# Patient Record
Sex: MALE | Race: BLACK OR AFRICAN AMERICAN | Employment: OTHER | URBAN - METROPOLITAN AREA
[De-identification: names, ages, dates, MRNs, and addresses within clinical notes are randomized per-mention and may not be internally consistent; named-entity substitution may affect disease eponyms.]

---

## 2017-06-02 ENCOUNTER — HOSPITAL ENCOUNTER (OUTPATIENT)
Dept: ULTRASOUND IMAGING | Age: 67
Discharge: HOME | End: 2017-06-02
Attending: UROLOGY | Admitting: UROLOGY

## 2017-06-02 DIAGNOSIS — C61 MALIGNANT NEOPLASM OF PROSTATE (HCC): ICD-10-CM

## 2018-03-31 ENCOUNTER — HOSPITAL ENCOUNTER (OUTPATIENT)
Dept: OTHER | Age: 68
Discharge: HOME | End: 2018-03-31
Attending: INTERNAL MEDICINE | Admitting: INTERNAL MEDICINE

## 2018-03-31 LAB
ANION GAP SERPL CALCULATED.3IONS-SCNC: 18 MMOL/L (ref 3–16)
BASOPHILS ABSOLUTE: 0.1 K/UL (ref 0–0.2)
BASOPHILS RELATIVE PERCENT: 1.2 %
BUN BLDV-MCNC: 16 MG/DL (ref 7–20)
CALCIUM SERPL-MCNC: 10 MG/DL (ref 8.3–10.6)
CHLORIDE BLD-SCNC: 103 MMOL/L (ref 99–110)
CO2: 23 MMOL/L (ref 21–32)
CREAT SERPL-MCNC: 1.9 MG/DL (ref 0.8–1.3)
CREATININE URINE: 164.5 MG/DL (ref 39–259)
EOSINOPHILS ABSOLUTE: 0.3 K/UL (ref 0–0.6)
EOSINOPHILS RELATIVE PERCENT: 4.8 %
FERRITIN: 176.6 NG/ML (ref 30–400)
GFR AFRICAN AMERICAN: 43
GFR NON-AFRICAN AMERICAN: 36
GLUCOSE BLD-MCNC: 186 MG/DL (ref 70–99)
HCT VFR BLD CALC: 42.2 % (ref 40.5–52.5)
HEMOGLOBIN: 13.9 G/DL (ref 13.5–17.5)
IRON SATURATION: 24 % (ref 20–50)
IRON: 67 UG/DL (ref 59–158)
LYMPHOCYTES ABSOLUTE: 2.7 K/UL (ref 1–5.1)
LYMPHOCYTES RELATIVE PERCENT: 42.7 %
MCH RBC QN AUTO: 32.1 PG (ref 26–34)
MCHC RBC AUTO-ENTMCNC: 32.9 G/DL (ref 31–36)
MCV RBC AUTO: 97.7 FL (ref 80–100)
MICROALBUMIN UR-MCNC: 305.1 MG/DL
MICROALBUMIN/CREAT UR-RTO: 1854.7 MG/G (ref 0–30)
MONOCYTES ABSOLUTE: 0.7 K/UL (ref 0–1.3)
MONOCYTES RELATIVE PERCENT: 10.8 %
NEUTROPHILS ABSOLUTE: 2.6 K/UL (ref 1.7–7.7)
NEUTROPHILS RELATIVE PERCENT: 40.5 %
PARATHYROID HORMONE INTACT: 27.2 PG/ML (ref 14–72)
PDW BLD-RTO: 14.3 % (ref 12.4–15.4)
PHOSPHORUS: 3.6 MG/DL (ref 2.5–4.9)
PLATELET # BLD: 356 K/UL (ref 135–450)
PMV BLD AUTO: 7.6 FL (ref 5–10.5)
POTASSIUM SERPL-SCNC: 4.5 MMOL/L (ref 3.5–5.1)
RBC # BLD: 4.32 M/UL (ref 4.2–5.9)
SODIUM BLD-SCNC: 144 MMOL/L (ref 136–145)
TOTAL IRON BINDING CAPACITY: 277 UG/DL (ref 260–445)
VITAMIN D 25-HYDROXY: 29 NG/ML
WBC # BLD: 6.4 K/UL (ref 4–11)

## 2018-10-10 ENCOUNTER — HOSPITAL ENCOUNTER (OUTPATIENT)
Age: 68
Discharge: HOME | End: 2018-10-10
Payer: MEDICARE

## 2018-10-10 LAB
ANION GAP SERPL CALCULATED.3IONS-SCNC: 15 MMOL/L (ref 3–16)
BUN BLDV-MCNC: 22 MG/DL (ref 7–20)
CALCIUM SERPL-MCNC: 10.2 MG/DL (ref 8.3–10.6)
CHLORIDE BLD-SCNC: 99 MMOL/L (ref 99–110)
CO2: 23 MMOL/L (ref 21–32)
CREAT SERPL-MCNC: 1.8 MG/DL (ref 0.8–1.3)
CREATININE URINE: 178.9 MG/DL (ref 39–259)
FERRITIN: 188.6 NG/ML (ref 30–400)
GFR AFRICAN AMERICAN: 46
GFR NON-AFRICAN AMERICAN: 38
GLUCOSE BLD-MCNC: 279 MG/DL (ref 70–99)
HCT VFR BLD CALC: 42.1 % (ref 40.5–52.5)
HEMOGLOBIN: 14 G/DL (ref 13.5–17.5)
IRON SATURATION: 25 % (ref 20–50)
IRON: 68 UG/DL (ref 59–158)
MCH RBC QN AUTO: 31.5 PG (ref 26–34)
MCHC RBC AUTO-ENTMCNC: 33.2 G/DL (ref 31–36)
MCV RBC AUTO: 95 FL (ref 80–100)
MICROALBUMIN UR-MCNC: 501.8 MG/DL
MICROALBUMIN/CREAT UR-RTO: 2804.9 MG/G (ref 0–30)
PARATHYROID HORMONE INTACT: 27.6 PG/ML (ref 14–72)
PDW BLD-RTO: 13.7 % (ref 12.4–15.4)
PHOSPHORUS: 2.8 MG/DL (ref 2.5–4.9)
PLATELET # BLD: 284 K/UL (ref 135–450)
PMV BLD AUTO: 8.1 FL (ref 5–10.5)
POTASSIUM SERPL-SCNC: 4.4 MMOL/L (ref 3.5–5.1)
RBC # BLD: 4.43 M/UL (ref 4.2–5.9)
SODIUM BLD-SCNC: 137 MMOL/L (ref 136–145)
TOTAL IRON BINDING CAPACITY: 271 UG/DL (ref 260–445)
VITAMIN D 25-HYDROXY: 27 NG/ML
WBC # BLD: 7.3 K/UL (ref 4–11)

## 2018-10-10 PROCEDURE — 82043 UR ALBUMIN QUANTITATIVE: CPT

## 2018-10-10 PROCEDURE — 83540 ASSAY OF IRON: CPT

## 2018-10-10 PROCEDURE — 83970 ASSAY OF PARATHORMONE: CPT

## 2018-10-10 PROCEDURE — 85027 COMPLETE CBC AUTOMATED: CPT

## 2018-10-10 PROCEDURE — 84100 ASSAY OF PHOSPHORUS: CPT

## 2018-10-10 PROCEDURE — 36415 COLL VENOUS BLD VENIPUNCTURE: CPT

## 2018-10-10 PROCEDURE — 82570 ASSAY OF URINE CREATININE: CPT

## 2018-10-10 PROCEDURE — 82306 VITAMIN D 25 HYDROXY: CPT

## 2018-10-10 PROCEDURE — 82728 ASSAY OF FERRITIN: CPT

## 2018-10-10 PROCEDURE — 80048 BASIC METABOLIC PNL TOTAL CA: CPT

## 2018-10-10 PROCEDURE — 83550 IRON BINDING TEST: CPT

## 2019-05-17 ENCOUNTER — HOSPITAL ENCOUNTER (OUTPATIENT)
Age: 69
Discharge: HOME | End: 2019-05-17
Payer: MEDICARE

## 2019-05-17 LAB
ALBUMIN SERPL-MCNC: 3.6 G/DL (ref 3.4–5)
ANION GAP SERPL CALCULATED.3IONS-SCNC: 13 MMOL/L (ref 3–16)
BUN BLDV-MCNC: 16 MG/DL (ref 7–20)
CALCIUM SERPL-MCNC: 9.5 MG/DL (ref 8.3–10.6)
CHLORIDE BLD-SCNC: 108 MMOL/L (ref 99–110)
CO2: 24 MMOL/L (ref 21–32)
CREAT SERPL-MCNC: 2.2 MG/DL (ref 0.8–1.3)
CREATININE URINE: 119.5 MG/DL (ref 39–259)
FERRITIN: 88 NG/ML (ref 30–400)
GFR AFRICAN AMERICAN: 36
GFR NON-AFRICAN AMERICAN: 30
GLUCOSE BLD-MCNC: 56 MG/DL (ref 70–99)
HCT VFR BLD CALC: 41.8 % (ref 40.5–52.5)
HEMOGLOBIN: 14.2 G/DL (ref 13.5–17.5)
IRON SATURATION: 19 % (ref 20–50)
IRON: 53 UG/DL (ref 59–158)
MCH RBC QN AUTO: 33.5 PG (ref 26–34)
MCHC RBC AUTO-ENTMCNC: 34 G/DL (ref 31–36)
MCV RBC AUTO: 98.6 FL (ref 80–100)
MICROALBUMIN UR-MCNC: 278 MG/DL
MICROALBUMIN/CREAT UR-RTO: 2326.4 MG/G (ref 0–30)
PARATHYROID HORMONE INTACT: 68.1 PG/ML (ref 14–72)
PDW BLD-RTO: 14.2 % (ref 12.4–15.4)
PHOSPHORUS: 2.2 MG/DL (ref 2.5–4.9)
PLATELET # BLD: 317 K/UL (ref 135–450)
PMV BLD AUTO: 7.5 FL (ref 5–10.5)
POTASSIUM SERPL-SCNC: 4.4 MMOL/L (ref 3.5–5.1)
RBC # BLD: 4.24 M/UL (ref 4.2–5.9)
SODIUM BLD-SCNC: 145 MMOL/L (ref 136–145)
TOTAL IRON BINDING CAPACITY: 274 UG/DL (ref 260–445)
VITAMIN D 25-HYDROXY: 24.4 NG/ML
WBC # BLD: 7.3 K/UL (ref 4–11)

## 2019-05-17 PROCEDURE — 80069 RENAL FUNCTION PANEL: CPT

## 2019-05-17 PROCEDURE — 85027 COMPLETE CBC AUTOMATED: CPT

## 2019-05-17 PROCEDURE — 83550 IRON BINDING TEST: CPT

## 2019-05-17 PROCEDURE — 83540 ASSAY OF IRON: CPT

## 2019-05-17 PROCEDURE — 83970 ASSAY OF PARATHORMONE: CPT

## 2019-05-17 PROCEDURE — 82728 ASSAY OF FERRITIN: CPT

## 2019-05-17 PROCEDURE — 82043 UR ALBUMIN QUANTITATIVE: CPT

## 2019-05-17 PROCEDURE — 82570 ASSAY OF URINE CREATININE: CPT

## 2019-05-17 PROCEDURE — 82306 VITAMIN D 25 HYDROXY: CPT

## 2019-05-17 PROCEDURE — 36415 COLL VENOUS BLD VENIPUNCTURE: CPT

## 2019-09-06 ENCOUNTER — HOSPITAL ENCOUNTER (OUTPATIENT)
Age: 69
Discharge: HOME | End: 2019-09-06
Payer: MEDICARE

## 2019-09-06 LAB
ALBUMIN SERPL-MCNC: 4.3 G/DL (ref 3.4–5)
ANION GAP SERPL CALCULATED.3IONS-SCNC: 19 MMOL/L (ref 3–16)
BUN BLDV-MCNC: 18 MG/DL (ref 7–20)
CALCIUM SERPL-MCNC: 10.4 MG/DL (ref 8.3–10.6)
CHLORIDE BLD-SCNC: 104 MMOL/L (ref 99–110)
CO2: 22 MMOL/L (ref 21–32)
CREAT SERPL-MCNC: 2.2 MG/DL (ref 0.8–1.3)
CREATININE URINE: 213.7 MG/DL (ref 39–259)
FERRITIN: 117.8 NG/ML (ref 30–400)
GFR AFRICAN AMERICAN: 36
GFR NON-AFRICAN AMERICAN: 30
GLUCOSE BLD-MCNC: 55 MG/DL (ref 70–99)
HCT VFR BLD CALC: 45.6 % (ref 40.5–52.5)
HEMOGLOBIN: 14.9 G/DL (ref 13.5–17.5)
IRON SATURATION: 21 % (ref 20–50)
IRON: 65 UG/DL (ref 59–158)
MCH RBC QN AUTO: 32.4 PG (ref 26–34)
MCHC RBC AUTO-ENTMCNC: 32.7 G/DL (ref 31–36)
MCV RBC AUTO: 99.1 FL (ref 80–100)
MICROALBUMIN UR-MCNC: 661.1 MG/DL
MICROALBUMIN/CREAT UR-RTO: 3093.6 MG/G (ref 0–30)
PARATHYROID HORMONE INTACT: 41.4 PG/ML (ref 14–72)
PDW BLD-RTO: 15 % (ref 12.4–15.4)
PHOSPHORUS: 3.3 MG/DL (ref 2.5–4.9)
PLATELET # BLD: 354 K/UL (ref 135–450)
PMV BLD AUTO: 7.7 FL (ref 5–10.5)
POTASSIUM SERPL-SCNC: 4.2 MMOL/L (ref 3.5–5.1)
RBC # BLD: 4.6 M/UL (ref 4.2–5.9)
SODIUM BLD-SCNC: 145 MMOL/L (ref 136–145)
TOTAL IRON BINDING CAPACITY: 312 UG/DL (ref 260–445)
VITAMIN D 25-HYDROXY: 30.2 NG/ML
WBC # BLD: 10.2 K/UL (ref 4–11)

## 2019-09-06 PROCEDURE — 83550 IRON BINDING TEST: CPT

## 2019-09-06 PROCEDURE — 80069 RENAL FUNCTION PANEL: CPT

## 2019-09-06 PROCEDURE — 85027 COMPLETE CBC AUTOMATED: CPT

## 2019-09-06 PROCEDURE — 82306 VITAMIN D 25 HYDROXY: CPT

## 2019-09-06 PROCEDURE — 82570 ASSAY OF URINE CREATININE: CPT

## 2019-09-06 PROCEDURE — 82043 UR ALBUMIN QUANTITATIVE: CPT

## 2019-09-06 PROCEDURE — 36415 COLL VENOUS BLD VENIPUNCTURE: CPT

## 2019-09-06 PROCEDURE — 83540 ASSAY OF IRON: CPT

## 2019-09-06 PROCEDURE — 83970 ASSAY OF PARATHORMONE: CPT

## 2019-09-06 PROCEDURE — 82728 ASSAY OF FERRITIN: CPT

## 2020-02-12 ENCOUNTER — HOSPITAL ENCOUNTER (OUTPATIENT)
Age: 70
Discharge: HOME | End: 2020-02-12
Payer: COMMERCIAL

## 2020-02-12 LAB
ALBUMIN SERPL-MCNC: 4.1 G/DL (ref 3.4–5)
ANION GAP SERPL CALCULATED.3IONS-SCNC: 19 MMOL/L (ref 3–16)
BUN BLDV-MCNC: 31 MG/DL (ref 7–20)
CALCIUM SERPL-MCNC: 9.9 MG/DL (ref 8.3–10.6)
CHLORIDE BLD-SCNC: 105 MMOL/L (ref 99–110)
CO2: 21 MMOL/L (ref 21–32)
CREAT SERPL-MCNC: 2.1 MG/DL (ref 0.8–1.3)
CREATININE URINE: 133.6 MG/DL (ref 39–259)
FERRITIN: 73.3 NG/ML (ref 30–400)
GFR AFRICAN AMERICAN: 38
GFR NON-AFRICAN AMERICAN: 32
GLUCOSE BLD-MCNC: 98 MG/DL (ref 70–99)
HCT VFR BLD CALC: 43.8 % (ref 40.5–52.5)
HEMOGLOBIN: 14.5 G/DL (ref 13.5–17.5)
IRON SATURATION: 25 % (ref 20–50)
IRON: 78 UG/DL (ref 59–158)
MCH RBC QN AUTO: 32.2 PG (ref 26–34)
MCHC RBC AUTO-ENTMCNC: 33 G/DL (ref 31–36)
MCV RBC AUTO: 97.7 FL (ref 80–100)
MICROALBUMIN UR-MCNC: 424.7 MG/DL
MICROALBUMIN/CREAT UR-RTO: 3178.9 MG/G (ref 0–30)
PARATHYROID HORMONE INTACT: 36.8 PG/ML (ref 14–72)
PDW BLD-RTO: 14.3 % (ref 12.4–15.4)
PHOSPHORUS: 3.8 MG/DL (ref 2.5–4.9)
PLATELET # BLD: 307 K/UL (ref 135–450)
PMV BLD AUTO: 7.6 FL (ref 5–10.5)
POTASSIUM SERPL-SCNC: 4.3 MMOL/L (ref 3.5–5.1)
RBC # BLD: 4.49 M/UL (ref 4.2–5.9)
SODIUM BLD-SCNC: 145 MMOL/L (ref 136–145)
TOTAL IRON BINDING CAPACITY: 314 UG/DL (ref 260–445)
VITAMIN D 25-HYDROXY: 27.2 NG/ML
WBC # BLD: 6.7 K/UL (ref 4–11)

## 2020-02-12 PROCEDURE — 85027 COMPLETE CBC AUTOMATED: CPT

## 2020-02-12 PROCEDURE — 83540 ASSAY OF IRON: CPT

## 2020-02-12 PROCEDURE — 83970 ASSAY OF PARATHORMONE: CPT

## 2020-02-12 PROCEDURE — 82043 UR ALBUMIN QUANTITATIVE: CPT

## 2020-02-12 PROCEDURE — 83550 IRON BINDING TEST: CPT

## 2020-02-12 PROCEDURE — 82570 ASSAY OF URINE CREATININE: CPT

## 2020-02-12 PROCEDURE — 36415 COLL VENOUS BLD VENIPUNCTURE: CPT

## 2020-02-12 PROCEDURE — 82306 VITAMIN D 25 HYDROXY: CPT

## 2020-02-12 PROCEDURE — 80069 RENAL FUNCTION PANEL: CPT

## 2020-02-12 PROCEDURE — 82728 ASSAY OF FERRITIN: CPT

## 2020-09-09 ENCOUNTER — OFFICE VISIT (OUTPATIENT)
Dept: INTERNAL MEDICINE CLINIC | Age: 70
End: 2020-09-09
Payer: COMMERCIAL

## 2020-09-09 VITALS
BODY MASS INDEX: 51.38 KG/M2 | OXYGEN SATURATION: 93 % | HEIGHT: 63 IN | DIASTOLIC BLOOD PRESSURE: 78 MMHG | HEART RATE: 84 BPM | WEIGHT: 290 LBS | TEMPERATURE: 96.8 F | SYSTOLIC BLOOD PRESSURE: 140 MMHG

## 2020-09-09 PROBLEM — G47.33 OSA (OBSTRUCTIVE SLEEP APNEA): Status: ACTIVE | Noted: 2020-01-28

## 2020-09-09 PROBLEM — Z86.010 HISTORY OF COLONIC POLYPS: Status: ACTIVE | Noted: 2017-07-21

## 2020-09-09 PROBLEM — E11.9 TYPE 2 DIABETES MELLITUS WITHOUT COMPLICATION, WITHOUT LONG-TERM CURRENT USE OF INSULIN (HCC): Status: ACTIVE | Noted: 2018-02-07

## 2020-09-09 PROBLEM — M47.816 SPONDYLOSIS OF LUMBAR REGION WITHOUT MYELOPATHY OR RADICULOPATHY: Status: ACTIVE | Noted: 2018-02-16

## 2020-09-09 PROBLEM — N18.9 ANEMIA IN CHRONIC KIDNEY DISEASE: Status: ACTIVE | Noted: 2020-09-09

## 2020-09-09 PROBLEM — Z80.0 FAMILY HISTORY OF MALIGNANT NEOPLASM OF GASTROINTESTINAL TRACT: Status: ACTIVE | Noted: 2017-05-12

## 2020-09-09 PROBLEM — N25.0 ROD (RENAL OSTEODYSTROPHY): Status: ACTIVE | Noted: 2020-09-09

## 2020-09-09 PROBLEM — Z86.0100 HISTORY OF COLONIC POLYPS: Status: ACTIVE | Noted: 2017-07-21

## 2020-09-09 PROBLEM — D63.1 ANEMIA IN CHRONIC KIDNEY DISEASE: Status: ACTIVE | Noted: 2020-09-09

## 2020-09-09 PROBLEM — G47.10 HYPERSOMNOLENCE: Status: ACTIVE | Noted: 2020-01-28

## 2020-09-09 PROCEDURE — 1036F TOBACCO NON-USER: CPT | Performed by: INTERNAL MEDICINE

## 2020-09-09 PROCEDURE — 3017F COLORECTAL CA SCREEN DOC REV: CPT | Performed by: INTERNAL MEDICINE

## 2020-09-09 PROCEDURE — 3046F HEMOGLOBIN A1C LEVEL >9.0%: CPT | Performed by: INTERNAL MEDICINE

## 2020-09-09 PROCEDURE — G8417 CALC BMI ABV UP PARAM F/U: HCPCS | Performed by: INTERNAL MEDICINE

## 2020-09-09 PROCEDURE — 1123F ACP DISCUSS/DSCN MKR DOCD: CPT | Performed by: INTERNAL MEDICINE

## 2020-09-09 PROCEDURE — 2022F DILAT RTA XM EVC RTNOPTHY: CPT | Performed by: INTERNAL MEDICINE

## 2020-09-09 PROCEDURE — 99203 OFFICE O/P NEW LOW 30 MIN: CPT | Performed by: INTERNAL MEDICINE

## 2020-09-09 PROCEDURE — 4040F PNEUMOC VAC/ADMIN/RCVD: CPT | Performed by: INTERNAL MEDICINE

## 2020-09-09 PROCEDURE — G8427 DOCREV CUR MEDS BY ELIG CLIN: HCPCS | Performed by: INTERNAL MEDICINE

## 2020-09-09 RX ORDER — AMMONIUM LACTATE 12 G/100G
CREAM TOPICAL PRN
COMMUNITY

## 2020-09-09 RX ORDER — HYDROCODONE BITARTRATE AND ACETAMINOPHEN 10; 325 MG/1; MG/1
1 TABLET ORAL EVERY 8 HOURS PRN
COMMUNITY
Start: 2020-08-24 | End: 2020-10-23

## 2020-09-09 RX ORDER — GLIMEPIRIDE 4 MG/1
4 TABLET ORAL
COMMUNITY
Start: 2020-07-01 | End: 2021-11-04 | Stop reason: SDUPTHER

## 2020-09-09 RX ORDER — PIOGLITAZONEHYDROCHLORIDE 30 MG/1
30 TABLET ORAL DAILY
COMMUNITY
Start: 2020-06-29 | End: 2021-03-17 | Stop reason: SDUPTHER

## 2020-09-09 RX ORDER — ALENDRONATE SODIUM 70 MG/1
70 TABLET ORAL
COMMUNITY
Start: 2020-07-01 | End: 2021-04-24

## 2020-09-09 RX ORDER — METOPROLOL SUCCINATE 100 MG/1
100 TABLET, EXTENDED RELEASE ORAL DAILY
COMMUNITY
Start: 2019-10-29 | End: 2021-03-18 | Stop reason: SDUPTHER

## 2020-09-09 RX ORDER — AMLODIPINE BESYLATE 10 MG/1
10 TABLET ORAL DAILY
COMMUNITY
Start: 2019-10-29 | End: 2021-03-18 | Stop reason: SDUPTHER

## 2020-09-09 RX ORDER — SALSALATE 750 MG
750 TABLET ORAL 2 TIMES DAILY PRN
COMMUNITY
Start: 2020-07-01

## 2020-09-09 RX ORDER — ATORVASTATIN CALCIUM 40 MG/1
40 TABLET, FILM COATED ORAL NIGHTLY
COMMUNITY
Start: 2020-07-01 | End: 2021-03-18 | Stop reason: SDUPTHER

## 2020-09-09 RX ORDER — LANOLIN ALCOHOL/MO/W.PET/CERES
50 CREAM (GRAM) TOPICAL DAILY
COMMUNITY

## 2020-09-09 SDOH — SOCIAL STABILITY: SOCIAL INSECURITY: WITHIN THE LAST YEAR, HAVE YOU BEEN AFRAID OF YOUR PARTNER OR EX-PARTNER?: NO

## 2020-09-09 SDOH — ECONOMIC STABILITY: TRANSPORTATION INSECURITY
IN THE PAST 12 MONTHS, HAS THE LACK OF TRANSPORTATION KEPT YOU FROM MEDICAL APPOINTMENTS OR FROM GETTING MEDICATIONS?: NO

## 2020-09-09 SDOH — SOCIAL STABILITY: SOCIAL NETWORK: HOW OFTEN DO YOU GET TOGETHER WITH FRIENDS OR RELATIVES?: MORE THAN THREE TIMES A WEEK

## 2020-09-09 SDOH — HEALTH STABILITY: MENTAL HEALTH
STRESS IS WHEN SOMEONE FEELS TENSE, NERVOUS, ANXIOUS, OR CAN'T SLEEP AT NIGHT BECAUSE THEIR MIND IS TROUBLED. HOW STRESSED ARE YOU?: NOT AT ALL

## 2020-09-09 SDOH — SOCIAL STABILITY: SOCIAL NETWORK: HOW OFTEN DO YOU ATTENT MEETINGS OF THE CLUB OR ORGANIZATION YOU BELONG TO?: MORE THAN 4 TIMES PER YEAR

## 2020-09-09 SDOH — HEALTH STABILITY: MENTAL HEALTH: HOW OFTEN DO YOU HAVE A DRINK CONTAINING ALCOHOL?: MONTHLY OR LESS

## 2020-09-09 SDOH — SOCIAL STABILITY: SOCIAL NETWORK: HOW OFTEN DO YOU ATTEND CHURCH OR RELIGIOUS SERVICES?: 1 TO 4 TIMES PER YEAR

## 2020-09-09 SDOH — SOCIAL STABILITY: SOCIAL INSECURITY
WITHIN THE LAST YEAR, HAVE YOU BEEN KICKED, HIT, SLAPPED, OR OTHERWISE PHYSICALLY HURT BY YOUR PARTNER OR EX-PARTNER?: NO

## 2020-09-09 SDOH — ECONOMIC STABILITY: INCOME INSECURITY: HOW HARD IS IT FOR YOU TO PAY FOR THE VERY BASICS LIKE FOOD, HOUSING, MEDICAL CARE, AND HEATING?: NOT VERY HARD

## 2020-09-09 SDOH — SOCIAL STABILITY: SOCIAL INSECURITY: WITHIN THE LAST YEAR, HAVE YOU BEEN HUMILIATED OR EMOTIONALLY ABUSED IN OTHER WAYS BY YOUR PARTNER OR EX-PARTNER?: NO

## 2020-09-09 SDOH — HEALTH STABILITY: PHYSICAL HEALTH: ON AVERAGE, HOW MANY DAYS PER WEEK DO YOU ENGAGE IN MODERATE TO STRENUOUS EXERCISE (LIKE A BRISK WALK)?: NOT ASKED

## 2020-09-09 SDOH — ECONOMIC STABILITY: TRANSPORTATION INSECURITY
IN THE PAST 12 MONTHS, HAS LACK OF TRANSPORTATION KEPT YOU FROM MEETINGS, WORK, OR FROM GETTING THINGS NEEDED FOR DAILY LIVING?: NO

## 2020-09-09 SDOH — HEALTH STABILITY: MENTAL HEALTH: HOW MANY STANDARD DRINKS CONTAINING ALCOHOL DO YOU HAVE ON A TYPICAL DAY?: 1 OR 2

## 2020-09-09 SDOH — ECONOMIC STABILITY: FOOD INSECURITY: WITHIN THE PAST 12 MONTHS, THE FOOD YOU BOUGHT JUST DIDN'T LAST AND YOU DIDN'T HAVE MONEY TO GET MORE.: NEVER TRUE

## 2020-09-09 SDOH — ECONOMIC STABILITY: FOOD INSECURITY: WITHIN THE PAST 12 MONTHS, YOU WORRIED THAT YOUR FOOD WOULD RUN OUT BEFORE YOU GOT MONEY TO BUY MORE.: NEVER TRUE

## 2020-09-09 SDOH — SOCIAL STABILITY: SOCIAL NETWORK
IN A TYPICAL WEEK, HOW MANY TIMES DO YOU TALK ON THE PHONE WITH FAMILY, FRIENDS, OR NEIGHBORS?: MORE THAN THREE TIMES A WEEK

## 2020-09-09 SDOH — SOCIAL STABILITY: SOCIAL NETWORK: ARE YOU MARRIED, WIDOWED, DIVORCED, SEPARATED, NEVER MARRIED, OR LIVING WITH A PARTNER?: DIVORCED

## 2020-09-09 SDOH — HEALTH STABILITY: PHYSICAL HEALTH: ON AVERAGE, HOW MANY MINUTES DO YOU ENGAGE IN EXERCISE AT THIS LEVEL?: NOT ASKED

## 2020-09-09 SDOH — SOCIAL STABILITY: SOCIAL INSECURITY
WITHIN THE LAST YEAR, HAVE TO BEEN RAPED OR FORCED TO HAVE ANY KIND OF SEXUAL ACTIVITY BY YOUR PARTNER OR EX-PARTNER?: NO

## 2020-09-09 ASSESSMENT — ENCOUNTER SYMPTOMS
RESPIRATORY NEGATIVE: 1
ALLERGIC/IMMUNOLOGIC NEGATIVE: 1
GASTROINTESTINAL NEGATIVE: 1
EYES NEGATIVE: 1

## 2020-09-09 ASSESSMENT — PATIENT HEALTH QUESTIONNAIRE - PHQ9
SUM OF ALL RESPONSES TO PHQ9 QUESTIONS 1 & 2: 0
SUM OF ALL RESPONSES TO PHQ QUESTIONS 1-9: 0
2. FEELING DOWN, DEPRESSED OR HOPELESS: 0
SUM OF ALL RESPONSES TO PHQ QUESTIONS 1-9: 0
1. LITTLE INTEREST OR PLEASURE IN DOING THINGS: 0

## 2020-09-09 NOTE — PROGRESS NOTES
Subjective:      Patient ID: Dipak Steve is a 72 y.o. male. HPI       Diabetes  Treatment Adherence:   Medication compliance:  noncompliant: with meals  Diet compliance:  compliant most of the time  Weight trend: stable  Current exercise: no regular exercise  Barriers: lack of motivation    Diabetes Mellitus Type 2: Current symptoms/problems include none. Home blood sugar records: patient does not test  Any episodes of hypoglycemia? no  Eye exam current (within one year): no  Tobacco history: He  reports that he quit smoking about 19 years ago. He started smoking about 40 years ago. He has quit using smokeless tobacco.   Daily Aspirin? Yes    Hypertension:  Home blood pressure monitoring: No.  He is not adherent to a low sodium diet. Patient denies chest pain, shortness of breath, headache, lightheadedness, blurred vision and dry cough. Antihypertensive medication side effects: no medication side effects noted. Use of agents associated with hypertension: none. Hyperlipidemia:  No new myalgias or GI upset on atorvastatin (Lipitor). No results found for: LABA1C  Lab Results   Component Value Date    LABMICR 424.70 (H) 02/12/2020    CREATININE 2.1 (H) 02/12/2020     Lab Results   Component Value Date    ALT 28 02/19/2015    AST 29 02/19/2015     Lab Results   Component Value Date    CHOL 201 (H) 11/14/2011    TRIG 123 11/14/2011    HDL 55 11/14/2011    LDLCALC 122 (H) 11/14/2011        CKD:    Dipak Steve is a 72 y.o. male who presents for follow-up of chronic renal disease caused by hypertension and diabetes.  Treatments have been prescribed for slowing the progression of CRF include cardiovascular risk factor reduction including advanced age (older than 54 for men, 72 for women), diabetes mellitus, dyslipidemia, hypertension, obesity (BMI >= 30 kg/m2) and sedentary lifestyle, glycemic control, ACE inhibitor therapy, blood pressure control and recognition and treatment of hypoglycemia. Patient has significant obesity and reports that he really does want to lose weight. He states he eats quite a bit.   He has had success with weight loss in the past.    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    Past Medical History:   Diagnosis Date    Cancer [facility])     prostate    Hyperlipidemia     Hypertension     Obesity     Osteoarthritis     Type 2 diabetes mellitus without complication ([facility] [address]     Type 2 diabetes mellitus without complication, without long-term current use of insulin ([facility] 2018     Past Surgical History:   Procedure Laterality Date    JOINT REPLACEMENT      TOTAL HIP ARTHROPLASTY       Family History   Problem Relation Age of Onset    Hypertension Mother     Diabetes Mother     Hypertension Father      Social History     Socioeconomic History    Marital status: Single     Spouse name: Not on file    Number of children: 2    Years of education: Not on file    Highest education level: High school graduate   Occupational History    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   Social Needs    Financial resource strain: Not very hard    Food insecurity     Worry: Never true     Inability: Never true    Transportation needs     Medical: No     Non-medical: No   Tobacco Use    Smoking status: Former Smoker     Start date:      Last attempt to quit: 2001     Years since quittin.7    Smokeless tobacco: Former User   Substance and Sexual Activity    Alcohol use: Yes     Frequency: Monthly or less     Drinks per session: 1 or 2     Binge frequency: Less than monthly     Comment: occ    Drug use: Never    Sexual activity: Yes     Partners: Female   Lifestyle    Physical activity     Days per week: Not on file     Minutes per session: Not on file    Stress: Not at all   Relationships    Social connections     Talks on phone: More than three times a week     Gets together: More than three times a week     Attends Church service: 1 to 4 times per year     Active member of club or organization: Not on file     Attends meetings of clubs or organizations: More than 4 times per year     Relationship status:     Intimate partner violence     Fear of current or ex partner: No     Emotionally abused: No     Physically abused: No     Forced sexual activity: No   Other Topics Concern    Not on file   Social History Narrative    Lives at home alone. He has 2 adult children. \  Review of Systems   Constitutional: Negative. HENT: Negative. Eyes: Negative. Respiratory: Negative. Cardiovascular: Negative. Gastrointestinal: Negative. Endocrine: Negative. Musculoskeletal: Negative. Skin: Negative. Allergic/Immunologic: Negative. Neurological: Negative. Hematological: Negative. Psychiatric/Behavioral: Negative. All other systems reviewed and are negative. .    Allergies   Allergen Reactions    Unable To Assess       Anesthesia that begins with S- stops my breathing       Current Outpatient Medications   Medication Sig Dispense Refill    tamsulosin (FLOMAX) 0.4 MG capsule Take 1 capsule by mouth daily for 10 days. 10 capsule 0    metoprolol (LOPRESSOR) 50 MG tablet Take 75 mg by mouth daily.  ergocalciferol (ERGOCALCIFEROL) 18373 UNITS capsule Take 50,000 Units by mouth once a week.  zolpidem (AMBIEN) 5 MG tablet Take 5 mg by mouth nightly as needed for Sleep.  bicalutamide (CASODEX) 50 MG chemo tablet Take 50 mg by mouth daily.  Naproxen-Esomeprazole (VIMOVO) 500-20 MG TBEC Take  by mouth daily.  simvastatin (ZOCOR) 10 MG tablet Take 10 mg by mouth nightly.  Cyanocobalamin (VITAMIN B 12 PO) Take  by mouth daily.  cyclobenzaprine (FLEXERIL) 10 MG tablet Take 1 tablet by mouth 3 times daily as needed for Muscle spasms. 21 tablet 0     No current facility-administered medications for this visit. Vitals:    09/09/20 1409 09/09/20 1417   BP: (!) 142/82 (!) 140/78   Site: Right Lower Arm Right Lower Arm   Position: Sitting Sitting   Cuff Size: Large Adult Large Adult   Pulse: 84    Temp: 96.8 °F (36 °C)    TempSrc: Infrared    SpO2: 93%    Weight: 290 lb (131.5 kg)    Height: 5' 3\" (1.6 m)      Body mass index is 51.37 kg/m². Wt Readings from Last 3 Encounters:   09/09/20 290 lb (131.5 kg)     BP Readings from Last 3 Encounters:   09/09/20 (!) 140/78         Objective:   Physical Exam  Vitals signs and nursing note reviewed. Constitutional:       General: He is not in acute distress. Appearance: He is well-developed. He is obese. He is not ill-appearing or diaphoretic. HENT:      Head: Normocephalic and atraumatic. Right Ear: Tympanic membrane, ear canal and external ear normal.      Left Ear: Tympanic membrane, ear canal and external ear normal.      Nose: Nose normal.      Mouth/Throat:      Mouth: Mucous membranes are moist.      Pharynx: Oropharynx is clear. No oropharyngeal exudate. Eyes:      General: No scleral icterus. Right eye: No discharge. Left eye: No discharge. Extraocular Movements: Extraocular movements intact. Conjunctiva/sclera: Conjunctivae normal.      Pupils: Pupils are equal, round, and reactive to light. Neck:      Musculoskeletal: Normal range of motion and neck supple. Thyroid: No thyromegaly. Vascular: No JVD. Trachea: No tracheal deviation. Cardiovascular:      Rate and Rhythm: Normal rate and regular rhythm. Pulses: Normal pulses. Heart sounds: Normal heart sounds. No murmur. Pulmonary:      Effort: Pulmonary effort is normal. No respiratory distress. Breath sounds: Normal breath sounds. No stridor. No wheezing or rales. Abdominal:      General: Bowel sounds are normal. There is no distension. Palpations: Abdomen is soft. There is no mass. Tenderness: There is no abdominal tenderness. There is no guarding or rebound. Musculoskeletal: Normal range of motion. General: No tenderness. Lymphadenopathy:      Cervical: No cervical adenopathy. Skin:     General: Skin is warm and dry. Capillary Refill: Capillary refill takes less than 2 seconds. Findings: No erythema or rash. Neurological:      General: No focal deficit present. Mental Status: He is alert and oriented to person, place, and time. Cranial Nerves: No cranial nerve deficit. Sensory: No sensory deficit. Motor: No abnormal muscle tone. Coordination: Coordination normal.      Deep Tendon Reflexes: Reflexes are normal and symmetric. Reflexes normal.   Psychiatric:         Mood and Affect: Mood normal.         Behavior: Behavior normal.         Thought Content: Thought content normal.         Judgment: Judgment normal.         Lab Review   No visits with results within 6 Month(s) from this visit.    Latest known visit with results is:   Hospital Outpatient Visit on 02/12/2020   Component Date Value    Ferritin 02/12/2020 73.3     Vit D, 25-Hydroxy 02/12/2020 27.2*    Microalbumin, Random Uri* 02/12/2020 424.70*    Creatinine, Ur 02/12/2020 133.6     Microalbumin Creatinine * 02/12/2020 3178.9*    Iron 02/12/2020 78     TIBC 02/12/2020 314     Iron Saturation 02/12/2020 25     PTH 02/12/2020 36.8     Sodium 02/12/2020 145     Potassium 02/12/2020 4.3     Chloride 02/12/2020 105     CO2 02/12/2020 21     Anion Gap 02/12/2020 19*    Glucose 02/12/2020 98     BUN 02/12/2020 31*    CREATININE 02/12/2020 2.1*    GFR Non- 02/12/2020 32*    GFR  02/12/2020 38*    Calcium 02/12/2020 9.9     Phosphorus 02/12/2020 3.8     Alb 02/12/2020 4.1     WBC 02/12/2020 6.7     RBC 02/12/2020 4.49     Hemoglobin 02/12/2020 14.5     Hematocrit 02/12/2020 43.8     MCV 02/12/2020 97.7     MCH 02/12/2020 32.2     MCHC 02/12/2020 33.0     RDW 02/12/2020 14.3     Platelets 85/28/3065 307     MPV 02/12/2020 7.6      I have reviewed the patient's medical history in detail and updated the computerized patient record. Assessment/Plan:  Yina Patient was seen today for new patient. Diagnoses and all orders for this visit:    Acute idiopathic gout of right foot  -Stable continue medication  Degeneration of lumbar or lumbosacral intervertebral disc  -Followed by pain management  Chronic renal insufficiency, stage III (moderate) (HCC)  -Has follow-up with nephrology  -Is on the appropriate blood pressure medications  Anemia in chronic kidney disease, unspecified CKD stage  -     [facility]    KAMRAN (obstructive sleep apnea)  -     [facility]    Type 2 diabetes mellitus without complication, without long-term current use of insulin [facility])  -     [facility]    Adult BMI 45.0-49.9 kg/sq m [facility])  -     Sam Angeles [facility]    A total of 35 minutes spent with the patient today greater than 50% in counseling regarding these issues. A great deal of time was spent discussing patient's chronic medical problems and the risk of morbidity associated with the problems.   Patient agreed to track his food and follow-up in 3 weeks with a food journal.       Deb Bone MD

## 2020-09-29 ENCOUNTER — OFFICE VISIT (OUTPATIENT)
Dept: INTERNAL MEDICINE CLINIC | Age: 70
End: 2020-09-29
Payer: COMMERCIAL

## 2020-09-29 VITALS
BODY MASS INDEX: 48.76 KG/M2 | WEIGHT: 293 LBS | TEMPERATURE: 97.8 F | DIASTOLIC BLOOD PRESSURE: 82 MMHG | HEART RATE: 69 BPM | OXYGEN SATURATION: 99 % | SYSTOLIC BLOOD PRESSURE: 138 MMHG

## 2020-09-29 LAB — HBA1C MFR BLD: 6.6 %

## 2020-09-29 PROCEDURE — 1036F TOBACCO NON-USER: CPT | Performed by: INTERNAL MEDICINE

## 2020-09-29 PROCEDURE — 90471 IMMUNIZATION ADMIN: CPT | Performed by: INTERNAL MEDICINE

## 2020-09-29 PROCEDURE — 4040F PNEUMOC VAC/ADMIN/RCVD: CPT | Performed by: INTERNAL MEDICINE

## 2020-09-29 PROCEDURE — 90653 IIV ADJUVANT VACCINE IM: CPT | Performed by: INTERNAL MEDICINE

## 2020-09-29 PROCEDURE — 2022F DILAT RTA XM EVC RTNOPTHY: CPT | Performed by: INTERNAL MEDICINE

## 2020-09-29 PROCEDURE — G8417 CALC BMI ABV UP PARAM F/U: HCPCS | Performed by: INTERNAL MEDICINE

## 2020-09-29 PROCEDURE — 3017F COLORECTAL CA SCREEN DOC REV: CPT | Performed by: INTERNAL MEDICINE

## 2020-09-29 PROCEDURE — 3044F HG A1C LEVEL LT 7.0%: CPT | Performed by: INTERNAL MEDICINE

## 2020-09-29 PROCEDURE — G8427 DOCREV CUR MEDS BY ELIG CLIN: HCPCS | Performed by: INTERNAL MEDICINE

## 2020-09-29 PROCEDURE — 1123F ACP DISCUSS/DSCN MKR DOCD: CPT | Performed by: INTERNAL MEDICINE

## 2020-09-29 PROCEDURE — 99213 OFFICE O/P EST LOW 20 MIN: CPT | Performed by: INTERNAL MEDICINE

## 2020-09-29 PROCEDURE — 83036 HEMOGLOBIN GLYCOSYLATED A1C: CPT | Performed by: INTERNAL MEDICINE

## 2020-09-29 RX ORDER — PEN NEEDLE, DIABETIC 31 G X1/4"
1 NEEDLE, DISPOSABLE MISCELLANEOUS DAILY
Qty: 100 EACH | Refills: 3 | Status: SHIPPED | OUTPATIENT
Start: 2020-09-29

## 2020-09-29 ASSESSMENT — ENCOUNTER SYMPTOMS
RESPIRATORY NEGATIVE: 1
ALLERGIC/IMMUNOLOGIC NEGATIVE: 1
EYES NEGATIVE: 1
GASTROINTESTINAL NEGATIVE: 1

## 2020-09-29 NOTE — PROGRESS NOTES
Subjective:      Patient ID: Lianne Acosta is a 72 y.o. male. HPI       Diabetes  Treatment Adherence:   Medication compliance:  noncompliant: with meals  Diet compliance:  compliant most of the time  Weight trend: stable  Current exercise: no regular exercise  Barriers: lack of motivation    Diabetes Mellitus Type 2: Current symptoms/problems include none. Home blood sugar records: patient does not test  Any episodes of hypoglycemia? no  Eye exam current (within one year): no  Tobacco history: He  reports that he quit smoking about 19 years ago. He started smoking about 40 years ago. He has quit using smokeless tobacco.   Daily Aspirin? Yes    Hypertension:  Home blood pressure monitoring: No.  He is not adherent to a low sodium diet. Patient denies chest pain, shortness of breath, headache, lightheadedness, blurred vision and dry cough. Antihypertensive medication side effects: no medication side effects noted. Use of agents associated with hypertension: none. Hyperlipidemia:  No new myalgias or GI upset on atorvastatin (Lipitor). Lab Results   Component Value Date    LABA1C 6.6 09/29/2020     Lab Results   Component Value Date    LABMICR 424.70 (H) 02/12/2020    CREATININE 2.1 (H) 02/12/2020     Lab Results   Component Value Date    ALT 28 02/19/2015    AST 29 02/19/2015     Lab Results   Component Value Date    CHOL 201 (H) 11/14/2011    TRIG 123 11/14/2011    HDL 55 11/14/2011    LDLCALC 122 (H) 11/14/2011        CKD:    Lianne Acosta is a 72 y.o. male who presents for follow-up of chronic renal disease caused by hypertension and diabetes.  Treatments have been prescribed for slowing the progression of CRF include cardiovascular risk factor reduction including advanced age (older than 54 for men, 72 for women), diabetes mellitus, dyslipidemia, hypertension, obesity (BMI >= 30 kg/m2) and sedentary lifestyle, glycemic control, ACE inhibitor therapy, blood pressure control and recognition and treatment of hypoglycemia. Patient has significant obesity and reports that he really does want to lose weight. He states he eats quite a bit.   He has had success with weight loss in the past.    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    Past Medical History:   Diagnosis Date    Cancer [facility])     prostate    Hyperlipidemia     Hypertension     Obesity     Osteoarthritis     Type 2 diabetes mellitus without complication ([facility] [address]     Type 2 diabetes mellitus without complication, without long-term current use of insulin ([facility] 2018     Past Surgical History:   Procedure Laterality Date    JOINT REPLACEMENT      TOTAL HIP ARTHROPLASTY       Family History   Problem Relation Age of Onset    Hypertension Mother     Diabetes Mother     Hypertension Father      Social History     Socioeconomic History    Marital status: Single     Spouse name: Not on file    Number of children: 2    Years of education: Not on file    Highest education level: High school graduate   Occupational History    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   Social Needs    Financial resource strain: Not very hard    Food insecurity     Worry: Never true     Inability: Never true    Transportation needs     Medical: No     Non-medical: No   Tobacco Use    Smoking status: Former Smoker     Start date:      Last attempt to quit: 2001     Years since quittin.7    Smokeless tobacco: Former User   Substance and Sexual Activity    Alcohol use: Yes     Frequency: Monthly or less     Drinks per session: 1 or 2     Binge frequency: Less than monthly     Comment: occ    Drug use: Never    Sexual activity: Yes     Partners: Female   Lifestyle    Physical activity     Days per week: Not on file     Minutes per session: Not on file    Stress: Not at all   Relationships    Social connections     Talks on phone: More than three times a week     Gets together: More than three times a week     Attends [employer] service: 1 to 4 times per year     Active member of club or organization: Not on file     Attends meetings of clubs or organizations: More than 4 times per year     Relationship status:     Intimate partner violence     Fear of current or ex partner: No     Emotionally abused: No     Physically abused: No     Forced sexual activity: No   Other Topics Concern    Not on file   Social History Narrative    Lives at home alone. He has 2 adult children. \  Review of Systems   Constitutional: Negative. HENT: Negative. Eyes: Negative. Respiratory: Negative. Cardiovascular: Negative. Gastrointestinal: Negative. Endocrine: Negative. Musculoskeletal: Negative. Skin: Negative. Allergic/Immunologic: Negative. Neurological: Negative. Hematological: Negative. Psychiatric/Behavioral: Negative. All other systems reviewed and are negative. .    Allergies   Allergen Reactions    Indomethacin Other (See Comments)     Dr. Geovanni Mars told him not to take because affects the bladder.  Statins Other (See Comments)    Unable To Assess       Anesthesia that begins with S- stops my breathing       Current Outpatient Medications   Medication Sig Dispense Refill    alendronate (FOSAMAX) 70 MG tablet Take 70 mg by mouth every 7 days      amLODIPine (NORVASC) 10 MG tablet Take 10 mg by mouth daily      ammonium lactate (AMLACTIN) 12 % cream Apply topically as needed      atorvastatin (LIPITOR) 40 MG tablet Take 40 mg by mouth nightly      glimepiride (AMARYL) 4 MG tablet Take 4 mg by mouth every morning (before breakfast)      HYDROcodone-acetaminophen (NORCO)  MG per tablet Take 1 tablet by mouth every 8 hours as needed.       metoprolol succinate (TOPROL XL) 100 MG extended release tablet Take 100 mg by mouth daily      pioglitazone (ACTOS) 30 MG tablet Take 30 mg by mouth daily      salsalate (DISALCID) 750 MG tablet Take 750 mg by mouth 2 times daily as needed      vitamin B-6 (PYRIDOXINE) 50 MG tablet Take 50 mg by mouth daily      tamsulosin (FLOMAX) 0.4 MG capsule Take 1 capsule by mouth daily for 10 days. (Patient not taking: Reported on 9/9/2020) 10 capsule 0    ergocalciferol (ERGOCALCIFEROL) 78509 UNITS capsule Take 50,000 Units by mouth once a week.  zolpidem (AMBIEN) 5 MG tablet Take 5 mg by mouth nightly as needed for Sleep.  bicalutamide (CASODEX) 50 MG chemo tablet Take 50 mg by mouth daily.  Naproxen-Esomeprazole (VIMOVO) 500-20 MG TBEC Take  by mouth daily.  Cyanocobalamin (VITAMIN B 12 PO) Take  by mouth daily.  cyclobenzaprine (FLEXERIL) 10 MG tablet Take 1 tablet by mouth 3 times daily as needed for Muscle spasms. 21 tablet 0     No current facility-administered medications for this visit. Vitals:    09/29/20 1455   BP: 138/82   Pulse: 69   Temp: 97.8 °F (36.6 °C)   TempSrc: Temporal   SpO2: 99%   Weight: 293 lb (132.9 kg)     Body mass index is 48.76 kg/m². Wt Readings from Last 3 Encounters:   09/29/20 293 lb (132.9 kg)   09/21/20 296 lb 9.6 oz (134.5 kg)   09/09/20 290 lb (131.5 kg)     BP Readings from Last 3 Encounters:   09/29/20 138/82   09/21/20 (!) 199/90   09/09/20 (!) 140/78         Objective:   Physical Exam  Vitals signs and nursing note reviewed. Constitutional:       General: He is not in acute distress. Appearance: He is well-developed. He is obese. He is not ill-appearing or diaphoretic. HENT:      Head: Normocephalic and atraumatic. Right Ear: Tympanic membrane, ear canal and external ear normal.      Left Ear: Tympanic membrane, ear canal and external ear normal.      Nose: Nose normal.      Mouth/Throat:      Mouth: Mucous membranes are moist.      Pharynx: Oropharynx is clear. No oropharyngeal exudate. Eyes:      General: No scleral icterus. Right eye: No discharge. Left eye: No discharge. Extraocular Movements: Extraocular movements intact. Conjunctiva/sclera: Conjunctivae normal.      Pupils: Pupils are equal, round, and reactive to light. Neck:      Musculoskeletal: Normal range of motion and neck supple. Thyroid: No thyromegaly. Vascular: No JVD. Trachea: No tracheal deviation. Cardiovascular:      Rate and Rhythm: Normal rate and regular rhythm. Pulses: Normal pulses. Heart sounds: Normal heart sounds. No murmur. Pulmonary:      Effort: Pulmonary effort is normal. No respiratory distress. Breath sounds: Normal breath sounds. No stridor. No wheezing or rales. Abdominal:      General: Bowel sounds are normal. There is no distension. Palpations: Abdomen is soft. There is no mass. Tenderness: There is no abdominal tenderness. There is no guarding or rebound. Musculoskeletal: Normal range of motion. General: No tenderness. Lymphadenopathy:      Cervical: No cervical adenopathy. Skin:     General: Skin is warm and dry. Capillary Refill: Capillary refill takes less than 2 seconds. Findings: No erythema or rash. Neurological:      General: No focal deficit present. Mental Status: He is alert and oriented to person, place, and time. Cranial Nerves: No cranial nerve deficit. Sensory: No sensory deficit. Motor: No abnormal muscle tone. Coordination: Coordination normal.      Deep Tendon Reflexes: Reflexes are normal and symmetric. Reflexes normal.   Psychiatric:         Mood and Affect: Mood normal.         Behavior: Behavior normal.         Thought Content: Thought content normal.         Judgment: Judgment normal.         Lab Review   No visits with results within 6 Month(s) from this visit.    Latest known visit with results is:   Hospital Outpatient Visit on 02/12/2020   Component Date Value    Ferritin 02/12/2020 73.3     Vit D, 25-Hydroxy 02/12/2020 27.2*    Microalbumin, Random Uri* 02/12/2020 424.70*    Creatinine, Ur 02/12/2020 133.6     Microalbumin Creatinine * 02/12/2020 3178.9*    Iron 02/12/2020 78     TIBC 02/12/2020 314     Iron Saturation 02/12/2020 25     PTH 02/12/2020 36.8     Sodium 02/12/2020 145     Potassium 02/12/2020 4.3     Chloride 02/12/2020 105     CO2 02/12/2020 21     Anion Gap 02/12/2020 19*    Glucose 02/12/2020 98     BUN 02/12/2020 31*    CREATININE 02/12/2020 2.1*    GFR Non- 02/12/2020 32*    GFR  02/12/2020 38*    Calcium 02/12/2020 9.9     Phosphorus 02/12/2020 3.8     Alb 02/12/2020 4.1     WBC 02/12/2020 6.7     RBC 02/12/2020 4.49     Hemoglobin 02/12/2020 14.5     Hematocrit 02/12/2020 43.8     MCV 02/12/2020 97.7     MCH 02/12/2020 32.2     MCHC 02/12/2020 33.0     RDW 02/12/2020 14.3     Platelets 70/32/0600 307     MPV 02/12/2020 7.6      I have reviewed the patient's medical history in detail and updated the computerized patient record. Assessment/Plan:  [facility] was seen today for diabetes and hypertension. Diagnoses and all orders for this visit:    Controlled type 2 diabetes mellitus with other specified complication, without long-term current use of insulin (HCC)  -     POCT glycosylated hemoglobin (Hb A1C)  -     Semaglutide,0.25 or 0.5MG/DOS, 2 MG/1.5ML SOPN; Inject 0.25 mg into the skin every 7 days for 14 days, THEN 0.5 mg every 7 days for 28 days.  -     Semaglutide,0.25 or 0.5MG/DOS, 2 MG/1.5ML SOPN; Inject 0.5 mg into the skin every 7 days  -     Insulin Pen Needle ([employer] PEN NEEDLES) 31G X 6 MM MISC; 1 each by Does not apply route daily    Needs flu shot  -     INFLUENZA, TRIV, INACTIVATED, SUBUNIT, ADJUVANTED, 65 YRS AND OLDER, IM, PREFILL SYR, 0.5ML (FLUAD TRIV)      Return in about 4 months (around 1/29/2021) for weight check/chronic problems BALWINDER Cantrell MD

## 2020-09-29 NOTE — PROGRESS NOTES
Vaccine Information Sheet, \"Influenza - Inactivated\"  given to Tariq Mariee, or parent/legal guardian of  Tariq Mariee and verbalized understanding. Patient responses:    Have you ever had a reaction to a flu vaccine? No  Do you have any current illness? No  Have you ever had Guillian Groveton Syndrome? No  Do you have a serious allergy to any of the follow: Neomycin, Polymyxin, Thimerosal, eggs or egg products? No    Flu vaccine given per order. Please see immunization tab. Risks and benefits explained. Current VIS given.       Immunizations Administered     Name Date Dose Route    Influenza, Triv, inactivated, subunit, adjuvanted, IM (Fluad 65 yrs and older) 9/29/2020 0.5 mL Intramuscular    Site: Deltoid- Left    Lot: [id number]    [address]: 07704-401-67

## 2020-10-06 NOTE — TELEPHONE ENCOUNTER
----- Message from Agustín Fan sent at 10/6/2020 12:52 PM EDT -----  Subject: Message to Provider    QUESTIONS  Information for Provider? Pt is wanting to know if he can up the dose on   Semaglutide  0.25 or 0.5MG/DOS   2 MG/1.5ML SOPN starting Friday because it is not helping him.  ---------------------------------------------------------------------------  --------------  CALL BACK INFO  What is the best way for the office to contact you? OK to leave message on   voicemail  Preferred Call Back Phone Number? [phone]  ---------------------------------------------------------------------------  --------------  SCRIPT ANSWERS  Relationship to Patient?  Self

## 2020-10-07 NOTE — TELEPHONE ENCOUNTER
Spoke with pt and informed him that per Dr. Mojgan Beverly he can increase his Semaglutide from to 0.5 mg weekly for 2 weeks and then increase again to 1mg weekly. Pt voiced understanding.

## 2020-10-30 ENCOUNTER — TELEPHONE (OUTPATIENT)
Dept: INTERNAL MEDICINE CLINIC | Age: 70
End: 2020-10-30

## 2020-10-30 NOTE — TELEPHONE ENCOUNTER
Pt received samples of Ozempic. When he went to use the injection for today he mistakenly forgot to take the second cap off and ended up wasting the medication. He is requesting a sample for him to use today.

## 2020-11-03 ENCOUNTER — TELEPHONE (OUTPATIENT)
Dept: INTERNAL MEDICINE CLINIC | Age: 70
End: 2020-11-03

## 2020-11-03 RX ORDER — DULAGLUTIDE 0.75 MG/.5ML
0.75 INJECTION, SOLUTION SUBCUTANEOUS WEEKLY
Qty: 4 PEN | Refills: 3 | Status: SHIPPED | OUTPATIENT
Start: 2020-11-03 | End: 2020-11-30 | Stop reason: SDUPTHER

## 2020-11-03 NOTE — TELEPHONE ENCOUNTER
----- Message from Carmencita Delaney sent at 10/31/2020 10:31 AM EDT -----  Subject: Message to Provider    QUESTIONS  Information for Provider? Patient states he ran out of medicine on   10/30/20 he is requesting a refill of Ozempic .05 mg injection taken once   per week  ---------------------------------------------------------------------------  --------------  CALL BACK INFO  What is the best way for the office to contact you? OK to leave message on   voicemail  Preferred Call Back Phone Number? [phone]  ---------------------------------------------------------------------------  --------------  SCRIPT ANSWERS  Relationship to Patient?  Self

## 2020-11-03 NOTE — TELEPHONE ENCOUNTER
Spoke with pt      No samples available    rx sent to pharmacy.    States he is doing well with injections

## 2020-11-30 ENCOUNTER — OFFICE VISIT (OUTPATIENT)
Dept: INTERNAL MEDICINE CLINIC | Age: 70
End: 2020-11-30
Payer: COMMERCIAL

## 2020-11-30 VITALS
SYSTOLIC BLOOD PRESSURE: 136 MMHG | BODY MASS INDEX: 48.85 KG/M2 | WEIGHT: 293.2 LBS | DIASTOLIC BLOOD PRESSURE: 78 MMHG | HEART RATE: 85 BPM | OXYGEN SATURATION: 98 % | RESPIRATION RATE: 18 BRPM | TEMPERATURE: 97 F | HEIGHT: 65 IN

## 2020-11-30 PROCEDURE — 99212 OFFICE O/P EST SF 10 MIN: CPT | Performed by: INTERNAL MEDICINE

## 2020-11-30 PROCEDURE — G8427 DOCREV CUR MEDS BY ELIG CLIN: HCPCS | Performed by: INTERNAL MEDICINE

## 2020-11-30 PROCEDURE — 2022F DILAT RTA XM EVC RTNOPTHY: CPT | Performed by: INTERNAL MEDICINE

## 2020-11-30 PROCEDURE — G8417 CALC BMI ABV UP PARAM F/U: HCPCS | Performed by: INTERNAL MEDICINE

## 2020-11-30 PROCEDURE — G8482 FLU IMMUNIZE ORDER/ADMIN: HCPCS | Performed by: INTERNAL MEDICINE

## 2020-11-30 PROCEDURE — 93000 ELECTROCARDIOGRAM COMPLETE: CPT | Performed by: INTERNAL MEDICINE

## 2020-11-30 RX ORDER — DULAGLUTIDE 1.5 MG/.5ML
1.5 INJECTION, SOLUTION SUBCUTANEOUS
Qty: 12 PEN | Refills: 0 | Status: SHIPPED | OUTPATIENT
Start: 2020-11-30 | End: 2021-03-05 | Stop reason: SDUPTHER

## 2020-11-30 RX ORDER — DULAGLUTIDE 0.75 MG/.5ML
0.75 INJECTION, SOLUTION SUBCUTANEOUS WEEKLY
Qty: 4 PEN | Refills: 3 | Status: SHIPPED | OUTPATIENT
Start: 2020-11-30 | End: 2020-11-30

## 2020-11-30 NOTE — PROGRESS NOTES
Preoperative Consultation      Cara De Jesus  YOB: 1950    Date of Service:  11/30/2020    Vitals:    11/30/20 1642 11/30/20 1647   BP: (!) 144/80 136/78   Pulse: 85    Resp: 18    Temp: 97 °F (36.1 °C)    TempSrc: Temporal    SpO2: 98%    Weight: 293 lb 3.2 oz (133 kg)    Height: 5' 5\" (1.651 m)       Wt Readings from Last 2 Encounters:   11/30/20 293 lb 3.2 oz (133 kg)   09/29/20 293 lb (132.9 kg)     BP Readings from Last 3 Encounters:   11/30/20 136/78   09/29/20 138/82   09/21/20 (!) 199/90        Chief Complaint   Patient presents with   [facility] Pre-op Exam     pre-op   Dr Alley Rico   Urology [facility]el[facility]    Pt unsure procedure    (Cecilia)     Allergies   Allergen Reactions    Indomethacin Other (See Comments)     Dr. Thomas Greene told him not to take because affects the bladder.  Statins Other (See Comments)    Unable To Assess       Anesthesia that begins with S- stops my breathing     Outpatient Medications Marked as Taking for the 11/30/20 encounter (Office Visit) with Kem Dennis MD   Medication Sig Dispense Refill    Dulaglutide (TRULICITY) 2.46 YE/9.6BB SOPN Inject 0.75 mg into the skin once a week 4 pen 3    alendronate (FOSAMAX) 70 MG tablet Take 70 mg by mouth every 7 days      amLODIPine (NORVASC) 10 MG tablet Take 10 mg by mouth daily      atorvastatin (LIPITOR) 40 MG tablet Take 40 mg by mouth nightly      metoprolol succinate (TOPROL XL) 100 MG extended release tablet Take 100 mg by mouth daily      pioglitazone (ACTOS) 30 MG tablet Take 30 mg by mouth daily      vitamin B-6 (PYRIDOXINE) 50 MG tablet Take 50 mg by mouth daily      zolpidem (AMBIEN) 5 MG tablet Take 5 mg by mouth nightly as needed for Sleep.  bicalutamide (CASODEX) 50 MG chemo tablet Take 50 mg by mouth daily.  Naproxen-Esomeprazole (VIMOVO) 500-20 MG TBEC Take  by mouth daily.          This patient presents to the office today for a preoperative consultation at the request of surgeon,  Brad Veliz,  Conservative therapy: yes .     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    Chronic renal insufficiency, stage III (moderate)    Degeneration of lumbar or lumbosacral intervertebral disc    Esophageal reflux    Depression    Hypersomnolence    Hyperlipidemia    History of colonic polyps    Family history of malignant neoplasm of gastrointestinal tract    Malignant neoplasm of prostate ([address]    Impotence of organic origin    Morbid obesity with BMI of 40.0-44.9, adult ([address]    Opioid type dependence, continuous ([address]    KAMRAN (obstructive sleep apnea)    Osteoarthritis of right knee    Osteopenia    ROB (renal osteodystrophy)    Spondylosis of lumbar region without myelopathy or radiculopathy    Type 2 diabetes mellitus without complication, without long-term current use of insulin ([facility])       Past Medical History:   Diagnosis Date    Cancer ([address]     prostate    Hyperlipidemia     Hypertension     Obesity     Osteoarthritis     Type 2 diabetes mellitus without complication ([address]     Type 2 diabetes mellitus without complication, without long-term current use of insulin ([facility] 2/7/2018     Past Surgical History:   Procedure Laterality Date    JOINT REPLACEMENT      TOTAL HIP ARTHROPLASTY       Family History   Problem Relation Age of Onset    Hypertension Mother     Diabetes Mother     Hypertension Father      Social History Socioeconomic History    Marital status: Single     Spouse name: Not on file    Number of children: 2    Years of education: Not on file    Highest education level: High school graduate   Occupational History    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   Social Needs    Financial resource strain: Not very hard    Food insecurity     Worry: Never true     Inability: Never true    Transportation needs     Medical: No     Non-medical: No   Tobacco Use    Smoking status: Former Smoker     Years: 21.00     Start date:      Last attempt to quit: 2001     Years since quittin.9    Smokeless tobacco: Former User   Substance and Sexual Activity    Alcohol use: Yes     Frequency: Monthly or less     Drinks per session: 1 or 2     Binge frequency: Less than monthly     Comment: occ    Drug use: Never    Sexual activity: Yes     Partners: Female   Lifestyle    Physical activity     Days per week: Not on file     Minutes per session: Not on file    Stress: Not at all   Relationships    Social connections     Talks on phone: More than three times a week     Gets together: More than three times a week     Attends Yazdanism service: 1 to 4 times per year     Active member of club or organization: Not on file     Attends meetings of clubs or organizations: More than 4 times per year     Relationship status:     Intimate partner violence     Fear of current or ex partner: No     Emotionally abused: No     Physically abused: No     Forced sexual activity: No   Other Topics Concern    Not on file   Social History Narrative    Lives at home alone. He has 2 adult children. Review of Systems  A comprehensive review of systems was negative except for what was noted in the HPI. Physical Exam   Constitutional: He is oriented to person, place, and time. He appears well-developed and well-nourished. No distress. HENT:   Head: Normocephalic and atraumatic. Mouth/Throat: Uvula is midline, oropharynx is clear and moist and mucous membranes are normal.   Eyes: Conjunctivae and EOM are normal. Pupils are equal, round, and reactive to light. Neck: Trachea normal and normal range of motion. Neck supple. No JVD present. Carotid bruit is not present. No mass and no thyromegaly present. Cardiovascular: Normal rate, regular rhythm, normal heart sounds and intact distal pulses. Exam reveals no gallop and no friction rub. No murmur heard. Pulmonary/Chest: Effort normal and breath sounds normal. No respiratory distress. He has no wheezes. He has no rales. Abdominal: Obese, Soft. Normal aorta and bowel sounds are normal. He exhibits no distension and no mass. There is no hepatosplenomegaly. No tenderness. Musculoskeletal: He exhibits no edema and no tenderness. Neurological: He is alert and oriented to person, place, and time. He has normal strength. No cranial nerve deficit or sensory deficit. Coordination and gait normal.   Skin: Skin is warm and dry. No rash noted. No erythema. Psychiatric: He has a normal mood and affect. His behavior is normal.     EKG Interpretation:  normal EKG, normal sinus rhythm, normal sinus rhythm, nonspecific ST and T waves changes. Lab Review   No visits with results within 2 Month(s) from this visit. Latest known visit with results is:   Office Visit on 09/29/2020   Component Date Value    Hemoglobin A1C 09/29/2020 6.6            Assessment:       72 y.o. patient with planned surgery as above. Known risk factors for perioperative complications: Diabetes mellitus, Hypertension, Obstructive sleep apnea, BMI 49.79  Current medications which may produce withdrawal symptoms if withheld perioperatively: none      Plan:     1. Preoperative workup as follows: ECG  2. Change in medication regimen before surgery: Discontinue NSAID and supplements as per list  3.  Prophylaxis for cardiac events with perioperative beta-blockers: Not

## 2021-01-12 ENCOUNTER — OFFICE VISIT (OUTPATIENT)
Dept: PRIMARY CARE CLINIC | Age: 71
End: 2021-01-12
Payer: COMMERCIAL

## 2021-01-12 DIAGNOSIS — Z20.828 EXPOSURE TO SARS-ASSOCIATED CORONAVIRUS: Primary | ICD-10-CM

## 2021-01-12 PROCEDURE — 99211 OFF/OP EST MAY X REQ PHY/QHP: CPT | Performed by: NURSE PRACTITIONER

## 2021-01-12 PROCEDURE — G8417 CALC BMI ABV UP PARAM F/U: HCPCS | Performed by: NURSE PRACTITIONER

## 2021-01-12 PROCEDURE — G8428 CUR MEDS NOT DOCUMENT: HCPCS | Performed by: NURSE PRACTITIONER

## 2021-01-12 NOTE — PATIENT INSTRUCTIONS
You have received a viral test for COVID-19. Below is education on quarantine per the CDC guidelines. For any symptoms, seek care from your PCP, call [phone] to establish care with a doctor, or go directly to an urgent care or the emergency room. Test results will take 2-7 days and will be sent to you in your [employer] account. If you test positive, you will be contacted via phone. If you test negative, the ONLY communication will be through [address] GO TO [facility] AND SIGN UP FOR [employer]  (LOWER LEFT OF THE HOME PAGE)  No test is 100%. If you have symptoms, you should follow the guidance of quarantine as previously stated. You can still be contagious if you have symptoms. Your [facility] will reach out to you if you have a positive result. They will provide you with a return to work date and note. If you were tested for a pre-op, then you should remain in quarantine until your procedure. How do I know if I need to be in quarantine? If you live in a community where COVID-19 is or might be spreading (currently, that is virtually everywhere in the [place])  Be alert for symptoms. Watch for fever, cough, shortness of breath, or other symptoms of COVID-19.  ? Take your temperature if symptoms develop. ? Practice social distancing. Maintain 6 feet of distance from others and stay out of crowded places. ? Follow CDC guidance if symptoms develop. If you feel healthy but:  ? Recently had close contact with a person with COVID-19 you need to Quarantine:  ? Stay home until 14 days after your last exposure. ? Check your temperature twice a day and watch for symptoms of COVID-19.  ? If possible, stay away from people who are at higher-risk for getting very sick from COVID-19. Stay Home and Monitor Your Health if you:  ? Have been diagnosed with COVID-19, or  ? Are waiting for test results, or  ?  Have cough, fever, or shortness of breath, or symptoms of COVID-19 When You Can be Around Others After You Had or Likely Had COVID-19     If you have or think you might have COVID-19, it is important to stay home and away from other people. Staying away from others helps stop the spread of COVID-19. If you have an emergency warning sign (including trouble breathing), get emergency medical care immediately. When you can be around others (end home isolation) depends on different factors for different situations. Find CDC's recommendations for your situation below. I think or know I had COVID-19, and I had symptoms  You can be with others after  ? 3 days with no fever and  ? Respiratory symptoms have improved (e.g. cough, shortness of breath) and  ? 10 days since symptoms first appeared  Depending on your healthcare provider's advice and availability of testing, you might get tested to see if you still have COVID-19. If you will be tested, you can be around others when you have no fever, respiratory symptoms have improved, and you receive two negative test results in a row, at least 24 hours apart. I tested positive for COVID-19 but had no symptoms  If you continue to have no symptoms, you can be with others after:  ? 10 days have passed since test or 14 days since your exposure test   Depending on your healthcare provider's advice and availability of testing, you might get tested to see if you still have COVID-19. If you will be tested, you can be around others after you receive two negative test results in a row, at least 24 hours apart. If you develop symptoms after testing positive, follow the guidance above for I think or know I had COVID, and I had symptoms.   For Anyone Who Has Been Around a Person with COVID-19  It is important to remember that anyone who has close contact with someone with COVID-19 should stay home for 14 days after exposure based on the time it takes to develop illness. Testing is not necessary.     www.cdc.gov/coronavirus/2019-ncov/index.html

## 2021-01-12 NOTE — PROGRESS NOTES
Nova Lee received a viral test for COVID-19. They were educated on isolation and quarantine as appropriate. For any symptoms, they were directed to seek care from their PCP, given contact information to establish with a doctor, directed to an urgent care or the emergency room.

## 2021-01-13 LAB — SARS-COV-2, NAA: NOT DETECTED

## 2021-02-10 ENCOUNTER — HOSPITAL ENCOUNTER (OUTPATIENT)
Age: 71
Discharge: HOME | End: 2021-02-10
Payer: COMMERCIAL

## 2021-02-10 PROCEDURE — 83036 HEMOGLOBIN GLYCOSYLATED A1C: CPT

## 2021-02-10 PROCEDURE — 36415 COLL VENOUS BLD VENIPUNCTURE: CPT

## 2021-02-11 LAB
ESTIMATED AVERAGE GLUCOSE: 116.9 MG/DL
HBA1C MFR BLD: 5.7 %

## 2021-03-05 RX ORDER — DULAGLUTIDE 1.5 MG/.5ML
1.5 INJECTION, SOLUTION SUBCUTANEOUS
Qty: 12 PEN | Refills: 0 | Status: ON HOLD | OUTPATIENT
Start: 2021-03-05 | End: 2021-04-14 | Stop reason: DRUGHIGH

## 2021-03-05 NOTE — TELEPHONE ENCOUNTER
Recent Visits  Date Type Provider Dept   01/12/21 Office Visit OSMAN Jasso [facility]   11/30/20 Office Visit Domo Bernal MD [facility]   09/29/20 Office Visit Domo Bernal MD [facility]   09/09/20 Office Visit Domo Bernal MD [facility]   Showing recent visits within past 540 days with a meds authorizing provider and meeting all other requirements     Future Appointments  No visits were found meeting these conditions.    Showing future appointments within next 150 days with a meds authorizing provider and meeting all other requirements      11/30/2020

## 2021-03-12 ENCOUNTER — TELEPHONE (OUTPATIENT)
Dept: INTERNAL MEDICINE CLINIC | Age: 71
End: 2021-03-12

## 2021-03-16 ENCOUNTER — TELEPHONE (OUTPATIENT)
Dept: INTERNAL MEDICINE CLINIC | Age: 71
End: 2021-03-16

## 2021-03-16 NOTE — TELEPHONE ENCOUNTER
----- Message from Amaya Haywood sent at 3/12/2021  9:54 AM EST -----  Subject: Message to Provider    QUESTIONS  Information for Provider? Pt needs his medication list updated with the   medications he was prescribed by Dr Arcelia Zamarripa. Pt asked to also call   daughter with info Daughters number - [phone]   ---------------------------------------------------------------------------  --------------  Macie OROZCO  What is the best way for the office to contact you? OK to leave message on   voicemail  Preferred Call Back Phone Number? [phone]  ---------------------------------------------------------------------------  --------------  SCRIPT ANSWERS  Relationship to Patient?  Self

## 2021-03-17 RX ORDER — ASPIRIN 81 MG/1
81 TABLET ORAL DAILY
COMMUNITY

## 2021-03-17 RX ORDER — HYDROCODONE BITARTRATE AND ACETAMINOPHEN 10; 325 MG/1; MG/1
1 TABLET ORAL 3 TIMES DAILY PRN
COMMUNITY

## 2021-03-17 RX ORDER — PIOGLITAZONEHYDROCHLORIDE 30 MG/1
30 TABLET ORAL DAILY
Qty: 90 TABLET | Refills: 0 | Status: SHIPPED | OUTPATIENT
Start: 2021-03-17 | End: 2021-03-18 | Stop reason: SDUPTHER

## 2021-03-18 RX ORDER — ATORVASTATIN CALCIUM 40 MG/1
40 TABLET, FILM COATED ORAL NIGHTLY
Qty: 90 TABLET | Refills: 0 | Status: SHIPPED | OUTPATIENT
Start: 2021-03-18 | End: 2021-06-30

## 2021-03-18 RX ORDER — METOPROLOL SUCCINATE 100 MG/1
100 TABLET, EXTENDED RELEASE ORAL DAILY
Qty: 90 TABLET | Refills: 1 | Status: SHIPPED | OUTPATIENT
Start: 2021-03-18 | End: 2021-07-14 | Stop reason: SDUPTHER

## 2021-03-18 RX ORDER — AMLODIPINE BESYLATE 10 MG/1
10 TABLET ORAL DAILY
Qty: 90 TABLET | Refills: 0 | Status: SHIPPED | OUTPATIENT
Start: 2021-03-18 | End: 2021-06-30

## 2021-03-18 RX ORDER — PIOGLITAZONEHYDROCHLORIDE 30 MG/1
30 TABLET ORAL DAILY
Qty: 90 TABLET | Refills: 0 | Status: ON HOLD | OUTPATIENT
Start: 2021-03-18 | End: 2021-04-14 | Stop reason: HOSPADM

## 2021-03-18 NOTE — TELEPHONE ENCOUNTER
Recent Visits  Date Type Provider Dept   01/12/21 Office Visit OSMAN Arevalo [facility]   11/30/20 Office Visit Layne Ashraf MD [facility]   09/29/20 Office Visit Layne Ashraf MD [facility]   09/09/20 Office Visit Layne Ashraf MD [facility]   Showing recent visits within past 540 days with a meds authorizing provider and meeting all other requirements     Future Appointments  No visits were found meeting these conditions.    Showing future appointments within next 150 days with a meds authorizing provider and meeting all other requirements      11/30/2020 last VV

## 2021-04-05 ENCOUNTER — APPOINTMENT (OUTPATIENT)
Dept: GENERAL RADIOLOGY | Age: 71
DRG: 291 | End: 2021-04-05
Payer: MEDICARE

## 2021-04-05 ENCOUNTER — HOSPITAL ENCOUNTER (INPATIENT)
Age: 71
LOS: 9 days | Discharge: HOME | DRG: 291 | End: 2021-04-14
Attending: INTERNAL MEDICINE | Admitting: INTERNAL MEDICINE
Payer: MEDICARE

## 2021-04-05 DIAGNOSIS — M47.816 SPONDYLOSIS OF LUMBAR REGION WITHOUT MYELOPATHY OR RADICULOPATHY: ICD-10-CM

## 2021-04-05 DIAGNOSIS — I50.33 ACUTE ON CHRONIC DIASTOLIC HEART FAILURE (HCC): ICD-10-CM

## 2021-04-05 DIAGNOSIS — J81.0 ACUTE PULMONARY EDEMA (HCC): Primary | ICD-10-CM

## 2021-04-05 PROBLEM — J96.01 ACUTE RESPIRATORY FAILURE WITH HYPOXIA (HCC): Status: ACTIVE | Noted: 2021-04-05

## 2021-04-05 LAB
A/G RATIO: 0.8 (ref 1.1–2.2)
ALBUMIN SERPL-MCNC: 3.3 G/DL (ref 3.4–5)
ALP BLD-CCNC: 86 U/L (ref 40–129)
ALT SERPL-CCNC: 17 U/L (ref 10–40)
ANION GAP SERPL CALCULATED.3IONS-SCNC: 11 MMOL/L (ref 3–16)
AST SERPL-CCNC: 23 U/L (ref 15–37)
BASOPHILS ABSOLUTE: 0 K/UL (ref 0–0.2)
BASOPHILS RELATIVE PERCENT: 0.6 %
BILIRUB SERPL-MCNC: 0.4 MG/DL (ref 0–1)
BUN BLDV-MCNC: 18 MG/DL (ref 7–20)
CALCIUM SERPL-MCNC: 9.4 MG/DL (ref 8.3–10.6)
CHLORIDE BLD-SCNC: 103 MMOL/L (ref 99–110)
CO2: 24 MMOL/L (ref 21–32)
CREAT SERPL-MCNC: 2 MG/DL (ref 0.8–1.3)
EOSINOPHILS ABSOLUTE: 0.1 K/UL (ref 0–0.6)
EOSINOPHILS RELATIVE PERCENT: 2 %
GFR AFRICAN AMERICAN: 40
GFR NON-AFRICAN AMERICAN: 33
GLOBULIN: 4.2 G/DL
GLUCOSE BLD-MCNC: 89 MG/DL (ref 70–99)
HCT VFR BLD CALC: 40.1 % (ref 40.5–52.5)
HEMOGLOBIN: 13.8 G/DL (ref 13.5–17.5)
LYMPHOCYTES ABSOLUTE: 1.3 K/UL (ref 1–5.1)
LYMPHOCYTES RELATIVE PERCENT: 22.9 %
MCH RBC QN AUTO: 34.1 PG (ref 26–34)
MCHC RBC AUTO-ENTMCNC: 34.5 G/DL (ref 31–36)
MCV RBC AUTO: 98.9 FL (ref 80–100)
MONOCYTES ABSOLUTE: 0.7 K/UL (ref 0–1.3)
MONOCYTES RELATIVE PERCENT: 11.8 %
NEUTROPHILS ABSOLUTE: 3.5 K/UL (ref 1.7–7.7)
NEUTROPHILS RELATIVE PERCENT: 62.7 %
PDW BLD-RTO: 15.2 % (ref 12.4–15.4)
PLATELET # BLD: 302 K/UL (ref 135–450)
PMV BLD AUTO: 7.2 FL (ref 5–10.5)
POTASSIUM SERPL-SCNC: 4.8 MMOL/L (ref 3.5–5.1)
PRO-BNP: 2510 PG/ML (ref 0–124)
RBC # BLD: 4.05 M/UL (ref 4.2–5.9)
SODIUM BLD-SCNC: 138 MMOL/L (ref 136–145)
TOTAL PROTEIN: 7.5 G/DL (ref 6.4–8.2)
TROPONIN: 0.03 NG/ML
WBC # BLD: 5.6 K/UL (ref 4–11)

## 2021-04-05 PROCEDURE — 85025 COMPLETE CBC W/AUTO DIFF WBC: CPT

## 2021-04-05 PROCEDURE — 1200000000 HC SEMI PRIVATE

## 2021-04-05 PROCEDURE — 80053 COMPREHEN METABOLIC PANEL: CPT

## 2021-04-05 PROCEDURE — 84484 ASSAY OF TROPONIN QUANT: CPT

## 2021-04-05 PROCEDURE — 71046 X-RAY EXAM CHEST 2 VIEWS: CPT

## 2021-04-05 PROCEDURE — 93005 ELECTROCARDIOGRAM TRACING: CPT | Performed by: PHYSICIAN ASSISTANT

## 2021-04-05 PROCEDURE — 83880 ASSAY OF NATRIURETIC PEPTIDE: CPT

## 2021-04-05 PROCEDURE — 99283 EMERGENCY DEPT VISIT LOW MDM: CPT

## 2021-04-05 PROCEDURE — 36415 COLL VENOUS BLD VENIPUNCTURE: CPT

## 2021-04-05 RX ORDER — FUROSEMIDE 10 MG/ML
40 INJECTION INTRAMUSCULAR; INTRAVENOUS ONCE
Status: COMPLETED | OUTPATIENT
Start: 2021-04-05 | End: 2021-04-06

## 2021-04-05 ASSESSMENT — ENCOUNTER SYMPTOMS
SHORTNESS OF BREATH: 1
WHEEZING: 0
COUGH: 0
ABDOMINAL PAIN: 0
RHINORRHEA: 0
DIARRHEA: 0
NAUSEA: 0
VOMITING: 0

## 2021-04-05 NOTE — ED PROVIDER NOTES
[address] [facility]        Pt Name: Philippe Bhatt  MRN: [id number]  [place] 1950  Date of evaluation: 4/5/2021  Provider: Chiara Garrison PA-C  PCP: Julito Trevino MD    ELIAS. I have evaluated this patient. My supervising physician was available for consultation. CHIEF COMPLAINT       Chief Complaint   Patient presents with    Shortness of Breath     pt shortness of breath worse with exertion, pt states also with dry eye, denies CP, been going on 3-4 months       HISTORY OF PRESENT ILLNESS   (Location, Timing/Onset, Context/Setting, Quality, Duration, Modifying Factors, Severity, Associated Signs and Symptoms)  Note limiting factors. Philippe Bhatt is a 72 y.o. male with history of prostate cancer hypertension hyperlipidemia diabetes and obesity presents for evaluation of increasing shortness of breath with it worse with exertion over the past 3 to 4 months. Patient states that has been getting worse. He is also reporting eye drainage. States that he has been taking Zyrtec for this with some improvement. He denies any history of CHF. Denies any significant weight gain or leg swelling from baseline. No significant cough congestion runny nose. No fevers or chills. He has no other complaints or concerns at this time. Nursing Notes were all reviewed and agreed with or any disagreements were addressed in the HPI. REVIEW OF SYSTEMS    (2-9 systems for level 4, 10 or more for level 5)     Review of Systems   Constitutional: Negative for appetite change, chills and fever. HENT: Negative for congestion and rhinorrhea. Respiratory: Positive for shortness of breath. Negative for cough and wheezing. Cardiovascular: Negative for chest pain. Gastrointestinal: Negative for abdominal pain, diarrhea, nausea and vomiting. Genitourinary: Negative for difficulty urinating, dysuria and hematuria.    Musculoskeletal: Negative for neck pain and neck stiffness. Skin: Negative for rash. Neurological: Negative for headaches. Positives and Pertinent negatives as per HPI. Except as noted above in the ROS, all other systems were reviewed and negative. PAST MEDICAL HISTORY     Past Medical History:   Diagnosis Date    Cancer [facility])     prostate    Hyperlipidemia     Hypertension     Obesity     Osteoarthritis     Type 2 diabetes mellitus without complication (HCC)     Type 2 diabetes mellitus without complication, without long-term current use of insulin ([facility] [address] 2/7/2018         SURGICAL HISTORY     Past Surgical History:   Procedure Laterality Date    JOINT REPLACEMENT      TOTAL HIP ARTHROPLASTY           CURRENTMEDICATIONS       Previous Medications    ACETAMINOPHEN (TYLENOL ARTHRITIS PAIN PO)    Take by mouth OTC    ALENDRONATE (FOSAMAX) 70 MG TABLET    Take 70 mg by mouth every 7 days    AMLODIPINE (NORVASC) 10 MG TABLET    Take 1 tablet by mouth daily    AMMONIUM LACTATE (AMLACTIN) 12 % CREAM    Apply topically as needed    ASPIRIN 81 MG EC TABLET    Take 81 mg by mouth daily    ATORVASTATIN (LIPITOR) 40 MG TABLET    Take 1 tablet by mouth nightly    CYANOCOBALAMIN (VITAMIN B 12 PO)    Take  by mouth daily. DULAGLUTIDE (TRULICITY) 1.5 WJ/7.9CA SOPN    Inject 1.5 mg into the skin every 7 days    ERGOCALCIFEROL (ERGOCALCIFEROL) 31893 UNITS CAPSULE    Take 50,000 Units by mouth once a week.     GLIMEPIRIDE (AMARYL) 4 MG TABLET    Take 4 mg by mouth every morning (before breakfast)    HYDROCODONE-ACETAMINOPHEN (NORCO PO)    Take by mouth Pain specialist    INSULIN PEN NEEDLE ([employer] PEN NEEDLES) 31G X 6 MM MISC    1 each by Does not apply route daily    METOPROLOL SUCCINATE (TOPROL XL) 100 MG EXTENDED RELEASE TABLET    Take 1 tablet by mouth daily    PIOGLITAZONE (ACTOS) 30 MG TABLET    Take 1 tablet by mouth daily    SALSALATE (DISALCID) 750 MG TABLET    Take 750 mg by mouth 2 times daily as needed SEMAGLUTIDE,0.25 OR 0.5MG/DOS, 2 MG/1.5ML SOPN    Inject 0.5 mg into the skin every 7 days    SEMAGLUTIDE,0.25 OR 0.5MG/DOS, 2 MG/1.5ML SOPN    Inject 0.25 mg into the skin every 7 days for 14 days, THEN 0.5 mg every 7 days for 28 days. VITAMIN B-6 (PYRIDOXINE) 50 MG TABLET    Take 50 mg by mouth daily         ALLERGIES     Indomethacin, Statins, and Unable to assess    FAMILYHISTORY       Family History   Problem Relation Age of Onset    Hypertension Mother     Diabetes Mother     Hypertension Father           SOCIAL HISTORY       Social History     Tobacco Use    Smoking status: Former Smoker     Years: 21.00     Start date: 37     Quit date: 2001     Years since quittin.2    Smokeless tobacco: Former User   Substance Use Topics    Alcohol use: Yes     Frequency: Monthly or less     Drinks per session: 1 or 2     Binge frequency: Less than monthly     Comment: occ    Drug use: Never       SCREENINGS             PHYSICAL EXAM    (up to 7 for level 4, 8 or more for level 5)     ED Triage Vitals [21 1609]   BP Temp Temp Source Pulse Resp SpO2 Height Weight   (!) 143/94 97.8 °F (36.6 °C) Oral 87 18 92 % 5' 5\" (1.651 m) 293 lb (132.9 kg)       Physical Exam  Vitals signs and nursing note reviewed. Constitutional:       General: He is not in acute distress. Appearance: He is well-developed. He is not ill-appearing, toxic-appearing or diaphoretic. HENT:      Head: Normocephalic and atraumatic. Right Ear: External ear normal.      Left Ear: External ear normal.      Nose: Nose normal.   Eyes:      General:         Right eye: No discharge. Left eye: No discharge. Neck:      Musculoskeletal: Normal range of motion and neck supple. Cardiovascular:      Rate and Rhythm: Normal rate and regular rhythm. Heart sounds: Normal heart sounds. Pulmonary:      Effort: Pulmonary effort is normal. No respiratory distress. Breath sounds: Rales present.    Abdominal: General: There is no distension. Palpations: Abdomen is soft. Tenderness: There is no abdominal tenderness. Musculoskeletal: Normal range of motion. Skin:     General: Skin is warm and dry. Neurological:      Mental Status: He is alert and oriented to person, place, and time. Psychiatric:         Behavior: Behavior normal.         DIAGNOSTIC RESULTS   LABS:    Labs Reviewed   CBC WITH AUTO DIFFERENTIAL - Abnormal; Notable for the following components:       Result Value    RBC 4.05 (*)     Hematocrit 40.1 (*)     MCH 34.1 (*)     All other components within normal limits    Narrative:     Performed at:  [facility] [address]   Phone [phone]   COMPREHENSIVE METABOLIC PANEL - Abnormal; Notable for the following components:    CREATININE 2.0 (*)     GFR Non- 33 (*)     GFR  40 (*)     Albumin 3.3 (*)     Albumin/Globulin Ratio 0.8 (*)     All other components within normal limits    Narrative:     Performed at:  [facility] [address]   Phone [phone]   TROPONIN - Abnormal; Notable for the following components:    Troponin 0.03 (*)     All other components within normal limits    Narrative:     Performed at:  [facility]  [employer]   Phone [phone]  - Abnormal; Notable for the following components:    Pro-BNP 2,510 (*)     All other components within normal limits    Narrative:     Performed at:  [facility] [address]   Phone [phone]       All other labs were within normal range or not returned as of this dictation. EKG:  All EKG's are interpreted by the Emergency Department Physician in the absence of a cardiologist.  Please see their note for interpretation of EKG.      RADIOLOGY:   Non-plain film images such as CT, Ultrasound and MRI are read by the radiologist. Plain radiographic images are visualized and preliminarily interpreted by the ED Provider with the below findings:        Interpretation per the Radiologist below, if available at the time of this note:    XR CHEST (2 VW)   Final Result   The above findings are most consistent moderate CHF. Pneumonia is not   excluded           Xr Chest (2 Vw)    Result Date: 4/5/2021  EXAMINATION: TWO XRAY VIEWS OF THE CHEST 4/5/2021 8:30 pm COMPARISON: 02/12/2016 HISTORY: ORDERING SYSTEM PROVIDED HISTORY: sob TECHNOLOGIST PROVIDED HISTORY: Reason for exam:->sob Reason for Exam: pt shortness of breath worse with exertion, pt states also with dry eye, denies CP, been going on 3-4 months Acuity: Unknown Type of Exam: Unknown FINDINGS: The cardiopericardial silhouette is moderately enlarged. There is ill definition of the pulmonary vascularity with patchy perihilar and bibasilar infiltrates. There is evidence of small pleural effusions. The above findings are most consistent moderate CHF. Pneumonia is not excluded           PROCEDURES   Unless otherwise noted below, none     Procedures    CRITICAL CARE TIME   N/A    CONSULTS:  None      EMERGENCY DEPARTMENT COURSE and DIFFERENTIAL DIAGNOSIS/MDM:   Vitals:    Vitals:    04/05/21 1609 04/05/21 2125 04/05/21 2130   BP: (!) 143/94     Pulse: 87 68 76   Resp: 18 16 19   Temp: 97.8 °F (36.6 °C)     TempSrc: Oral     SpO2: 92% (!) 89% 96%   Weight: 293 lb (132.9 kg)     Height: 5' 5\" (1.651 m)         Patient was given the following medications:  Medications   furosemide (LASIX) injection 40 mg (has no administration in time range)           Patient presents for evaluation of increasing exertional dyspnea. On exam, he is morbidly obese resting comfortably in bed no acute distress nontoxic. He is satting 89% on room air, placed on 2 L nasal cannula oxygen.   He has coarse breath sounds bilateral bases. Good aeration. No wheezing. Chest is nontender and abdomen is benign. Please see attending note for EKG interpretation. CBC and CMP are unremarkable aside from chronic kidney disease with a creatinine of 2.0. Troponin 0.03 with a BNP of 2510. Chest x-ray shows findings concerning for moderate CHF. Pneumonia is not excluded, however, clinically I have low suspicion for this. He was given dose of IV Lasix and will be admitted for further evaluation management of suspected new onset CHF with acute exacerbation. Hospitalist will resume care the patient at this time. Patient was informed and agreeable. He is stable for admission. Critical Care  There was a high probability of life-threatening clinical deterioration in the patient's condition requiring my urgent intervention. Total critical care time with the patient was 42 minutes excluding separately reportable procedures. Critical care required due to patients presentation, comorbidities and concern for acute life-threatening disease process, respiratory failure with hypoxia, CHF and decompensation. FINAL IMPRESSION      1. Acute pulmonary edema [facility])          DISPOSITION/PLAN   DISPOSITION Decision To Admit 04/05/2021 09:28:56 PM      PATIENT REFERREDTO:  No follow-up provider specified.     DISCHARGE MEDICATIONS:  New Prescriptions    No medications on file       DISCONTINUED MEDICATIONS:  Discontinued Medications    No medications on file              (Please note that portions of this note were completed with a voice recognition program.  Efforts were made to edit the dictations but occasionally words are mis-transcribed.)    Hilary Oconnor PA-C (electronically signed)       Heron Wynn PA-C  04/05/21 5604

## 2021-04-06 LAB
ANION GAP SERPL CALCULATED.3IONS-SCNC: 10 MMOL/L (ref 3–16)
BILIRUBIN URINE: NEGATIVE
BLOOD, URINE: ABNORMAL
BUN BLDV-MCNC: 20 MG/DL (ref 7–20)
CALCIUM SERPL-MCNC: 9 MG/DL (ref 8.3–10.6)
CHLORIDE BLD-SCNC: 106 MMOL/L (ref 99–110)
CLARITY: ABNORMAL
CO2: 24 MMOL/L (ref 21–32)
COLOR: ABNORMAL
COMMENT UA: ABNORMAL
CREAT SERPL-MCNC: 2 MG/DL (ref 0.8–1.3)
CREATININE URINE: 75.7 MG/DL (ref 39–259)
EKG ATRIAL RATE: 82 BPM
EKG DIAGNOSIS: NORMAL
EKG P AXIS: 63 DEGREES
EKG P-R INTERVAL: 192 MS
EKG Q-T INTERVAL: 388 MS
EKG QRS DURATION: 96 MS
EKG QTC CALCULATION (BAZETT): 453 MS
EKG R AXIS: -32 DEGREES
EKG T AXIS: 97 DEGREES
EKG VENTRICULAR RATE: 82 BPM
GFR AFRICAN AMERICAN: 40
GFR NON-AFRICAN AMERICAN: 33
GLUCOSE BLD-MCNC: 100 MG/DL (ref 70–99)
GLUCOSE BLD-MCNC: 107 MG/DL (ref 70–99)
GLUCOSE BLD-MCNC: 109 MG/DL (ref 70–99)
GLUCOSE BLD-MCNC: 92 MG/DL (ref 70–99)
GLUCOSE BLD-MCNC: 94 MG/DL (ref 70–99)
GLUCOSE BLD-MCNC: 94 MG/DL (ref 70–99)
GLUCOSE URINE: NEGATIVE MG/DL
HCT VFR BLD CALC: 39.6 % (ref 40.5–52.5)
HEMOGLOBIN: 12.8 G/DL (ref 13.5–17.5)
KETONES, URINE: NEGATIVE MG/DL
LEUKOCYTE ESTERASE, URINE: ABNORMAL
MAGNESIUM: 1.9 MG/DL (ref 1.8–2.4)
MCH RBC QN AUTO: 31.9 PG (ref 26–34)
MCHC RBC AUTO-ENTMCNC: 32.2 G/DL (ref 31–36)
MCV RBC AUTO: 99.2 FL (ref 80–100)
MICROSCOPIC EXAMINATION: YES
NITRITE, URINE: NEGATIVE
PDW BLD-RTO: 15.2 % (ref 12.4–15.4)
PERFORMED ON: ABNORMAL
PERFORMED ON: ABNORMAL
PERFORMED ON: NORMAL
PH UA: 5 (ref 5–8)
PLATELET # BLD: 291 K/UL (ref 135–450)
PMV BLD AUTO: 7.6 FL (ref 5–10.5)
POTASSIUM SERPL-SCNC: 4.5 MMOL/L (ref 3.5–5.1)
PROTEIN PROTEIN: 0.2 G/DL
PROTEIN PROTEIN: 200 MG/DL
PROTEIN UA: 100 MG/DL
PROTEIN/CREAT RATIO: 2.6 MG/DL
RBC # BLD: 3.99 M/UL (ref 4.2–5.9)
SODIUM BLD-SCNC: 140 MMOL/L (ref 136–145)
SODIUM URINE: 88 MMOL/L
SPECIFIC GRAVITY UA: 1.02 (ref 1–1.03)
TROPONIN: 0.03 NG/ML
TROPONIN: 0.04 NG/ML
URINE TYPE: ABNORMAL
UROBILINOGEN, URINE: 0.2 E.U./DL
WBC # BLD: 5.2 K/UL (ref 4–11)
WBC UA: >100 /HPF (ref 0–5)

## 2021-04-06 PROCEDURE — 6360000002 HC RX W HCPCS: Performed by: PHYSICIAN ASSISTANT

## 2021-04-06 PROCEDURE — 84484 ASSAY OF TROPONIN QUANT: CPT

## 2021-04-06 PROCEDURE — 6370000000 HC RX 637 (ALT 250 FOR IP): Performed by: PHYSICIAN ASSISTANT

## 2021-04-06 PROCEDURE — 80048 BASIC METABOLIC PNL TOTAL CA: CPT

## 2021-04-06 PROCEDURE — 99223 1ST HOSP IP/OBS HIGH 75: CPT | Performed by: INTERNAL MEDICINE

## 2021-04-06 PROCEDURE — 84166 PROTEIN E-PHORESIS/URINE/CSF: CPT

## 2021-04-06 PROCEDURE — 81001 URINALYSIS AUTO W/SCOPE: CPT

## 2021-04-06 PROCEDURE — 51798 US URINE CAPACITY MEASURE: CPT

## 2021-04-06 PROCEDURE — 85027 COMPLETE CBC AUTOMATED: CPT

## 2021-04-06 PROCEDURE — 87086 URINE CULTURE/COLONY COUNT: CPT

## 2021-04-06 PROCEDURE — 87077 CULTURE AEROBIC IDENTIFY: CPT

## 2021-04-06 PROCEDURE — 1200000000 HC SEMI PRIVATE

## 2021-04-06 PROCEDURE — 6370000000 HC RX 637 (ALT 250 FOR IP): Performed by: STUDENT IN AN ORGANIZED HEALTH CARE EDUCATION/TRAINING PROGRAM

## 2021-04-06 PROCEDURE — 87186 SC STD MICRODIL/AGAR DIL: CPT

## 2021-04-06 PROCEDURE — 82570 ASSAY OF URINE CREATININE: CPT

## 2021-04-06 PROCEDURE — 84156 ASSAY OF PROTEIN URINE: CPT

## 2021-04-06 PROCEDURE — 36415 COLL VENOUS BLD VENIPUNCTURE: CPT

## 2021-04-06 PROCEDURE — 84300 ASSAY OF URINE SODIUM: CPT

## 2021-04-06 PROCEDURE — 93010 ELECTROCARDIOGRAM REPORT: CPT | Performed by: INTERNAL MEDICINE

## 2021-04-06 PROCEDURE — 2580000003 HC RX 258: Performed by: PHYSICIAN ASSISTANT

## 2021-04-06 PROCEDURE — 2500000003 HC RX 250 WO HCPCS: Performed by: PHYSICIAN ASSISTANT

## 2021-04-06 PROCEDURE — 83735 ASSAY OF MAGNESIUM: CPT

## 2021-04-06 PROCEDURE — 84443 ASSAY THYROID STIM HORMONE: CPT

## 2021-04-06 RX ORDER — AMLODIPINE BESYLATE 5 MG/1
10 TABLET ORAL DAILY
Status: DISCONTINUED | OUTPATIENT
Start: 2021-04-06 | End: 2021-04-14 | Stop reason: HOSPADM

## 2021-04-06 RX ORDER — KETOTIFEN FUMARATE 0.35 MG/ML
1 SOLUTION/ DROPS OPHTHALMIC 2 TIMES DAILY
Status: DISCONTINUED | OUTPATIENT
Start: 2021-04-06 | End: 2021-04-14 | Stop reason: HOSPADM

## 2021-04-06 RX ORDER — CETIRIZINE HYDROCHLORIDE 10 MG/1
10 TABLET ORAL DAILY
Status: DISCONTINUED | OUTPATIENT
Start: 2021-04-06 | End: 2021-04-13

## 2021-04-06 RX ORDER — FUROSEMIDE 10 MG/ML
40 INJECTION INTRAMUSCULAR; INTRAVENOUS 2 TIMES DAILY
Status: COMPLETED | OUTPATIENT
Start: 2021-04-06 | End: 2021-04-10

## 2021-04-06 RX ORDER — NICOTINE POLACRILEX 4 MG
15 LOZENGE BUCCAL PRN
Status: DISCONTINUED | OUTPATIENT
Start: 2021-04-06 | End: 2021-04-14 | Stop reason: HOSPADM

## 2021-04-06 RX ORDER — CETIRIZINE HYDROCHLORIDE 10 MG/1
10 TABLET ORAL DAILY
Status: ON HOLD | COMMUNITY
End: 2021-04-14 | Stop reason: HOSPADM

## 2021-04-06 RX ORDER — INSULIN LISPRO 100 [IU]/ML
0-3 INJECTION, SOLUTION INTRAVENOUS; SUBCUTANEOUS NIGHTLY
Status: DISCONTINUED | OUTPATIENT
Start: 2021-04-06 | End: 2021-04-14 | Stop reason: HOSPADM

## 2021-04-06 RX ORDER — ASPIRIN 81 MG/1
81 TABLET ORAL DAILY
Status: DISCONTINUED | OUTPATIENT
Start: 2021-04-06 | End: 2021-04-14 | Stop reason: HOSPADM

## 2021-04-06 RX ORDER — ACETAMINOPHEN 325 MG/1
650 TABLET ORAL EVERY 6 HOURS PRN
Status: DISCONTINUED | OUTPATIENT
Start: 2021-04-06 | End: 2021-04-14 | Stop reason: HOSPADM

## 2021-04-06 RX ORDER — INSULIN LISPRO 100 [IU]/ML
0-6 INJECTION, SOLUTION INTRAVENOUS; SUBCUTANEOUS
Status: DISCONTINUED | OUTPATIENT
Start: 2021-04-06 | End: 2021-04-14 | Stop reason: HOSPADM

## 2021-04-06 RX ORDER — HYDROCODONE BITARTRATE AND ACETAMINOPHEN 10; 325 MG/1; MG/1
1 TABLET ORAL 3 TIMES DAILY PRN
Status: DISCONTINUED | OUTPATIENT
Start: 2021-04-06 | End: 2021-04-14 | Stop reason: HOSPADM

## 2021-04-06 RX ORDER — FAMOTIDINE 20 MG/1
20 TABLET, FILM COATED ORAL DAILY
Status: DISCONTINUED | OUTPATIENT
Start: 2021-04-06 | End: 2021-04-14 | Stop reason: HOSPADM

## 2021-04-06 RX ORDER — SODIUM CHLORIDE 0.9 % (FLUSH) 0.9 %
10 SYRINGE (ML) INJECTION EVERY 12 HOURS SCHEDULED
Status: DISCONTINUED | OUTPATIENT
Start: 2021-04-06 | End: 2021-04-14 | Stop reason: HOSPADM

## 2021-04-06 RX ORDER — SODIUM CHLORIDE 9 MG/ML
25 INJECTION, SOLUTION INTRAVENOUS PRN
Status: DISCONTINUED | OUTPATIENT
Start: 2021-04-06 | End: 2021-04-14 | Stop reason: HOSPADM

## 2021-04-06 RX ORDER — DEXTROSE MONOHYDRATE 25 G/50ML
12.5 INJECTION, SOLUTION INTRAVENOUS PRN
Status: DISCONTINUED | OUTPATIENT
Start: 2021-04-06 | End: 2021-04-14 | Stop reason: HOSPADM

## 2021-04-06 RX ORDER — SODIUM CHLORIDE 0.9 % (FLUSH) 0.9 %
10 SYRINGE (ML) INJECTION PRN
Status: DISCONTINUED | OUTPATIENT
Start: 2021-04-06 | End: 2021-04-14 | Stop reason: HOSPADM

## 2021-04-06 RX ORDER — METOPROLOL SUCCINATE 50 MG/1
100 TABLET, EXTENDED RELEASE ORAL DAILY
Status: DISCONTINUED | OUTPATIENT
Start: 2021-04-06 | End: 2021-04-14 | Stop reason: HOSPADM

## 2021-04-06 RX ORDER — ATORVASTATIN CALCIUM 40 MG/1
40 TABLET, FILM COATED ORAL NIGHTLY
Status: DISCONTINUED | OUTPATIENT
Start: 2021-04-06 | End: 2021-04-14 | Stop reason: HOSPADM

## 2021-04-06 RX ORDER — GUAIFENESIN 600 MG/1
600 TABLET, EXTENDED RELEASE ORAL 2 TIMES DAILY
Status: DISCONTINUED | OUTPATIENT
Start: 2021-04-06 | End: 2021-04-14 | Stop reason: HOSPADM

## 2021-04-06 RX ORDER — DEXTROSE MONOHYDRATE 50 MG/ML
100 INJECTION, SOLUTION INTRAVENOUS PRN
Status: DISCONTINUED | OUTPATIENT
Start: 2021-04-06 | End: 2021-04-14 | Stop reason: HOSPADM

## 2021-04-06 RX ORDER — ONDANSETRON 2 MG/ML
4 INJECTION INTRAMUSCULAR; INTRAVENOUS EVERY 6 HOURS PRN
Status: DISCONTINUED | OUTPATIENT
Start: 2021-04-06 | End: 2021-04-14 | Stop reason: HOSPADM

## 2021-04-06 RX ADMIN — FAMOTIDINE 20 MG: 20 TABLET ORAL at 08:49

## 2021-04-06 RX ADMIN — KETOTIFEN FUMARATE 1 DROP: 0.35 SOLUTION/ DROPS OPHTHALMIC at 04:02

## 2021-04-06 RX ADMIN — GUAIFENESIN 600 MG: 600 TABLET, EXTENDED RELEASE ORAL at 22:19

## 2021-04-06 RX ADMIN — ASPIRIN 81 MG: 81 TABLET, COATED ORAL at 08:49

## 2021-04-06 RX ADMIN — Medication 10 ML: at 17:47

## 2021-04-06 RX ADMIN — FUROSEMIDE 40 MG: 10 INJECTION, SOLUTION INTRAMUSCULAR; INTRAVENOUS at 08:49

## 2021-04-06 RX ADMIN — KETOTIFEN FUMARATE 1 DROP: 0.35 SOLUTION/ DROPS OPHTHALMIC at 20:42

## 2021-04-06 RX ADMIN — CETIRIZINE HYDROCHLORIDE 10 MG: 10 TABLET, FILM COATED ORAL at 12:38

## 2021-04-06 RX ADMIN — HYDROCODONE BITARTRATE AND ACETAMINOPHEN 1 TABLET: 10; 325 TABLET ORAL at 12:43

## 2021-04-06 RX ADMIN — FUROSEMIDE 40 MG: 10 INJECTION, SOLUTION INTRAMUSCULAR; INTRAVENOUS at 01:01

## 2021-04-06 RX ADMIN — MICONAZOLE NITRATE: 20 POWDER TOPICAL at 20:42

## 2021-04-06 RX ADMIN — Medication 10 ML: at 08:49

## 2021-04-06 RX ADMIN — ATORVASTATIN CALCIUM 40 MG: 40 TABLET, FILM COATED ORAL at 20:42

## 2021-04-06 RX ADMIN — METOPROLOL SUCCINATE 100 MG: 50 TABLET, EXTENDED RELEASE ORAL at 08:49

## 2021-04-06 RX ADMIN — FUROSEMIDE 40 MG: 10 INJECTION, SOLUTION INTRAMUSCULAR; INTRAVENOUS at 17:47

## 2021-04-06 RX ADMIN — KETOTIFEN FUMARATE 1 DROP: 0.35 SOLUTION/ DROPS OPHTHALMIC at 08:49

## 2021-04-06 RX ADMIN — Medication 10 ML: at 20:42

## 2021-04-06 RX ADMIN — ENOXAPARIN SODIUM 40 MG: 40 INJECTION SUBCUTANEOUS at 08:48

## 2021-04-06 RX ADMIN — AMLODIPINE BESYLATE 10 MG: 5 TABLET ORAL at 08:49

## 2021-04-06 ASSESSMENT — PAIN SCALES - GENERAL
PAINLEVEL_OUTOF10: 8
PAINLEVEL_OUTOF10: 0

## 2021-04-06 ASSESSMENT — PAIN DESCRIPTION - LOCATION
LOCATION: ARM
LOCATION: ARM

## 2021-04-06 ASSESSMENT — PAIN DESCRIPTION - PAIN TYPE
TYPE: ACUTE PAIN
TYPE: ACUTE PAIN

## 2021-04-06 ASSESSMENT — PAIN DESCRIPTION - ORIENTATION: ORIENTATION: RIGHT

## 2021-04-06 NOTE — PROGRESS NOTES
4 Eyes Skin Assessment     NAME:  Rocio Banks  YOB: 1950  MEDICAL RECORD NUMBER:  [id number]    The patient is being assess for  Admission    I agree that 2 RN's have performed a thorough Head to Toe Skin Assessment on the patient. ALL assessment sites listed below have been assessed. Areas assessed by both nurses:    Head, Face, Ears, Shoulders, Back, Chest, Arms, Elbows, Hands, Sacrum. Buttock, Coccyx, Ischium and Legs. Feet and Heels        Does the Patient have a Wound?  No noted wound(s)       Bill Prevention initiated:  Yes   Wound Care Orders initiated:  No    Pressure Injury (Stage 3,4, Unstageable, DTI, NWPT, and Complex wounds) if present place consult order under [de-identified] No    New and Established Ostomies if present place consult order under : No      Nurse 1 eSignature: Electronically signed by Jes Worrell RN on 4/6/21 at 5:10 AM EDT    **SHARE this note so that the co-signing nurse is able to place an eSignature**    Nurse 2 eSignature: Electronically signed by Jayro Perkins RN on 4/6/21 at 6:53 AM EDT no

## 2021-04-06 NOTE — ED NOTES
Pt is alert and oriented no s/s of distress. Pt respirations easy, even, and unlabored.       Nickolas Lilly RN  04/05/21 3148

## 2021-04-06 NOTE — H&P
[facility] Medicine History & Physical      Patient Name: Krystle Kaba    : 1950    PCP: Valery Webb MD    Date of Service:  Patient seen and examined on 2021     Chief Complaint:  Shortness of breath    History Of Present Illness:    Krystle Kaba is a 72 y.o. male who presented to ED with complaint of progressively worsening shortness of breath which is worse with exertion for the last 3-4 months. He reports associated non-productive cough. He denies fever, dizziness, chest pain, edema, orthopnea, weight gain, abdominal pain, N/V/D/C, urinary symptoms. Patient has a history of KAMRAN and has not been using his CPAP    Past Medical History:    Patient has a past medical history of Cancer ([facility] [address] Hyperlipidemia, Hypertension, Obesity, Osteoarthritis, Type 2 diabetes mellitus without complication ([facility] [address] and Type 2 diabetes mellitus without complication, without long-term current use of insulin ([facility] [address] Past Surgical History:    Patient has a past surgical history that includes Total hip arthroplasty and joint replacement. Medications Prior to Admission:      Prior to Admission medications    Medication Sig Start Date End Date Taking?  Authorizing Provider   pioglitazone (ACTOS) 30 MG tablet Take 1 tablet by mouth daily 3/18/21   Gunner Mckeon MD   metoprolol succinate (TOPROL XL) 100 MG extended release tablet Take 1 tablet by mouth daily 3/18/21   Gunner Mckeon MD   atorvastatin (LIPITOR) 40 MG tablet Take 1 tablet by mouth nightly 3/18/21   Gunner Mckeon MD   amLODIPine (NORVASC) 10 MG tablet Take 1 tablet by mouth daily 3/18/21   Gunner Mckeon MD   aspirin 81 MG EC tablet Take 81 mg by mouth daily    Historical Provider, MD   Acetaminophen (TYLENOL ARTHRITIS PAIN PO) Take by mouth OTC    Historical Provider, MD   HYDROcodone-Acetaminophen (NORCO PO) Take by mouth Pain specialist    Historical Provider, MD   Dulaglutide (Priti Smiles) 1.5 MG/0.5ML SOPN Inject 1.5 mg into the skin every 7 days 3/5/21   Martín Nguyen MD   Semaglutide,0.25 or 0.5MG/DOS, 2 MG/1.5ML SOPN Inject 0.25 mg into the skin every 7 days for 14 days, THEN 0.5 mg every 7 days for 28 days. 11/2/20 12/14/20  Martín Nguyen MD   Semaglutide,0.25 or 0.5MG/DOS, 2 MG/1.5ML SOPN Inject 0.5 mg into the skin every 7 days 9/29/20   Martín Nguyen MD   Insulin Pen Needle ([employer] PEN NEEDLES) 31G X 6 MM MISC 1 each by Does not apply route daily 9/29/20   Martín Nguyen MD   alendronate (FOSAMAX) 70 MG tablet Take 70 mg by mouth every 7 days 7/1/20   Historical Provider, MD   ammonium lactate (AMLACTIN) 12 % cream Apply topically as needed    Historical Provider, MD   glimepiride (AMARYL) 4 MG tablet Take 4 mg by mouth every morning (before breakfast) 7/1/20   Historical Provider, MD   salsalate (DISALCID) 750 MG tablet Take 750 mg by mouth 2 times daily as needed 7/1/20   Historical Provider, MD   vitamin B-6 (PYRIDOXINE) 50 MG tablet Take 50 mg by mouth daily    Historical Provider, MD   ergocalciferol (ERGOCALCIFEROL) 45460 UNITS capsule Take 50,000 Units by mouth once a week. Historical Provider, MD   Cyanocobalamin (VITAMIN B 12 PO) Take  by mouth daily. Historical Provider, MD       Allergies: Indomethacin, Statins, and Unable to assess    Social History:      TOBACCO:   reports that he quit smoking about 20 years ago. He started smoking about 41 years ago. He quit after 21.00 years of use. He has quit using smokeless tobacco.  ETOH:   reports current alcohol use. DRUGS:  reports no history of drug use. Family History:      Reviewed in detail positive as follows:        Problem Relation Age of Onset    Hypertension Mother     Diabetes Mother     Hypertension Father        REVIEW OF SYSTEMS:   Pertinent positives as noted in the HPI. All other systems reviewed and negative.     PHYSICAL EXAM PERFORMED:    BP (!) 143/94   Pulse 68 Temp 97.8 °F (36.6 °C) (Oral)   Resp 16   Ht 5' 5\" (1.651 m)   Wt 293 lb (132.9 kg)   SpO2 (!) 89%   BMI 48.76 kg/m²     General appearance:  Awake, alert, no apparent distress  HEENT:  Normocephalic, atraumatic without obvious deformity. PERRL. EOM intact. Conjunctivae/corneas clear. Neck: Supple, with full range of motion. No JVD. Trachea midline. Respiratory:  +Rales. No wheezes, or rhonchi. Normal respiratory effort. Cardiovascular:  Regular rate and rhythm without murmurs, rubs or gallops. Abdomen: Soft, NT, ND, without rebound or guarding. Normal bowel sounds. Extremities:  +BLE edema. Full range of motion without deformity. +2 palpable pulses, equal bilaterally. Capillary refill brisk,< 3 seconds   Skin: No rashes or lesions. Warm/dry. Neurologic:  Neurovascularly intact without any focal sensory/motor deficits. Cranial nerves: II-XII intact, grossly non-focal. Alert and oriented x 3. Normal speech. Psychiatric:  Thought content appropriate, normal insight. Labs:   CBC   Recent Labs     04/05/21  1626   WBC 5.6   HGB 13.8   HCT 40.1*           RENAL  Recent Labs     04/05/21  1626      K 4.8      CO2 24   BUN 18   CREATININE 2.0*       LFTS  Recent Labs     04/05/21  1626   AST 23   ALT 17   BILITOT 0.4   ALKPHOS 86       COAG  No results for input(s): INR in the last 72 hours. CARDIAC ENZYMES  Recent Labs     04/05/21  1626   TROPONINI 0.03*       LIPIDS  Cholesterol, Total   Date/Time Value Ref Range Status   11/14/2011 08:42  (H) <200 mg/dl Final     Triglycerides   Date/Time Value Ref Range Status   11/14/2011 08:42  <150 mg/dl Final     HDL   Date/Time Value Ref Range Status   11/14/2011 08:42 AM 55 40 - 60 mg/dl Final     LDL Calculated   Date/Time Value Ref Range Status   11/14/2011 08:42  (H) <100 mg/dl Final         Radiology:     XR CHEST (2 VW)   Final Result   The above findings are most consistent moderate CHF.   Pneumonia is not   excluded ASSESSMENT/PLAN:     Acute pulmonary edema  Suspect new onset CHF  Last ECHO on 12/12/19 noted EF of 60-65%  Trend troponin  ProBNP 2510  Lasix 40 mg IV BID  Continue BB  Daily weights, Strict I/Os  Cardiology consulted    Acute hypoxic respiratory failure, POA  With criteria: < 92% on RA (89%), RR> 18, concern for new onset CHF  Supplemental O2 PRN. Not on O2 at home. Currently stable on 3L O2     KAMRAN  Has not been using CPAP at home. Explained the importance of CPAP compliance and patient would like to have it ordered here    Morbid obesity due to excess calories (Body mass index is 48.01 kg/m².)   Complicating assessment and treatment. Obesity places the patient at risk for multiple co-morbidities as well as early death and may be contributing to the patient's presentation. Supportive care. Encourage therapeutic lifestyle changes. DM2  Last HbA1c was 6.9  Hold home PO meds   Accuchecks, SSI  Hypoglycemia protocol      HTN  Continue home norvasc and metoprolol    HLD  Continue home statin      DVT prophylaxis: Lovenox   Probiotic if on abx: N/A    Diet: DIET LOW SODIUM 2 GM;  Code Status: Full Code    Consults:  IP CONSULT TO CARDIOLOGY    Disposition: Admit to Inpatient   ELOS: Greater than two midnights due to medical therapy     Verito Estrada PA-C    Thank you Helio Maloney MD for the opportunity to be involved in this patient's care. If you have any questions or concerns please feel free to contact me at [phone].

## 2021-04-06 NOTE — PROGRESS NOTES
Attempted to place talbot in patient but daughter states pt has a hx of prostate CA and usually has to have urology place a talbot.        Jayro Travis (daughter): [phone]

## 2021-04-06 NOTE — PROGRESS NOTES
Message sent to Dr. Mraina Goldberg:    Dave Lee we (this RN and charge RN) attempted talbot cath x2 one being a coude, pt has lots of pain when inserted, family states usually urologist has to put catheter in. For right now we have an external cath hooked up but those can be hit or miss with monitoring urine output.  Thanks\"

## 2021-04-06 NOTE — ED NOTES
Attempted in each arm to insert a saline lock - unsuccessful at this time.      Jarod Fitch RN  04/05/21 7618

## 2021-04-06 NOTE — PROGRESS NOTES
Acute likely diastolic CHF with chronic renal failure  H/o urinary tract obstruction with h/o longstanding diabetes    Rec-   Diurese,check echo  Consult nephrology,possibly need to consult urology

## 2021-04-06 NOTE — CONSULTS
Nephrology Consult Note  [phone]   [email or web address].        Reason for Consult:  CKD    HISTORY OF PRESENT ILLNESS:                This is a patient with significant past medical history of CKD stage 3bA3 followed by Dr. Renea Heath, h/o obstructive nephropathy, prostate Ca, obesity, BMI 48, HTN, DM, who presented with progressively worsening SOB for the past few months.    -he is diagnosed with CHF and started on lasix  -he has a h/o SHANNAN in the setting of obstruction.  -baseline Scr now in high 1 low 2 range  -he does not have a talbot, per nurse he was incontinent of urine, will difficulty placing talbot. Currently has a condom  Cath. -CXR showed pulmonary edema, started on IV lasix bid  -pro-BNP is elevated 2500  -denies fever/chills/N/V    Past Medical History:        Diagnosis Date    Cancer ([address]     prostate    Hyperlipidemia     Hypertension     Obesity     Osteoarthritis     Type 2 diabetes mellitus without complication (HCC)     Type 2 diabetes mellitus without complication, without long-term current use of insulin ([address] 2/7/2018       Past Surgical History:        Procedure Laterality Date    JOINT REPLACEMENT      TOTAL HIP ARTHROPLASTY         Current Medications:    No current facility-administered medications on file prior to encounter.       Current Outpatient Medications on File Prior to Encounter   Medication Sig Dispense Refill    cetirizine (ZYRTEC) 10 MG tablet Take 10 mg by mouth daily      pioglitazone (ACTOS) 30 MG tablet Take 1 tablet by mouth daily 90 tablet 0    metoprolol succinate (TOPROL XL) 100 MG extended release tablet Take 1 tablet by mouth daily 90 tablet 1    atorvastatin (LIPITOR) 40 MG tablet Take 1 tablet by mouth nightly 90 tablet 0    amLODIPine (NORVASC) 10 MG tablet Take 1 tablet by mouth daily 90 tablet 0    aspirin 81 MG EC tablet Take 81 mg by mouth daily      Acetaminophen (TYLENOL ARTHRITIS PAIN PO) Take by mouth OTC      BUN 20 04/06/2021    LABALBU 3.3 04/05/2021    CREATININE 2.0 04/06/2021    CALCIUM 9.0 04/06/2021    GFRAA 40 04/06/2021    GFRAA 53 02/18/2013    LABGLOM 33 04/06/2021    GLUCOSE 107 04/06/2021       IMPRESSION/RECOMMENDATIONS:      Acute resp failure: due to new onset CHF  -continue diuresis   -daily weights  -ECHO pending  -per cardiology    CKD stage 3b: multifactorial, presumed DN/HTN NS/chronic intermittent obstruction. Follows with my partner Dr. Paulette Mcbride  -baseline Scr is high 1 low 2 range.    -Cr is stable and at baseline  -tolerate some degree of azotemia  -will check UA/UPC/SPEP/UPEP  -recommend talbot  -will need urology to insert talbot catheter if unsuccessful    HTN:  -continue lasix, metoprolol, amlodipine  -hold on RASI while diuresing    Anemia: mild, monitor  -no CHRIS indicated    DM2: on insulin    CKD-MBD: check phos/PTH/vitamin D    Obesity: has been on keto diet in the past, recommend against keto diet at this time-discussed whole foods plant based diet-resources provided. He is willing to try    KAMRAN: has not been complaint with KAMRAN    H/o prostate Ca: s/p surgery        Thank you for allowing me to participate in the care of this patient. I will continue to follow along. Please call with questions.     Naomi Juárez MD

## 2021-04-06 NOTE — PROGRESS NOTES
Hospitalist Progress Note      PCP: Tarik Blanca MD    Date of Admission: 4/5/2021    Chief Complaint: Shortness of breath    Hospital Course:     Joana Robertson is a 72 y.o. male who presented to ED with complaint of progressively worsening shortness of breath which is worse with exertion for the last 3-4 months. He reports associated non-productive cough. He denies fever, dizziness, chest pain, edema, orthopnea, weight gain, abdominal pain, N/V/D/C, urinary symptoms. Patient has a history of KAMRAN and has not been using his CPAP  Subjective:     No acute event overnight  Creatinine 2  Echocardiogram pending  Diuresing well on Lasix  Troponin is trending down      Medications:  Reviewed    Infusion Medications    sodium chloride      dextrose       Scheduled Medications    amLODIPine  10 mg Oral Daily    aspirin  81 mg Oral Daily    atorvastatin  40 mg Oral Nightly    metoprolol succinate  100 mg Oral Daily    sodium chloride flush  10 mL Intravenous 2 times per day    famotidine  20 mg Oral Daily    enoxaparin  40 mg Subcutaneous Daily    furosemide  40 mg Intravenous BID    insulin lispro  0-6 Units Subcutaneous TID WC    insulin lispro  0-3 Units Subcutaneous Nightly    miconazole   Topical BID    ketotifen  1 drop Both Eyes BID    cetirizine  10 mg Oral Daily     PRN Meds: HYDROcodone-acetaminophen, sodium chloride flush, sodium chloride, magnesium hydroxide, acetaminophen, glucose, dextrose, glucagon (rDNA), dextrose, ondansetron      Intake/Output Summary (Last 24 hours) at 4/6/2021 1446  Last data filed at 4/6/2021 1437  Gross per 24 hour   Intake 240 ml   Output 200 ml   Net 40 ml       Physical Exam Performed:    BP (!) 161/93   Pulse 69   Temp 98 °F (36.7 °C) (Oral)   Resp 20   Ht 5' 5\" (1.651 m)   Wt (!) 314 lb 9.6 oz (142.7 kg)   SpO2 93%   BMI 52.35 kg/m²     General appearance: No apparent distress, appears stated age and cooperative.   HEENT: Pupils equal, round, and reactive to light. Conjunctivae/corneas clear. Neck: Supple, with full range of motion. No jugular venous distention. Trachea midline. Respiratory:  Normal respiratory effort. Clear to auscultation, bilaterally without Rales/Wheezes/Rhonchi. Cardiovascular: Regular rate and rhythm with normal S1/S2 without murmurs, rubs or gallops. Abdomen: Soft, non-tender, non-distended with normal bowel sounds. Musculoskeletal: No clubbing, cyanosis or edema bilaterally. Full range of motion without deformity. Skin: Skin color, texture, turgor normal.  No rashes or lesions. Neurologic:  Neurovascularly intact without any focal sensory/motor deficits. Cranial nerves: II-XII intact, grossly non-focal.  Psychiatric: Alert and oriented, thought content appropriate, normal insight  Capillary Refill: Brisk,< 3 seconds   Peripheral Pulses: +2 palpable, equal bilaterally       Labs:   Recent Labs     04/05/21  1626 04/06/21  0550   WBC 5.6 5.2   HGB 13.8 12.8*   HCT 40.1* 39.6*    291     Recent Labs     04/05/21  1626 04/06/21  0550    140   K 4.8 4.5    106   CO2 24 24   BUN 18 20   CREATININE 2.0* 2.0*   CALCIUM 9.4 9.0     Recent Labs     04/05/21  1626   AST 23   ALT 17   BILITOT 0.4   ALKPHOS 86     No results for input(s): INR in the last 72 hours. Recent Labs     04/05/21  1626 04/06/21  0151 04/06/21  0550   TROPONINI 0.03* 0.04* 0.03*       Urinalysis:      Lab Results   Component Value Date    NITRU POSITIVE 03/21/2015    WBCUA 127 03/21/2015    BACTERIA 4+ 03/21/2015    RBCUA 8 03/21/2015    BLOODU SMALL 03/21/2015    SPECGRAV 1.015 03/21/2015    GLUCOSEU Negative 03/21/2015       Radiology:  XR CHEST (2 VW)   Final Result   The above findings are most consistent moderate CHF.   Pneumonia is not   excluded                 Assessment/Plan:    Active Hospital Problems    Diagnosis    Acute respiratory failure with hypoxia ([facility] [J96.01]     Acute pulmonary edema secondary likely acute on chronic diastolic congestive heart failure  Last ECHO on 12/12/19 noted EF of 60-65%  Trend troponin  ProBNP 2510  Lasix 40 mg IV BID  Continue BB  Daily weights, Strict I/Os  Cardiology on board continue diuresing and check an echocardiogram.     Acute hypoxic respiratory failure, POA  With criteria: < 92% on RA (89%), RR> 18, concern for new onset CHF  Supplemental O2 PRN. Not on O2 at home. Currently stable on 3L O2     Acute kidney injury  Creatinine 2, CKD? Consult nephrology    KAMRAN  Has not been using CPAP at home.      Morbid obesity due to excess calories (Body mass index is 08.33 kg/m².)   Complicating assessment and treatment. Obesity places the patient at risk for multiple co-morbidities as well as early death and may be contributing to the patient's presentation. Supportive care.   Encourage therapeutic lifestyle changes.     DM2  Last HbA1c was 6.9  Hold home PO meds   Accuchecks, SSI  Hypoglycemia protocol       HTN  Continue home norvasc and metoprolol     HLD  Continue home statin     DVT Prophylaxis: Lovenox  Diet: DIET LOW SODIUM 2 GM;  Code Status: Full Code    PT/OT Eval Status: PT to evaluate    Dispo -continue inpatient care, continue diuresing pending echocardiogram.    Brayden Todd MD

## 2021-04-07 ENCOUNTER — APPOINTMENT (OUTPATIENT)
Dept: ULTRASOUND IMAGING | Age: 71
DRG: 291 | End: 2021-04-07
Payer: MEDICARE

## 2021-04-07 LAB
ANION GAP SERPL CALCULATED.3IONS-SCNC: 10 MMOL/L (ref 3–16)
BASOPHILS ABSOLUTE: 0 K/UL (ref 0–0.2)
BASOPHILS RELATIVE PERCENT: 1 %
BUN BLDV-MCNC: 28 MG/DL (ref 7–20)
CALCIUM SERPL-MCNC: 8.7 MG/DL (ref 8.3–10.6)
CHLORIDE BLD-SCNC: 102 MMOL/L (ref 99–110)
CO2: 26 MMOL/L (ref 21–32)
CREAT SERPL-MCNC: 2.8 MG/DL (ref 0.8–1.3)
CREATININE URINE: 52.9 MG/DL (ref 39–259)
EOSINOPHILS ABSOLUTE: 0.3 K/UL (ref 0–0.6)
EOSINOPHILS RELATIVE PERCENT: 5.6 %
GFR AFRICAN AMERICAN: 27
GFR NON-AFRICAN AMERICAN: 23
GLUCOSE BLD-MCNC: 105 MG/DL (ref 70–99)
GLUCOSE BLD-MCNC: 109 MG/DL (ref 70–99)
GLUCOSE BLD-MCNC: 117 MG/DL (ref 70–99)
GLUCOSE BLD-MCNC: 121 MG/DL (ref 70–99)
GLUCOSE BLD-MCNC: 176 MG/DL (ref 70–99)
GLUCOSE BLD-MCNC: 86 MG/DL (ref 70–99)
HCT VFR BLD CALC: 39.5 % (ref 40.5–52.5)
HEMOGLOBIN: 12.6 G/DL (ref 13.5–17.5)
LV EF: 55 %
LVEF MODALITY: NORMAL
LYMPHOCYTES ABSOLUTE: 1.2 K/UL (ref 1–5.1)
LYMPHOCYTES RELATIVE PERCENT: 23.8 %
MCH RBC QN AUTO: 31.7 PG (ref 26–34)
MCHC RBC AUTO-ENTMCNC: 32 G/DL (ref 31–36)
MCV RBC AUTO: 99.2 FL (ref 80–100)
MONOCYTES ABSOLUTE: 0.6 K/UL (ref 0–1.3)
MONOCYTES RELATIVE PERCENT: 12 %
NEUTROPHILS ABSOLUTE: 2.8 K/UL (ref 1.7–7.7)
NEUTROPHILS RELATIVE PERCENT: 57.6 %
PARATHYROID HORMONE INTACT: 101.1 PG/ML (ref 14–72)
PDW BLD-RTO: 14.9 % (ref 12.4–15.4)
PERFORMED ON: ABNORMAL
PERFORMED ON: NORMAL
PHOSPHORUS: 5.4 MG/DL (ref 2.5–4.9)
PLATELET # BLD: 325 K/UL (ref 135–450)
PMV BLD AUTO: 7.6 FL (ref 5–10.5)
POTASSIUM SERPL-SCNC: 4.6 MMOL/L (ref 3.5–5.1)
PRO-BNP: 1269 PG/ML (ref 0–124)
RBC # BLD: 3.98 M/UL (ref 4.2–5.9)
SODIUM BLD-SCNC: 138 MMOL/L (ref 136–145)
SODIUM URINE: 82 MMOL/L
TSH SERPL DL<=0.05 MIU/L-ACNC: 3.58 UIU/ML (ref 0.27–4.2)
URINE ELECTROPHORESIS INTERP: NORMAL
VITAMIN D 25-HYDROXY: 35.1 NG/ML
WBC # BLD: 4.8 K/UL (ref 4–11)

## 2021-04-07 PROCEDURE — 6370000000 HC RX 637 (ALT 250 FOR IP): Performed by: PHYSICIAN ASSISTANT

## 2021-04-07 PROCEDURE — 84300 ASSAY OF URINE SODIUM: CPT

## 2021-04-07 PROCEDURE — 84100 ASSAY OF PHOSPHORUS: CPT

## 2021-04-07 PROCEDURE — 6360000002 HC RX W HCPCS: Performed by: PHYSICIAN ASSISTANT

## 2021-04-07 PROCEDURE — 6370000000 HC RX 637 (ALT 250 FOR IP): Performed by: NURSE PRACTITIONER

## 2021-04-07 PROCEDURE — 97530 THERAPEUTIC ACTIVITIES: CPT

## 2021-04-07 PROCEDURE — 2700000000 HC OXYGEN THERAPY PER DAY

## 2021-04-07 PROCEDURE — 97165 OT EVAL LOW COMPLEX 30 MIN: CPT

## 2021-04-07 PROCEDURE — 94660 CPAP INITIATION&MGMT: CPT

## 2021-04-07 PROCEDURE — 76770 US EXAM ABDO BACK WALL COMP: CPT

## 2021-04-07 PROCEDURE — 99232 SBSQ HOSP IP/OBS MODERATE 35: CPT | Performed by: CLINICAL NURSE SPECIALIST

## 2021-04-07 PROCEDURE — 83970 ASSAY OF PARATHORMONE: CPT

## 2021-04-07 PROCEDURE — 82570 ASSAY OF URINE CREATININE: CPT

## 2021-04-07 PROCEDURE — 51798 US URINE CAPACITY MEASURE: CPT

## 2021-04-07 PROCEDURE — 97535 SELF CARE MNGMENT TRAINING: CPT

## 2021-04-07 PROCEDURE — 94761 N-INVAS EAR/PLS OXIMETRY MLT: CPT

## 2021-04-07 PROCEDURE — 80048 BASIC METABOLIC PNL TOTAL CA: CPT

## 2021-04-07 PROCEDURE — 36415 COLL VENOUS BLD VENIPUNCTURE: CPT

## 2021-04-07 PROCEDURE — 83880 ASSAY OF NATRIURETIC PEPTIDE: CPT

## 2021-04-07 PROCEDURE — 93306 TTE W/DOPPLER COMPLETE: CPT

## 2021-04-07 PROCEDURE — 6370000000 HC RX 637 (ALT 250 FOR IP): Performed by: STUDENT IN AN ORGANIZED HEALTH CARE EDUCATION/TRAINING PROGRAM

## 2021-04-07 PROCEDURE — 6360000002 HC RX W HCPCS: Performed by: INTERNAL MEDICINE

## 2021-04-07 PROCEDURE — 97161 PT EVAL LOW COMPLEX 20 MIN: CPT

## 2021-04-07 PROCEDURE — 85025 COMPLETE CBC W/AUTO DIFF WBC: CPT

## 2021-04-07 PROCEDURE — 97116 GAIT TRAINING THERAPY: CPT

## 2021-04-07 PROCEDURE — 2580000003 HC RX 258: Performed by: PHYSICIAN ASSISTANT

## 2021-04-07 PROCEDURE — 82306 VITAMIN D 25 HYDROXY: CPT

## 2021-04-07 PROCEDURE — 1200000000 HC SEMI PRIVATE

## 2021-04-07 RX ORDER — LIDOCAINE HYDROCHLORIDE 20 MG/ML
JELLY TOPICAL
Status: COMPLETED | OUTPATIENT
Start: 2021-04-07 | End: 2021-04-07

## 2021-04-07 RX ADMIN — KETOTIFEN FUMARATE 1 DROP: 0.35 SOLUTION/ DROPS OPHTHALMIC at 08:23

## 2021-04-07 RX ADMIN — FUROSEMIDE 40 MG: 10 INJECTION, SOLUTION INTRAMUSCULAR; INTRAVENOUS at 17:12

## 2021-04-07 RX ADMIN — GUAIFENESIN 600 MG: 600 TABLET, EXTENDED RELEASE ORAL at 21:21

## 2021-04-07 RX ADMIN — Medication 1000 MG: at 11:36

## 2021-04-07 RX ADMIN — FUROSEMIDE 40 MG: 10 INJECTION, SOLUTION INTRAMUSCULAR; INTRAVENOUS at 08:22

## 2021-04-07 RX ADMIN — FAMOTIDINE 20 MG: 20 TABLET ORAL at 08:22

## 2021-04-07 RX ADMIN — ASPIRIN 81 MG: 81 TABLET, COATED ORAL at 08:22

## 2021-04-07 RX ADMIN — AMLODIPINE BESYLATE 10 MG: 5 TABLET ORAL at 08:22

## 2021-04-07 RX ADMIN — CETIRIZINE HYDROCHLORIDE 10 MG: 10 TABLET, FILM COATED ORAL at 08:22

## 2021-04-07 RX ADMIN — ATORVASTATIN CALCIUM 40 MG: 40 TABLET, FILM COATED ORAL at 21:21

## 2021-04-07 RX ADMIN — MICONAZOLE NITRATE: 20 POWDER TOPICAL at 08:23

## 2021-04-07 RX ADMIN — METOPROLOL SUCCINATE 100 MG: 50 TABLET, EXTENDED RELEASE ORAL at 08:22

## 2021-04-07 RX ADMIN — ENOXAPARIN SODIUM 40 MG: 40 INJECTION SUBCUTANEOUS at 08:22

## 2021-04-07 RX ADMIN — LIDOCAINE HYDROCHLORIDE: 20 JELLY TOPICAL at 11:05

## 2021-04-07 RX ADMIN — MICONAZOLE NITRATE: 20 POWDER TOPICAL at 21:21

## 2021-04-07 RX ADMIN — GUAIFENESIN 600 MG: 600 TABLET, EXTENDED RELEASE ORAL at 08:22

## 2021-04-07 RX ADMIN — KETOTIFEN FUMARATE 1 DROP: 0.35 SOLUTION/ DROPS OPHTHALMIC at 21:21

## 2021-04-07 RX ADMIN — Medication 10 ML: at 08:23

## 2021-04-07 RX ADMIN — Medication 10 ML: at 21:21

## 2021-04-07 NOTE — CONSULTS
[address]                     [facility], [address]                                  CONSULTATION    PATIENT NAME: Rajendra Garcia                  :        1950  MED REC NO:   [id number]                          ROOM:       0102  ACCOUNT NO:   [de-identified]                           ADMIT DATE: 2021  PROVIDER:     Ngueyn Reyes MD    CONSULT DATE:  2021    REASON FOR CONSULTATION:  Evaluate patient with acute congestive heart  failure. HISTORY OF PRESENT ILLNESS:  The patient is a pleasant 46-year-old  gentleman. He previously was followed by Dr. Cuong Paz, now he is  followed by Dr. Elaine Manzo. He has multiple medical problems which  include chronic renal insufficiency and obstructive sleep apnea. He has  type 2 diabetes mellitus, morbid obesity, history of gout, history of  very severe osteoporosis which led to severe hip problems. He has noted  over the last month that he is not experiencing chest discomfort, but he  is experiencing a significant worsening of shortness of breath. Now, he  can hardly walk across the room without significant dyspnea. He still  tries to work. 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 Medications prior to admission are Zyrtec p.r.n., Actos 30 mg daily,  Toprol- mg daily, Lipitor 40 mg daily, Norvasc 10 mg daily,  aspirin 81 mg daily, Tylenol Arthritis, hydrocodone p.r.n., Trulicity,  Fosamax, salsalate, vitamin B6, vitamin D, and vitamin B12. He is on  insulin. He came into the hospital due to marked change in shortness of  breath especially over the last month. Now, he cannot walk more than 10  feet without having to stop. PAST MEDICAL HISTORY:  Notable for prostate cancer status post prostate  resection, hyperlipidemia, hypertension, obesity, osteoarthritis, and  type 2 diabetes mellitus on insulin therapy.     PAST SURGICAL HISTORY:  He has had total hip arthroplasty and joint  replacement. MEDICATIONS:  As listed. ALLERGIES:  Include INDOCIN. He has had trouble with STATINS. SOCIAL HISTORY:  Lives independently. He had a 20-year cigarette use  history. Quit 20 years ago. Does drink alcohol but not to excess. No  history of illicit drug use. He still works operating a commercial  motor vehicle. Also, he relates to me that he was an athlete in his  high school days. In fact, he was a champion wrestler at [employer] He wrestled in the 120-pound weight class. FAMILY HISTORY:  Notable for hypertension and diabetes in his family. REVIEW OF SYSTEMS:  A 14-system review of systems otherwise negative. PHYSICAL EXAMINATION:  GENERAL:  Currently on physical examination, he appears comfortable at  rest.  He does state he feels better with his breathing. Generally, he  appears in no acute distress. VITAL SIGNS:  Current blood pressure 143/94 and respirations 16. He is  5 feet 5 inches tall, weighs 293 pounds. Pulse oximeter on presentation  only 89%. HEENT:  Shows sclerae clear. Posterior pharynx clear. NECK:  Currently does have neck vein distention. LUNGS:  He has crackles at the lung bases and wheezes throughout. CARDIAC:  No murmur or gallop. ABDOMEN:  Soft, obese. No masses felt. EXTREMITIES:  Show 3+ edema from the knees down. SKIN:  Warm and dry. NEUROLOGIC:  Moves all extremities well. Cranial nerves II-XII intact. LABORATORY STUDIES:  On admission shows creatinine of 2, BUN 24, and  magnesium 1.9. Glucoses are extremely well controlled. Hemoglobin is  12.8 and platelet count 293,606. He was tested for COVID on 01/12 and  was negative. He is actively seeking COVID vaccine. Chest x-ray is  consistent with acute congestive heart failure.  _____ he has had a  stress testing in 2019 and echocardiogram in 2019. At that time, he had  a normal left ventricular ejection fraction and concentric left  ventricular hypertrophy.   Stress testing at that time was normal.    IMPRESSION:  Acute congestive heart failure likely diastolic in  etiology, chronic renal insufficiency, and chronic osteoporosis. RECOMMENDATIONS:  Agree with using IV Lasix with close monitoring of  renal function. We will review echocardiogram when available. Thank you very much for the consultation. We will follow the patient  along with you. Please note, 70 minutes time is spent with majority of time with direct  patient contact performing this consultation.         Romi Mcgrath MD    D: 04/06/2021 21:11:08       T: 04/06/2021 21:17:18     [email or web address]  Job#: [id number]     Doc#: [id number]    CC:

## 2021-04-07 NOTE — PROGRESS NOTES
Shift assessment completed. Routine vitals stable. SpO2 92% on 4L. Scheduled medications given. Patient is awake, alert and oriented. Respirations are easy and unlabored. Patient does not appear to be in distress. Call light within reach. Bed alarm on.

## 2021-04-07 NOTE — PROGRESS NOTES
[facility]   Daily Progress Note      Admit Date:  4/5/2021    HPI:    Mr. Panchito Beckman is a 72year old male with history of ckd, prashanth. Dm, morbid obesity,  Follows with Dr Andreina Pastor    He is admitted with chest discomfort and SOB for a few months. He is being treated for fluid overload. probnp 2500    Subjective:  Patient is being seen for acute on chronic dHF. There were no acute overnight cardiac events. Creat 2.8 potassium 4.6 he is lying in the bed on oxygen. He has never seen a cardiologist and has a cpap machine (over a year ago) but does not use it. He loves to drink and eat \"soul food\"  He denies any chest pain, palpitations or lightheadedness  probnp 2510->1269     Objective:   BP (!) 142/78   Pulse 73   Temp 98 °F (36.7 °C) (Oral)   Resp 18   Ht 5' 5\" (1.651 m)   Wt 290 lb 6.4 oz (131.7 kg)   SpO2 98%   BMI 48.33 kg/m²       Intake/Output Summary (Last 24 hours) at 4/7/2021 1055  Last data filed at 4/7/2021 0640  Gross per 24 hour   Intake 720 ml   Output 1630 ml   Net -910 ml          Physical Exam:  General:  Awake, alert, oriented in NAD  Skin:  Warm and dry. No unusual bruising or rash  Neck:  Supple. Hard to assess JVD due to neck size or carotid bruit appreciated  Chest:  Normal effort.   Bilateral rales  Cardiovascular:  RRR, S1/S2, no murmur/gallop/rub  Abdomen:  Soft, nontender, +bowel sounds, morbidly obese  Extremities:  Bilateral lower legs to thighs edema  Neurological: No focal deficits  Psychological: Normal mood and affect      Medications:    cefTRIAXone (ROCEPHIN) IV  1,000 mg Intravenous Q24H    amLODIPine  10 mg Oral Daily    aspirin  81 mg Oral Daily    atorvastatin  40 mg Oral Nightly    metoprolol succinate  100 mg Oral Daily    sodium chloride flush  10 mL Intravenous 2 times per day    famotidine  20 mg Oral Daily    enoxaparin  40 mg Subcutaneous Daily    furosemide  40 mg Intravenous BID    insulin lispro  0-6 Units Subcutaneous TID WC    insulin lispro  0-3 Units Subcutaneous Nightly    miconazole   Topical BID    ketotifen  1 drop Both Eyes BID    cetirizine  10 mg Oral Daily    guaiFENesin  600 mg Oral BID      sodium chloride      dextrose         Lab Data:  CBC:   Recent Labs     04/05/21  1626 04/06/21  0550 04/07/21  0559   WBC 5.6 5.2 4.8   HGB 13.8 12.8* 12.6*    291 325     BMP:    Recent Labs     04/05/21  1626 04/06/21  0550 04/07/21  0559    140 138   K 4.8 4.5 4.6   CO2 24 24 26   BUN 18 20 28*   CREATININE 2.0* 2.0* 2.8*     INR:  No results for input(s): INR in the last 72 hours. BNP:    Recent Labs     04/05/21  1626 04/07/21  0559   PROBNP 2,510* 1,269*         Diagnostics:  Echo 4/7/2021 pending    Echo 12/12/2019  Summary:  The left ventricular wall motion is normal.  Overall left ventricular ejection fraction is estimated to be 60-65%. There is mild concentric left ventricular hypertrophy. There is mild mitral regurgitation. Assessment:    1. Acute respiratory failure  2. Acute on chronic kidney disease  3. Essential hypertension  4.  prashanth non compliant  5. Morbid obesity  6. Mild anemia  7. Acute on chronic diastolic heart failure    Plan:    1.  Echo pending today  2. Nephrology following  3. Continue norvasc 10 mg daily  4. Continue lasix 40 mg IV bid  5. Continue toprol 100 mg daily  6. CHF nurse following for diet education, fluid restriction and daily weights    Discussed with patient who is agreeable with plan of care. Thank you for allowing me to participate in the care of your patient.     Larry Estrella, CNS

## 2021-04-07 NOTE — CARE COORDINATION
Discharge Planning Assessment  Discharge Planning Assessment  RN/SW discharge planner met with patient/ (and family member) to discuss reason for admission, current living situation, and potential needs at the time of discharge    Demographics/Insurance verified Yes     Current type of dwelling: First floor apartment with no steps to enter building. Patient from ECF/SW confirmed with:n/a    Living arrangements: Patient lives alone     Level of function/Support:Independent with adl's. States daughter helps him with lotion when she comes over. CM made referral to [facility] pt states having trouble taking out trash and could use some cleaning assistance. Carlos A Weiss    Last Visit to PCP: 3 months ago, has appt scheduled for June 9th    DME: states has a cane or walker but does not need to use it. Patient states had a CPAP through [facility] and \"I didn't need it and turned it back in about 5 months ago. \"     Active with any community resources/agencies/skilled home care: none. CM discussed hc and ratings and open to [facility] or [facility]     Medication compliance issues: Independent     Financial issues that could impact healthcare:   none       Tentative discharge plan:home with hc services   Discussed and provided facilities of choice if transition to a skilled nursing facility is required at the time of discharge n/a therapy scores indicate home care       Discussed with patient and/or family that on the day of discharge home tentative time of discharge will be between 10 AM and noon.     Transportation at the time of discharge:Family can pick pt up     Ghazal Hurt RN, BSN  [phone]

## 2021-04-07 NOTE — PROGRESS NOTES
Occupational Therapy   Occupational Therapy Initial Assessment  Date: 2021   Patient Name: Ann Avery  MRN: [id number]     : 1950    Date of Service: 2021    Discharge Recommendations:Ramón Knapp scored a 18/24 on the AM-PAC ADL Inpatient form. Current research shows that an AM-PAC score of 18 or greater is typically associated with a discharge to the patient's home setting. Based on the patient's AM-PAC score, and their current ADL deficits, it is recommended that the patient have 2-3 sessions per week of Occupational Therapy at d/c to increase the patient's independence. At this time, this patient demonstrates the endurance and safety to discharge home with  Home OT and a follow up treatment frequency of 2-3x/wk. Please see assessment section for further patient specific details. HOME HEALTH CARE: LEVEL 2 SOCIAL     - Initial home health evaluation to occur within 24-48 hours, in patient home   - Therapy to evaluate with goal of regaining prior level of functioning   - Therapy to evaluate if patient has [address] needs for personal care   -  evaluation within 24-48 hours, includes evaluation of resources and insurance to determine AL/IL/LTC/Medicaid options   - [facility] Martinez on Aging Referral   - [address] to discuss home support and care needs post discharge within two weeks of discharge. If patient discharges prior to next session this note will serve as a discharge summary. Please see below for the latest assessment towards goals. S Level 2  OT Equipment Recommendations  Equipment Needed: Yes  Mobility Devices: ADL Assistive Devices  ADL Assistive Devices: Elastic Shoe Laces; Reacher;Transfer Tub Bench;Hand-held Shower;Long-handled Shoe Horn;Long-handled Sponge;Grab Bars - tub;Grab Bars - toilet; Toileting - Raised Toilet Seat without arms    Assessment   Performance deficits / Impairments: Decreased functional mobility ; Decreased ADL status; Decreased high-level IADLs;Decreased safe awareness;Decreased endurance  Assessment: Patient presents with the above deficits impacting occupational performance functioning below baseline level due to new onset of CHF. Patient can benefit from skilled OT to address energy conservation during ADLs and I ADLs to increase patient safety as well needed adaptive equipment. Treatment Diagnosis: Decreased functional mobility, ADLs, endurance, safety, high level I ADLs associated with CHF  Prognosis: Good  Decision Making: Low Complexity  OT Education: OT Role;Plan of Care;Energy Conservation;Equipment  Patient Education: Patient is an independent learner  Barriers to Learning: hearing  REQUIRES OT FOLLOW UP: Yes  Activity Tolerance  Activity Tolerance: Patient Tolerated treatment well  Activity Tolerance: 4 L of 02  Safety Devices  Safety Devices in place: Yes  Type of devices: All fall risk precautions in place;Call light within reach; Patient at risk for falls;Nurse notified;Gait belt;Left in bed(RN and urology present)           Patient Diagnosis(es): The encounter diagnosis was Acute pulmonary edema ([facility] [address] has a past medical history of Cancer ([facility] [address] Hyperlipidemia, Hypertension, Obesity, Osteoarthritis, Type 2 diabetes mellitus without complication ([facility] [address] and Type 2 diabetes mellitus without complication, without long-term current use of insulin ([facility] [address] has a past surgical history that includes Total hip arthroplasty and joint replacement.     Treatment Diagnosis: Decreased functional mobility, ADLs, endurance, safety, high level I ADLs associated with CHF      Restrictions  Restrictions/Precautions  Restrictions/Precautions: Fall Risk(high fall risk)  Required Braces or Orthoses?: No  Position Activity Restriction  Other position/activity restrictions: Nikki Desai is a 72 y.o. male with history of prostate cancer hypertension hyperlipidemia diabetes and obesity presents for evaluation of increasing shortness of breath with it worse with exertion over the past 3 to 4 months. Patient states that has been getting worse. He is also reporting eye drainage. States that he has been taking Zyrtec for this with some improvement. He denies any history of CHF. Denies any significant weight gain or leg swelling from baseline. Subjective   General  Chart Reviewed: Yes  Additional Pertinent Hx: DM, Prostate CA  Family / Caregiver Present: Yes(RN and urology at end of session)  Diagnosis: Acute on set of CHF  Subjective  Subjective: Patient in bed. 4 L O2 Denies pain.   Patient Currently in Pain: Denies  Vital Signs  Patient Currently in Pain: Denies  Social/Functional History  Social/Functional History  Lives With: Alone  Type of Home: Apartment  Home Layout: One level, Performs ADL's on one level  Home Access: Level entry  Bathroom Shower/Tub: Tub/Shower unit  Bathroom Toilet: Standard  Home Equipment: [employer], Sock aid  ADL Assistance: Independent  Homemaking Assistance: Needs assistance( coming in Saturday, typically cooks, does laundry independent, grocery shops short trips and electric cart)  Ambulation Assistance: Independent(short distances)  Transfer Assistance: Independent  Active : Yes  Type of occupation: disability   Additional Comments: no falls in the last 6 months       Objective   Vision: Impaired(contacts)  Hearing: Exceptions to [facility]  Hearing Exceptions: Hard of hearing/hearing concerns;Bilateral hearing aid    Orientation  Overall Orientation Status: Within Normal Limits     Balance  Sitting Balance: Modified independent   Standing Balance: Stand by assistance  Functional Mobility  Functional - Mobility Device: No device  Activity: To/from bathroom  Assist Level: Stand by assistance  Functional Mobility Comments: Short mobility due to urology in room  Toilet Transfers  Toilet - Technique: Ambulating  Equipment Used: Standard bedside commode  Toilet Transfer: Stand by assistance  ADL  Grooming: Stand by assistance(Washed face at sink)  UE Bathing: Minimal assistance(soap and water at sink)  LE Bathing: Maximum assistance(feet and buttocks)  UE Dressing: Minimal assistance(gown)  LE Dressing: Dependent/Total(sock aide at home)  Toileting: None(incontinent of urine due to lasix)  Tone RUE  RUE Tone: Normotonic  Tone LUE  LUE Tone: Normotonic  Coordination  Movements Are Fluid And Coordinated: Yes     Bed mobility  Supine to Sit: Supervision  Sit to Supine: Supervision  Scooting: Supervision  Transfers  Stand Step Transfers: Stand by assistance  Sit to stand: Stand by assistance  Stand to sit: Stand by assistance     Cognition  Overall Cognitive Status: WNL                 LUE AROM (degrees)  LUE General AROM: functional for ADLs  RUE AROM (degrees)  RUE General AROM: functional for ADLs                      Plan   Plan  Times per week: 3-5  Times per day: Daily  Current Treatment Recommendations: Functional Mobility Training, Endurance Training, Safety Education & Training, Self-Care / ADL, Home Management Training, Patient/Caregiver Education & Training, Equipment Evaluation, Education, & procurement      AM-PAC Score        -[facility] Inpatient Daily Activity Raw Score: 18 (04/07/21 1115)  AM-PAC Inpatient ADL T-Scale Score : 38.66 (04/07/21 1115)  ADL Inpatient CMS 0-100% Score: 46.65 (04/07/21 1115)  ADL Inpatient CMS G-Code Modifier : CK (04/07/21 1115)    Goals  Short term goals  Time Frame for Short term goals: Discharge  Short term goal 1: Mod I with functional ADL maintaining O2 sats greater than 90%  Short term goal 2: Mod I with functional ADL transfers  Short term goal 3: Patient to verbalize understanding of energy conservation during ADLs and mobility  Short term goal 4: Dressing Mod I with Adaptive equipment  Long term goals  Time Frame for Long term goals : LTG=STG  Patient Goals   Patient goals : Go home with home health       Therapy Time   Individual Concurrent Group Co-treatment   Time In 1003         Time Out 1052         Minutes 49              Timed Code Treatment Minutes:  31  Total Treatment Minutes:  [address], OT   [facility]  Debra [address]

## 2021-04-07 NOTE — PROGRESS NOTES
Message sent to Alejandro Cast:    2106  \"Pt is here for resp. failure. He is c/o congestion and is requesting some medication for it. Can you order something like Mucinex?       New orders: Mucinex

## 2021-04-07 NOTE — PROGRESS NOTES
Nephrology Progress note  [phone]   [email or web address]        Reason for Consult:  CKD    HISTORY OF PRESENT ILLNESS:                This is a patient with significant past medical history of CKD stage 3bA3 followed by Dr. Iraida Yee, h/o obstructive nephropathy, prostate Ca, obesity, BMI 48, HTN, DM, who presented with progressively worsening SOB for the past few months.    -he is diagnosed with CHF and started on lasix  -he has a h/o SHANNAN in the setting of obstruction.  -baseline Scr now in high 1 low 2 range  -he does not have a talbot, per nurse he was incontinent of urine, will difficulty placing talbot. Currently has a condom  Cath. -CXR showed pulmonary edema, started on IV lasix bid  -pro-BNP is elevated 2500  -denies fever/chills/N/V    Subjective:  -pt seen and examined  -PMSHx and meds reviewed  -No family at bedside    No acute evens ON  BP better control  UO is good  Cr trended up    ROS: neg chest pain/SOB      PHYSICAL EXAM:    Vitals:    /80   Pulse 65   Temp 98.3 °F (36.8 °C) (Oral)   Resp 20   Ht 5' 5\" (1.651 m)   Wt 290 lb 6.4 oz (131.7 kg)   SpO2 92%   BMI 48.33 kg/m²   I/O last 3 completed shifts: In: 720 [P.O.:720]  Out: 1630 [Urine:1630]  No intake/output data recorded. Physical Exam:  Gen: Resting in bed, NAD.  obese  HEENT: MMM, OP clear, anicteric, NC/AT. CV: +S1/S2, RRR  Lungs: Good respiratory effort, + wheezing  Abd: obese, NT  Ext: + edema, no cyanosis  Skin: Warm. No rashes appreciated. : No TTP over bladder, nondistended . Neuro: Alert and oriented x 3, nonfocal.  Joints: No erythema noted over joints.     DATA:    CBC:   Lab Results   Component Value Date    WBC 5.2 04/06/2021    RBC 3.99 04/06/2021    HGB 12.8 04/06/2021    HCT 39.6 04/06/2021    MCV 99.2 04/06/2021    MCH 31.9 04/06/2021    MCHC 32.2 04/06/2021    RDW 15.2 04/06/2021     04/06/2021    MPV 7.6 04/06/2021     BMP:    Lab Results   Component Value Date     04/07/2021 K 4.6 04/07/2021     04/07/2021    CO2 26 04/07/2021    BUN 28 04/07/2021    LABALBU 3.3 04/05/2021    CREATININE 2.8 04/07/2021    CALCIUM 8.7 04/07/2021    GFRAA 27 04/07/2021    GFRAA 53 02/18/2013    LABGLOM 23 04/07/2021    GLUCOSE 105 04/07/2021       IMPRESSION/RECOMMENDATIONS:      SHANNAN: -Scr trending up, r/o obstruction  -unable to insert talbot x 2 er nurse-including coude catheter  -check renal US/urine lytes  -consult Urology    Acute resp failure: due to new onset CHF  -continue diuresis   -daily weights  -ECHO pending  -per cardiology    CKD stage 3b: multifactorial, presumed DN/HTN NS/chronic intermittent obstruction. Follows with my partner Dr. Bc Cervantes. UA = pyuria, UPC 2.6  -check renal US  -baseline Scr is high 1 low 2 range.    -tolerate some degree of azotemia  -SPEP/UPEP is pending  -Urology consulted for talbot insertion    Pyuria: culture is pending    HTN:  -continue lasix, metoprolol, amlodipine  -hold on RASI while diuresing    Anemia: mild, monitor  -no CHRIS indicated    DM2: on insulin    CKD-MBD: check phos/PTH/vitamin D    Obesity: has been on keto diet in the past, recommend against keto diet at this time-discussed whole foods plant based diet-resources provided. He is willing to try    KAMRAN: has not been complaint with KAMRAN    H/o prostate Ca: s/p surgery        Thank you for allowing me to participate in the care of this patient. I will continue to follow along. Please call with questions.     Esther De Oliveira MD

## 2021-04-07 NOTE — DISCHARGE INSTR - COC
Continuity of Care Form    Patient Name: Noel Singh   :    MRN:  [id number]    [facility] date:  2021  Discharge date:  21      Code Status Order: Full Code   Advance Directives:   [facility] Documentation       Date/Time Healthcare Directive Type of Healthcare Directive Copy in [address] Agent's Name Healthcare Agent's Phone Number    21 0208  No, patient does not have an advance directive for healthcare treatment -- -- -- -- --            Admitting Physician:  Renaldo Justin MD  PCP: Yaquelin Zelaya MD    Discharging Nurse: [facility] Unit/Room#: 2UH-0284/5804-17  Discharging Unit Phone Number: [phone]    Emergency Contact:   Extended Emergency Contact Information  Primary Emergency Contact: Marzena Steve, [address]  Home Phone: [phone]  Relation: Child  Secondary Emergency Contact: [place] [address] Phone: [phone]  Relation: Child    Past Surgical History:  Past Surgical History:   Procedure Laterality Date    JOINT REPLACEMENT      TOTAL HIP ARTHROPLASTY         Immunization History:   Immunization History   Administered Date(s) Administered    Influenza, Triv, inactivated, subunit, adjuvanted, IM (Fluad 65 yrs and older) 2020       Active Problems:  Patient Active Problem List   Diagnosis Code    Acquired spondylolisthesis M43.10    Acute idiopathic gout of right foot M10.071    Adjustment disorder with mixed disturbance of emotions and conduct F43.25    Anemia in chronic kidney disease N18.9, D63.1    Aseptic necrosis of head and neck of femur M87.059    Chronic renal insufficiency, stage III (moderate) N18.30    Degeneration of lumbar or lumbosacral intervertebral disc M51.37    Esophageal reflux K21.9    Depression F32.9    Hypersomnolence G47.10    Hyperlipidemia E78.5    History of colonic polyps Z86.010    Family history of malignant neoplasm of gastrointestinal tract Z80.0    Malignant neoplasm of prostate ([facility] [address] C61    Impotence of organic origin N52.9    Morbid obesity ([facility] [address] E66.01    Opioid type dependence, continuous ([facility]) F11.20    KAMRAN (obstructive sleep apnea) G47.33    Osteoarthritis of right knee M17.11    Osteopenia M85.80    ROB (renal osteodystrophy) N25.0    Spondylosis of lumbar region without myelopathy or radiculopathy M47.816    Type 2 diabetes mellitus without complication, without long-term current use of insulin ([facility]) E11.9    Acute respiratory failure with hypoxia ([facility]) J96.01    Acute kidney injury superimposed on CKD ([facility]) N17.9, N18.9    Essential hypertension I10    Acute on chronic diastolic heart failure ([facility]) I50.33       Isolation/Infection:   Isolation            No Isolation          Patient Infection Status       Infection Onset Added Last Indicated Last Indicated By Review Planned Expiration Resolved Resolved By    None active    Resolved    COVID-19 Rule Out 01/12/21 01/12/21 01/12/21 COVID-19 (Ordered)   01/13/21 Rule-Out Test Resulted            Nurse Assessment:  Last Vital Signs: /85   Pulse 64   Temp 97.8 °F (36.6 °C) (Oral)   Resp 17   Ht 5' 5\" (1.651 m)   Wt 290 lb 6.4 oz (131.7 kg)   SpO2 93%   BMI 48.33 kg/m²     Last documented pain score (0-10 scale): Pain Level: 0  Last Weight:   Wt Readings from Last 1 Encounters:   04/07/21 290 lb 6.4 oz (131.7 kg)     Mental Status:  oriented and alert    IV Access:  - None    Nursing Mobility/ADLs:  Walking   Assisted  Transfer  Assisted  Bathing  Assisted  Dressing  Independent  Toileting  Independent  Feeding  Independent  Med Admin  Independent  Med Delivery   whole    Wound Care Documentation and Therapy:        Elimination:  Continence:   · Bowel: no  · Bladder: no  Urinary Catheter: None   Colostomy/Ileostomy/Ileal Conduit: No       Date of Last BM: 4-13    Intake/Output Summary (Last 24 hours) at 4/7/2021 1504  Last data filed at 4/7/2021 1117  Gross per 24 hour   Intake 480 ml   Output 1580 ml   Net -1100 ml     I/O last 3 completed shifts: In: 480 [P.O.:480]  Out: 1580 [Urine:1580]    Safety Concerns: At Risk for Falls    Impairments/Disabilities:      None    Nutrition Therapy:  Current Nutrition Therapy:   - Oral Diet:  Carb Control 4 carbs/meal (1800kcals/day) and Low Sodium (2gm)    Routes of Feeding: Oral  Liquids: Thin Liquids  Daily Fluid Restriction: yes - amount 1800  Last Modified Barium Swallow with Video (Video Swallowing Test): not done    Treatments at the Time of Hospital Discharge:   Respiratory Treatments: na  Oxygen Therapy:  is not on home oxygen therapy.   Ventilator:    - No ventilator support    Rehab Therapies: Physical Therapy and Occupational Therapy  Weight Bearing Status/Restrictions: No weight bearing restirctions  Other Medical Equipment (for information only, NOT a DME order):  walker  Other Treatments: na    Patient's personal belongings (please select all that are sent with patient):  None    RN SIGNATURE:  Electronically signed by Rishi Devi RN on 4/14/21 at 2:21 PM EDT    CASE MANAGEMENT/SOCIAL WORK SECTION    Inpatient Status Date: 4/5/2021    Readmission Risk Assessment Score:  Readmission Risk              Risk of Unplanned Readmission:        30           Discharging to Facility/ Agency   · Name: [address] [place] [address]   · Address:  · Phone:[phone]  · Fax:[phone]    Dialysis Facility (if applicable)   · Name:  · Address:  · Dialysis Schedule:  · Phone:  · Fax:    / signature: Chago Lundy RN, BSN  [phone]       PHYSICIAN SECTION    Prognosis: Good    Condition at Discharge: Stable    Rehab Potential (if transferring to Rehab): Good    Recommended Labs or Other Treatments After Discharge: daily weights, daily glucose     Physician Certification: I certify the above information and transfer of Andreas Notice  is necessary for the continuing treatment of the diagnosis listed and that he requires Home Care for greater 30 days.      Update Admission H&P: No change in H&P    PHYSICIAN SIGNATURE:  Electronically signed by OSMAN Blackmon CNP on 4/14/21 at 10:23 AM EDT

## 2021-04-07 NOTE — CONSULTS
Hyperlipidemia, Hypertension, Obesity, Osteoarthritis, Type 2 diabetes mellitus without complication ([facility] [address] and Type 2 diabetes mellitus without complication, without long-term current use of insulin ([facility] [address] (2/7/2018). Hospital Problem List:  Active Problems:    Acute respiratory failure with hypoxia (HCC)  Resolved Problems:    * No resolved hospital problems. *      Past Surgical History:  He has a past surgical history that includes Total hip arthroplasty and joint replacement. Social History:  He reports that he quit smoking about 20 years ago. He started smoking about 41 years ago. He quit after 21.00 years of use. He has quit using smokeless tobacco. He reports current alcohol use. He reports that he does not use drugs. Family History:  family history includes Diabetes in his mother; Hypertension in his father and mother. Allergies: Allergies   Allergen Reactions    Indomethacin Other (See Comments)     Dr. Alie Bettencourt told him not to take because affects the bladder.       Statins Other (See Comments)    Unable To Assess       Anesthesia that begins with S- stops my breathing       Medications:  Scheduled Meds:   cefTRIAXone (ROCEPHIN) IV  1,000 mg Intravenous Q24H    amLODIPine  10 mg Oral Daily    aspirin  81 mg Oral Daily    atorvastatin  40 mg Oral Nightly    metoprolol succinate  100 mg Oral Daily    sodium chloride flush  10 mL Intravenous 2 times per day    famotidine  20 mg Oral Daily    enoxaparin  40 mg Subcutaneous Daily    furosemide  40 mg Intravenous BID    insulin lispro  0-6 Units Subcutaneous TID WC    insulin lispro  0-3 Units Subcutaneous Nightly    miconazole   Topical BID    ketotifen  1 drop Both Eyes BID    cetirizine  10 mg Oral Daily    guaiFENesin  600 mg Oral BID     Continuous Infusions:   sodium chloride      dextrose       PRN Meds:HYDROcodone-acetaminophen, sodium chloride flush, sodium chloride, magnesium hydroxide, acetaminophen, glucose, dextrose,

## 2021-04-07 NOTE — PROGRESS NOTES
Physical Therapy    Facility/Department: [address] ONCOLOGY  Initial Assessment    NAME: Bola Umana  : 1950  MRN: [id number]    Date of Service: 2021    Discharge Recommendations:  Bola Umana scored a 20/24 on the AM-PAC short mobility form. Current research shows that an AM-PAC score of 18 or greater is typically associated with a discharge to the patient's home setting. Based on the patient's AM-PAC score and their current functional mobility deficits, it is recommended that the patient have 2-3 sessions per week of Physical Therapy at d/c to increase the patient's independence. At this time, this patient demonstrates the endurance and safety to discharge home with home services and a follow up treatment frequency of 2-3x/wk. Please see assessment section for further patient specific details. HOME HEALTH CARE: LEVEL 2 SOCIAL     - Initial home health evaluation to occur within 24-48 hours, in patient home   - Therapy to evaluate with goal of regaining prior level of functioning   - Therapy to evaluate if patient has [address] needs for personal care   -  evaluation within 24-48 hours, includes evaluation of resources and insurance to determine AL/IL/LTC/Medicaid options   - [place] on Aging Referral   - [address] to discuss home support and care needs post discharge within two weeks of discharge. If patient discharges prior to next session this note will serve as a discharge summary. Please see below for the latest assessment towards goals. S Level 2   PT Equipment Recommendations  Equipment Needed: No    Assessment   Body structures, Functions, Activity limitations: Decreased functional mobility ; Decreased strength;Decreased endurance;Decreased balance  Assessment: Patient not at baseline function and would benefit from skilled PT to address above deficits and facilitate return to baseline function. Patient with endurance deficits.  Limited assessment by need for catheter placement. Will continue to assess ambulation on next visit. Treatment Diagnosis: decreased functional mobility, impaired gait, decreased endurance  Prognosis: Good  Decision Making: Low Complexity  Clinical Presentation: stable  PT Education: PT Role;Goals;Plan of Care  Patient Education: d/c recommendations- verbalized understanding  Barriers to Learning: none  REQUIRES PT FOLLOW UP: Yes  Activity Tolerance  Activity Tolerance: Patient Tolerated treatment well       Patient Diagnosis(es): The encounter diagnosis was Acute pulmonary edema ([facility] [address] has a past medical history of Cancer ([facility] [address] Hyperlipidemia, Hypertension, Obesity, Osteoarthritis, Type 2 diabetes mellitus without complication ([facility] [address] and Type 2 diabetes mellitus without complication, without long-term current use of insulin ([facility] [address] has a past surgical history that includes Total hip arthroplasty and joint replacement. Restrictions  Restrictions/Precautions  Restrictions/Precautions: Fall Risk(high fall risk)  Required Braces or Orthoses?: No  Position Activity Restriction  Other position/activity restrictions: Andreas Pena is a 72 y.o. male with history of prostate cancer hypertension hyperlipidemia diabetes and obesity presents for evaluation of increasing shortness of breath with it worse with exertion over the past 3 to 4 months. Patient states that has been getting worse. He is also reporting eye drainage. States that he has been taking Zyrtec for this with some improvement. He denies any history of CHF. Denies any significant weight gain or leg swelling from baseline.   Vision/Hearing  Vision: Impaired(contacts)  Hearing: Exceptions to [employer]  Hearing Exceptions: Hard of hearing/hearing concerns;Bilateral hearing aid     Subjective  General  Chart Reviewed: Yes  Family / Caregiver Present: No  Diagnosis: acute respiratory failure  Follows Commands: Within Functional Limits  General Comment  Comments: supine in bed upon arrival  Subjective  Subjective: Denied pain at rest. Agreeable to therapy. Reported mild SOB with mobility.   Pain Screening  Patient Currently in Pain: Denies          Orientation  Orientation  Overall Orientation Status: Within Functional Limits  Social/Functional History  Social/Functional History  Lives With: Alone  Type of Home: Apartment  Home Layout: One level, Performs ADL's on one level  Home Access: Level entry  Bathroom Shower/Tub: Tub/Shower unit  Bathroom Toilet: Standard  Home Equipment: Cane, Sock aid  ADL Assistance: Independent  Homemaking Assistance: Needs assistance( coming in Saturday, typically cooks, does laundry independent, grocery shops short trips and electric cart)  Ambulation Assistance: Independent(short distances)  Transfer Assistance: Independent  Active : Yes  Type of occupation: disability   Additional Comments: no falls in the last 6 months    Objective          AROM RLE (degrees)  RLE AROM: WFL(hip flexion limited by body habitus)  AROM LLE (degrees)  LLE AROM : WFL(hip flexion limited by body habitus)  Strength LLE  Strength LLE: WNL  Strength LUE  Strength LUE: WNL        Bed mobility  Supine to Sit: Supervision  Sit to Supine: Supervision  Scooting: Supervision  Transfers  Sit to Stand: Stand by assistance(from bed and BSC x 2-3 during ADL task)  Stand to sit: Stand by assistance  Ambulation 1  Surface: level tile  Device: No Device  Other Apparatus: O2(4L O2)  Quality of Gait: steady gait, increased medial lateral sway, decreased kari  Distance: 10' x 2  Comments: Ambulation limited by arrival of urology for catheter placement     Balance  Sitting - Static: Good  Sitting - Dynamic: Good  Standing - Static: Good(tolerates static standing 4-5 minutes each trial x 3. UE support needed to perform UE ADL task)  Standing - Dynamic: [address]  Times per week: 3-5  Times per day: Daily  Current Treatment Recommendations: Balance Training, Strengthening, Functional Mobility Training, Transfer Training, Endurance Training, Gait Training, Safety Education & Training, Home Exercise Program  Safety Devices  Type of devices: None(left in bed with nursing and urology present for catheter placement)  Restraints  Initially in place: No      AM-PAC Score  AM-PAC Inpatient Mobility Raw Score : 20 (04/07/21 1117)  AM-PAC Inpatient T-Scale Score : 47.67 (04/07/21 1117)  Mobility Inpatient CMS 0-100% Score: 35.83 (04/07/21 1117)  Mobility Inpatient CMS G-Code Modifier : Rondall Forward (04/07/21 1117)          Goals  Short term goals  Time Frame for Short term goals: To be met prior to discharge  Short term goal 1: Bed mobility with mod I  Short term goal 2: Sit to/from stand with mod I  Short term goal 3: Ambulate 48' without AD and mod I  Patient Goals   Patient goals :  To go home       Therapy Time   Individual Concurrent Group Co-treatment   Time In 1003         Time Out 1052         Minutes 49         Timed Code Treatment Minutes: Sivakumar Balderrama, PT     Thanks, Wilmar Vazquez, PT, DPT [id number]

## 2021-04-07 NOTE — PROGRESS NOTES
Hospitalist Progress Note      PCP: Tahir Washington MD    Date of Admission: 4/5/2021    Chief Complaint: Shortness of breath    Hospital Course:     Marge Reyes is a 72 y.o. male who presented to ED with complaint of progressively worsening shortness of breath which is worse with exertion for the last 3-4 months. He reports associated non-productive cough. He denies fever, dizziness, chest pain, edema, orthopnea, weight gain, abdominal pain, N/V/D/C, urinary symptoms. Patient has a history of KAMRAN and has not been using his CPAP  Subjective:     No acute event overnight  Creatinine 2>2.8  Echocardiogram pending  Troponin is trending down  Currently on condom catheter  Will require cystoscopy for Cook placement.   Urology on board    Medications:  Reviewed    Infusion Medications    sodium chloride      dextrose       Scheduled Medications    cefTRIAXone (ROCEPHIN) IV  1,000 mg Intravenous Q24H    amLODIPine  10 mg Oral Daily    aspirin  81 mg Oral Daily    atorvastatin  40 mg Oral Nightly    metoprolol succinate  100 mg Oral Daily    sodium chloride flush  10 mL Intravenous 2 times per day    famotidine  20 mg Oral Daily    enoxaparin  40 mg Subcutaneous Daily    furosemide  40 mg Intravenous BID    insulin lispro  0-6 Units Subcutaneous TID WC    insulin lispro  0-3 Units Subcutaneous Nightly    miconazole   Topical BID    ketotifen  1 drop Both Eyes BID    cetirizine  10 mg Oral Daily    guaiFENesin  600 mg Oral BID     PRN Meds: HYDROcodone-acetaminophen, sodium chloride flush, sodium chloride, magnesium hydroxide, acetaminophen, glucose, dextrose, glucagon (rDNA), dextrose, ondansetron      Intake/Output Summary (Last 24 hours) at 4/7/2021 1336  Last data filed at 4/7/2021 1117  Gross per 24 hour   Intake 720 ml   Output 1580 ml   Net -860 ml       Physical Exam Performed:    /85   Pulse 66   Temp 97.8 °F (36.6 °C) (Oral)   Resp 17   Ht 5' 5\" (1.651 m)   Wt 290 lb findings are most consistent moderate CHF. Pneumonia is not   excluded         US RENAL COMPLETE    (Results Pending)           Assessment/Plan:    Active Hospital Problems    Diagnosis    Acute kidney injury superimposed on CKD ([facility] [address] [N17.9, N18.9]    Essential hypertension [I10]    Acute on chronic diastolic heart failure (HCC) [I50.33]    Acute respiratory failure with hypoxia (HCC) [J96.01]    Morbid obesity ([facility] [address] [E66.01]     Acute pulmonary edema secondary likely acute on chronic diastolic congestive heart failure  Last ECHO on 12/12/19 noted EF of 60-65%  Trend troponin  ProBNP 2510  Lasix 40 mg IV BID  Continue BB  Daily weights, Strict I/Os  Cardiology on board continue diuresing and check an echocardiogram.     Acute hypoxic respiratory failure, POA  With criteria: < 92% on RA (89%), RR> 18, concern for new onset CHF  Supplemental O2 PRN. Not on O2 at home. Currently stable on 3L O2     Acute kidney injury/obstructive uropathy  Creatinine 2>2.8, CKD? Appreciate nephrology recommendation    KAMRAN  Has not been using CPAP at home.      Morbid obesity due to excess calories (Body mass index is 66.43 kg/m².)   Complicating assessment and treatment. Obesity places the patient at risk for multiple co-morbidities as well as early death and may be contributing to the patient's presentation. Supportive care.   Encourage therapeutic lifestyle changes.     DM2  Last HbA1c was 6.9  Hold home PO meds   Accuchecks, SSI  Hypoglycemia protocol       HTN  Continue home norvasc and metoprolol     HLD  Continue home statin     DVT Prophylaxis: Lovenox  Diet: DIET LOW SODIUM 2 GM;  Code Status: Full Code    PT/OT Eval Status: 20/24     Dispo -continue inpatient care, continue diuresing pending echocardiogram.    Stewart Sweeney MD

## 2021-04-08 LAB
ANION GAP SERPL CALCULATED.3IONS-SCNC: 10 MMOL/L (ref 3–16)
BASOPHILS ABSOLUTE: 0 K/UL (ref 0–0.2)
BASOPHILS RELATIVE PERCENT: 0.9 %
BUN BLDV-MCNC: 29 MG/DL (ref 7–20)
CALCIUM SERPL-MCNC: 8.7 MG/DL (ref 8.3–10.6)
CHLORIDE BLD-SCNC: 102 MMOL/L (ref 99–110)
CO2: 24 MMOL/L (ref 21–32)
CREAT SERPL-MCNC: 2.4 MG/DL (ref 0.8–1.3)
EOSINOPHILS ABSOLUTE: 0.3 K/UL (ref 0–0.6)
EOSINOPHILS RELATIVE PERCENT: 5.8 %
GFR AFRICAN AMERICAN: 33
GFR NON-AFRICAN AMERICAN: 27
GLUCOSE BLD-MCNC: 102 MG/DL (ref 70–99)
GLUCOSE BLD-MCNC: 112 MG/DL (ref 70–99)
GLUCOSE BLD-MCNC: 123 MG/DL (ref 70–99)
GLUCOSE BLD-MCNC: 91 MG/DL (ref 70–99)
GLUCOSE BLD-MCNC: 97 MG/DL (ref 70–99)
GLUCOSE BLD-MCNC: 98 MG/DL (ref 70–99)
HCT VFR BLD CALC: 39.2 % (ref 40.5–52.5)
HEMOGLOBIN: 12.5 G/DL (ref 13.5–17.5)
LYMPHOCYTES ABSOLUTE: 1.2 K/UL (ref 1–5.1)
LYMPHOCYTES RELATIVE PERCENT: 23.5 %
MCH RBC QN AUTO: 31.9 PG (ref 26–34)
MCHC RBC AUTO-ENTMCNC: 31.8 G/DL (ref 31–36)
MCV RBC AUTO: 100.2 FL (ref 80–100)
MONOCYTES ABSOLUTE: 0.6 K/UL (ref 0–1.3)
MONOCYTES RELATIVE PERCENT: 11.6 %
NEUTROPHILS ABSOLUTE: 3.1 K/UL (ref 1.7–7.7)
NEUTROPHILS RELATIVE PERCENT: 58.2 %
PDW BLD-RTO: 14.8 % (ref 12.4–15.4)
PERFORMED ON: ABNORMAL
PERFORMED ON: NORMAL
PERFORMED ON: NORMAL
PLATELET # BLD: 320 K/UL (ref 135–450)
PMV BLD AUTO: 6.7 FL (ref 5–10.5)
POTASSIUM SERPL-SCNC: 4.5 MMOL/L (ref 3.5–5.1)
RBC # BLD: 3.91 M/UL (ref 4.2–5.9)
SODIUM BLD-SCNC: 136 MMOL/L (ref 136–145)
WBC # BLD: 5.3 K/UL (ref 4–11)

## 2021-04-08 PROCEDURE — 6370000000 HC RX 637 (ALT 250 FOR IP): Performed by: STUDENT IN AN ORGANIZED HEALTH CARE EDUCATION/TRAINING PROGRAM

## 2021-04-08 PROCEDURE — 2580000003 HC RX 258: Performed by: PHYSICIAN ASSISTANT

## 2021-04-08 PROCEDURE — 94660 CPAP INITIATION&MGMT: CPT

## 2021-04-08 PROCEDURE — 6370000000 HC RX 637 (ALT 250 FOR IP): Performed by: PHYSICIAN ASSISTANT

## 2021-04-08 PROCEDURE — 97535 SELF CARE MNGMENT TRAINING: CPT

## 2021-04-08 PROCEDURE — 99232 SBSQ HOSP IP/OBS MODERATE 35: CPT | Performed by: CLINICAL NURSE SPECIALIST

## 2021-04-08 PROCEDURE — 6360000002 HC RX W HCPCS: Performed by: INTERNAL MEDICINE

## 2021-04-08 PROCEDURE — 85025 COMPLETE CBC W/AUTO DIFF WBC: CPT

## 2021-04-08 PROCEDURE — 84165 PROTEIN E-PHORESIS SERUM: CPT

## 2021-04-08 PROCEDURE — 1200000000 HC SEMI PRIVATE

## 2021-04-08 PROCEDURE — 36415 COLL VENOUS BLD VENIPUNCTURE: CPT

## 2021-04-08 PROCEDURE — 6360000002 HC RX W HCPCS: Performed by: PHYSICIAN ASSISTANT

## 2021-04-08 PROCEDURE — 80048 BASIC METABOLIC PNL TOTAL CA: CPT

## 2021-04-08 PROCEDURE — 84155 ASSAY OF PROTEIN SERUM: CPT

## 2021-04-08 RX ADMIN — KETOTIFEN FUMARATE 1 DROP: 0.35 SOLUTION/ DROPS OPHTHALMIC at 08:38

## 2021-04-08 RX ADMIN — CETIRIZINE HYDROCHLORIDE 10 MG: 10 TABLET, FILM COATED ORAL at 08:31

## 2021-04-08 RX ADMIN — Medication 1000 MG: at 11:16

## 2021-04-08 RX ADMIN — GUAIFENESIN 600 MG: 600 TABLET, EXTENDED RELEASE ORAL at 08:31

## 2021-04-08 RX ADMIN — ATORVASTATIN CALCIUM 40 MG: 40 TABLET, FILM COATED ORAL at 20:30

## 2021-04-08 RX ADMIN — AMLODIPINE BESYLATE 10 MG: 5 TABLET ORAL at 08:30

## 2021-04-08 RX ADMIN — GUAIFENESIN 600 MG: 600 TABLET, EXTENDED RELEASE ORAL at 20:30

## 2021-04-08 RX ADMIN — ASPIRIN 81 MG: 81 TABLET, COATED ORAL at 08:31

## 2021-04-08 RX ADMIN — METOPROLOL SUCCINATE 100 MG: 50 TABLET, EXTENDED RELEASE ORAL at 08:31

## 2021-04-08 RX ADMIN — FUROSEMIDE 40 MG: 10 INJECTION, SOLUTION INTRAMUSCULAR; INTRAVENOUS at 17:00

## 2021-04-08 RX ADMIN — ENOXAPARIN SODIUM 40 MG: 40 INJECTION SUBCUTANEOUS at 08:30

## 2021-04-08 RX ADMIN — FAMOTIDINE 20 MG: 20 TABLET ORAL at 08:31

## 2021-04-08 RX ADMIN — KETOTIFEN FUMARATE 1 DROP: 0.35 SOLUTION/ DROPS OPHTHALMIC at 20:32

## 2021-04-08 RX ADMIN — Medication 10 ML: at 08:31

## 2021-04-08 RX ADMIN — MICONAZOLE NITRATE: 20 POWDER TOPICAL at 08:30

## 2021-04-08 RX ADMIN — Medication 10 ML: at 20:34

## 2021-04-08 RX ADMIN — MICONAZOLE NITRATE: 20 POWDER TOPICAL at 20:33

## 2021-04-08 RX ADMIN — FUROSEMIDE 40 MG: 10 INJECTION, SOLUTION INTRAMUSCULAR; INTRAVENOUS at 08:31

## 2021-04-08 NOTE — CONSULTS
[address] [facility] 1950    History:  Past Medical History:   Diagnosis Date    Cancer [facility])     prostate    Hyperlipidemia     Hypertension     Obesity     Osteoarthritis     Type 2 diabetes mellitus without complication (HCC)     Type 2 diabetes mellitus without complication, without long-term current use of insulin ([facility] [address] 2/7/2018       ECHO:  Summary   Overall left ventricular function is normal.   Ejection fraction is visually estimated to be 55 %. E/e'= 9.3 . There is reversal of E/A inflow velocities across the mitral valve. There is mild concentric left ventricular hypertrophy. No definitive regional wall motion abnormalities are noted. Grade II diastolic dysfunction with elevated LV filling pressures. Mild to moderate tricuspid regurgitation. Estimated pulmonary artery systolic pressure is moderately elevated at 65   mmHg assuming a right atrial pressure of 15 mmHg. The right ventricle is mildly enlarged. TAPSE= 2.72 RV S'= 17.2   IVC size is dilated (>2.1 cm) and collapses < 50% with respiration   consistent with elevated RA pressure (15 mmHg). History of sleep apnea: noType:  prashanth   Equipment used at home: cpap-- patient compliant with use         DM History: Yes  DM medications:   Home Meds: Trulicity, glimepride, actos,   Inpatient: insulin sliding scale    Last Hospital Admission: no recent admissions noted in EMR  Code Status: full code  Discharge plans: from home    Family Present: oscar Schroeder was admitted to the hospital with increased shortness of breath. He states he had lower extremity edema, abdomen fullness, and dyspnea. Patient states HF is a new diagnosis. He does not have a scale but will obtain one. He admits he likes to cook but uses a lot of salt. Will make chili and \"soul food\". He often drinks a lot of fluids. He feels he drinks over 64 oz. He is compliant with medications and follow ups.  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 Patient provided with both written and verbal education on CHF signs/ symptoms, causes, discharge medications, daily weights, low sodium diet, activity, and follow-up. Pt to call if gains 3 pounds in one day or 5 pounds in one week. Mutually agreed upon goals were discussed such as calling the MD as soon as they recognize symptoms and weight gain, maintaining his proper diet, taking medications as prescribed, joining rehab when able. Patient provided with CHF Zone Management tool and CHF symptoms magnet. Discussed importance of lifestyle changes: patient agrees to daily weights    PATIENT/CAREGIVER TEACHING:    Level of patient/caregiver understanding able to:   [x ] Verbalize understanding [ ] Demonstrate understanding [ ] Teach back   [ ] Needs reinforcement [ ] Other:       Time spent teachin mins    1. WEIGHT: Admit Weight: 293 lb (132.9 kg)      Today  Weight: 291 lb (132 kg)   2. I/O     Intake/Output Summary (Last 24 hours) at 2021 1522  Last data filed at 2021 0526  Gross per 24 hour   Intake 360 ml   Output 1900 ml   Net -1540 ml       Recommendations:   1. Patient needs one week hospital follow up at discharge  2. Educate further on fluid restriction 48 oz- 64 oz during inpatient stay so he can understand how to measure intake at home. 3. Continue to educate on S/S.   4. Emphasize daily weights, diet, and knowing when and who to call  5. Provided patient with CHF Resource Line for questions and concerns.        MARCK PERALTA 2021 3:22 PM

## 2021-04-08 NOTE — PROGRESS NOTES
Urology Progress Note  [facility]    Provider: Brooke Toscano APRN-CNP Patient ID:  Admission Date: 2021 Name: Renaldo Cardona Date: 2021 MRN: [id number]   Patient Location: [id number] : 1950  Attending: Francisca Lopez MD Date of Service: 2021  PCP: Cici Lu MD     Diagnoses:  Complex Talbot Catheter    Assessment/Plan:  Mr. Faustino Park is a 72yo male  Hx of prostate cancer  s/p robotic radical prostatectomy   Now with baseline urge incontinence  Last PSA <0.01  S/p IPP in 2018  Admitted for CHF exacerbation  SHANNAN increasing, now 2.8  Consult for complex talbot, multiple attempts per staff     Recommendations:  -Talbot for Strict I&O  -Patient would require cystoscopy complex talbot placement   -Would continue with condom catheter for now - Sounds like it is working well. Patient happy with this, does not wish to have procedure for talbot placement.  -Please call if there is concern for acute urinary retention. The patient had a chance to ask questions which were answered. he understands the above plan. Subjective:   Miguel Campbell is a 72 y.o. male. He was seen and examined this morning. Today we discussed continued use of condom cath. Pt does not wish to have talbot placed. Objective:   Vitals:  Vitals:    21 0828   BP: (!) 152/86   Pulse: 72   Resp: 18   Temp: 97.9 °F (36.6 °C)   SpO2: 97%       Intake/Output Summary (Last 24 hours) at 2021 0944  Last data filed at 2021 8075  Gross per 24 hour   Intake 360 ml   Output 2050 ml   Net -1690 ml     Physical Exam:  Gen: Obese, Alert and oriented x3, no acute distress  CV: Regular rate   Resp: unlabored respirations  Abd: Soft, non-distended, non-tender, no masses  Ext: no peripheral edema noted, moves upper and lower extremities spontaneously  Skin: warmand well perfused, no rashes noted on the face, or arms.      Labs:  Lab Results   Component Value Date    WBC 5.3 2021    HGB 12.5

## 2021-04-08 NOTE — PROGRESS NOTES
Nephrology Progress note  [phone]   [email or web address].        Reason for Consult:  CKD    HISTORY OF PRESENT ILLNESS:                This is a patient with significant past medical history of CKD stage 3bA3 followed by Dr. Alem Olivera, h/o obstructive nephropathy, prostate Ca, obesity, BMI 48, HTN, DM, who presented with progressively worsening SOB for the past few months.    -he is diagnosed with CHF and started on lasix  -he has a h/o SHANNAN in the setting of obstruction.  -baseline Scr now in high 1 low 2 range  -he does not have a talbot, per nurse he was incontinent of urine, will difficulty placing talbot. Currently has a condom  Cath. -CXR showed pulmonary edema, started on IV lasix bid  -pro-BNP is elevated 2500  -denies fever/chills/N/V    Subjective:  -pt seen and examined  -PMSHx and meds reviewed  -No family at bedside    No acute events ON  BP better control  Urology attempted bedside talbot-appreciate assistance  Currently voiding OK    ROS: neg chest pain/SOB      PHYSICAL EXAM:    Vitals:    BP (!) 152/86   Pulse 72   Temp 97.9 °F (36.6 °C) (Oral)   Resp 18   Ht 5' 5\" (1.651 m)   Wt 291 lb (132 kg)   SpO2 97%   BMI 48.42 kg/m²   I/O last 3 completed shifts: In: 360 [P.O.:360]  Out: 2050 [Urine:2050]  No intake/output data recorded. Physical Exam:  Gen: Resting in bed, NAD.  obese  HEENT: MMM, OP clear, anicteric, NC/AT. CV: +S1/S2, RRR  Lungs: Good respiratory effort, + wheezing  Abd: obese, NT  Ext: + edema, no cyanosis  Skin: Warm. No rashes appreciated. : No TTP over bladder, nondistended . Neuro: Alert and oriented x 3, nonfocal.  Joints: No erythema noted over joints.     DATA:    CBC:   Lab Results   Component Value Date    WBC 5.3 04/08/2021    RBC 3.91 04/08/2021    HGB 12.5 04/08/2021    HCT 39.2 04/08/2021    .2 04/08/2021    MCH 31.9 04/08/2021    MCHC 31.8 04/08/2021    RDW 14.8 04/08/2021     04/08/2021    MPV 6.7 04/08/2021     BMP:    Lab Results Component Value Date     04/08/2021    K 4.5 04/08/2021     04/08/2021    CO2 24 04/08/2021    BUN 29 04/08/2021    LABALBU 3.3 04/05/2021    CREATININE 2.4 04/08/2021    CALCIUM 8.7 04/08/2021    GFRAA 33 04/08/2021    GFRAA 53 02/18/2013    LABGLOM 27 04/08/2021    GLUCOSE 97 04/08/2021     Evaluation is limited due to the patient's body habitus.  The right kidney   measures 9.2 cm in length and the left kidney measures 9.3 cm in length.       Both kidneys are poorly delineated.  Cortical thickness and echogenicity are   grossly normal.  There is no evidence for hydronephrosis.         Bladder:       There appears to be diffuse urinary bladder wall irregularity.  Left ureteral   jet was identified with no visualization of a right ureteral jet.  Post void   bladder volume could not be obtained due to incontinence and \"problem with   catheter. \"         Summary   Overall left ventricular function is normal.   Ejection fraction is visually estimated to be 55 %. E/e'= 9.3 . There is reversal of E/A inflow velocities across the mitral valve. There is mild concentric left ventricular hypertrophy. No definitive regional wall motion abnormalities are noted. Grade II diastolic dysfunction with elevated LV filling pressures. Mild to moderate tricuspid regurgitation. Estimated pulmonary artery systolic pressure is moderately elevated at 65   mmHg assuming a right atrial pressure of 15 mmHg. The right ventricle is mildly enlarged. TAPSE= 2.72 RV S'= 17.2   IVC size is dilated (>2.1 cm) and collapses < 50% with respiration   consistent with elevated RA pressure (15 mmHg).          IMPRESSION/RECOMMENDATIONS:      SHANNAN: -  -Scr improved to 2.4<--2.8  -appreciate urology assistance  -given improvement in Scr, OK to avoid talbot  -continue bladder scan  -renal US noted-no HN  -continue lasix    Acute resp failure: due to new onset CHF  -continue diuresis - wt is trending lower  -daily weights  -ECHO noted  -per cardiology    CKD stage 3b: multifactorial, presumed DN/HTN NS/chronic intermittent obstruction. Follows with my partner Dr. Som Gross. UA = pyuria, UPC 2.6  -renal US ntoed  -baseline Scr is high 1 low 2 range.    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 KAMRAN: has not been complaint with KAMRAN    H/o prostate Ca: s/p surgery        Thank you for allowing me to participate in the care of this patient. I will continue to follow along. Please call with questions.     Carl Burrell MD

## 2021-04-08 NOTE — PROGRESS NOTES
[facility]   Daily Progress Note      Admit Date:  4/5/2021    HPI:    Mr. Yudith Stewart is a 72year old male with history of ckd, prashanth. Dm, morbid obesity,  Follows with Dr Reba Mukherjee    He is admitted with chest discomfort and SOB for a few months. He is being treated for fluid overload. probnp 2500  never seen a cardiologist and has a cpap machine (over a year ago) but does not use it. He loves to drink and eat \"soul food    Subjective:  Patient is being seen for acute on chronic dHF. There were no acute overnight cardiac events. Weight 314->291 (weight in 9/20 was 290) He is sitting up in the bed (has 2 empty jello containers on his table). Po intake is not accurate. He denies any increased sob. He did wear the hospital cpap last night and did well. His family is bringing in his cpap machine. probnp 2510->1269     Objective:   BP (!) 152/86   Pulse 72   Temp 97.9 °F (36.6 °C) (Oral)   Resp 18   Ht 5' 5\" (1.651 m)   Wt 291 lb (132 kg)   SpO2 97%   BMI 48.42 kg/m²       Intake/Output Summary (Last 24 hours) at 4/8/2021 1021  Last data filed at 4/8/2021 0526  Gross per 24 hour   Intake 360 ml   Output 2050 ml   Net -1690 ml          Physical Exam:  General:  Awake, alert, oriented in NAD  Skin:  Warm and dry. No unusual bruising or rash  Neck:  Supple. Hard to assess JVD due to neck size or carotid bruit appreciated  Chest:  Normal effort.   Bilateral rales improved  Cardiovascular:  RRR, S1/S2, no murmur/gallop/rub  Abdomen:  Soft, nontender, +bowel sounds, morbidly obese  Extremities:  Bilateral lower legs to thighs edema  Neurological: No focal deficits  Psychological: Normal mood and affect      Medications:    cefTRIAXone (ROCEPHIN) IV  1,000 mg Intravenous Q24H    amLODIPine  10 mg Oral Daily    aspirin  81 mg Oral Daily    atorvastatin  40 mg Oral Nightly    metoprolol succinate  100 mg Oral Daily    sodium chloride flush  10 mL Intravenous 2 times per day    famotidine  20 mg Oral Daily    enoxaparin  40 mg Subcutaneous Daily    furosemide  40 mg Intravenous BID    insulin lispro  0-6 Units Subcutaneous TID WC    insulin lispro  0-3 Units Subcutaneous Nightly    miconazole   Topical BID    ketotifen  1 drop Both Eyes BID    cetirizine  10 mg Oral Daily    guaiFENesin  600 mg Oral BID      sodium chloride      dextrose         Lab Data:  CBC:   Recent Labs     04/06/21  0550 04/07/21  0559 04/08/21  0603   WBC 5.2 4.8 5.3   HGB 12.8* 12.6* 12.5*    325 320     BMP:    Recent Labs     04/06/21  0550 04/07/21  0559 04/08/21  0603    138 136   K 4.5 4.6 4.5   CO2 24 26 24   BUN 20 28* 29*   CREATININE 2.0* 2.8* 2.4*     INR:  No results for input(s): INR in the last 72 hours. BNP:    Recent Labs     04/05/21  1626 04/07/21  0559   PROBNP 2,510* 1,269*         Diagnostics:  Echo 4/7/2021   Summary   Overall left ventricular function is normal.   Ejection fraction is visually estimated to be 55 %. E/e'= 9.3 . There is reversal of E/A inflow velocities across the mitral valve. There is mild concentric left ventricular hypertrophy. No definitive regional wall motion abnormalities are noted. Grade II diastolic dysfunction with elevated LV filling pressures. Mild to moderate tricuspid regurgitation. Estimated pulmonary artery systolic pressure is moderately elevated at 65   mmHg assuming a right atrial pressure of 15 mmHg. The right ventricle is mildly enlarged. TAPSE= 2.72 RV S'= 17.2   IVC size is dilated (>2.1 cm) and collapses < 50% with respiration   consistent with elevated RA pressure (15 mmHg    Echo 12/12/2019  Summary:  The left ventricular wall motion is normal.  Overall left ventricular ejection fraction is estimated to be 60-65%. There is mild concentric left ventricular hypertrophy. There is mild mitral regurgitation. Assessment:    1. Acute respiratory failure  2. Acute on chronic kidney disease  3.   Essential hypertension  4.  prashanth non compliant  5. Morbid obesity  6. Mild anemia  7. Acute on chronic diastolic heart failure    Plan:    1. Continue with cpap at night and naps  2. Nephrology following  3. Continue norvasc 10 mg daily  4. Continue lasix 40 mg IV bid  5. Continue toprol 100 mg daily  6. CHF nurse following for diet education, fluid restriction and daily weights  7. Fluid restriction added to his diet    Discussed with patient who is agreeable with plan of care. Thank you for allowing me to participate in the care of your patient.     Anshul Olvera, CNS

## 2021-04-08 NOTE — PROGRESS NOTES
Hospitalist Progress Note      PCP: Anand Negrete MD    Date of Admission: 4/5/2021    Chief Complaint: Shortness of breath    Hospital Course: Valerie Mcallister is a 72 y.o. male who presented to ED with complaint of progressively worsening shortness of breath which is worse with exertion for the last 3-4 months. He reports associated non-productive cough. He denies fever, dizziness, chest pain, edema, orthopnea, weight gain, abdominal pain, N/V/D/C, urinary symptoms. Patient has a history of KAMRAN and has not been using his CPAP. Subjective: No acute event overnight. Patient is currently on 4 L via nasal cannula. On CPAP overnight for KAMRAN. Patient offers no complaints. Urine output of 2 L documented over 24 hours.     Medications:  Reviewed    Infusion Medications    sodium chloride      dextrose       Scheduled Medications    cefTRIAXone (ROCEPHIN) IV  1,000 mg Intravenous Q24H    amLODIPine  10 mg Oral Daily    aspirin  81 mg Oral Daily    atorvastatin  40 mg Oral Nightly    metoprolol succinate  100 mg Oral Daily    sodium chloride flush  10 mL Intravenous 2 times per day    famotidine  20 mg Oral Daily    enoxaparin  40 mg Subcutaneous Daily    furosemide  40 mg Intravenous BID    insulin lispro  0-6 Units Subcutaneous TID WC    insulin lispro  0-3 Units Subcutaneous Nightly    miconazole   Topical BID    ketotifen  1 drop Both Eyes BID    cetirizine  10 mg Oral Daily    guaiFENesin  600 mg Oral BID     PRN Meds: HYDROcodone-acetaminophen, sodium chloride flush, sodium chloride, magnesium hydroxide, acetaminophen, glucose, dextrose, glucagon (rDNA), dextrose, ondansetron      Intake/Output Summary (Last 24 hours) at 4/8/2021 1214  Last data filed at 4/8/2021 0526  Gross per 24 hour   Intake 360 ml   Output 1900 ml   Net -1540 ml       Physical Exam Performed:    BP (!) 144/83   Pulse 64   Temp 98.4 °F (36.9 °C) (Oral)   Resp 18   Ht 5' 5\" (1.651 m)   Wt 291 lb (132 2d-Echo from 4/5/21. Summary   Overall left ventricular function is normal.   Ejection fraction is visually estimated to be 55 %. E/e'= 9.3 . There is reversal of E/A inflow velocities across the mitral valve. There is mild concentric left ventricular hypertrophy. No definitive regional wall motion abnormalities are noted. Grade II diastolic dysfunction with elevated LV filling pressures. Mild to moderate tricuspid regurgitation. Estimated pulmonary artery systolic pressure is moderately elevated at 65   mmHg assuming a right atrial pressure of 15 mmHg. The right ventricle is mildly enlarged. TAPSE= 2.72 RV S'= 17.2   IVC size is dilated (>2.1 cm) and collapses < 50% with respiration   consistent with elevated RA pressure (15 mmHg). Assessment/Plan:    Active Hospital Problems    Diagnosis    Acute kidney injury superimposed on CKD ([facility] [N17.9, N18.9]    Essential hypertension [I10]    Acute on chronic diastolic heart failure (HCC) [I50.33]    Acute respiratory failure with hypoxia ([facility]) [J96.01]    Morbid obesity ([facility] [E66.01]     Acute pulmonary edema secondary likely acute on chronic diastolic congestive heart failure  2D echo from 4/5/2021 showed LVEF of 50 % with grade 2 diastolic dysfunction. Clinically continues to have signs of fluid overload. Receiving less 40 mg IV twice daily. Educated on salt and fluid restrictions.      Acute hypoxic respiratory failure, POA  Likely secondary to pulmonary vascular congestion from diastolic heart failure. KAMRAN likely contributing as well. Lasix 40 mg IV twice daily. Educated on using CPAP at night and during day naps. O2 requirements at 4 L by nasal cannula. Wean off as tolerated. Acute kidney injury/obstructive uropathy  Serum creatinine with acute rise from 2.0 to 2.8 yesterday. Etiology likely postrenal since patient was unable to void yesterday. Currently able to void on his own.   No indication for portacatheter placement. Appreciate urology and nephrology recommendations. Sr creat down to 2.4 today. Urine output of 2 L in 24 hours. Continue IV diuretics. Monitor labs. KAMRAN  Has not been using CPAP at home. Educated on importance of CPAP use.     Morbid obesity due to excess calories (Body mass index is 48.76 kg/m².)   Low calorie diet. Encourage therapeutic lifestyle changes.     DM2  Most recent hemoglobin A1c of 6.9%. Oral meds on hold. POC blood sugars and exercise to cover.     HTN  Continue home norvasc and metoprolol     HLD  Continue home statin     DVT Prophylaxis: Lovenox  Diet: DIET LOW SODIUM 2 GM; 1800 ml  Code Status: Full Code    PT/OT Eval Status: Cleared for discharge home.     Dispo -continue with IV diuresis, possible discharge in 1 to 2 days    Babak Fields MD

## 2021-04-08 NOTE — PROGRESS NOTES
Occupational Therapy  Facility/Department: [address] ONCOLOGY  Daily Treatment Note  NAME: Esther Bradford  : 1950  MRN: [id number]    Date of Service: 2021    Discharge Recommendations:    Esther Bradford scored a 17/24 on the AM-PAC ADL Inpatient form. Current research shows that an AM-PAC score of 18 or greater is typically associated with a discharge to the patient's home setting. Based on the patient's AM-PAC score, and their current ADL deficits, it is recommended that the patient have 2-3 sessions per week of Occupational Therapy at d/c to increase the patient's independence. At this time, this patient demonstrates the endurance and safety to discharge home with home health services and a follow up treatment frequency of 2-3x/wk. Please see assessment section for further patient specific details. If patient discharges prior to next session this note will serve as a discharge summary. Please see below for the latest assessment towards goals. HOME HEALTH CARE: LEVEL 2 SOCIAL: PRN assist      - Initial home health evaluation to occur within 24-48 hours, in patient home   - Therapy to evaluate with goal of regaining prior level of functioning   - Therapy to evaluate if patient has [id number] needs for personal care   -  evaluation within 24-48 hours, includes evaluation of resources and insurance to determine AL/IL/LTC/Medicaid options   - [place] [facility] Referral   - Ethan Townsend to discuss home support and care needs post discharge within two weeks of discharge. S Level 2  OT Equipment Recommendations  Equipment Needed: Yes  ADL Assistive Devices: Elastic Shoe Laces; Reacher;Transfer Tub Bench;Hand-held Shower;Long-handled Shoe Horn;Long-handled Sponge;Grab Bars - tub;Grab Bars - toilet; Toileting - Raised Toilet Seat without arms  Other: has a sock aid at home    Assessment   Performance deficits / Impairments: Decreased functional mobility ;Decreased ADL status; Decreased high-level IADLs;Decreased safe awareness;Decreased endurance  Assessment: Patient presents with the above deficits impacting occupational performance functioning below baseline level due to new onset of CHF. Patient can benefit from skilled OT to improve energy conservation. Treatment Diagnosis: Decreased functional mobility, ADLs, endurance, safety, high level I ADLs associated with CHF  Prognosis: Good  Decision Making: Low Complexity  OT Education: OT Role;Plan of Care;Energy Conservation;Equipment;ADL Adaptive Strategies  Patient Education: Patient is an independent learner  Barriers to Learning: hearing  REQUIRES OT FOLLOW UP: Yes  Activity Tolerance  Activity Tolerance: Patient Tolerated treatment well  Activity Tolerance: pt on 4 L of 02, denies pain or dizziness during treatment  Safety Devices  Safety Devices in place: Yes  Type of devices: All fall risk precautions in place;Call light within reach; Patient at risk for falls;Nurse notified;Gait belt;Left in bed;Bed alarm in place         Patient Diagnosis(es): The encounter diagnosis was Acute pulmonary edema ([facility] has a past medical history of Cancer ([facility] Hyperlipidemia, Hypertension, Obesity, Osteoarthritis, Type 2 diabetes mellitus without complication ([facility] and Type 2 diabetes mellitus without complication, without long-term current use of insulin ([facility] has a past surgical history that includes Total hip arthroplasty and joint replacement. Restrictions  Restrictions/Precautions  Restrictions/Precautions: Fall Risk(High Fall risk)  Required Braces or Orthoses?: No  Position Activity Restriction  Other position/activity restrictions: Tad Romo is a 72 y.o. male with history of prostate cancer hypertension hyperlipidemia diabetes and obesity presents for evaluation of increasing shortness of breath with it worse with exertion over the past 3 to 4 months. Patient states that has been getting worse. Recommendations: Functional Mobility Training, Endurance Training, Safety Education & Training, Self-Care / ADL, Home Management Training, Patient/Caregiver Education & Training, Equipment Evaluation, Education, & procurement, Strengthening    AM-PAC Score        AM-PAC Inpatient Daily Activity Raw Score: 17 (04/08/21 1545)  AM-PAC Inpatient ADL T-Scale Score : 37.26 (04/08/21 1545)  ADL Inpatient CMS 0-100% Score: 50.11 (04/08/21 1545)  ADL Inpatient CMS G-Code Modifier : CK (04/08/21 1545)    Goals  Short term goals  Time Frame for Short term goals: Discharge  Short term goal 1: Mod I with functional ADL maintaining O2 sats greater than 90%--\"ongoing\" 4/8  Short term goal 2: Mod I with functional ADL transfers--SBA \"ongoing\" 4/8  Short term goal 3: Patient to verbalize understanding of energy conservation during ADLs and mobility--\"verbal cueing required, continue goal\" 4/8  Short term goal 4: Dressing Mod I with Adaptive equipment- SBA \"ongoing\" 4/8  Long term goals  Time Frame for Long term goals : LTG=STG  Patient Goals   Patient goals : Go home with home health       Therapy Time   Individual Concurrent Group Co-treatment   Time In 5372         Time Out 1523         Minutes 31            Timed Code Treatment Minutes:  31 Minutes  Total Treatment Minutes: J[address]       [address]    Occupational Therapist read and agrees to the above written documentation  Fermin Graham, OTR/L [id number]

## 2021-04-09 LAB
ALBUMIN SERPL-MCNC: 2.6 G/DL (ref 3.1–4.9)
ALPHA-1-GLOBULIN: 0.3 G/DL (ref 0.2–0.4)
ALPHA-2-GLOBULIN: 1.3 G/DL (ref 0.4–1.1)
ANION GAP SERPL CALCULATED.3IONS-SCNC: 10 MMOL/L (ref 3–16)
BASOPHILS ABSOLUTE: 0 K/UL (ref 0–0.2)
BASOPHILS RELATIVE PERCENT: 0.7 %
BETA GLOBULIN: 1.2 G/DL (ref 0.9–1.6)
BUN BLDV-MCNC: 30 MG/DL (ref 7–20)
CALCIUM SERPL-MCNC: 8.7 MG/DL (ref 8.3–10.6)
CHLORIDE BLD-SCNC: 101 MMOL/L (ref 99–110)
CO2: 28 MMOL/L (ref 21–32)
CREAT SERPL-MCNC: 2.5 MG/DL (ref 0.8–1.3)
EOSINOPHILS ABSOLUTE: 0.3 K/UL (ref 0–0.6)
EOSINOPHILS RELATIVE PERCENT: 5.4 %
GAMMA GLOBULIN: 0.7 G/DL (ref 0.6–1.8)
GFR AFRICAN AMERICAN: 31
GFR NON-AFRICAN AMERICAN: 26
GLUCOSE BLD-MCNC: 108 MG/DL (ref 70–99)
GLUCOSE BLD-MCNC: 165 MG/DL (ref 70–99)
GLUCOSE BLD-MCNC: 90 MG/DL (ref 70–99)
GLUCOSE BLD-MCNC: 93 MG/DL (ref 70–99)
GLUCOSE BLD-MCNC: 98 MG/DL (ref 70–99)
HCT VFR BLD CALC: 39.8 % (ref 40.5–52.5)
HEMOGLOBIN: 12.7 G/DL (ref 13.5–17.5)
LYMPHOCYTES ABSOLUTE: 1.2 K/UL (ref 1–5.1)
LYMPHOCYTES RELATIVE PERCENT: 20.7 %
MCH RBC QN AUTO: 31.5 PG (ref 26–34)
MCHC RBC AUTO-ENTMCNC: 31.9 G/DL (ref 31–36)
MCV RBC AUTO: 98.9 FL (ref 80–100)
MONOCYTES ABSOLUTE: 0.8 K/UL (ref 0–1.3)
MONOCYTES RELATIVE PERCENT: 14.2 %
NEUTROPHILS ABSOLUTE: 3.3 K/UL (ref 1.7–7.7)
NEUTROPHILS RELATIVE PERCENT: 59 %
ORGANISM: ABNORMAL
PDW BLD-RTO: 14.7 % (ref 12.4–15.4)
PERFORMED ON: ABNORMAL
PERFORMED ON: NORMAL
PLATELET # BLD: 356 K/UL (ref 135–450)
PMV BLD AUTO: 6.7 FL (ref 5–10.5)
POTASSIUM SERPL-SCNC: 4.8 MMOL/L (ref 3.5–5.1)
PRO-BNP: 552 PG/ML (ref 0–124)
RBC # BLD: 4.02 M/UL (ref 4.2–5.9)
SODIUM BLD-SCNC: 139 MMOL/L (ref 136–145)
TOTAL PROTEIN: 6.1 G/DL (ref 6.4–8.2)
URINE CULTURE, ROUTINE: ABNORMAL
WBC # BLD: 5.6 K/UL (ref 4–11)

## 2021-04-09 PROCEDURE — 99232 SBSQ HOSP IP/OBS MODERATE 35: CPT | Performed by: CLINICAL NURSE SPECIALIST

## 2021-04-09 PROCEDURE — 2580000003 HC RX 258: Performed by: PHYSICIAN ASSISTANT

## 2021-04-09 PROCEDURE — 6360000002 HC RX W HCPCS: Performed by: INTERNAL MEDICINE

## 2021-04-09 PROCEDURE — 80048 BASIC METABOLIC PNL TOTAL CA: CPT

## 2021-04-09 PROCEDURE — 6360000002 HC RX W HCPCS: Performed by: PHYSICIAN ASSISTANT

## 2021-04-09 PROCEDURE — 6370000000 HC RX 637 (ALT 250 FOR IP): Performed by: INTERNAL MEDICINE

## 2021-04-09 PROCEDURE — 36415 COLL VENOUS BLD VENIPUNCTURE: CPT

## 2021-04-09 PROCEDURE — 94660 CPAP INITIATION&MGMT: CPT

## 2021-04-09 PROCEDURE — 2700000000 HC OXYGEN THERAPY PER DAY

## 2021-04-09 PROCEDURE — 94761 N-INVAS EAR/PLS OXIMETRY MLT: CPT

## 2021-04-09 PROCEDURE — 83880 ASSAY OF NATRIURETIC PEPTIDE: CPT

## 2021-04-09 PROCEDURE — 1200000000 HC SEMI PRIVATE

## 2021-04-09 PROCEDURE — 97530 THERAPEUTIC ACTIVITIES: CPT

## 2021-04-09 PROCEDURE — 85025 COMPLETE CBC W/AUTO DIFF WBC: CPT

## 2021-04-09 PROCEDURE — 6370000000 HC RX 637 (ALT 250 FOR IP): Performed by: STUDENT IN AN ORGANIZED HEALTH CARE EDUCATION/TRAINING PROGRAM

## 2021-04-09 PROCEDURE — 6370000000 HC RX 637 (ALT 250 FOR IP): Performed by: PHYSICIAN ASSISTANT

## 2021-04-09 RX ORDER — METOLAZONE 2.5 MG/1
5 TABLET ORAL ONCE
Status: COMPLETED | OUTPATIENT
Start: 2021-04-09 | End: 2021-04-09

## 2021-04-09 RX ORDER — GABAPENTIN 100 MG/1
100 CAPSULE ORAL 3 TIMES DAILY PRN
Status: DISCONTINUED | OUTPATIENT
Start: 2021-04-09 | End: 2021-04-14 | Stop reason: HOSPADM

## 2021-04-09 RX ADMIN — MICONAZOLE NITRATE: 20 POWDER TOPICAL at 21:01

## 2021-04-09 RX ADMIN — INSULIN LISPRO 1 UNITS: 100 INJECTION, SOLUTION INTRAVENOUS; SUBCUTANEOUS at 21:00

## 2021-04-09 RX ADMIN — FUROSEMIDE 40 MG: 10 INJECTION, SOLUTION INTRAMUSCULAR; INTRAVENOUS at 09:45

## 2021-04-09 RX ADMIN — Medication 1000 MG: at 09:54

## 2021-04-09 RX ADMIN — ATORVASTATIN CALCIUM 40 MG: 40 TABLET, FILM COATED ORAL at 20:59

## 2021-04-09 RX ADMIN — KETOTIFEN FUMARATE 1 DROP: 0.35 SOLUTION/ DROPS OPHTHALMIC at 21:03

## 2021-04-09 RX ADMIN — Medication 10 ML: at 09:58

## 2021-04-09 RX ADMIN — GUAIFENESIN 600 MG: 600 TABLET, EXTENDED RELEASE ORAL at 20:59

## 2021-04-09 RX ADMIN — GABAPENTIN 100 MG: 100 CAPSULE ORAL at 20:59

## 2021-04-09 RX ADMIN — FAMOTIDINE 20 MG: 20 TABLET ORAL at 09:51

## 2021-04-09 RX ADMIN — Medication 1 SPRAY: at 21:04

## 2021-04-09 RX ADMIN — Medication 10 ML: at 09:52

## 2021-04-09 RX ADMIN — CETIRIZINE HYDROCHLORIDE 10 MG: 10 TABLET, FILM COATED ORAL at 09:52

## 2021-04-09 RX ADMIN — ASPIRIN 81 MG: 81 TABLET, COATED ORAL at 09:51

## 2021-04-09 RX ADMIN — AMLODIPINE BESYLATE 10 MG: 5 TABLET ORAL at 09:51

## 2021-04-09 RX ADMIN — METOLAZONE 5 MG: 2.5 TABLET ORAL at 10:18

## 2021-04-09 RX ADMIN — KETOTIFEN FUMARATE 1 DROP: 0.35 SOLUTION/ DROPS OPHTHALMIC at 09:52

## 2021-04-09 RX ADMIN — Medication 10 ML: at 21:08

## 2021-04-09 RX ADMIN — MICONAZOLE NITRATE: 20 POWDER TOPICAL at 09:52

## 2021-04-09 RX ADMIN — METOPROLOL SUCCINATE 100 MG: 50 TABLET, EXTENDED RELEASE ORAL at 09:51

## 2021-04-09 RX ADMIN — GUAIFENESIN 600 MG: 600 TABLET, EXTENDED RELEASE ORAL at 09:51

## 2021-04-09 RX ADMIN — ENOXAPARIN SODIUM 40 MG: 40 INJECTION SUBCUTANEOUS at 09:51

## 2021-04-09 RX ADMIN — FUROSEMIDE 40 MG: 10 INJECTION, SOLUTION INTRAMUSCULAR; INTRAVENOUS at 18:54

## 2021-04-09 ASSESSMENT — PAIN SCALES - GENERAL
PAINLEVEL_OUTOF10: 0
PAINLEVEL_OUTOF10: 0

## 2021-04-09 NOTE — PROGRESS NOTES
5\" (1.651 m)   Wt (!) 304 lb 4.8 oz (138 kg)   SpO2 95%   BMI 50.64 kg/m²     General appearance: No apparent distress, appears stated age and cooperative. Respiratory:  Normal respiratory effort. Clear to auscultation, bilaterally without Rales/Wheezes/Rhonchi. Cardiovascular: Regular rate and rhythm with normal S1/S2 without murmurs, rubs or gallops. Abdomen: Soft, non-tender, non-distended with normal bowel sounds. Musculoskeletal: No clubbing, cyanosis or edema bilaterally. Full range of motion without deformity. Skin: Skin color, texture, turgor normal.  No rashes or lesions. Neurologic:  Neurovascularly intact without any focal sensory/motor deficits. Cranial nerves: II-XII intact, grossly non-focal.  Peripheral Pulses: +2 palpable, equal bilaterally       Labs:   Recent Labs     04/07/21  0559 04/08/21  0603 04/09/21  0446   WBC 4.8 5.3 5.6   HGB 12.6* 12.5* 12.7*   HCT 39.5* 39.2* 39.8*    320 356     Recent Labs     04/07/21  0559 04/08/21  0603 04/09/21  0446    136 139   K 4.6 4.5 4.8    102 101   CO2 26 24 28   BUN 28* 29* 30*   CREATININE 2.8* 2.4* 2.5*   CALCIUM 8.7 8.7 8.7   PHOS 5.4*  --   --      No results for input(s): AST, ALT, BILIDIR, BILITOT, ALKPHOS in the last 72 hours. No results for input(s): INR in the last 72 hours. No results for input(s): Cristofer Cobble in the last 72 hours. Urinalysis:      Lab Results   Component Value Date    NITRU Negative 04/06/2021    WBCUA >100 04/06/2021    BACTERIA 4+ 03/21/2015    RBCUA 8 03/21/2015    BLOODU LARGE 04/06/2021    SPECGRAV 1.020 04/06/2021    GLUCOSEU Negative 04/06/2021       Radiology:  US RENAL COMPLETE   Final Result   1. Limited exam with no hydronephrosis. 2. Suspected urinary bladder wall irregularity, possibly due to chronic   outlet obstruction. Post void bladder volume could not be obtained. XR CHEST (2 VW)   Final Result   The above findings are most consistent moderate CHF.   Pneumonia is not   excluded             2d-Echo from 4/5/21. Summary   Overall left ventricular function is normal.   Ejection fraction is visually estimated to be 55 %. E/e'= 9.3 . There is reversal of E/A inflow velocities across the mitral valve. There is mild concentric left ventricular hypertrophy. No definitive regional wall motion abnormalities are noted. Grade II diastolic dysfunction with elevated LV filling pressures. Mild to moderate tricuspid regurgitation. Estimated pulmonary artery systolic pressure is moderately elevated at 65   mmHg assuming a right atrial pressure of 15 mmHg. The right ventricle is mildly enlarged. TAPSE= 2.72 RV S'= 17.2   IVC size is dilated (>2.1 cm) and collapses < 50% with respiration   consistent with elevated RA pressure (15 mmHg). Assessment/Plan:    Active Hospital Problems    Diagnosis    Acute kidney injury superimposed on CKD ([facility] [N17.9, N18.9]    Essential hypertension [I10]    Acute on chronic diastolic heart failure (HCC) [I50.33]    Acute respiratory failure with hypoxia ([facility]) [J96.01]    Morbid obesity ([facility] [E66.01]     Acute pulmonary edema secondary likely acute on chronic diastolic congestive heart failure  2D echo from 4/5/2021 showed LVEF of 50 % with grade 2 diastolic dysfunction. Clinically continues to have signs of fluid overload. Receiving Lasix 40 mg IV twice daily + metolazone as per nephro. Educated on salt and fluid restrictions.      Acute hypoxic respiratory failure, POA  Likely secondary to pulmonary vascular congestion from diastolic heart failure. KAMRAN likely contributing as well. Lasix 40 mg IV twice daily +metolazone. Educated on using CPAP at night and during day naps. O2 requirements at 4 L by nasal cannula. Wean off as tolerated. Acute kidney injury/obstructive uropathy  Serum creatinine with acute rise from 2.0 to 2.4--> 2.5. Etiology likely postrenal since patient was unable to void yesterday.   Currently able to void on his own. No indication for talbot cath placement. Appreciate urology and nephrology recommendations. Not much response to iv lasix. D/w nephro, Dr. Bhargav Herring. Adding Metolazone today. KAMRAN  Educated on importance of CPAP use.     Morbid obesity due to excess calories (Body mass index is 48.76 kg/m².)   Low calorie diet. Encourage therapeutic lifestyle changes.     DM2  Most recent hemoglobin A1c of 6.9%. Oral meds on hold. POC blood sugars and exercise to cover.     HTN  Continue home norvasc and metoprolol     HLD  Continue home statin     DVT Prophylaxis: Lovenox  Diet: DIET LOW SODIUM 2 GM; 1800 ml  Code Status: Full Code    PT/OT Eval Status: Cleared for discharge home.     Dispo -continue with diuresis, wean off of O2, possible discharge in 1 to 2 days    Bismark Bush MD

## 2021-04-09 NOTE — PROGRESS NOTES
Nephrology Progress note  [phone]   [email or web address]        Reason for Consult:  CKD    HISTORY OF PRESENT ILLNESS:                This is a patient with significant past medical history of CKD stage 3bA3 followed by Dr. Marshall Rabago, h/o obstructive nephropathy, prostate Ca, obesity, BMI 48, HTN, DM, who presented with progressively worsening SOB for the past few months.    -he is diagnosed with CHF and started on lasix  -he has a h/o SHANNAN in the setting of obstruction.  -baseline Scr now in high 1 low 2 range  -he does not have a talbot, per nurse he was incontinent of urine, will difficulty placing talbot. Currently has a condom  Cath. -CXR showed pulmonary edema, started on IV lasix bid  -pro-BNP is elevated 2500  -denies fever/chills/N/V    Subjective:  -pt seen and examined  -PMSHx and meds reviewed  -No family at bedside    No acute events ON  BP better control  Weight trending up    ROS: neg chest pain/SOB      PHYSICAL EXAM:    Vitals:    BP (!) 156/83   Pulse 71   Temp 98.2 °F (36.8 °C) (Oral)   Resp 18   Ht 5' 5\" (1.651 m)   Wt (!) 304 lb 4.8 oz (138 kg)   SpO2 96%   BMI 50.64 kg/m²   I/O last 3 completed shifts: In: 1440 [P.O.:1440]  Out: 2671 [Urine:1550]  No intake/output data recorded. Physical Exam:  Gen: Resting in bed, NAD.  obese  HEENT: MMM, OP clear, anicteric, NC/AT. CV: +S1/S2, RRR  Lungs: Good respiratory effort, + wheezing  Abd: obese, NT  Ext: + edema, no cyanosis  Skin: Warm. No rashes appreciated. : No TTP over bladder, nondistended . Neuro: Alert and oriented x 3, nonfocal.  Joints: No erythema noted over joints.     DATA:    CBC:   Lab Results   Component Value Date    WBC 5.6 04/09/2021    RBC 4.02 04/09/2021    HGB 12.7 04/09/2021    HCT 39.8 04/09/2021    MCV 98.9 04/09/2021    MCH 31.5 04/09/2021    MCHC 31.9 04/09/2021    RDW 14.7 04/09/2021     04/09/2021    MPV 6.7 04/09/2021     BMP:    Lab Results   Component Value Date     04/09/2021 K 4.8 04/09/2021     04/09/2021    CO2 28 04/09/2021    BUN 30 04/09/2021    LABALBU 3.3 04/05/2021    CREATININE 2.5 04/09/2021    CALCIUM 8.7 04/09/2021    GFRAA 31 04/09/2021    GFRAA 53 02/18/2013    LABGLOM 26 04/09/2021    GLUCOSE 108 04/09/2021     Evaluation is limited due to the patient's body habitus.  The right kidney   measures 9.2 cm in length and the left kidney measures 9.3 cm in length.       Both kidneys are poorly delineated.  Cortical thickness and echogenicity are   grossly normal.  There is no evidence for hydronephrosis.         Bladder:       There appears to be diffuse urinary bladder wall irregularity.  Left ureteral   jet was identified with no visualization of a right ureteral jet.  Post void   bladder volume could not be obtained due to incontinence and \"problem with   catheter. \"         Summary   Overall left ventricular function is normal.   Ejection fraction is visually estimated to be 55 %. E/e'= 9.3 . There is reversal of E/A inflow velocities across the mitral valve. There is mild concentric left ventricular hypertrophy. No definitive regional wall motion abnormalities are noted. Grade II diastolic dysfunction with elevated LV filling pressures. Mild to moderate tricuspid regurgitation. Estimated pulmonary artery systolic pressure is moderately elevated at 65   mmHg assuming a right atrial pressure of 15 mmHg. The right ventricle is mildly enlarged. TAPSE= 2.72 RV S'= 17.2   IVC size is dilated (>2.1 cm) and collapses < 50% with respiration   consistent with elevated RA pressure (15 mmHg).          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 Follows with my partner Dr. Destiny Rivas. UA = pyuria, UPC 2.6  -renal US ntoed  -baseline Scr is high 1 low 2 range.    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 KAMRAN: has not been complaint with KAMRAN    H/o prostate Ca: s/p surgery        Thank you for allowing me to participate in the care of this patient. I will continue to follow along. Please call with questions.     Brianda Scott MD

## 2021-04-09 NOTE — PROGRESS NOTES
[address]   Daily Progress Note      Admit Date:  4/5/2021    HPI:    Mr. Nela Lakhani is a 72year old male with history of ckd, prashanth. Dm, morbid obesity,  Follows with Dr Torres Form    He is admitted with chest discomfort and SOB for a few months. He is being treated for fluid overload. probnp 2500  never seen a cardiologist and has a cpap machine (over a year ago) but does not use it. He loves to drink and eat \"soul food    Subjective:  Patient is being seen for acute on chronic dHF. There were no acute overnight cardiac events. Weight 314->304 (weight in 9/20 was 290) bun 30 creat 2.5   probnp 2510->1269->552 received a dose of metolazone per nephrology today   He is sitting up in the bed on 4 L of oxygen. He denies any chest pain, palpitations, lightheadedness. He is complaining of cramping in his hands    Objective:   BP (!) 149/74   Pulse 70   Temp 98.7 °F (37.1 °C) (Oral)   Resp 18   Ht 5' 5\" (1.651 m)   Wt (!) 304 lb 4.8 oz (138 kg)   SpO2 98%   BMI 50.64 kg/m²       Intake/Output Summary (Last 24 hours) at 4/9/2021 1226  Last data filed at 4/9/2021 0925  Gross per 24 hour   Intake 1680 ml   Output 1550 ml   Net 130 ml          Physical Exam:  General:  Awake, alert, oriented in NAD  Skin:  Warm and dry. No unusual bruising or rash  Neck:  Supple. Hard to assess JVD due to neck size or carotid bruit appreciated  Chest:  Normal effort.   No rhonchi, rales or wheezing  Cardiovascular:  RRR, S1/S2, no murmur/gallop/rub  Abdomen:  Soft, nontender, +bowel sounds, morbidly obese  Extremities:  Bilateral lower ankles to below knee edema  Neurological: No focal deficits  Psychological: Normal mood and affect      Medications:    amLODIPine  10 mg Oral Daily    aspirin  81 mg Oral Daily    atorvastatin  40 mg Oral Nightly    metoprolol succinate  100 mg Oral Daily    sodium chloride flush  10 mL Intravenous 2 times per day    famotidine  20 mg Oral Daily    enoxaparin  40 mg Subcutaneous Daily    furosemide  40 mg Intravenous BID    insulin lispro  0-6 Units Subcutaneous TID WC    insulin lispro  0-3 Units Subcutaneous Nightly    miconazole   Topical BID    ketotifen  1 drop Both Eyes BID    cetirizine  10 mg Oral Daily    guaiFENesin  600 mg Oral BID      sodium chloride      dextrose         Lab Data:  CBC:   Recent Labs     04/07/21  0559 04/08/21  0603 04/09/21  0446   WBC 4.8 5.3 5.6   HGB 12.6* 12.5* 12.7*    320 356     BMP:    Recent Labs     04/07/21  0559 04/08/21  0603 04/09/21  0446    136 139   K 4.6 4.5 4.8   CO2 26 24 28   BUN 28* 29* 30*   CREATININE 2.8* 2.4* 2.5*     INR:  No results for input(s): INR in the last 72 hours. BNP:    Recent Labs     04/07/21  0559 04/09/21  0446   PROBNP 1,269* 552*         Diagnostics:  Echo 4/7/2021   Summary   Overall left ventricular function is normal.   Ejection fraction is visually estimated to be 55 %. E/e'= 9.3 . There is reversal of E/A inflow velocities across the mitral valve. There is mild concentric left ventricular hypertrophy. No definitive regional wall motion abnormalities are noted. Grade II diastolic dysfunction with elevated LV filling pressures. Mild to moderate tricuspid regurgitation. Estimated pulmonary artery systolic pressure is moderately elevated at 65   mmHg assuming a right atrial pressure of 15 mmHg. The right ventricle is mildly enlarged. TAPSE= 2.72 RV S'= 17.2   IVC size is dilated (>2.1 cm) and collapses < 50% with respiration   consistent with elevated RA pressure (15 mmHg    Echo 12/12/2019  Summary:  The left ventricular wall motion is normal.  Overall left ventricular ejection fraction is estimated to be 60-65%. There is mild concentric left ventricular hypertrophy. There is mild mitral regurgitation. Assessment:    1. Acute respiratory failure  2. Acute on chronic kidney disease  3. Essential hypertension  4.  prashanth non compliant  5. Morbid obesity  6. Mild anemia  7. Acute on chronic diastolic heart failure    Plan:    1. Continue with cpap at night and naps  2. Nephrology following  3. Continue norvasc 10 mg daily  4. Continue lasix 40 mg IV bid plus dose of metolazone per nephrology  5. Continue toprol 100 mg daily  6. CHF nurse following for diet education, fluid restriction and daily weights  7. Fluid restriction and sodium restriction  8. Wean oxygen  9. Will consult PT   10. Weigh on standup scale every day as I think he is getting to dry weight    Hopefully home in next 1-2 days    Discussed with patient who is agreeable with plan of care. Thank you for allowing me to participate in the care of your patient.     Dylan Diego, CNS

## 2021-04-09 NOTE — PROGRESS NOTES
Patient's head to toe assessment complete at this time. Routine VSS. Patient up in bed, finishing dinner. Patient is awake, alert, and oriented. Respirations appear even and unlabored. Patient does not appear to be in distress. All nightly medications given per MAR. Patient denies pain. Male external catheter changed. No other needs expressed, call light within reach. Will continue to monitor. Fall precautions in place: bed locked and in lowest position, bed alarm on, 2/4 side rails raised, non-slip socks on, overhead table within reach, patient knows when to appropriately call out for help.

## 2021-04-09 NOTE — PROGRESS NOTES
Physical Therapy  Facility/Department: [address] ONCOLOGY  Daily Treatment Note  NAME: Andreas Notice  : 1950  MRN: [id number]    Date of Service: 2021    Discharge Recommendations:Ramón Carrero scored a 16/24 on the AM-PAC short mobility form. Current research shows that an AM-PAC score of 17 or less is typically not associated with a discharge to the patient's home setting. Based on the patient's AM-PAC score and their current functional mobility deficits, it is recommended that the patient have 3-5 sessions per week of Physical Therapy at d/c to increase the patient's independence. Please see assessment section for further patient specific details. If patient discharges prior to next session this note will serve as a discharge summary. Please see below for the latest assessment towards goals. If pt's pain level in B feet resolves, and pt is able to ambulate household distances safely, pt may be able to discharge home; pt was limited this date by significant B feet pain. HOME HEALTH CARE: LEVEL 2 SOCIAL     - Initial home health evaluation to occur within 24-48 hours, in patient home   - Therapy to evaluate with goal of regaining prior level of functioning   - Therapy to evaluate if patient has [address] needs for personal care   -  evaluation within 24-48 hours, includes evaluation of resources and insurance to determine AL/IL/LTC/Medicaid options   - [place] on Aging Referral   - Ethan Townsend to discuss home support and care needs post discharge within two weeks of discharge. S Level 2   PT Equipment Recommendations  Equipment Needed: Yes  Mobility Devices: Abad Pascual: Rolling    Assessment   Body structures, Functions, Activity limitations: Decreased functional mobility ; Decreased strength;Decreased endurance;Decreased balance  Assessment: Pt is greatly limited this date by B feet pain per pt report; pt was able to attempt standing and ambulating, but only able to amb 8ft with RW, heavily leaning onto AD; pt would be unable to discharge home alone at this time due to limited standing tolerance and B feet pain. Will continue skilled PT services. Treatment Diagnosis: decreased functional mobility, impaired gait, decreased endurance  Prognosis: Good  PT Education: Goals;PT Role;Plan of Care;Transfer Training;Gait Training  Patient Education: d/c recommendations- verbalized understanding  Barriers to Learning: none  REQUIRES PT FOLLOW UP: Yes  Activity Tolerance  Activity Tolerance: Patient limited by pain     Patient Diagnosis(es): The encounter diagnosis was Acute pulmonary edema ([facility] [address] has a past medical history of Cancer ([facility] [address] Hyperlipidemia, Hypertension, Obesity, Osteoarthritis, Type 2 diabetes mellitus without complication ([facility] [address] and Type 2 diabetes mellitus without complication, without long-term current use of insulin ([facility] [address] has a past surgical history that includes Total hip arthroplasty and joint replacement. Restrictions  Restrictions/Precautions  Restrictions/Precautions: Fall Risk(High Fall risk)  Required Braces or Orthoses?: No  Position Activity Restriction  Other position/activity restrictions: Felecia Louise is a 72 y.o. male with history of prostate cancer hypertension hyperlipidemia diabetes and obesity presents for evaluation of increasing shortness of breath with it worse with exertion over the past 3 to 4 months. Patient states that has been getting worse. He is also reporting eye drainage. States that he has been taking Zyrtec for this with some improvement. He denies any history of CHF. Denies any significant weight gain or leg swelling from baseline.   Subjective   General  Chart Reviewed: Yes  Family / Caregiver Present: No  Subjective  Subjective: Pt denies pain at rest; but having new B feet pain with standing and attempts to ambulate; nursing aware  General Comment  Comments: supine in bed upon arrival; pt wants to get back to bed after PT          Orientation  Orientation  Overall Orientation Status: Within Functional Limits        Objective   Bed mobility  Supine to Sit: Stand by assistance(HOB elevated, some difficulty getting to EOB)  Sit to Supine: Stand by assistance  Scooting: Stand by assistance  Transfers  Sit to Stand: Stand by assistance;Contact guard assistance(from bed)  Stand to sit: Stand by assistance;Contact guard assistance  Ambulation  Ambulation?: Yes  Ambulation 1  Surface: level tile  Device: Rolling Walker  Other Apparatus: O2(4L)  Assistance: Contact guard assistance  Quality of Gait: pt used RW this date due to B feet pain; leaning forward onto AD  Gait Deviations: Slow Fifi;Decreased step length;Decreased step height  Distance: 8 ft  Comments: limited due to reports of B feet pain  Stairs/Curb  Stairs?: No     Balance  Posture: Fair  Sitting - Static: Good  Sitting - Dynamic: Good  Standing - Static: Fair  Standing - Dynamic: Fair  Comments: CGA-SBA with RW; very limited standing tolerance today due to B feet pain; pt sat EOB with good balance for gown change                AM-PAC Score  AM-PAC Inpatient Mobility Raw Score : 16 (04/09/21 1422)  AM-PAC Inpatient T-Scale Score : 40.78 (04/09/21 1422)  Mobility Inpatient CMS 0-100% Score: 54.16 (04/09/21 1422)  Mobility Inpatient CMS G-Code Modifier : CK (04/09/21 1422)          Goals  Short term goals  Time Frame for Short term goals: To be met prior to discharge  Short term goal 1: Bed mobility with mod I  Short term goal 2: Sit to/from stand with mod I  Short term goal 3: Ambulate 48' without AD and mod I  Patient Goals   Patient goals :  To go home  No goals met    Plan    Plan  Times per week: 3-5  Times per day: Daily  Current Treatment Recommendations: Balance Training, Strengthening, Functional Mobility Training, Transfer Training, Endurance Training, Gait Training, Safety Education & Training, Home Exercise Program  Safety Devices  Type of devices:  All fall risk precautions in place, Bed alarm in place, Call light within reach, Gait belt, Nurse notified, Left in bed, Patient at risk for falls(STEFANIE Bolanos)  Restraints  Initially in place: No     Therapy Time   Individual Concurrent Group Co-treatment   Time In 1340         Time Out 1408         Minutes 28         Timed Code Treatment Minutes: [address] [facility], [address] [place] [address]

## 2021-04-09 NOTE — PROGRESS NOTES
Urology Progress Note  [facility]    Provider: Sheron Higuera APRN-CNP Patient ID:  Admission Date: 2021 Name: Jonah Stewart Date: 2021 MRN: [id number]   Patient Location: [address] [place] [address] : 1950  Attending: Janina Mandel MD Date of Service: 2021  PCP: Tahir Washington MD     Diagnoses:  Complex Talbot Catheter    Assessment/Plan:  Mr. Kayla Musa is a 70yo male  Hx of prostate cancer  s/p robotic radical prostatectomy   Now with baseline urge incontinence  Last PSA <0.01  S/p IPP in 2018  Admitted for CHF exacerbation  SHANNAN, Cr 2.5  Consult for complex talbot, multiple attempts per staff     Recommendations:  -Obtained PVR this am for 14cc's.   -Would continue with condom catheter for now - Sounds like it is working well.   -Please call if there is concern for acute urinary retention. The patient had a chance to ask questions which were answered. he understands the above plan. Subjective:   Joel Ahn is a 72 y.o. male. He was seen and examined this morning. Today we discussed continued use of condom cath. Pt does not wish to have talbot placed. Objective:   Vitals:  Vitals:    21 0917   BP: (!) 156/83   Pulse: 71   Resp: 18   Temp: 98.2 °F (36.8 °C)   SpO2: 96%       Intake/Output Summary (Last 24 hours) at 2021 1044  Last data filed at 2021 1375  Gross per 24 hour   Intake 1680 ml   Output 1550 ml   Net 130 ml     Physical Exam:  Gen: Obese, Alert and oriented x3, no acute distress  CV: Regular rate   Resp: unlabored respirations  Abd: Soft, non-distended, non-tender, no masses  Ext: no peripheral edema noted, moves upper and lower extremities spontaneously  Skin: warmand well perfused, no rashes noted on the face, or arms.      Labs:  Lab Results   Component Value Date    WBC 5.6 2021    HGB 12.7 (L) 2021    HCT 39.8 (L) 2021    MCV 98.9 2021     2021     Lab Results   Component Value Date    CREATININE 2.5 (H) 04/09/2021    BUN 30 (H) 04/09/2021     04/09/2021    K 4.8 04/09/2021     04/09/2021    CO2 28 04/09/2021       Anuj Reich APRN-CNP  4/9/2021

## 2021-04-10 LAB
ANION GAP SERPL CALCULATED.3IONS-SCNC: 8 MMOL/L (ref 3–16)
BASOPHILS ABSOLUTE: 0.1 K/UL (ref 0–0.2)
BASOPHILS RELATIVE PERCENT: 1 %
BUN BLDV-MCNC: 34 MG/DL (ref 7–20)
CALCIUM SERPL-MCNC: 9.2 MG/DL (ref 8.3–10.6)
CHLORIDE BLD-SCNC: 100 MMOL/L (ref 99–110)
CO2: 31 MMOL/L (ref 21–32)
CREAT SERPL-MCNC: 2.5 MG/DL (ref 0.8–1.3)
EOSINOPHILS ABSOLUTE: 0.3 K/UL (ref 0–0.6)
EOSINOPHILS RELATIVE PERCENT: 5 %
FOLATE: 2.79 NG/ML (ref 4.78–24.2)
GFR AFRICAN AMERICAN: 31
GFR NON-AFRICAN AMERICAN: 26
GLUCOSE BLD-MCNC: 102 MG/DL (ref 70–99)
GLUCOSE BLD-MCNC: 109 MG/DL (ref 70–99)
GLUCOSE BLD-MCNC: 113 MG/DL (ref 70–99)
GLUCOSE BLD-MCNC: 120 MG/DL (ref 70–99)
GLUCOSE BLD-MCNC: 77 MG/DL (ref 70–99)
HCT VFR BLD CALC: 39.6 % (ref 40.5–52.5)
HEMOGLOBIN: 12.7 G/DL (ref 13.5–17.5)
LYMPHOCYTES ABSOLUTE: 1.2 K/UL (ref 1–5.1)
LYMPHOCYTES RELATIVE PERCENT: 20.7 %
MCH RBC QN AUTO: 31.7 PG (ref 26–34)
MCHC RBC AUTO-ENTMCNC: 32 G/DL (ref 31–36)
MCV RBC AUTO: 99.2 FL (ref 80–100)
MONOCYTES ABSOLUTE: 0.9 K/UL (ref 0–1.3)
MONOCYTES RELATIVE PERCENT: 15.8 %
NEUTROPHILS ABSOLUTE: 3.4 K/UL (ref 1.7–7.7)
NEUTROPHILS RELATIVE PERCENT: 57.5 %
PDW BLD-RTO: 14.7 % (ref 12.4–15.4)
PERFORMED ON: ABNORMAL
PERFORMED ON: NORMAL
PLATELET # BLD: 319 K/UL (ref 135–450)
PMV BLD AUTO: 7 FL (ref 5–10.5)
POTASSIUM SERPL-SCNC: 4.2 MMOL/L (ref 3.5–5.1)
RBC # BLD: 3.99 M/UL (ref 4.2–5.9)
SODIUM BLD-SCNC: 139 MMOL/L (ref 136–145)
VITAMIN B-12: 602 PG/ML (ref 211–911)
WBC # BLD: 6 K/UL (ref 4–11)

## 2021-04-10 PROCEDURE — 1200000000 HC SEMI PRIVATE

## 2021-04-10 PROCEDURE — 82607 VITAMIN B-12: CPT

## 2021-04-10 PROCEDURE — 94660 CPAP INITIATION&MGMT: CPT

## 2021-04-10 PROCEDURE — 85025 COMPLETE CBC W/AUTO DIFF WBC: CPT

## 2021-04-10 PROCEDURE — 94760 N-INVAS EAR/PLS OXIMETRY 1: CPT

## 2021-04-10 PROCEDURE — 6360000002 HC RX W HCPCS: Performed by: INTERNAL MEDICINE

## 2021-04-10 PROCEDURE — 80048 BASIC METABOLIC PNL TOTAL CA: CPT

## 2021-04-10 PROCEDURE — 99233 SBSQ HOSP IP/OBS HIGH 50: CPT | Performed by: NURSE PRACTITIONER

## 2021-04-10 PROCEDURE — 6370000000 HC RX 637 (ALT 250 FOR IP): Performed by: PHYSICIAN ASSISTANT

## 2021-04-10 PROCEDURE — 82746 ASSAY OF FOLIC ACID SERUM: CPT

## 2021-04-10 PROCEDURE — 6370000000 HC RX 637 (ALT 250 FOR IP): Performed by: STUDENT IN AN ORGANIZED HEALTH CARE EDUCATION/TRAINING PROGRAM

## 2021-04-10 PROCEDURE — 2700000000 HC OXYGEN THERAPY PER DAY

## 2021-04-10 PROCEDURE — 36415 COLL VENOUS BLD VENIPUNCTURE: CPT

## 2021-04-10 PROCEDURE — 2580000003 HC RX 258: Performed by: PHYSICIAN ASSISTANT

## 2021-04-10 PROCEDURE — 6360000002 HC RX W HCPCS: Performed by: PHYSICIAN ASSISTANT

## 2021-04-10 PROCEDURE — 6370000000 HC RX 637 (ALT 250 FOR IP): Performed by: INTERNAL MEDICINE

## 2021-04-10 RX ORDER — FUROSEMIDE 40 MG/1
40 TABLET ORAL 2 TIMES DAILY
Status: DISCONTINUED | OUTPATIENT
Start: 2021-04-11 | End: 2021-04-12

## 2021-04-10 RX ADMIN — AMLODIPINE BESYLATE 5 MG: 5 TABLET ORAL at 09:44

## 2021-04-10 RX ADMIN — FAMOTIDINE 20 MG: 20 TABLET ORAL at 09:44

## 2021-04-10 RX ADMIN — CETIRIZINE HYDROCHLORIDE 10 MG: 10 TABLET, FILM COATED ORAL at 09:44

## 2021-04-10 RX ADMIN — MICONAZOLE NITRATE: 20 POWDER TOPICAL at 22:09

## 2021-04-10 RX ADMIN — GABAPENTIN 100 MG: 100 CAPSULE ORAL at 09:50

## 2021-04-10 RX ADMIN — Medication 10 ML: at 09:47

## 2021-04-10 RX ADMIN — FUROSEMIDE 40 MG: 10 INJECTION, SOLUTION INTRAMUSCULAR; INTRAVENOUS at 18:49

## 2021-04-10 RX ADMIN — KETOTIFEN FUMARATE 1 DROP: 0.35 SOLUTION/ DROPS OPHTHALMIC at 22:09

## 2021-04-10 RX ADMIN — ASPIRIN 81 MG: 81 TABLET, COATED ORAL at 09:44

## 2021-04-10 RX ADMIN — Medication 10 ML: at 22:08

## 2021-04-10 RX ADMIN — MICONAZOLE NITRATE: 20 POWDER TOPICAL at 09:47

## 2021-04-10 RX ADMIN — GUAIFENESIN 600 MG: 600 TABLET, EXTENDED RELEASE ORAL at 09:45

## 2021-04-10 RX ADMIN — GUAIFENESIN 600 MG: 600 TABLET, EXTENDED RELEASE ORAL at 22:08

## 2021-04-10 RX ADMIN — ENOXAPARIN SODIUM 40 MG: 40 INJECTION SUBCUTANEOUS at 09:45

## 2021-04-10 RX ADMIN — ATORVASTATIN CALCIUM 40 MG: 40 TABLET, FILM COATED ORAL at 22:08

## 2021-04-10 RX ADMIN — FUROSEMIDE 40 MG: 10 INJECTION, SOLUTION INTRAMUSCULAR; INTRAVENOUS at 09:43

## 2021-04-10 RX ADMIN — METOPROLOL SUCCINATE 100 MG: 50 TABLET, EXTENDED RELEASE ORAL at 09:44

## 2021-04-10 RX ADMIN — KETOTIFEN FUMARATE 1 DROP: 0.35 SOLUTION/ DROPS OPHTHALMIC at 09:46

## 2021-04-10 NOTE — PROGRESS NOTES
Patient's head to toe assessment complete at this time. Routine VSS. Patient is awake, alert, and oriented. Respirations appear even and unlabored. Patient does not appear to be in distress. All nightly medications given per MAR. Patient states he has pain in bilateral feet, PRN gabapentin given per MAR. PRN NaCl nasal spray given for congestion. No other needs expressed, call light within reach. Will continue to monitor.      Fall precautions in place: bed locked and in lowest position, bed alarm on, 2/4 side rails raised, non-slip socks on, overhead table within reach, patient knows when to appropriately call out for help.

## 2021-04-10 NOTE — PROGRESS NOTES
Urology Progress Note  [facility]    Provider: Rom Gilbert APRN-CNP Patient ID:  Admission Date: 2021 Name: Minnie Lakhani Date: 2021 MRN: [id number]   Patient Location: [id number]/[id number] : 1950  Attending: Vee Lam MD Date of Service: 4/10/2021  PCP: Beth Astudillo MD     Diagnoses:  Complex Talbot Catheter    Assessment/Plan:  Mr. Diomedes Garcia is a 70yo male  Hx of prostate cancer  s/p robotic radical prostatectomy   Now with baseline urge incontinence  Last PSA <0.01  S/p IPP in 2018  Admitted for CHF exacerbation  SHANNAN, Cr 2.5  Consult for complex talbot, multiple attempts per staff     Recommendations:  -Obtained PVR recently for 14cc's.   -Would continue with condom catheter for now - Sounds like it is working well.   -Please call if there is concern for acute urinary retention.   -Discussed patient with Dr. Marlene Jim will follow up as outpatient    The patient had a chance to ask questions which were answered. he understands the above plan. Subjective:   Saira Mitchell is a 72 y.o. male. He was seen and examined this morning. Today we discussed continued use of condom cath. Pt does not wish to have talbot placed. Objective:   Vitals:  Vitals:    04/10/21 0939   BP: 138/83   Pulse: 75   Resp: 20   Temp: 98 °F (36.7 °C)   SpO2: 96%       Intake/Output Summary (Last 24 hours) at 4/10/2021 1052  Last data filed at 4/10/2021 0414  Gross per 24 hour   Intake 720 ml   Output 1900 ml   Net -1180 ml     Physical Exam:  Gen: Obese, Alert and oriented x3, no acute distress  CV: Regular rate   Resp: unlabored respirations  Abd: Soft, non-distended, non-tender, no masses  Ext: no peripheral edema noted, moves upper and lower extremities spontaneously  Skin: warmand well perfused, no rashes noted on the face, or arms.      Labs:  Lab Results   Component Value Date    WBC 6.0 04/10/2021    HGB 12.7 (L) 04/10/2021    HCT 39.6 (L) 04/10/2021    MCV 99.2 04/10/2021  04/10/2021     Lab Results   Component Value Date    CREATININE 2.5 (H) 04/10/2021    BUN 34 (H) 04/10/2021     04/10/2021    K 4.2 04/10/2021     04/10/2021    CO2 31 04/10/2021       Governor Industry Kappa APRN-CNP  4/10/2021

## 2021-04-10 NOTE — PROGRESS NOTES
Nephrology Progress note  [phone]   [email or web address]        Reason for Consult:  CKD    HISTORY OF PRESENT ILLNESS:                This is a patient with significant past medical history of CKD stage 3bA3 followed by Dr. Renea Heath, h/o obstructive nephropathy, prostate Ca, obesity, BMI 48, HTN, DM, who presented with progressively worsening SOB for the past few months.    -he is diagnosed with CHF and started on lasix  -he has a h/o SHANNAN in the setting of obstruction.  -baseline Scr now in high 1 low 2 range  -he does not have a talbot, per nurse he was incontinent of urine, will difficulty placing talbot. Currently has a condom  Cath. -CXR showed pulmonary edema, started on IV lasix bid  -pro-BNP is elevated 2500  -denies fever/chills/N/V    Subjective:  -pt seen and examined  -PMSHx and meds reviewed  -No family at bedside    dyspnea is better   Both feet are hurting this am and it is hard to stand  Does note that edema is much better     PHYSICAL EXAM:    Vitals:    BP (!) 149/86   Pulse 70   Temp 99 °F (37.2 °C) (Oral)   Resp 20   Ht 5' 5\" (1.651 m)   Wt 298 lb 2 oz (135.2 kg)   SpO2 95%   BMI 49.61 kg/m²   I/O last 3 completed shifts: In: 12 [P.O.:960]  Out: 1900 [Urine:1900]  I/O this shift:  In: -   Out: 1200 [Urine:1200]    Physical Exam:  Gen: Resting in bed, NAD.  obese  HEENT: MMM, OP clear, anicteric, NC/AT.   CV: +S1/S2, RRR  Lungs: Good respiratory effort, clear, no crackles heard   Abd: obese, NT  Ext: trace calf edema, no cyanosis, no pedal edema   Neuro: Alert and oriented x 3, nonfocal.    DATA:    CBC:   Lab Results   Component Value Date    WBC 6.0 04/10/2021    RBC 3.99 04/10/2021    HGB 12.7 04/10/2021    HCT 39.6 04/10/2021    MCV 99.2 04/10/2021    MCH 31.7 04/10/2021    MCHC 32.0 04/10/2021    RDW 14.7 04/10/2021     04/10/2021    MPV 7.0 04/10/2021     BMP:    Lab Results   Component Value Date     04/10/2021    K 4.2 04/10/2021     04/10/2021 CO2 31 04/10/2021    BUN 34 04/10/2021    LABALBU 2.6 04/08/2021    CREATININE 2.5 04/10/2021    CALCIUM 9.2 04/10/2021    GFRAA 31 04/10/2021    GFRAA 53 02/18/2013    LABGLOM 26 04/10/2021    GLUCOSE 102 04/10/2021     Evaluation is limited due to the patient's body habitus.  The right kidney   measures 9.2 cm in length and the left kidney measures 9.3 cm in length.       Both kidneys are poorly delineated.  Cortical thickness and echogenicity are   grossly normal.  There is no evidence for hydronephrosis.         Bladder:       There appears to be diffuse urinary bladder wall irregularity.  Left ureteral   jet was identified with no visualization of a right ureteral jet.  Post void   bladder volume could not be obtained due to incontinence and \"problem with   catheter. \"         Summary   Overall left ventricular function is normal.   Ejection fraction is visually estimated to be 55 %. E/e'= 9.3 . There is reversal of E/A inflow velocities across the mitral valve. There is mild concentric left ventricular hypertrophy. No definitive regional wall motion abnormalities are noted. Grade II diastolic dysfunction with elevated LV filling pressures. Mild to moderate tricuspid regurgitation. Estimated pulmonary artery systolic pressure is moderately elevated at 65   mmHg assuming a right atrial pressure of 15 mmHg. The right ventricle is mildly enlarged. TAPSE= 2.72 RV S'= 17.2   IVC size is dilated (>2.1 cm) and collapses < 50% with respiration   consistent with elevated RA pressure (15 mmHg). IMPRESSION/RECOMMENDATIONS:      SHANNAN: -  -Scc stable for the past 48hrsr  -appreciate urology assistance  -given improvement in Scr, OK to avoid talbot  -continue bladder scan  -renal US noted-no HN  -continue diuretics - will likely have to tolerate increase in creatinine to maintain fluid balance  -I'm ok with discharge.  Trying to wean oxygen and does report feet hurting this am when he first got up Acute resp failure: due to new onset CHF-oxygen requirement persists  -weight is down and O2 requirement improvement   -will go ahead and change to PO lasix    CKD stage 3b: multifactorial, presumed DN/HTN NS/chronic intermittent obstruction. Follows with my partner Dr. Luis Arthur. UA = pyuria, UPC 2.6  -renal US ntoed  -baseline Scr is high 1 low 2 range.    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 Thank you for allowing me to participate in the care of this patient. I will continue to follow along. Please call with questions.     Luciana Matias MD

## 2021-04-10 NOTE — PROGRESS NOTES
Pt reporting pain in bilateral feet and more difficulty walking, also reports cramping in hands with slight swelling in right hand, Dr. Terri Cary notified. New orders for B12 and folate levels and prn gabapentin entered.

## 2021-04-10 NOTE — PROGRESS NOTES
Hospitalist Progress Note      PCP: Gregory Haile MD    Date of Admission: 4/5/2021    Chief Complaint: Shortness of breath    Hospital Course: Haim Moran is a 72 y.o. male who presented to ED with complaint of progressively worsening shortness of breath which is worse with exertion for the last 3-4 months. He reports associated non-productive cough. He denies fever, dizziness, chest pain, edema, orthopnea, weight gain, abdominal pain, N/V/D/C, urinary symptoms. Patient has a history of KAMRAN and has not been using his CPAP. Subjective: Patient seen and examined at bedside. States his breathing better. Currently on 2 L per nasal cannula.     Medications:  Reviewed    Infusion Medications    sodium chloride      dextrose       Scheduled Medications    [START ON 4/11/2021] furosemide  40 mg Oral BID    amLODIPine  10 mg Oral Daily    aspirin  81 mg Oral Daily    atorvastatin  40 mg Oral Nightly    metoprolol succinate  100 mg Oral Daily    sodium chloride flush  10 mL Intravenous 2 times per day    famotidine  20 mg Oral Daily    enoxaparin  40 mg Subcutaneous Daily    furosemide  40 mg Intravenous BID    insulin lispro  0-6 Units Subcutaneous TID WC    insulin lispro  0-3 Units Subcutaneous Nightly    miconazole   Topical BID    ketotifen  1 drop Both Eyes BID    cetirizine  10 mg Oral Daily    guaiFENesin  600 mg Oral BID     PRN Meds: sodium chloride, gabapentin, HYDROcodone-acetaminophen, sodium chloride flush, sodium chloride, magnesium hydroxide, acetaminophen, glucose, dextrose, glucagon (rDNA), dextrose, ondansetron      Intake/Output Summary (Last 24 hours) at 4/10/2021 1555  Last data filed at 4/10/2021 1504  Gross per 24 hour   Intake 240 ml   Output 2650 ml   Net -2410 ml       Physical Exam Performed:    BP (!) 149/86   Pulse 70   Temp 99 °F (37.2 °C) (Oral)   Resp 20   Ht 5' 5\" (1.651 m)   Wt 298 lb 2 oz (135.2 kg)   SpO2 95%   BMI 49.61 kg/m²     General appearance: No apparent distress, appears stated age and cooperative. Respiratory:  Normal respiratory effort. Clear to auscultation, bilaterally without Rales/Wheezes/Rhonchi. Cardiovascular: Regular rate and rhythm with normal S1/S2 without murmurs, rubs or gallops. Abdomen: Soft, non-tender, non-distended with normal bowel sounds. Musculoskeletal: No clubbing, cyanosis or edema bilaterally. Full range of motion without deformity. Skin: Skin color, texture, turgor normal.  No rashes or lesions. Neurologic:  Neurovascularly intact without any focal sensory/motor deficits. Cranial nerves: II-XII intact, grossly non-focal.  Peripheral Pulses: +2 palpable, equal bilaterally       Labs:   Recent Labs     04/08/21  0603 04/09/21 0446 04/10/21  0526   WBC 5.3 5.6 6.0   HGB 12.5* 12.7* 12.7*   HCT 39.2* 39.8* 39.6*    356 319     Recent Labs     04/08/21  0603 04/09/21  0446 04/10/21  0526    139 139   K 4.5 4.8 4.2    101 100   CO2 24 28 31   BUN 29* 30* 34*   CREATININE 2.4* 2.5* 2.5*   CALCIUM 8.7 8.7 9.2     No results for input(s): AST, ALT, BILIDIR, BILITOT, ALKPHOS in the last 72 hours. No results for input(s): INR in the last 72 hours. No results for input(s): Duarte Laure in the last 72 hours. Urinalysis:      Lab Results   Component Value Date    NITRU Negative 04/06/2021    WBCUA >100 04/06/2021    BACTERIA 4+ 03/21/2015    RBCUA 8 03/21/2015    BLOODU LARGE 04/06/2021    SPECGRAV 1.020 04/06/2021    GLUCOSEU Negative 04/06/2021       Radiology:  US RENAL COMPLETE   Final Result   1. Limited exam with no hydronephrosis. 2. Suspected urinary bladder wall irregularity, possibly due to chronic   outlet obstruction. Post void bladder volume could not be obtained. XR CHEST (2 VW)   Final Result   The above findings are most consistent moderate CHF. Pneumonia is not   excluded             2d-Echo from 4/5/21.    Summary   Overall left ventricular function is normal. Ejection fraction is visually estimated to be 55 %. E/e'= 9.3 . There is reversal of E/A inflow velocities across the mitral valve. There is mild concentric left ventricular hypertrophy. No definitive regional wall motion abnormalities are noted. Grade II diastolic dysfunction with elevated LV filling pressures. Mild to moderate tricuspid regurgitation. Estimated pulmonary artery systolic pressure is moderately elevated at 65   mmHg assuming a right atrial pressure of 15 mmHg. The right ventricle is mildly enlarged. TAPSE= 2.72 RV S'= 17.2   IVC size is dilated (>2.1 cm) and collapses < 50% with respiration   consistent with elevated RA pressure (15 mmHg). Assessment/Plan:    Active Hospital Problems    Diagnosis    Acute kidney injury superimposed on CKD ([facility] [N17.9, N18.9]    Essential hypertension [I10]    Acute on chronic diastolic heart failure (HCC) [I50.33]    Acute respiratory failure with hypoxia (HCC) [J96.01]    Morbid obesity ([facility] [E66.01]     Acute pulmonary edema secondary likely acute on chronic diastolic congestive heart failure  2D echo from 4/5/2021 showed LVEF of 50 % with grade 2 diastolic dysfunction. Clinically continues to have signs of fluid overload. Receiving Lasix 40 mg IV twice daily + metolazone as per nephro. Educated on salt and fluid restrictions.      Acute hypoxic respiratory failure, POA  Likely secondary to pulmonary vascular congestion from diastolic heart failure. KAMRAN likely contributing as well. Lasix 40 mg IV twice daily +metolazone. Educated on using CPAP at night and during day naps. O2 requirements at 2 L by nasal cannula. Wean off as tolerated. Acute kidney injury/obstructive uropathy  Serum creatinine with acute rise from 2.0 to 2.4--> 2.5. Etiology likely postrenal since patient was unable to void. Currently able to void on his own. No indication for talbot cath placement. Appreciate urology and nephrology recommendations.  Not much response to iv lasix. Nephro, Dr. Vianca Bae. Added Metolazone     KAMRAN  Educated on importance of CPAP use.     Morbid obesity due to excess calories (Body mass index is 48.76 kg/m².)   Low calorie diet. Encourage therapeutic lifestyle changes.     DM2  Most recent hemoglobin A1c of 6.9%. Oral meds on hold. POC blood sugars and exercise to cover.     HTN  Continue home norvasc and metoprolol     HLD  Continue home statin     DVT Prophylaxis: Lovenox  Diet: DIET LOW SODIUM 2 GM; 1800 ml  Code Status: Full Code    PT/OT Eval Status: Cleared for discharge home.     Dispo -continue with diuresis, wean off of O2, possible discharge in 1 to 2 days    Kelvin Patel MD

## 2021-04-10 NOTE — PROGRESS NOTES
[facility]   Cardiology Progress Note     Date: 4/10/2021  Admit Date: 4/5/2021     Reason for consultation: CHF    Chief Complaint:   Chief Complaint   Patient presents with    Shortness of Breath     pt shortness of breath worse with exertion, pt states also with dry eye, denies CP, been going on 3-4 months       History of Present Illness: History obtained from patient and medical record. Tremaine Patel is a 72 y.o. male with a past medical history of obesity, CKD, HTN, HLD, and DM. Pt presented to hospital with chest discomfort and SOB. He was found to be fluid overloaded. Interval Hx: Today, he is being seen for follow up. He continues to feel better each day. He remains on 2L of oxygen. He is hemodynamically stable and remains in sinus rhythm. His biggest complaint is issues with standing and feeling unsteady. Patient seen and examined. Clinical notes reviewed. Telemetry reviewed. No new complaints today. No major events overnight. Denies having chest pain, palpitations, shortness of breath, orthopnea/PND, cough, or dizziness at the time of this visit. Allergies: Allergies   Allergen Reactions    Indomethacin Other (See Comments)     Dr. Gus Medley told him not to take because affects the bladder.  Statins Other (See Comments)    Unable To Assess       Anesthesia that begins with S- stops my breathing    Actos [Pioglitazone] Rash       Home Meds:  Prior to Visit Medications    Medication Sig Taking?  Authorizing Provider   cetirizine (ZYRTEC) 10 MG tablet Take 10 mg by mouth daily Yes Historical Provider, MD   pioglitazone (ACTOS) 30 MG tablet Take 1 tablet by mouth daily Yes Jeremie Macario MD   metoprolol succinate (TOPROL XL) 100 MG extended release tablet Take 1 tablet by mouth daily Yes Jeremie Macario MD   atorvastatin (LIPITOR) 40 MG tablet Take 1 tablet by mouth nightly Yes Jeremie Macario MD   amLODIPine (NORVASC) 10 MG tablet Take 1 tablet by mouth daily Yes Gunner Mckeon MD   aspirin 81 MG EC tablet Take 81 mg by mouth daily Yes Historical Provider, MD   Acetaminophen (TYLENOL ARTHRITIS PAIN PO) Take by mouth OTC Yes Historical Provider, MD   HYDROcodone-acetaminophen (NORCO)  MG per tablet Take 1 tablet by mouth 3 times daily as needed (Pain). Pain specialist  Yes Historical Provider, MD   Dulaglutide (TRULICITY) 1.5 XB/6.3YD SOPN Inject 1.5 mg into the skin every 7 days Yes Gunner Mckeon MD   alendronate (FOSAMAX) 70 MG tablet Take 70 mg by mouth every 7 days Yes Historical Provider, MD   ammonium lactate (AMLACTIN) 12 % cream Apply topically as needed Yes Historical Provider, MD   glimepiride (AMARYL) 4 MG tablet Take 4 mg by mouth every morning (before breakfast) Yes Historical Provider, MD   salsalate (DISALCID) 750 MG tablet Take 750 mg by mouth 2 times daily as needed Yes Historical Provider, MD   vitamin B-6 (PYRIDOXINE) 50 MG tablet Take 50 mg by mouth daily Yes Historical Provider, MD   ergocalciferol (ERGOCALCIFEROL) 49090 UNITS capsule Take 50,000 Units by mouth once a week. Yes Historical Provider, MD   Cyanocobalamin (VITAMIN B 12 PO) Take  by mouth daily.  Yes Historical Provider, MD   Insulin Pen Needle ([employer] PEN NEEDLES) 31G X 6 MM MISC 1 each by Does not apply route daily  Gunner Mckeon MD      Scheduled Meds:   [START ON 4/11/2021] furosemide  40 mg Oral BID    amLODIPine  10 mg Oral Daily    aspirin  81 mg Oral Daily    atorvastatin  40 mg Oral Nightly    metoprolol succinate  100 mg Oral Daily    sodium chloride flush  10 mL Intravenous 2 times per day    famotidine  20 mg Oral Daily    enoxaparin  40 mg Subcutaneous Daily    furosemide  40 mg Intravenous BID    insulin lispro  0-6 Units Subcutaneous TID WC    insulin lispro  0-3 Units Subcutaneous Nightly    miconazole   Topical BID    ketotifen  1 drop Both Eyes BID    cetirizine  10 mg Oral Daily    guaiFENesin  600 mg insomnia    Physical Examination:  Vitals:    04/10/21 1112   BP: (!) 149/86   Pulse: 70   Resp: 20   Temp: 99 °F (37.2 °C)   SpO2: 95%      In: 240 [P.O.:240]  Out: 2250    Wt Readings from Last 3 Encounters:   04/10/21 298 lb 2 oz (135.2 kg)   11/30/20 293 lb 3.2 oz (133 kg)   09/29/20 293 lb (132.9 kg)       Intake/Output Summary (Last 24 hours) at 4/10/2021 1456  Last data filed at 4/10/2021 1112  Gross per 24 hour   Intake 240 ml   Output 2250 ml   Net -2010 ml       Telemetry: Personally Reviewed  - Sinus rhythm   · Constitutional: Cooperative and in no apparent distress, and appears well nourished  · Skin: Warm and pink; no pallor, cyanosis, bruising, or clubbing  · HEENT: Symmetric and normocephalic. PERRL, EOM intact. Conjunctiva pink with clear sclera. Mucus membranes pink and moist. Teeth intact. Thyroid smooth without nodules or goiter. · Cardiovascular: Regular rate and rhythm. S1/S2 present without murmurs, rubs, or gallops. Peripheral pulses 2+, capillary refill < 3 seconds. No elevation of JVP. No peripheral edema  · Respiratory: Respirations symmetric and unlabored. Lungs clear to auscultation bilaterally, no wheezing, crackles, or rhonchi  · Gastrointestinal: Abdomen soft and round. Bowel sounds normoactive in all quadrants without tenderness or masses. · Musculoskeletal: Bilateral upper and lower extremity strength 5/5 with full ROM  · Neurologic/Psych: Awake and orientated to person, place and time. Calm affect, appropriate mood    Pertinent labs, diagnostic, device, and imaging results reviewed as a part of this visit    Labs:    BMP:   Recent Labs     04/08/21  0603 04/09/21  0446 04/10/21  0526    139 139   K 4.5 4.8 4.2    101 100   CO2 24 28 31   BUN 29* 30* 34*   CREATININE 2.4* 2.5* 2.5*     Estimated Creatinine Clearance: 36 mL/min (A) (based on SCr of 2.5 mg/dL (H)).    CBC:   Recent Labs     04/08/21  0603 04/09/21  0446 04/10/21  0526   WBC 5.3 5.6 6.0   HGB 12.5* 12.7* prevent long term effects of untreated KAMRAN    7. Obesity  - Body mass index is 49.61 kg/m². - Complicating assessment and treatment. Placing patient at risk for multiple co-morbidities as well as early death and contributing to the patient's presentation  - Discussed weight loss and patient was encouraged to reduce calorie intake and increase exercise    Hopefully ready for d/c in next 24-48 hours. Will need follow up in office in next week as scheduled    All pertinent information and plan of care discussed with the rounding physician. All questions and concerns were addressed to the patient. Alternatives to my treatment were discussed. I have discussed the above stated plan with patient and the nurse. The patient verbalized understanding and agreed with the plan. Thank you for allowing to us to participate in the care of Joel Ahn     The patient was seen for >35 minutes. I reviewed interval history, physical exam, review of data including labs, imaging, development and implementation of treatment plan and coordination of complex care.     OSMAN Alfonso-[facility]  [address]   Office: [phone]

## 2021-04-11 LAB
ANION GAP SERPL CALCULATED.3IONS-SCNC: 14 MMOL/L (ref 3–16)
BASOPHILS ABSOLUTE: 0.1 K/UL (ref 0–0.2)
BASOPHILS RELATIVE PERCENT: 1 %
BUN BLDV-MCNC: 35 MG/DL (ref 7–20)
CALCIUM SERPL-MCNC: 9.1 MG/DL (ref 8.3–10.6)
CHLORIDE BLD-SCNC: 96 MMOL/L (ref 99–110)
CO2: 28 MMOL/L (ref 21–32)
CREAT SERPL-MCNC: 2.5 MG/DL (ref 0.8–1.3)
EOSINOPHILS ABSOLUTE: 0.3 K/UL (ref 0–0.6)
EOSINOPHILS RELATIVE PERCENT: 4.7 %
GFR AFRICAN AMERICAN: 31
GFR NON-AFRICAN AMERICAN: 26
GLUCOSE BLD-MCNC: 100 MG/DL (ref 70–99)
GLUCOSE BLD-MCNC: 109 MG/DL (ref 70–99)
GLUCOSE BLD-MCNC: 116 MG/DL (ref 70–99)
GLUCOSE BLD-MCNC: 120 MG/DL (ref 70–99)
GLUCOSE BLD-MCNC: 91 MG/DL (ref 70–99)
HCT VFR BLD CALC: 39.6 % (ref 40.5–52.5)
HEMOGLOBIN: 12.9 G/DL (ref 13.5–17.5)
LYMPHOCYTES ABSOLUTE: 1.4 K/UL (ref 1–5.1)
LYMPHOCYTES RELATIVE PERCENT: 23.4 %
MCH RBC QN AUTO: 32.1 PG (ref 26–34)
MCHC RBC AUTO-ENTMCNC: 32.6 G/DL (ref 31–36)
MCV RBC AUTO: 98.4 FL (ref 80–100)
MONOCYTES ABSOLUTE: 0.9 K/UL (ref 0–1.3)
MONOCYTES RELATIVE PERCENT: 15.5 %
NEUTROPHILS ABSOLUTE: 3.4 K/UL (ref 1.7–7.7)
NEUTROPHILS RELATIVE PERCENT: 55.4 %
PDW BLD-RTO: 14.4 % (ref 12.4–15.4)
PERFORMED ON: ABNORMAL
PERFORMED ON: NORMAL
PLATELET # BLD: 339 K/UL (ref 135–450)
PMV BLD AUTO: 6.8 FL (ref 5–10.5)
POTASSIUM SERPL-SCNC: 4 MMOL/L (ref 3.5–5.1)
PRO-BNP: 386 PG/ML (ref 0–124)
RBC # BLD: 4.02 M/UL (ref 4.2–5.9)
SODIUM BLD-SCNC: 138 MMOL/L (ref 136–145)
WBC # BLD: 6.1 K/UL (ref 4–11)

## 2021-04-11 PROCEDURE — 80048 BASIC METABOLIC PNL TOTAL CA: CPT

## 2021-04-11 PROCEDURE — 6370000000 HC RX 637 (ALT 250 FOR IP): Performed by: STUDENT IN AN ORGANIZED HEALTH CARE EDUCATION/TRAINING PROGRAM

## 2021-04-11 PROCEDURE — 85025 COMPLETE CBC W/AUTO DIFF WBC: CPT

## 2021-04-11 PROCEDURE — 6370000000 HC RX 637 (ALT 250 FOR IP): Performed by: INTERNAL MEDICINE

## 2021-04-11 PROCEDURE — 2580000003 HC RX 258: Performed by: PHYSICIAN ASSISTANT

## 2021-04-11 PROCEDURE — 99233 SBSQ HOSP IP/OBS HIGH 50: CPT | Performed by: NURSE PRACTITIONER

## 2021-04-11 PROCEDURE — 1200000000 HC SEMI PRIVATE

## 2021-04-11 PROCEDURE — 83880 ASSAY OF NATRIURETIC PEPTIDE: CPT

## 2021-04-11 PROCEDURE — 6360000002 HC RX W HCPCS: Performed by: PHYSICIAN ASSISTANT

## 2021-04-11 PROCEDURE — 94660 CPAP INITIATION&MGMT: CPT

## 2021-04-11 PROCEDURE — 6370000000 HC RX 637 (ALT 250 FOR IP): Performed by: PHYSICIAN ASSISTANT

## 2021-04-11 RX ADMIN — KETOTIFEN FUMARATE 1 DROP: 0.35 SOLUTION/ DROPS OPHTHALMIC at 20:43

## 2021-04-11 RX ADMIN — FUROSEMIDE 40 MG: 40 TABLET ORAL at 18:26

## 2021-04-11 RX ADMIN — ASPIRIN 81 MG: 81 TABLET, COATED ORAL at 09:52

## 2021-04-11 RX ADMIN — METOPROLOL SUCCINATE 100 MG: 50 TABLET, EXTENDED RELEASE ORAL at 09:52

## 2021-04-11 RX ADMIN — FUROSEMIDE 40 MG: 40 TABLET ORAL at 09:53

## 2021-04-11 RX ADMIN — CETIRIZINE HYDROCHLORIDE 10 MG: 10 TABLET, FILM COATED ORAL at 09:52

## 2021-04-11 RX ADMIN — MICONAZOLE NITRATE: 20 POWDER TOPICAL at 09:57

## 2021-04-11 RX ADMIN — GUAIFENESIN 600 MG: 600 TABLET, EXTENDED RELEASE ORAL at 09:53

## 2021-04-11 RX ADMIN — ATORVASTATIN CALCIUM 40 MG: 40 TABLET, FILM COATED ORAL at 20:43

## 2021-04-11 RX ADMIN — AMLODIPINE BESYLATE 10 MG: 5 TABLET ORAL at 09:53

## 2021-04-11 RX ADMIN — GUAIFENESIN 600 MG: 600 TABLET, EXTENDED RELEASE ORAL at 20:43

## 2021-04-11 RX ADMIN — Medication 1 SPRAY: at 09:59

## 2021-04-11 RX ADMIN — Medication 1 SPRAY: at 20:45

## 2021-04-11 RX ADMIN — Medication 10 ML: at 09:57

## 2021-04-11 RX ADMIN — FAMOTIDINE 20 MG: 20 TABLET ORAL at 09:52

## 2021-04-11 RX ADMIN — MICONAZOLE NITRATE: 20 POWDER TOPICAL at 20:43

## 2021-04-11 RX ADMIN — ENOXAPARIN SODIUM 40 MG: 40 INJECTION SUBCUTANEOUS at 09:52

## 2021-04-11 RX ADMIN — Medication 10 ML: at 20:43

## 2021-04-11 RX ADMIN — KETOTIFEN FUMARATE 1 DROP: 0.35 SOLUTION/ DROPS OPHTHALMIC at 09:56

## 2021-04-11 ASSESSMENT — PAIN DESCRIPTION - LOCATION: LOCATION: FOOT

## 2021-04-11 ASSESSMENT — PAIN DESCRIPTION - ORIENTATION: ORIENTATION: RIGHT;LEFT

## 2021-04-11 ASSESSMENT — PAIN DESCRIPTION - DESCRIPTORS: DESCRIPTORS: ACHING

## 2021-04-11 ASSESSMENT — PAIN SCALES - GENERAL: PAINLEVEL_OUTOF10: 0

## 2021-04-11 NOTE — PROGRESS NOTES
Nephrology Progress note  [phone]   [email or web address].        Reason for Consult:  CKD    HISTORY OF PRESENT ILLNESS:                This is a patient with significant past medical history of CKD stage 3bA3 followed by Dr. Vitaly Blanco, h/o obstructive nephropathy, prostate Ca, obesity, BMI 48, HTN, DM, who presented with progressively worsening SOB for the past few months.    -he is diagnosed with CHF and started on lasix  -he has a h/o SHANNAN in the setting of obstruction.  -baseline Scr now in high 1 low 2 range  -he does not have a talbot, per nurse he was incontinent of urine, will difficulty placing talbot. Currently has a condom  Cath. -CXR showed pulmonary edema, started on IV lasix bid  -pro-BNP is elevated 2500  -denies fever/chills/N/V    Subjective:  -pt seen and examined  -PMSHx and meds reviewed    Breathing and swelling better  Still with feet pain when walking  It is a bit better     PHYSICAL EXAM:    Vitals:    /86   Pulse 66   Temp 98.7 °F (37.1 °C) (Oral)   Resp 16   Ht 5' 5\" (1.651 m)   Wt 295 lb 12.8 oz (134.2 kg)   SpO2 95%   BMI 49.22 kg/m²   I/O last 3 completed shifts: In: 1150 [P.O.:1140; I.V.:10]  Out: 3000 [Urine:3000]  I/O this shift:  In: 300 [P.O.:300]  Out: -     Physical Exam:  Gen: Resting in bed, NAD.  obese  HEENT: MMM, OP clear, anicteric, NC/AT.   CV: +S1/S2, RRR  Lungs: Good respiratory effort, clear, no crackles heard   Abd: obese, NT  Ext: trace calf edema, no cyanosis, no pedal edema   Neuro: Alert and oriented x 3, nonfocal.    DATA:    CBC:   Lab Results   Component Value Date    WBC 6.1 04/11/2021    RBC 4.02 04/11/2021    HGB 12.9 04/11/2021    HCT 39.6 04/11/2021    MCV 98.4 04/11/2021    MCH 32.1 04/11/2021    MCHC 32.6 04/11/2021    RDW 14.4 04/11/2021     04/11/2021    MPV 6.8 04/11/2021     BMP:    Lab Results   Component Value Date     04/11/2021    K 4.0 04/11/2021    CL 96 04/11/2021    CO2 28 04/11/2021    BUN 35 04/11/2021 LABALBU 2.6 04/08/2021    CREATININE 2.5 04/11/2021    CALCIUM 9.1 04/11/2021    GFRAA 31 04/11/2021    GFRAA 53 02/18/2013    LABGLOM 26 04/11/2021    GLUCOSE 109 04/11/2021     Evaluation is limited due to the patient's body habitus.  The right kidney   measures 9.2 cm in length and the left kidney measures 9.3 cm in length.       Both kidneys are poorly delineated.  Cortical thickness and echogenicity are   grossly normal.  There is no evidence for hydronephrosis.         Bladder:       There appears to be diffuse urinary bladder wall irregularity.  Left ureteral   jet was identified with no visualization of a right ureteral jet.  Post void   bladder volume could not be obtained due to incontinence and \"problem with   catheter. \"         Summary   Overall left ventricular function is normal.   Ejection fraction is visually estimated to be 55 %. E/e'= 9.3 . There is reversal of E/A inflow velocities across the mitral valve. There is mild concentric left ventricular hypertrophy. No definitive regional wall motion abnormalities are noted. Grade II diastolic dysfunction with elevated LV filling pressures. Mild to moderate tricuspid regurgitation. Estimated pulmonary artery systolic pressure is moderately elevated at 65   mmHg assuming a right atrial pressure of 15 mmHg. The right ventricle is mildly enlarged. TAPSE= 2.72 RV S'= 17.2   IVC size is dilated (>2.1 cm) and collapses < 50% with respiration   consistent with elevated RA pressure (15 mmHg). IMPRESSION/RECOMMENDATIONS:      SHANNAN: -  -Scc stable for the past 48hrsr  -appreciate urology assistance  -given improvement in Scr, OK to avoid talbot  -continue bladder scan  -renal US noted-no HN  -continue diuretics - will likely have to tolerate increase in creatinine to maintain fluid balance  -I'm ok with discharge.  PT evaluation pending this am     Acute resp failure: due to new onset CHF-oxygen requirement persists  -weight is down and O2

## 2021-04-11 NOTE — PROGRESS NOTES
Assessment complete. See flow sheet. Patient resting in bed quietly at this time. No complaints of pain or discomfort voiced at this time. Respirations easy and even. Denies needs at this time. The care plan and education has been reviewed and mutually agreed upon with the patient.

## 2021-04-11 NOTE — PROGRESS NOTES
[facility]   Cardiology Progress Note     Date: 4/11/2021  Admit Date: 4/5/2021     Reason for consultation: CHF    Chief Complaint:   Chief Complaint   Patient presents with    Shortness of Breath     pt shortness of breath worse with exertion, pt states also with dry eye, denies CP, been going on 3-4 months       History of Present Illness: History obtained from patient and medical record. Juan Antonio Farris is a 72 y.o. male with a past medical history of obesity, CKD, HTN, HLD, and DM. Pt presented to hospital with chest discomfort and SOB. He was found to be fluid overloaded. Interval Hx: Today, he is being seen for follow up. He has multiple questions about his health and plan of care. He continues to feel better each day and is able to do more physically than when he came in. His biggest complaint is pain in his feet. He remains hemodynamically stable in sinus rhythm. His weight is down 3 lbs and he is diuresing well on PO lasix. Patient seen and examined. Clinical notes reviewed. Telemetry reviewed. No new complaints today. No major events overnight. Denies having chest pain, palpitations, shortness of breath, orthopnea/PND, cough, or dizziness at the time of this visit. Allergies: Allergies   Allergen Reactions    Indomethacin Other (See Comments)     Dr. Andrez Dugan told him not to take because affects the bladder.  Statins Other (See Comments)    Unable To Assess       Anesthesia that begins with S- stops my breathing    Actos [Pioglitazone] Rash       Home Meds:  Prior to Visit Medications    Medication Sig Taking?  Authorizing Provider   cetirizine (ZYRTEC) 10 MG tablet Take 10 mg by mouth daily Yes Historical Provider, MD   pioglitazone (ACTOS) 30 MG tablet Take 1 tablet by mouth daily Yes Kehinde Diallo MD   metoprolol succinate (TOPROL XL) 100 MG extended release tablet Take 1 tablet by mouth daily Yes Kehinde Diallo MD   atorvastatin (LIPITOR) 40 MG cetirizine  10 mg Oral Daily    guaiFENesin  600 mg Oral BID     Continuous Infusions:   sodium chloride      dextrose       PRN Meds:sodium chloride, gabapentin, HYDROcodone-acetaminophen, sodium chloride flush, sodium chloride, magnesium hydroxide, acetaminophen, glucose, dextrose, glucagon (rDNA), dextrose, ondansetron     Past Medical History:  Past Medical History:   Diagnosis Date    Cancer ([facility]     prostate    Hyperlipidemia     Hypertension     Obesity     Osteoarthritis     Type 2 diabetes mellitus without complication ([facility]     Type 2 diabetes mellitus without complication, without long-term current use of insulin ([facility] 2/7/2018        Past Surgical History:    has a past surgical history that includes Total hip arthroplasty and joint replacement. Social History:  Reviewed. reports that he quit smoking about 20 years ago. He started smoking about 41 years ago. He quit after 21.00 years of use. He has quit using smokeless tobacco. He reports current alcohol use. He reports that he does not use drugs. Family History:  Reviewed. family history includes Diabetes in his mother; Hypertension in his father and mother. Review of Systems:  · Constitutional: Negative for fever, night sweats, chills, weight changes, or weakness  · Skin: Negative for rash, dry skin, pruritus, bruising, bleeding, blood clots, or changes in skin pigment  · HEENT: Negative for vision changes, ringing in the ears, sore throat, dysphagia, or swollen lymph nodes  · Respiratory: Reviewed in HPI  · Cardiovascular: Reviewed in HPI  · Gastrointestinal: Negative for abdominal pain, N/V/D, constipation, or black/tarry stools  · Genito-Urinary: Negative for dysuria, incontinence, urgency, or hematuria  · Musculoskeletal: Negative for joint swelling, muscle pain, or injuries  · Neurological/Psych: Negative for confusion, seizures, headaches, balance issues or TIA-like symptoms.  No anxiety, depression, or insomnia    Physical Examination:  Vitals:    04/11/21 0518   BP: (!) 146/87   Pulse: 73   Resp: 21   Temp: 98.3 °F (36.8 °C)   SpO2: 96%      In: 550 [P.O.:540; I.V.:10]  Out: 1800    Wt Readings from Last 3 Encounters:   04/11/21 295 lb 12.8 oz (134.2 kg)   11/30/20 293 lb 3.2 oz (133 kg)   09/29/20 293 lb (132.9 kg)       Intake/Output Summary (Last 24 hours) at 4/11/2021 9808  Last data filed at 4/11/2021 0225  Gross per 24 hour   Intake 1150 ml   Output 3000 ml   Net -1850 ml       Telemetry: Personally Reviewed  - Sinus rhythm with rare PVC  · Constitutional: Cooperative and in no apparent distress, and appears well nourished  · Skin: Warm and pink; no pallor, cyanosis, bruising, or clubbing  · HEENT: Symmetric and normocephalic. PERRL, EOM intact. Conjunctiva pink with clear sclera. Mucus membranes pink and moist. Teeth intact. Thyroid smooth without nodules or goiter. · Cardiovascular: Regular rate and rhythm. S1/S2 present without murmurs, rubs, or gallops. Peripheral pulses 2+, capillary refill < 3 seconds. No elevation of JVP. Trace edema  · Respiratory: Respirations symmetric and unlabored. Lungs diminished to auscultation bilaterally, no wheezing, crackles, or rhonchi. On 3 L of oxygen  · Gastrointestinal: Abdomen soft and round. Bowel sounds normoactive in all quadrants without tenderness or masses. · Musculoskeletal: + Generalized weakness (improving)  · Neurologic/Psych: Awake and orientated to person, place and time. Calm affect, appropriate mood    Pertinent labs, diagnostic, device, and imaging results reviewed as a part of this visit    Labs:    BMP:   Recent Labs     04/09/21  0446 04/10/21  0526    139   K 4.8 4.2    100   CO2 28 31   BUN 30* 34*   CREATININE 2.5* 2.5*     Estimated Creatinine Clearance: 36 mL/min (A) (based on SCr of 2.5 mg/dL (H)).    CBC:   Recent Labs     04/09/21  0446 04/10/21  0526 04/11/21  0534   WBC 5.6 6.0 6.1   HGB 12.7* 12.7* 12.9*   HCT 39.8* 39.6* 39.6*   MCV 98.9 99.2 98.4    319 339     Thyroid:   Lab Results   Component Value Date    TSH 3.58 2021     Lipids:   Lab Results   Component Value Date    CHOL 201 2011    HDL 55 2011    TRIG 123 2011     LFTS:   Lab Results   Component Value Date    ALT 17 2021    AST 23 2021    ALKPHOS 86 2021    PROT 6.1 2021    PROT 8.3 2011    AGRATIO 0.8 2021    BILITOT 0.4 2021     Cardiac Enzymes:   Lab Results   Component Value Date    TROPONINI 0.03 2021    TROPONINI 0.04 2021    TROPONINI 0.03 2021     Coags: No results found for: PROTIME, INR    EC21  NSR with PVC    ECHO: 21   Overall left ventricular function is normal.   Ejection fraction is visually estimated to be 55 %. E/e'= 9.3 . There is reversal of E/A inflow velocities across the mitral valve. There is mild concentric left ventricular hypertrophy. No definitive regional wall motion abnormalities are noted. Grade II diastolic dysfunction with elevated LV filling pressures. Mild to moderate tricuspid regurgitation. Estimated pulmonary artery systolic pressure is moderately elevated at 65   mmHg assuming a right atrial pressure of 15 mmHg. The right ventricle is mildly enlarged. TAPSE= 2.72 RV S'= 17.2   IVC size is dilated (>2.1 cm) and collapses < 50% with respiration consistent with elevated RA pressure (15 mmHg).     Stress Test: None    CXR: 21  The above findings are most consistent moderate CHF.  Pneumonia is not   excluded     Problem List:   Patient Active Problem List    Diagnosis Date Noted    Acute kidney injury superimposed on CKD ([address]     Essential hypertension     Acute on chronic diastolic heart failure ([address]     Acute respiratory failure with hypoxia ([facility] 2021    Anemia in chronic kidney disease 2020    ROB (renal osteodystrophy) 2020    Hypersomnolence 2020    KAMRNA (obstructive sleep apnea) 2020    Spondylosis of lumbar region without myelopathy or radiculopathy 02/16/2018    Type 2 diabetes mellitus without complication, without long-term current use of insulin ([address] 02/07/2018    History of colonic polyps 07/21/2017    Family history of malignant neoplasm of gastrointestinal tract 05/12/2017    Morbid obesity ([address] 10/11/2016    Chronic renal insufficiency, stage III (moderate) 10/20/2015    Acute idiopathic gout of right foot 05/22/2015    Osteoarthritis of right knee 11/11/2014    Impotence of organic origin 09/30/2013    Osteopenia 04/30/2013    Hyperlipidemia 04/02/2013    Adjustment disorder with mixed disturbance of emotions and conduct 01/12/2013    Depression 01/11/2013    Malignant neoplasm of prostate ([address] 11/15/2010    Esophageal reflux 07/29/2010    Acquired spondylolisthesis 06/02/2008    Aseptic necrosis of head and neck of femur 03/11/2008    Degeneration of lumbar or lumbosacral intervertebral disc 03/11/2008    Opioid type dependence, continuous ([address] 03/11/2008        Assessment and Plan:     1. Acute on Chronic diastolic heart failure (NYHA Class II)  - Appears fairly compensated   ~ EF 55%; Grade 2 DD per echo (4/7/21)  - Continue with Toprol  mg QD, lasix 40 mg BID  ~ ACE-I/ARB contraindicated due to renal insufficiency and risk of hyperkalemia. May consider starting as OP  - Monitor I&Os, daily weights  - Place compression stockings to help with leg swelling     2. SHANNAN on CKD   - Stable at baseline   - Monitor UO closely   - Nephrology following    3. Acute Respiratory Failure   - Secondary to CHF   - Remains on 2L of oxygen (May need short term at d/c)   - Encourage IS and ambulation    4. HTN  - Controlled: Goal <130/80  - Continue current medications    5. Anemia   - Stable   - No bleeding    6. KAMRAN  - Unstable  - Non-compliant with CPAP therapy. Encourage to use CPAP to prevent long term effects of untreated KAMRAN  - Recommend outpatient evaluation and therapy    7. Obesity  - Body mass index is 49.22 kg/m². - Complicating assessment and treatment. Placing patient at risk for multiple co-morbidities as well as early death and contributing to the patient's presentation  - Discussed weight loss and patient was encouraged to reduce calorie intake and increase exercise    Hopefully ready for d/c in next 24 hours if able to ambulate and kidney function stable. Will have follow up with CHF team in next week as outpatient as scheduled. All pertinent information and plan of care discussed with the rounding physician. All questions and concerns were addressed to the patient. Alternatives to my treatment were discussed. I have discussed the above stated plan with patient and the nurse. The patient verbalized understanding and agreed with the plan. Thank you for allowing to us to participate in the care of Tad Romo     The patient was seen for >35 minutes. I reviewed interval history, physical exam, review of data including labs, imaging, development and implementation of treatment plan and coordination of complex care.     OSMAN [address]  [address]   Office: [phone]

## 2021-04-11 NOTE — PROGRESS NOTES
Hospitalist Progress Note      PCP: Shital Agarwal MD    Date of Admission: 4/5/2021    Chief Complaint: Shortness of breath    Hospital Course: Rachelle Morales is a 72 y.o. male who presented to ED with complaint of progressively worsening shortness of breath which is worse with exertion for the last 3-4 months. He reports associated non-productive cough. He denies fever, dizziness, chest pain, edema, orthopnea, weight gain, abdominal pain, N/V/D/C, urinary symptoms. Patient has a history of KAMRAN and has not been using his CPAP. Subjective: Patient seen and examined at bedside. States his breathing better. Currently on 2 L per nasal cannula.     Medications:  Reviewed    Infusion Medications    sodium chloride      dextrose       Scheduled Medications    furosemide  40 mg Oral BID    amLODIPine  10 mg Oral Daily    aspirin  81 mg Oral Daily    atorvastatin  40 mg Oral Nightly    metoprolol succinate  100 mg Oral Daily    sodium chloride flush  10 mL Intravenous 2 times per day    famotidine  20 mg Oral Daily    enoxaparin  40 mg Subcutaneous Daily    insulin lispro  0-6 Units Subcutaneous TID WC    insulin lispro  0-3 Units Subcutaneous Nightly    miconazole   Topical BID    ketotifen  1 drop Both Eyes BID    cetirizine  10 mg Oral Daily    guaiFENesin  600 mg Oral BID     PRN Meds: sodium chloride, gabapentin, HYDROcodone-acetaminophen, sodium chloride flush, sodium chloride, magnesium hydroxide, acetaminophen, glucose, dextrose, glucagon (rDNA), dextrose, ondansetron      Intake/Output Summary (Last 24 hours) at 4/11/2021 1412  Last data filed at 4/11/2021 0948  Gross per 24 hour   Intake 850 ml   Output 1800 ml   Net -950 ml       Physical Exam Performed:    /86   Pulse 66   Temp 98.7 °F (37.1 °C) (Oral)   Resp 16   Ht 5' 5\" (1.651 m)   Wt 295 lb 12.8 oz (134.2 kg)   SpO2 95%   BMI 49.22 kg/m²     General appearance: No apparent distress, appears stated age and cooperative. Respiratory:  Normal respiratory effort. Clear to auscultation, bilaterally without Rales/Wheezes/Rhonchi. Cardiovascular: Regular rate and rhythm with normal S1/S2 without murmurs, rubs or gallops. Abdomen: Soft, non-tender, non-distended with normal bowel sounds. Musculoskeletal: No clubbing, cyanosis or edema bilaterally. Full range of motion without deformity. Skin: Skin color, texture, turgor normal.  No rashes or lesions. Neurologic:  Neurovascularly intact without any focal sensory/motor deficits. Cranial nerves: II-XII intact, grossly non-focal.  Peripheral Pulses: +2 palpable, equal bilaterally       Labs:   Recent Labs     04/09/21  0446 04/10/21  0526 04/11/21  0534   WBC 5.6 6.0 6.1   HGB 12.7* 12.7* 12.9*   HCT 39.8* 39.6* 39.6*    319 339     Recent Labs     04/09/21  0446 04/10/21  0526 04/11/21  0534    139 138   K 4.8 4.2 4.0    100 96*   CO2 28 31 28   BUN 30* 34* 35*   CREATININE 2.5* 2.5* 2.5*   CALCIUM 8.7 9.2 9.1     No results for input(s): AST, ALT, BILIDIR, BILITOT, ALKPHOS in the last 72 hours. No results for input(s): INR in the last 72 hours. No results for input(s): Kruse Sol in the last 72 hours. Urinalysis:      Lab Results   Component Value Date    NITRU Negative 04/06/2021    WBCUA >100 04/06/2021    BACTERIA 4+ 03/21/2015    RBCUA 8 03/21/2015    BLOODU LARGE 04/06/2021    SPECGRAV 1.020 04/06/2021    GLUCOSEU Negative 04/06/2021       Radiology:  US RENAL COMPLETE   Final Result   1. Limited exam with no hydronephrosis. 2. Suspected urinary bladder wall irregularity, possibly due to chronic   outlet obstruction. Post void bladder volume could not be obtained. XR CHEST (2 VW)   Final Result   The above findings are most consistent moderate CHF. Pneumonia is not   excluded             2d-Echo from 4/5/21. Summary   Overall left ventricular function is normal.   Ejection fraction is visually estimated to be 55 %.  E/e'= 9. 3 .   There is reversal of E/A inflow velocities across the mitral valve. There is mild concentric left ventricular hypertrophy. No definitive regional wall motion abnormalities are noted. Grade II diastolic dysfunction with elevated LV filling pressures. Mild to moderate tricuspid regurgitation. Estimated pulmonary artery systolic pressure is moderately elevated at 65   mmHg assuming a right atrial pressure of 15 mmHg. The right ventricle is mildly enlarged. TAPSE= 2.72 RV S'= 17.2   IVC size is dilated (>2.1 cm) and collapses < 50% with respiration   consistent with elevated RA pressure (15 mmHg). Assessment/Plan:    Active Hospital Problems    Diagnosis    Acute kidney injury superimposed on CKD ([facility] [N17.9, N18.9]    Essential hypertension [I10]    Acute on chronic diastolic heart failure (HCC) [I50.33]    Acute respiratory failure with hypoxia ([facility]) [J96.01]    Morbid obesity ([facility] [E66.01]     Acute pulmonary edema secondary likely acute on chronic diastolic congestive heart failure  2D echo from 4/5/2021 showed LVEF of 50 % with grade 2 diastolic dysfunction. Clinically continues to have signs of fluid overload. Receiving Lasix 40 mg IV twice daily + metolazone as per nephro. Educated on salt and fluid restrictions. -5.5L since admit     Acute hypoxic respiratory failure, POA  Likely secondary to pulmonary vascular congestion from diastolic heart failure. KAMRAN likely contributing as well. Lasix 40 mg IV twice daily +metolazone. Educated on using CPAP at night and during day naps. O2 requirements at 2 L by nasal cannula. Wean off as tolerated. Acute kidney injury/obstructive uropathy  Serum creatinine with acute rise from 2.0 to 2.4--> 2.5. Etiology likely postrenal since patient was unable to void. Currently able to void on his own. No indication for talbot cath placement. Appreciate urology and nephrology recommendations. Not much response to iv lasix. Dr. Renaldo Patel. Added Metolazone     KAMRAN  Educated on importance of CPAP use.     Morbid obesity due to excess calories (Body mass index is 48.76 kg/m².)   Low calorie diet. Encourage therapeutic lifestyle changes.     DM2  Most recent hemoglobin A1c of 6.9%. Oral meds on hold. POC blood sugars and exercise to cover.     HTN  Continue home norvasc and metoprolol     HLD  Continue home statin     DVT Prophylaxis: Lovenox  Diet: DIET LOW SODIUM 2 GM; 1800 ml  Code Status: Full Code    PT/OT Eval Status: Cleared for discharge home.     Dispo -continue with diuresis, wean off of O2, possible discharge in 1 to 2 days    Adal Porras MD

## 2021-04-11 NOTE — PROGRESS NOTES
Pt alert and oriented and VS stable. Bed alarm is on and nancy light close. Pt has ewa hose on to the knee. Called placed to PT/OT to make sure therapy is aware of order and daughter would like aggressive therapy as he lives alone.

## 2021-04-11 NOTE — PROGRESS NOTES
Urology Progress Note  [facility]    Provider: Mitra Martinez APRROXANNA-CNP Patient ID:  Admission Date: 2021 Name: Jonah Stewart Date: 2021 MRN: [id number]   Patient Location: [address] [place] [address]- : 1950  Attending: Alexys Choudhury MD Date of Service: 2021  PCP: Tahir Washington MD     Diagnoses:  Complex Talbot Catheter    Assessment/Plan:  Mr. Kayla Musa is a 70yo male  Hx of prostate cancer  s/p robotic radical prostatectomy   Now with baseline urge incontinence  Last PSA <0.01  S/p IPP in 2018  Admitted for CHF exacerbation  SHANNAN, Cr 2.5  Consult for complex talbot, multiple attempts per staff  Concerning for bladder neck contracture     Recommendations:  -Obtained PVR recently for 14cc's.   -Would continue with condom catheter for now - working well.   -Please call if there is concern for acute urinary retention.   -Discussed patient with Dr. Halina Singleton will follow up as outpatient    The patient had a chance to ask questions which were answered. he understands the above plan. Subjective:   Joel Ahn is a 72 y.o. male. He was seen and examined this morning. Today we discussed continued use of condom cath. Pt does not wish to have talbot placed. Objective:   Vitals:  Vitals:    21 0518   BP: (!) 146/87   Pulse: 73   Resp: 21   Temp: 98.3 °F (36.8 °C)   SpO2: 96%       Intake/Output Summary (Last 24 hours) at 2021 0935  Last data filed at 2021 0225  Gross per 24 hour   Intake 1150 ml   Output 3000 ml   Net -1850 ml     Physical Exam:  Gen: Obese, Alert and oriented x3, no acute distress  CV: Regular rate   Resp: unlabored respirations  Abd: Soft, non-distended, non-tender, no masses  Ext: no peripheral edema noted, moves upper and lower extremities spontaneously  Skin: warmand well perfused, no rashes noted on the face, or arms.      Labs:  Lab Results   Component Value Date    WBC 6.1 2021    HGB 12.9 (L) 2021    HCT 39.6 (L) 04/11/2021    MCV 98.4 04/11/2021     04/11/2021     Lab Results   Component Value Date    CREATININE 2.5 (H) 04/10/2021    BUN 34 (H) 04/10/2021     04/10/2021    K 4.2 04/10/2021     04/10/2021    CO2 31 04/10/2021       Jeni Coles APRN-CNP  4/11/2021

## 2021-04-12 LAB
ANION GAP SERPL CALCULATED.3IONS-SCNC: 9 MMOL/L (ref 3–16)
BASOPHILS ABSOLUTE: 0 K/UL (ref 0–0.2)
BASOPHILS RELATIVE PERCENT: 0.9 %
BUN BLDV-MCNC: 40 MG/DL (ref 7–20)
CALCIUM SERPL-MCNC: 9.1 MG/DL (ref 8.3–10.6)
CHLORIDE BLD-SCNC: 96 MMOL/L (ref 99–110)
CO2: 32 MMOL/L (ref 21–32)
CREAT SERPL-MCNC: 2.6 MG/DL (ref 0.8–1.3)
EOSINOPHILS ABSOLUTE: 0.3 K/UL (ref 0–0.6)
EOSINOPHILS RELATIVE PERCENT: 7.1 %
GFR AFRICAN AMERICAN: 30
GFR NON-AFRICAN AMERICAN: 25
GLUCOSE BLD-MCNC: 108 MG/DL (ref 70–99)
GLUCOSE BLD-MCNC: 117 MG/DL (ref 70–99)
GLUCOSE BLD-MCNC: 142 MG/DL (ref 70–99)
GLUCOSE BLD-MCNC: 159 MG/DL (ref 70–99)
GLUCOSE BLD-MCNC: 94 MG/DL (ref 70–99)
HCT VFR BLD CALC: 37.8 % (ref 40.5–52.5)
HEMOGLOBIN: 12.3 G/DL (ref 13.5–17.5)
LYMPHOCYTES ABSOLUTE: 1 K/UL (ref 1–5.1)
LYMPHOCYTES RELATIVE PERCENT: 21.3 %
MAGNESIUM: 2.1 MG/DL (ref 1.8–2.4)
MCH RBC QN AUTO: 31.8 PG (ref 26–34)
MCHC RBC AUTO-ENTMCNC: 32.6 G/DL (ref 31–36)
MCV RBC AUTO: 97.6 FL (ref 80–100)
MONOCYTES ABSOLUTE: 0.8 K/UL (ref 0–1.3)
MONOCYTES RELATIVE PERCENT: 16.4 %
NEUTROPHILS ABSOLUTE: 2.6 K/UL (ref 1.7–7.7)
NEUTROPHILS RELATIVE PERCENT: 54.3 %
PDW BLD-RTO: 14.5 % (ref 12.4–15.4)
PERFORMED ON: ABNORMAL
PERFORMED ON: NORMAL
PHOSPHORUS: 3.9 MG/DL (ref 2.5–4.9)
PLATELET # BLD: 331 K/UL (ref 135–450)
PMV BLD AUTO: 7.4 FL (ref 5–10.5)
POTASSIUM SERPL-SCNC: 3.6 MMOL/L (ref 3.5–5.1)
RBC # BLD: 3.87 M/UL (ref 4.2–5.9)
SODIUM BLD-SCNC: 137 MMOL/L (ref 136–145)
WBC # BLD: 4.8 K/UL (ref 4–11)

## 2021-04-12 PROCEDURE — 6370000000 HC RX 637 (ALT 250 FOR IP): Performed by: INTERNAL MEDICINE

## 2021-04-12 PROCEDURE — 1200000000 HC SEMI PRIVATE

## 2021-04-12 PROCEDURE — 6370000000 HC RX 637 (ALT 250 FOR IP): Performed by: PHYSICIAN ASSISTANT

## 2021-04-12 PROCEDURE — 97530 THERAPEUTIC ACTIVITIES: CPT

## 2021-04-12 PROCEDURE — 6360000002 HC RX W HCPCS: Performed by: PHYSICIAN ASSISTANT

## 2021-04-12 PROCEDURE — 85025 COMPLETE CBC W/AUTO DIFF WBC: CPT

## 2021-04-12 PROCEDURE — 99233 SBSQ HOSP IP/OBS HIGH 50: CPT | Performed by: NURSE PRACTITIONER

## 2021-04-12 PROCEDURE — 2700000000 HC OXYGEN THERAPY PER DAY

## 2021-04-12 PROCEDURE — 84100 ASSAY OF PHOSPHORUS: CPT

## 2021-04-12 PROCEDURE — 6370000000 HC RX 637 (ALT 250 FOR IP): Performed by: STUDENT IN AN ORGANIZED HEALTH CARE EDUCATION/TRAINING PROGRAM

## 2021-04-12 PROCEDURE — 80048 BASIC METABOLIC PNL TOTAL CA: CPT

## 2021-04-12 PROCEDURE — 2580000003 HC RX 258: Performed by: PHYSICIAN ASSISTANT

## 2021-04-12 PROCEDURE — 97535 SELF CARE MNGMENT TRAINING: CPT

## 2021-04-12 PROCEDURE — 36415 COLL VENOUS BLD VENIPUNCTURE: CPT

## 2021-04-12 PROCEDURE — 97116 GAIT TRAINING THERAPY: CPT

## 2021-04-12 PROCEDURE — 83735 ASSAY OF MAGNESIUM: CPT

## 2021-04-12 PROCEDURE — 94660 CPAP INITIATION&MGMT: CPT

## 2021-04-12 RX ORDER — FUROSEMIDE 40 MG/1
80 TABLET ORAL 2 TIMES DAILY
Status: DISCONTINUED | OUTPATIENT
Start: 2021-04-12 | End: 2021-04-14 | Stop reason: HOSPADM

## 2021-04-12 RX ADMIN — Medication 10 ML: at 21:16

## 2021-04-12 RX ADMIN — KETOTIFEN FUMARATE 1 DROP: 0.35 SOLUTION/ DROPS OPHTHALMIC at 21:17

## 2021-04-12 RX ADMIN — CETIRIZINE HYDROCHLORIDE 10 MG: 10 TABLET, FILM COATED ORAL at 08:26

## 2021-04-12 RX ADMIN — AMLODIPINE BESYLATE 10 MG: 5 TABLET ORAL at 08:27

## 2021-04-12 RX ADMIN — ATORVASTATIN CALCIUM 40 MG: 40 TABLET, FILM COATED ORAL at 21:16

## 2021-04-12 RX ADMIN — FUROSEMIDE 80 MG: 40 TABLET ORAL at 17:56

## 2021-04-12 RX ADMIN — FUROSEMIDE 40 MG: 40 TABLET ORAL at 08:35

## 2021-04-12 RX ADMIN — INSULIN LISPRO 1 UNITS: 100 INJECTION, SOLUTION INTRAVENOUS; SUBCUTANEOUS at 08:32

## 2021-04-12 RX ADMIN — GUAIFENESIN 600 MG: 600 TABLET, EXTENDED RELEASE ORAL at 21:16

## 2021-04-12 RX ADMIN — MICONAZOLE NITRATE: 20 POWDER TOPICAL at 21:16

## 2021-04-12 RX ADMIN — ENOXAPARIN SODIUM 40 MG: 40 INJECTION SUBCUTANEOUS at 08:28

## 2021-04-12 RX ADMIN — ASPIRIN 81 MG: 81 TABLET, COATED ORAL at 08:26

## 2021-04-12 RX ADMIN — METOPROLOL SUCCINATE 100 MG: 50 TABLET, EXTENDED RELEASE ORAL at 08:38

## 2021-04-12 RX ADMIN — KETOTIFEN FUMARATE 1 DROP: 0.35 SOLUTION/ DROPS OPHTHALMIC at 08:36

## 2021-04-12 RX ADMIN — MICONAZOLE NITRATE: 20 POWDER TOPICAL at 08:36

## 2021-04-12 RX ADMIN — Medication 10 ML: at 08:35

## 2021-04-12 RX ADMIN — INSULIN LISPRO 1 UNITS: 100 INJECTION, SOLUTION INTRAVENOUS; SUBCUTANEOUS at 21:19

## 2021-04-12 RX ADMIN — FAMOTIDINE 20 MG: 20 TABLET ORAL at 08:26

## 2021-04-12 ASSESSMENT — PAIN SCALES - GENERAL
PAINLEVEL_OUTOF10: 7
PAINLEVEL_OUTOF10: 0
PAINLEVEL_OUTOF10: 10

## 2021-04-12 ASSESSMENT — PAIN DESCRIPTION - LOCATION: LOCATION: FOOT

## 2021-04-12 ASSESSMENT — PAIN DESCRIPTION - ORIENTATION: ORIENTATION: RIGHT;LEFT

## 2021-04-12 ASSESSMENT — PAIN DESCRIPTION - PAIN TYPE: TYPE: ACUTE PAIN

## 2021-04-12 ASSESSMENT — PAIN DESCRIPTION - DESCRIPTORS: DESCRIPTORS: BURNING;TINGLING

## 2021-04-12 NOTE — PROGRESS NOTES
Occupational Therapy  Facility/Department: [address] ONCOLOGY  Daily Treatment Note  NAME: Andreas Notice  : 1950  MRN: [id number]    Date of Service: 2021    Discharge Recommendations:Ramón Olvera scored a 18/24 on the AM-PAC ADL Inpatient form. Current research shows that an AM-PAC score of 18 or greater is typically associated with a discharge to the patient's home setting. Based on the patient's AM-PAC score, and their current ADL deficits, it is recommended that the patient have 2-3 sessions per week of Occupational Therapy at d/c to increase the patient's independence. At this time, this patient demonstrates the endurance and safety to discharge home with [facility] and a follow up treatment frequency of 2-3x/wk. Please see assessment section for further patient specific details. HOME HEALTH CARE: LEVEL 2 SOCIAL     - Initial home health evaluation to occur within 24-48 hours, in patient home   - Therapy to evaluate with goal of regaining prior level of functioning   - Therapy to evaluate if patient has [address] needs for personal care   -  evaluation within 24-48 hours, includes evaluation of resources and insurance to determine AL/IL/LTC/Medicaid options   - [place] on Aging Referral   - Ethan Townsend to discuss home support and care needs post discharge within two weeks of discharge. If patient discharges prior to next session this note will serve as a discharge summary. Please see below for the latest assessment towards goals. S Level 2  OT Equipment Recommendations  Equipment Needed: Yes  ADL Assistive Devices: Elastic Shoe Laces; Reacher;Transfer Tub Bench;Hand-held Shower;Long-handled Shoe Horn;Long-handled Sponge;Grab Bars - tub;Grab Bars - toilet; Toileting - Raised Toilet Seat without arms  Other: has a sock aid at home    Assessment   Performance deficits / Impairments: Decreased functional mobility ; Decreased ADL status; Decreased high-level IADLs; Decreased safe awareness;Decreased endurance  Assessment: Patient presents with the above deficits impacting occupational performance functioning below baseline level due to new onset of CHF. Patient can benefit from skilled OT to improve energy conservation. Treatment Diagnosis: Decreased functional mobility, ADLs, endurance, safety, high level I ADLs associated with CHF  Prognosis: Good  OT Education: OT Role;Plan of Care;Energy Conservation;Equipment;ADL Adaptive Strategies  Patient Education: Patient is an independent learner  Barriers to Learning: hearing  REQUIRES OT FOLLOW UP: Yes  Activity Tolerance  Activity Tolerance: Patient Tolerated treatment well  Activity Tolerance: 2L of 02 decreased from 4L  Safety Devices  Safety Devices in place: Yes  Type of devices: All fall risk precautions in place;Call light within reach; Patient at risk for falls;Nurse notified;Gait belt;Left in bed;Bed alarm in place         Patient Diagnosis(es): The encounter diagnosis was Acute pulmonary edema ([facility] has a past medical history of Cancer ([facility] [address] Hyperlipidemia, Hypertension, Obesity, Osteoarthritis, Type 2 diabetes mellitus without complication ([facility] [address] and Type 2 diabetes mellitus without complication, without long-term current use of insulin ([facility] [address] has a past surgical history that includes Total hip arthroplasty and joint replacement. Restrictions  Restrictions/Precautions  Restrictions/Precautions: Fall Risk(High Fall risk)  Required Braces or Orthoses?: No  Position Activity Restriction  Other position/activity restrictions: Miguel Campbell is a 72 y.o. male with history of prostate cancer hypertension hyperlipidemia diabetes and obesity presents for evaluation of increasing shortness of breath with it worse with exertion over the past 3 to 4 months. Patient states that has been getting worse. He is also reporting eye drainage. States that he has been taking Zyrtec for this with some improvement. He denies any history of CHF. Denies any significant weight gain or leg swelling from baseline. Subjective   General  Chart Reviewed: Yes  Patient assessed for rehabilitation services?: Yes  Additional Pertinent Hx: DM, Prostate CA  Response to previous treatment: Patient with no complaints from previous session  Family / Caregiver Present: No(PCA and Dietician in and out)  Diagnosis: Acute on set of CHF  Subjective  Subjective: Patient in chair. Agreeable to therapy. Discomfort in feet when walking  Vital Signs  Patient Currently in Pain: No   Orientation  Orientation  Overall Orientation Status: Within Normal Limits  Objective    ADL  Grooming: Stand by assistance(seated at sink)  UE Bathing: Stand by assistance(soap and water at sink)  LE Bathing: Minimal assistance(assist with buttocks (reports using long towel at home to complete buttocks care))  UE Dressing: Minimal assistance(gown, assist to tie)  LE Dressing: Stand by assistance(underwear, patient rolls top of under wear down, lies them on ground and works feet into underwear and pulls them up.   Socks not changed during session)  Toileting: None        Balance  Sitting Balance: Modified independent   Standing Balance: Stand by assistance  Standing Balance  Time: ~5-7  minutes  Activity: bathing LB, in stance to don gown  Functional Mobility  Functional - Mobility Device: No device  Activity: To/from bathroom  Assist Level: Stand by assistance  Toilet Transfers  Toilet - Technique: Ambulating  Equipment Used: Standard bedside commode  Toilet Transfer: Stand by assistance  Bed mobility  Sit to Supine: Supervision  Scooting: Supervision  Transfers  Sit to stand: Stand by assistance  Stand to sit: Stand by assistance      Cognition  Overall Cognitive Status: WNL                  Plan   Plan  Times per week: 3-5  Times per day: Daily  Current Treatment Recommendations: Functional Mobility Training, Endurance Training, Safety Education & Training, Self-Care / ADL, Home Management Training, Patient/Caregiver Education & Training, Equipment Evaluation, Education, & procurement, Strengthening    AM-PAC Score        AM-PAC Inpatient Daily Activity Raw Score: 18 (04/12/21 1443)  AM-PAC Inpatient ADL T-Scale Score : 38.66 (04/12/21 1443)  ADL Inpatient CMS 0-100% Score: 46.65 (04/12/21 1443)  ADL Inpatient CMS G-Code Modifier : CK (04/12/21 1443)    Goals  Short term goals  Time Frame for Short term goals: Discharge  Short term goal 1: Mod I with functional ADL maintaining O2 sats greater than 90%--2L >90 SBA in room mobility  Short term goal 2: Mod I with functional ADL transfers--SBA 4/12  Short term goal 3: Patient to verbalize understanding of energy conservation during ADLs and mobility--requires cues for energy conservation 4/12  Short term goal 4: Dressing Mod I with Adaptive equipment- Patient able to don underwear without adaptive equipment this date 4/12  Long term goals  Time Frame for Long term goals : LTG=STG  Patient Goals   Patient goals : Go home with home health       Therapy Time   Individual Concurrent Group Co-treatment   Time In 1354         Time Out 1432         Minutes 38              Timed Code Treatment Minutes:  38 Minutes    Total Treatment Minutes:  MELVI Gayle 106, [address] [facility]  Debra Rodriguez 103

## 2021-04-12 NOTE — PROGRESS NOTES
Nutrition Assessment     Type and Reason for Visit: Initial(LOS)    Nutrition Recommendations/Plan:   1. Add CCC modifier to low sodium (2gm) diet    Nutrition Assessment:  Pt on a low sodium diet (2gm); 1800ml fluid restirction with po intake averaging at 75% of meals. Reported a good appetite and observed lunch at 75% today. Weights during admission show weight loss likely r/t diuresis (-5L). Weight history shows stability. Pt denied any questions or needs concerning diet. Will monitor po intake and for any changes in nutrition status.     Malnutrition Assessment:  Malnutrition Status: No malnutrition    Estimated Daily Nutrient Needs:    Nutrition Related Findings: No BMs noted, POC Gluc 159, Gluc 117, generalized +2, BLE +2, pitting edema      Current Nutrition Therapies:    DIET LOW SODIUM 2 GM; 1800 ml    Anthropometric Measures:  · Height: 5' 5\" (165.1 cm)  · Current Body Wt: 294 lb (133.4 kg)   · BMI: 48.9    Nutrition Diagnosis:   No nutrition diagnosis at this time    Nutrition Interventions:   Food and/or Nutrient Delivery:  Modify Current Diet  Nutrition Education/Counseling:  Education completed(CHF edu completed 4/9)   Coordination of Nutrition Care:  Continue to monitor while inpatient    Goals:  PO intake will continue to be 75% or greater       Nutrition Monitoring and Evaluation:   Behavioral-Environmental Outcomes:  None Identified   Food/Nutrient Intake Outcomes:  Food and Nutrient Intake  Physical Signs/Symptoms Outcomes:  Biochemical Data, Fluid Status or Edema, Weight     Discharge Planning:    Continue current diet     Electronically signed by Unruly Cannon on 4/12/21 at 2:30 PM EDT    Contact: [phone]

## 2021-04-12 NOTE — PROGRESS NOTES
Nephrology Attending  Progress Note        SUMMARY :  We are following this patient for CKD   CKD stage 3bA3 followed by Dr. Bruce Reddy, h/o obstructive nephropathy, prostate Ca, obesity, BMI 48, HTN, DM, who presented with progressively worsening dyspnea  for the past few months. SUBJECTIVE:   Patient progress reviewed. The patient has dyspnea   No cough, no chest pain     Physical Exam:    VITALS:  /76   Pulse 73   Temp 98 °F (36.7 °C) (Oral)   Resp 18   Ht 5' 5\" (1.651 m)   Wt 294 lb 9.6 oz (133.6 kg)   SpO2 95%   BMI 49.02 kg/m²   BLOOD PRESSURE RANGE:  Systolic (42FBT), LMK:343 , Min:128 , TCJ:468   ; Diastolic (45IAC), KZT:25, Min:66, Max:79    24HR INTAKE/OUTPUT:      Intake/Output Summary (Last 24 hours) at 4/12/2021 1145  Last data filed at 4/12/2021 0847  Gross per 24 hour   Intake 900 ml   Output 600 ml   Net 300 ml       Gen:  alert, oriented x 3  PERRL , EOM +  Pallor +, No icterus  JVP not raised   CV: RRR no murmur or rub . Lungs:B/ L air entry, Normal breath sounds   Abd: soft, bowel sounds + , No organomegaly   Ext: No edema, no cyanosis  Skin: Warm.   No rash  Neuro: nonfocal.      DATA:    CBC with Differential:    Lab Results   Component Value Date    WBC 4.8 04/12/2021    RBC 3.87 04/12/2021    HGB 12.3 04/12/2021    HCT 37.8 04/12/2021     04/12/2021    MCV 97.6 04/12/2021    MCH 31.8 04/12/2021    MCHC 32.6 04/12/2021    RDW 14.5 04/12/2021    BANDSPCT 6 02/19/2015    LYMPHOPCT 21.3 04/12/2021    MONOPCT 16.4 04/12/2021    BASOPCT 0.9 04/12/2021    MONOSABS 0.8 04/12/2021    LYMPHSABS 1.0 04/12/2021    EOSABS 0.3 04/12/2021    BASOSABS 0.0 04/12/2021     CMP:    Lab Results   Component Value Date     04/12/2021    K 3.6 04/12/2021    CL 96 04/12/2021    CO2 32 04/12/2021    BUN 40 04/12/2021    CREATININE 2.6 04/12/2021    GFRAA 30 04/12/2021    GFRAA 53 02/18/2013    AGRATIO 0.8 04/05/2021    LABGLOM 25 04/12/2021    GLUCOSE 117 04/12/2021    PROT 6.1 04/08/2021

## 2021-04-12 NOTE — PROGRESS NOTES
Physical Therapy  Facility/Department: [address] ONCOLOGY  Daily Treatment Note  NAME: Rachelle Morales  : 1950  MRN: [id number]    Date of Service: 2021    Discharge Recommendations:Ramón Moore scored a 19/24 on the AM-PAC short mobility form. Current research shows that an AM-PAC score of 18 or greater is typically associated with a discharge to the patient's home setting. Based on the patient's AM-PAC score and their current functional mobility deficits, it is recommended that the patient have 2-3 sessions per week of Physical Therapy at d/c to increase the patient's independence. At this time, this patient demonstrates the endurance and safety to discharge home with home PT and a follow up treatment frequency of 2-3x/wk. Please see assessment section for further patient specific details. HOME HEALTH CARE: LEVEL 2 SOCIAL     - Initial home health evaluation to occur within 24-48 hours, in patient home   - Therapy to evaluate with goal of regaining prior level of functioning   - Therapy to evaluate if patient has [address] needs for personal care   -  evaluation within 24-48 hours, includes evaluation of resources and insurance to determine AL/IL/LTC/Medicaid options   - [place] on Aging Referral   - [address] Townsend to discuss home support and care needs post discharge within two weeks of discharge. If patient discharges prior to next session this note will serve as a discharge summary. Please see below for the latest assessment towards goals. S Level 2   PT Equipment Recommendations  Equipment Needed: Yes  Walker: Rolling    Assessment   Body structures, Functions, Activity limitations: Decreased functional mobility ; Decreased strength;Decreased endurance;Decreased balance;Decreased ADL status  Assessment: Pt supervision for bed mob, SBA transfer and gait with RW. O2 dropping to 88% with amb.  Decreased pain and improved walking/activity tolerance today.  Treatment Diagnosis: decreased functional mobility, impaired gait, decreased endurance  Prognosis: Good  PT Education: Goals;PT Role;Plan of Care;Transfer Training;Gait Training;Functional Mobility Training  Patient Education: d/c recommendations- verbalized understanding  Barriers to Learning: none  REQUIRES PT FOLLOW UP: Yes  Activity Tolerance  Activity Tolerance: Patient limited by endurance     Patient Diagnosis(es): The encounter diagnosis was Acute pulmonary edema ([facility] [address] has a past medical history of Cancer ([facility] [address] Hyperlipidemia, Hypertension, Obesity, Osteoarthritis, Type 2 diabetes mellitus without complication ([facility] [address] and Type 2 diabetes mellitus without complication, without long-term current use of insulin ([facility] [address] has a past surgical history that includes Total hip arthroplasty and joint replacement. Restrictions  Restrictions/Precautions  Restrictions/Precautions: Fall Risk(High Fall risk)  Required Braces or Orthoses?: No  Position Activity Restriction  Other position/activity restrictions: Philippe Bhatt is a 72 y.o. male with history of prostate cancer hypertension hyperlipidemia diabetes and obesity presents for evaluation of increasing shortness of breath with it worse with exertion over the past 3 to 4 months. Patient states that has been getting worse. He is also reporting eye drainage. States that he has been taking Zyrtec for this with some improvement. He denies any history of CHF. Denies any significant weight gain or leg swelling from baseline. Subjective   General  Chart Reviewed: Yes  Family / Caregiver Present: No  Subjective  Subjective: Pt reporting 7/10 knee pain but much improved. Once up and moving denied knee pain. General Comment  Comments: Pt supine in bed upon arrival. Frandy Arredondo to work with PT.   Pain Screening  Patient Currently in Pain: Yes  Pain Assessment  Pain Level: 7  Pain Type: Acute pain  Pain Location: Leg  Pain Orientation: Right;Left  Vital Signs  Patient Currently in Pain: Yes       Orientation  Orientation  Overall Orientation Status: Within Functional Limits  Cognition      Objective   Bed mobility  Supine to Sit: Supervision  Scooting: Supervision  Transfers  Sit to Stand: Stand by assistance(from EOB, toilet, and chair.)  Stand to sit: Stand by assistance  Comment: Pt assisted with brief change and hygiene after having BM. VCs for hand placement using toilet and RW. Pt stood at sink and forearm leaned while washing hands. Ambulation  Ambulation?: Yes  More Ambulation?: Yes  Ambulation 1  Surface: level tile  Device: Rolling Walker  Other Apparatus: O2(2LO2)  Assistance: Stand by assistance  Quality of Gait: increased lat sway  Gait Deviations: Increased MAGAN; Decreased step height;Decreased step length  Distance: Pt amb with RW and SBA for 10 ft x 2. Comments: Pt amb to toilet and had BM. Assist for hygiene and brief change. Stood at sink 4 min for ADLs. Pt returned to seated in chair. O2 93% on 2L during activity. O2 removed and remained in mid 90s%. Once rested, performed 2nd walk. Ambulation 2  Surface - 2: level tile  Device 2: Rolling Walker  Assistance 2: Stand by assistance  Quality of Gait 2: increased lat sway. Gait Deviations: Decreased step length;Decreased step height  Distance: Pt amb 150 ft with RW and SBA. Comments: Pt SOB at end of amb but reporting feeling good with no pain. O2 sats 88% and O2 replaced on 2L. Pt taking 30 sec to improve back to 94%. Balance  Posture: Fair  Sitting - Static: Good  Sitting - Dynamic: Good  Standing - Static: Good  Standing - Dynamic: Good;-(with RW)            Comment: toileting, brief change, standing ADLs at sink, close monitoring of O2 sats.               G-Code     OutComes Score                                                     AM-PAC Score  AM-PAC Inpatient Mobility Raw Score : 19 (04/12/21 1119)  AM-PAC Inpatient T-Scale Score : 45.44 (04/12/21 1119)  Mobility Inpatient CMS 0-100% Score: 41.77 (04/12/21 1119)  Mobility Inpatient CMS G-Code Modifier : CK (04/12/21 1119)          Goals  Short term goals  Time Frame for Short term goals: To be met prior to discharge (no goals met in full this date)  Short term goal 1: Bed mobility with mod I  Short term goal 2: Sit to/from stand with mod I  Short term goal 3: Ambulate 48' without AD and mod I  Patient Goals   Patient goals :  To go home    Plan    Plan  Times per week: 3-5  Times per day: Daily  Current Treatment Recommendations: Balance Training, Strengthening, Functional Mobility Training, Transfer Training, Endurance Training, Gait Training, Safety Education & Training, Home Exercise Program  Safety Devices  Type of devices: Call light within reach, Gait belt, Left in chair, Nurse notified(Pt left with PCA getting ready to assist pt with bathing.)  Restraints  Initially in place: No     Therapy Time   Individual Concurrent Group Co-treatment   Time In 1022         Time Out 1100         Minutes 38         Timed Code Treatment Minutes: [address] [place], [id number]

## 2021-04-12 NOTE — PROGRESS NOTES
Hospitalist Progress Note      PCP: Alireza Weber MD    Date of Admission: 4/5/2021    Chief Complaint: Shortness of breath    Hospital Course: Bebeto Lehman is a 72 y.o. male who presented to ED with complaint of progressively worsening shortness of breath which is worse with exertion for the last 3-4 months. He reports associated non-productive cough. He denies fever, dizziness, chest pain, edema, orthopnea, weight gain, abdominal pain, N/V/D/C, urinary symptoms. Patient has a history of KAMRAN and has not been using his CPAP. Subjective: Patient seen and examined at bedside. Patient states he feels much better from being able to walk in the hallway with physical therapy. Continues to require 2 L O2. Inadequate diuresis past 24 hours, discussed with nephrology will adjust diuretic dose.   Medications:  Reviewed    Infusion Medications    sodium chloride      dextrose       Scheduled Medications    [START ON 4/13/2021] enoxaparin  30 mg Subcutaneous Daily    furosemide  80 mg Oral BID    amLODIPine  10 mg Oral Daily    aspirin  81 mg Oral Daily    atorvastatin  40 mg Oral Nightly    metoprolol succinate  100 mg Oral Daily    sodium chloride flush  10 mL Intravenous 2 times per day    famotidine  20 mg Oral Daily    insulin lispro  0-6 Units Subcutaneous TID WC    insulin lispro  0-3 Units Subcutaneous Nightly    miconazole   Topical BID    ketotifen  1 drop Both Eyes BID    cetirizine  10 mg Oral Daily    guaiFENesin  600 mg Oral BID     PRN Meds: sodium chloride, gabapentin, HYDROcodone-acetaminophen, sodium chloride flush, sodium chloride, magnesium hydroxide, acetaminophen, glucose, dextrose, glucagon (rDNA), dextrose, ondansetron      Intake/Output Summary (Last 24 hours) at 4/12/2021 1341  Last data filed at 4/12/2021 1301  Gross per 24 hour   Intake 1140 ml   Output 600 ml   Net 540 ml       Physical Exam Performed:    /77   Pulse 74   Temp 97.7 °F (36.5 °C) (Oral)   Resp 18   Ht 5' 5\" (1.651 m)   Wt 294 lb 9.6 oz (133.6 kg)   SpO2 96%   BMI 49.02 kg/m²     General appearance: No apparent distress, appears stated age and cooperative. Respiratory:  Normal respiratory effort. Clear to auscultation, bilaterally without Rales/Wheezes/Rhonchi. Cardiovascular: Regular rate and rhythm with normal S1/S2 without murmurs, rubs or gallops. Abdomen: Soft, non-tender, non-distended with normal bowel sounds. Musculoskeletal: No clubbing, cyanosis or edema bilaterally. Full range of motion without deformity. Skin: Skin color, texture, turgor normal.  No rashes or lesions. Neurologic:  Neurovascularly intact without any focal sensory/motor deficits. Cranial nerves: II-XII intact, grossly non-focal.  Peripheral Pulses: +2 palpable, equal bilaterally       Labs:   Recent Labs     04/10/21  0526 04/11/21  0534 04/12/21  0511   WBC 6.0 6.1 4.8   HGB 12.7* 12.9* 12.3*   HCT 39.6* 39.6* 37.8*    339 331     Recent Labs     04/10/21  0526 04/11/21  0534 04/12/21  0511    138 137   K 4.2 4.0 3.6    96* 96*   CO2 31 28 32   BUN 34* 35* 40*   CREATININE 2.5* 2.5* 2.6*   CALCIUM 9.2 9.1 9.1   PHOS  --   --  3.9     No results for input(s): AST, ALT, BILIDIR, BILITOT, ALKPHOS in the last 72 hours. No results for input(s): INR in the last 72 hours. No results for input(s): Meldon Loera in the last 72 hours. Urinalysis:      Lab Results   Component Value Date    NITRU Negative 04/06/2021    WBCUA >100 04/06/2021    BACTERIA 4+ 03/21/2015    RBCUA 8 03/21/2015    BLOODU LARGE 04/06/2021    SPECGRAV 1.020 04/06/2021    GLUCOSEU Negative 04/06/2021       Radiology:  US RENAL COMPLETE   Final Result   1. Limited exam with no hydronephrosis. 2. Suspected urinary bladder wall irregularity, possibly due to chronic   outlet obstruction. Post void bladder volume could not be obtained.          XR CHEST (2 VW)   Final Result   The above findings are most consistent Currently able to void on his own. No indication for talbot cath placement. Appreciate urology and nephrology recommendations. Not much response to iv lasix. Nephro, Dr. Kat Meth. Added Metolazone     KAMRAN  Educated on importance of CPAP use.     Morbid obesity due to excess calories (Body mass index is 48.76 kg/m².)   Low calorie diet. Encourage therapeutic lifestyle changes.     DM2  Most recent hemoglobin A1c of 6.9%. Oral meds on hold. POC blood sugars and exercise to cover.     HTN  Continue home norvasc and metoprolol     HLD  Continue home statin     DVT Prophylaxis: Lovenox  Diet: DIET LOW SODIUM 2 GM; 1800 ml  Code Status: Full Code    PT/OT Eval Status: Cleared for discharge home.     Dispo -continue with diuresis, wean off of O2, possible discharge in 1 to 2 days Home with home care    Tirso Russell MD

## 2021-04-12 NOTE — CARE COORDINATION
CM spoke with patient regarding potential skilled nursing facility placement after morning hospitalist huddles, he defers any decision to his daughter Amalia López. CM spoke with Amalia López, she would consider [facility]. [facility] as options. She is concerned that if patient discharges to home he will not be safe as he lives alone but does not want referrals sent at this time pending a new therapy evaluation. [facility] last week were 16/17. CM will continue to follow to facilitate discharge needs.   Magalis Bernardo, ELAYNEN, CCM, RN  [facility]  [phone]

## 2021-04-12 NOTE — PROGRESS NOTES
[address]  HEART FAILURE  Progress Note      Admit Date 4/5/2021     Reason for Consult:      Reason for Consultation/Chief Complaint: SOB    HPI:    Tad Romo is a 72 y.o. male with PMH obesity, HTN, HLD, DM admitted with SOB and CP, fluid overload. He has diuresed 4L on IV lasix      Subjective:  Patient is being seen for CHF. There were no acute overnight cardiac events. Today Mr. Gary Miranda denies chest pain, shortness of breath, palpitations.  He is feeling better but not back to baseline, still on O2 at 2L      Baseline Weight:    Wt Readings from Last 3 Encounters:   04/12/21 294 lb 9.6 oz (133.6 kg)   11/30/20 293 lb 3.2 oz (133 kg)   09/29/20 293 lb (132.9 kg)          Objective:   /76   Pulse 73   Temp 98 °F (36.7 °C) (Oral)   Resp 18   Ht 5' 5\" (1.651 m)   Wt 294 lb 9.6 oz (133.6 kg)   SpO2 95%   BMI 49.02 kg/m²       Intake/Output Summary (Last 24 hours) at 4/12/2021 0948  Last data filed at 4/12/2021 0847  Gross per 24 hour   Intake 1200 ml   Output 600 ml   Net 600 ml      Wt Readings from Last 3 Encounters:   04/12/21 294 lb 9.6 oz (133.6 kg)   11/30/20 293 lb 3.2 oz (133 kg)   09/29/20 293 lb (132.9 kg)      In: 900 [P.O.:900]  Out: 600       Physical Exam:  General Appearance:  Obese/Well Nourished  Respiratory:  · Resp Auscultation: Normal breath sounds without dullness  Cardiovascular:  · Auscultation: Regular rate and rhythm, normal S1S2, no m/g/r/c  · Palpation: Normal    · Pedal Pulses: 2+ and equal   Abdomen:  · Soft, NT, ND, + bs  Extremities:  · No Cyanosis or Clubbing  · Extremities: 1+ edema  Neurological/Psychiatric:  · Oriented to time, place, and person  · Non-anxious    MEDICATIONS:   Scheduled Meds:   Scheduled Meds:   furosemide  40 mg Oral BID    amLODIPine  10 mg Oral Daily    aspirin  81 mg Oral Daily    atorvastatin  40 mg Oral Nightly    metoprolol succinate  100 mg Oral Daily    sodium chloride flush  10 mL Intravenous 2 times per day    famotidine  20 mg Oral Daily    enoxaparin  40 mg Subcutaneous Daily    insulin lispro  0-6 Units Subcutaneous TID WC    insulin lispro  0-3 Units Subcutaneous Nightly    miconazole   Topical BID    ketotifen  1 drop Both Eyes BID    cetirizine  10 mg Oral Daily    guaiFENesin  600 mg Oral BID     Continuous Infusions:   sodium chloride      dextrose       PRN Meds:.sodium chloride, gabapentin, HYDROcodone-acetaminophen, sodium chloride flush, sodium chloride, magnesium hydroxide, acetaminophen, glucose, dextrose, glucagon (rDNA), dextrose, ondansetron  Continuous Infusions:   sodium chloride      dextrose         Intake/Output Summary (Last 24 hours) at 4/12/2021 0948  Last data filed at 4/12/2021 0847  Gross per 24 hour   Intake 1200 ml   Output 600 ml   Net 600 ml       Lab Data:  CBC:   Lab Results   Component Value Date    WBC 4.8 04/12/2021    HGB 12.3 04/12/2021     04/12/2021     BMP:  Lab Results   Component Value Date     04/12/2021    K 3.6 04/12/2021    CL 96 04/12/2021    CO2 32 04/12/2021    BUN 40 04/12/2021    CREATININE 2.6 04/12/2021    GLUCOSE 117 04/12/2021     INR: No results found for: INR     CARDIAC LABS  ENZYMES:No results for input(s): CKMB, CKMBINDEX, TROPONINI in the last 72 hours.     Invalid input(s): CKTOTAL;3  FASTING LIPID PANEL:  Lab Results   Component Value Date    HDL 55 11/14/2011    LDLCALC 122 11/14/2011    TRIG 123 11/14/2011    TSH 3.58 04/06/2021     LIVER PROFILE:  Lab Results   Component Value Date    AST 23 04/05/2021    AST 29 02/19/2015    ALT 17 04/05/2021    ALT 28 02/19/2015     BNP:   Lab Results   Component Value Date    PROBNP 386 04/11/2021    PROBNP 552 04/09/2021    PROBNP 1,269 04/07/2021     Iron Studies:    Lab Results   Component Value Date    FERRITIN 73.3 02/12/2020    FERRITIN 117.8 09/06/2019    FERRITIN 88.0 05/17/2019     Lab Results   Component Value Date    IRON 78 02/12/2020    TIBC 314 02/12/2020    FERRITIN 73.3 02/12/2020 1. WEIGHT: Admit Weight: 293 lb (132.9 kg)      Today  Weight: 294 lb 9.6 oz (133.6 kg)   2. I/O     Intake/Output Summary (Last 24 hours) at 4/12/2021 0948  Last data filed at 4/12/2021 0847  Gross per 24 hour   Intake 1200 ml   Output 600 ml   Net 600 ml       Cardiac Testing:   ECHO: 4/7/21   Overall left ventricular function is normal.   Ejection fraction is visually estimated to be 55 %. E/e'= 9.3 .   There is reversal of E/A inflow velocities across the mitral valve.   There is mild concentric left ventricular hypertrophy.   No definitive regional wall motion abnormalities are noted.   Grade II diastolic dysfunction with elevated LV filling pressures. Mild to moderate tricuspid regurgitation.   Estimated pulmonary artery systolic pressure is moderately elevated at 65   mmHg assuming a right atrial pressure of 15 mmHg.   The right ventricle is mildly enlarged. TAPSE= 2.72 RV S'= 17.2   IVC size is dilated (>2.1 cm) and collapses < 50% with respiration consistent with elevated RA pressure (15 mmHg). Assessment/Plan:     1. [id number] out, but +1L over last 24hours, lasix dose increased today, should be ready for d/c soon, will need to treat KAMRAN at home  2.  HTN- controlled on norvasc and toprol          I appreciate the opportunity of cooperating in the care of this individual.    Jesús Boateng [facility], [address] 4/12/2021, 9:48 AM  Heart Failure  The [address], [address]  Ph: [phone]      Core Measures:   · Discharge instructions:   · LVEF documented:   · ACEI for LV dysfunction:   · Smoking Cessation:

## 2021-04-13 LAB
ANION GAP SERPL CALCULATED.3IONS-SCNC: 10 MMOL/L (ref 3–16)
BASOPHILS ABSOLUTE: 0 K/UL (ref 0–0.2)
BASOPHILS RELATIVE PERCENT: 1 %
BUN BLDV-MCNC: 41 MG/DL (ref 7–20)
CALCIUM SERPL-MCNC: 9.1 MG/DL (ref 8.3–10.6)
CHLORIDE BLD-SCNC: 97 MMOL/L (ref 99–110)
CO2: 32 MMOL/L (ref 21–32)
CREAT SERPL-MCNC: 2.6 MG/DL (ref 0.8–1.3)
EOSINOPHILS ABSOLUTE: 0.4 K/UL (ref 0–0.6)
EOSINOPHILS RELATIVE PERCENT: 7.2 %
GFR AFRICAN AMERICAN: 30
GFR NON-AFRICAN AMERICAN: 25
GLUCOSE BLD-MCNC: 101 MG/DL (ref 70–99)
GLUCOSE BLD-MCNC: 114 MG/DL (ref 70–99)
GLUCOSE BLD-MCNC: 192 MG/DL (ref 70–99)
GLUCOSE BLD-MCNC: 349 MG/DL (ref 70–99)
GLUCOSE BLD-MCNC: 97 MG/DL (ref 70–99)
HCT VFR BLD CALC: 37.8 % (ref 40.5–52.5)
HEMOGLOBIN: 12.4 G/DL (ref 13.5–17.5)
LYMPHOCYTES ABSOLUTE: 1.1 K/UL (ref 1–5.1)
LYMPHOCYTES RELATIVE PERCENT: 21.8 %
MAGNESIUM: 2.1 MG/DL (ref 1.8–2.4)
MCH RBC QN AUTO: 32.2 PG (ref 26–34)
MCHC RBC AUTO-ENTMCNC: 32.8 G/DL (ref 31–36)
MCV RBC AUTO: 98.1 FL (ref 80–100)
MONOCYTES ABSOLUTE: 0.8 K/UL (ref 0–1.3)
MONOCYTES RELATIVE PERCENT: 15.8 %
NEUTROPHILS ABSOLUTE: 2.7 K/UL (ref 1.7–7.7)
NEUTROPHILS RELATIVE PERCENT: 54.2 %
PDW BLD-RTO: 14.4 % (ref 12.4–15.4)
PERFORMED ON: ABNORMAL
PERFORMED ON: NORMAL
PHOSPHORUS: 3.6 MG/DL (ref 2.5–4.9)
PLATELET # BLD: 336 K/UL (ref 135–450)
PMV BLD AUTO: 6.8 FL (ref 5–10.5)
POTASSIUM SERPL-SCNC: 3.6 MMOL/L (ref 3.5–5.1)
RBC # BLD: 3.85 M/UL (ref 4.2–5.9)
SODIUM BLD-SCNC: 139 MMOL/L (ref 136–145)
SPE/IFE INTERPRETATION: NORMAL
WBC # BLD: 5 K/UL (ref 4–11)

## 2021-04-13 PROCEDURE — 80048 BASIC METABOLIC PNL TOTAL CA: CPT

## 2021-04-13 PROCEDURE — 6370000000 HC RX 637 (ALT 250 FOR IP): Performed by: PHYSICIAN ASSISTANT

## 2021-04-13 PROCEDURE — 99232 SBSQ HOSP IP/OBS MODERATE 35: CPT | Performed by: NURSE PRACTITIONER

## 2021-04-13 PROCEDURE — 97530 THERAPEUTIC ACTIVITIES: CPT

## 2021-04-13 PROCEDURE — 1200000000 HC SEMI PRIVATE

## 2021-04-13 PROCEDURE — 6360000002 HC RX W HCPCS: Performed by: INTERNAL MEDICINE

## 2021-04-13 PROCEDURE — 2580000003 HC RX 258: Performed by: PHYSICIAN ASSISTANT

## 2021-04-13 PROCEDURE — 6370000000 HC RX 637 (ALT 250 FOR IP): Performed by: STUDENT IN AN ORGANIZED HEALTH CARE EDUCATION/TRAINING PROGRAM

## 2021-04-13 PROCEDURE — 6370000000 HC RX 637 (ALT 250 FOR IP): Performed by: NURSE PRACTITIONER

## 2021-04-13 PROCEDURE — 6370000000 HC RX 637 (ALT 250 FOR IP): Performed by: INTERNAL MEDICINE

## 2021-04-13 PROCEDURE — 85025 COMPLETE CBC W/AUTO DIFF WBC: CPT

## 2021-04-13 PROCEDURE — 94660 CPAP INITIATION&MGMT: CPT

## 2021-04-13 PROCEDURE — 36415 COLL VENOUS BLD VENIPUNCTURE: CPT

## 2021-04-13 PROCEDURE — 84100 ASSAY OF PHOSPHORUS: CPT

## 2021-04-13 PROCEDURE — 83735 ASSAY OF MAGNESIUM: CPT

## 2021-04-13 RX ORDER — GUAIFENESIN/DEXTROMETHORPHAN 100-10MG/5
5 SYRUP ORAL EVERY 4 HOURS PRN
Status: DISCONTINUED | OUTPATIENT
Start: 2021-04-13 | End: 2021-04-14 | Stop reason: HOSPADM

## 2021-04-13 RX ORDER — CETIRIZINE HYDROCHLORIDE 10 MG/1
5 TABLET ORAL DAILY
Status: DISCONTINUED | OUTPATIENT
Start: 2021-04-14 | End: 2021-04-14 | Stop reason: HOSPADM

## 2021-04-13 RX ADMIN — GUAIFENESIN 600 MG: 600 TABLET, EXTENDED RELEASE ORAL at 07:59

## 2021-04-13 RX ADMIN — ASPIRIN 81 MG: 81 TABLET, COATED ORAL at 07:59

## 2021-04-13 RX ADMIN — KETOTIFEN FUMARATE 1 DROP: 0.35 SOLUTION/ DROPS OPHTHALMIC at 21:32

## 2021-04-13 RX ADMIN — GUAIFENESIN 600 MG: 600 TABLET, EXTENDED RELEASE ORAL at 21:31

## 2021-04-13 RX ADMIN — ATORVASTATIN CALCIUM 40 MG: 40 TABLET, FILM COATED ORAL at 21:30

## 2021-04-13 RX ADMIN — GUAIFENESIN AND DEXTROMETHORPHAN 5 ML: 100; 10 SYRUP ORAL at 13:30

## 2021-04-13 RX ADMIN — CETIRIZINE HYDROCHLORIDE 10 MG: 10 TABLET, FILM COATED ORAL at 07:59

## 2021-04-13 RX ADMIN — INSULIN LISPRO 2 UNITS: 100 INJECTION, SOLUTION INTRAVENOUS; SUBCUTANEOUS at 21:35

## 2021-04-13 RX ADMIN — FUROSEMIDE 80 MG: 40 TABLET ORAL at 07:59

## 2021-04-13 RX ADMIN — INSULIN LISPRO 1 UNITS: 100 INJECTION, SOLUTION INTRAVENOUS; SUBCUTANEOUS at 17:35

## 2021-04-13 RX ADMIN — GUAIFENESIN AND DEXTROMETHORPHAN 5 ML: 100; 10 SYRUP ORAL at 07:58

## 2021-04-13 RX ADMIN — FUROSEMIDE 80 MG: 40 TABLET ORAL at 17:35

## 2021-04-13 RX ADMIN — Medication 10 ML: at 08:00

## 2021-04-13 RX ADMIN — AMLODIPINE BESYLATE 10 MG: 5 TABLET ORAL at 07:58

## 2021-04-13 RX ADMIN — Medication 10 ML: at 21:31

## 2021-04-13 RX ADMIN — ENOXAPARIN SODIUM 30 MG: 30 INJECTION SUBCUTANEOUS at 07:58

## 2021-04-13 RX ADMIN — MICONAZOLE NITRATE: 20 POWDER TOPICAL at 08:02

## 2021-04-13 RX ADMIN — FAMOTIDINE 20 MG: 20 TABLET ORAL at 07:58

## 2021-04-13 RX ADMIN — METOPROLOL SUCCINATE 100 MG: 50 TABLET, EXTENDED RELEASE ORAL at 07:58

## 2021-04-13 RX ADMIN — KETOTIFEN FUMARATE 1 DROP: 0.35 SOLUTION/ DROPS OPHTHALMIC at 08:02

## 2021-04-13 RX ADMIN — MICONAZOLE NITRATE: 20 POWDER TOPICAL at 21:31

## 2021-04-13 ASSESSMENT — PAIN SCALES - GENERAL
PAINLEVEL_OUTOF10: 0

## 2021-04-13 NOTE — PROGRESS NOTES
[phone] Hospital or Facility: [facility] From: Andres Braga RE: [facility] RM: [id number] Pt here for acute resp failure with hypoxia. Pt has a productive cough and has been taking Mucinex. He is requesting a different medication for his cough. Thanks.  Need Callback: NO CALLBACK REQ

## 2021-04-13 NOTE — PROGRESS NOTES
sodium chloride flush  10 mL Intravenous 2 times per day    famotidine  20 mg Oral Daily    insulin lispro  0-6 Units Subcutaneous TID WC    insulin lispro  0-3 Units Subcutaneous Nightly    miconazole   Topical BID    ketotifen  1 drop Both Eyes BID    cetirizine  10 mg Oral Daily    guaiFENesin  600 mg Oral BID     Continuous Infusions:   sodium chloride      dextrose       PRN Meds:.guaiFENesin-dextromethorphan, sodium chloride, gabapentin, HYDROcodone-acetaminophen, sodium chloride flush, sodium chloride, magnesium hydroxide, acetaminophen, glucose, dextrose, glucagon (rDNA), dextrose, ondansetron  Continuous Infusions:   sodium chloride      dextrose         Intake/Output Summary (Last 24 hours) at 4/13/2021 0921  Last data filed at 4/13/2021 0502  Gross per 24 hour   Intake 360 ml   Output 1300 ml   Net -940 ml       Lab Data:  CBC:   Lab Results   Component Value Date    WBC 5.0 04/13/2021    HGB 12.4 04/13/2021     04/13/2021     BMP:  Lab Results   Component Value Date     04/13/2021    K 3.6 04/13/2021    CL 97 04/13/2021    CO2 32 04/13/2021    BUN 41 04/13/2021    CREATININE 2.6 04/13/2021    GLUCOSE 114 04/13/2021     INR: No results found for: INR     CARDIAC LABS  ENZYMES:No results for input(s): CKMB, CKMBINDEX, TROPONINI in the last 72 hours.     Invalid input(s): CKTOTAL;3  FASTING LIPID PANEL:  Lab Results   Component Value Date    HDL 55 11/14/2011    LDLCALC 122 11/14/2011    TRIG 123 11/14/2011    TSH 3.58 04/06/2021     LIVER PROFILE:  Lab Results   Component Value Date    AST 23 04/05/2021    AST 29 02/19/2015    ALT 17 04/05/2021    ALT 28 02/19/2015     BNP:   Lab Results   Component Value Date    PROBNP 386 04/11/2021    PROBNP 552 04/09/2021    PROBNP 1,269 04/07/2021     Iron Studies:    Lab Results   Component Value Date    FERRITIN 73.3 02/12/2020    FERRITIN 117.8 09/06/2019    FERRITIN 88.0 05/17/2019     Lab Results   Component Value Date    IRON 78 02/12/2020 TIBC 314 02/12/2020    FERRITIN 73.3 02/12/2020        1. WEIGHT: Admit Weight: 293 lb (132.9 kg)      Today  Weight: 297 lb 4.8 oz (134.9 kg)   2. I/O     Intake/Output Summary (Last 24 hours) at 4/13/2021 0921  Last data filed at 4/13/2021 0502  Gross per 24 hour   Intake 360 ml   Output 1300 ml   Net -940 ml       Cardiac Testing:   ECHO: 4/7/21   Overall left ventricular function is normal.   Ejection fraction is visually estimated to be 55 %. E/e'= 9.3 .   There is reversal of E/A inflow velocities across the mitral valve.   There is mild concentric left ventricular hypertrophy.   No definitive regional wall motion abnormalities are noted.   Grade II diastolic dysfunction with elevated LV filling pressures. Mild to moderate tricuspid regurgitation.   Estimated pulmonary artery systolic pressure is moderately elevated at 65   mmHg assuming a right atrial pressure of 15 mmHg.   The right ventricle is mildly enlarged. TAPSE= 2.72 RV S'= 17.2   IVC size is dilated (>2.1 cm) and collapses < 50% with respiration consistent with elevated RA pressure (15 mmHg). Assessment/Plan:     1. AHF5L out, lasix dose increased yesterday, ok to discharge when off O2, pt has f/u scheduled Friday in CHF clinic  2.  HTN- controlled on norvasc and toprol          I appreciate the opportunity of cooperating in the care of this individual.    Susanna Choudhary, ACNP, [address] 4/13/2021, 9:21 AM  Heart Failure  [facility], [address] [place] [address]  Ph: [phone]      Core Measures:   · Discharge instructions:   · LVEF documented:   · ACEI for LV dysfunction:   · Smoking Cessation:

## 2021-04-13 NOTE — ACP (ADVANCE CARE PLANNING)
Advanced Care Planning Note. Purpose of Encounter: Advanced care planning in light of acute respiratory failure, Acute chf, renal failure  Parties In Attendance: Patient  Decisional Capacity: Yes  Subjective: Patient presented with dyspnea  Objective: echo showed dystolic dysfunction, clinically fluid overloaded, hypoxic, crt increased  Goals of Care Determination: Pt reports in the event of cardiac or respiratory arrest he wants to receive full resuscitative measures, including chest compressions, intubation, defibrillation/cardioversion, ACLS medications. Pt is in agreement to dialysis and PEG tube if need should ever arise. Plan:  Continue current medical treatment. Code Status: full code   Time spent on Advanced care Plannin min  Advanced Care Planning Documents: none.     OSMAN Villalobos - CNP  2021 10:23 AM

## 2021-04-13 NOTE — PROGRESS NOTES
above findings are most consistent moderate CHF. Pneumonia is not   excluded             2d-Echo from 4/5/21. Summary   Overall left ventricular function is normal.   Ejection fraction is visually estimated to be 55 %. E/e'= 9.3 . There is reversal of E/A inflow velocities across the mitral valve. There is mild concentric left ventricular hypertrophy. No definitive regional wall motion abnormalities are noted. Grade II diastolic dysfunction with elevated LV filling pressures. Mild to moderate tricuspid regurgitation. Estimated pulmonary artery systolic pressure is moderately elevated at 65   mmHg assuming a right atrial pressure of 15 mmHg. The right ventricle is mildly enlarged. TAPSE= 2.72 RV S'= 17.2   IVC size is dilated (>2.1 cm) and collapses < 50% with respiration   consistent with elevated RA pressure (15 mmHg). Assessment/Plan:    Active Hospital Problems    Diagnosis    Acute kidney injury superimposed on CKD ([facility] [N17.9, N18.9]    Essential hypertension [I10]    Acute on chronic diastolic heart failure (HCC) [I50.33]    Acute respiratory failure with hypoxia ([facility]) [J96.01]    Morbid obesity ([facility] [E66.01]     Acute pulmonary edema secondary likely acute on chronic diastolic congestive heart failure  -2D echo from 4/5/2021 showed LVEF of 50 % with grade 2 diastolic dysfunction.    -Clinically continues to have signs of fluid overload. -continue Lasix 40 mg IV twice daily + metolazone as per nephro.    -Educated on salt and fluid restrictions.  -daily weights  -admit weight 132.9kg, today weight 134.9kg, (133.63 kg)  -cardiology consulted     Acute hypoxic respiratory failure, POA  -Likely secondary to pulmonary vascular congestion from diastolic heart failure. -KAMRAN likely contributing as well. - Lasix 40 mg IV twice daily +metolazone.    -Educated on using CPAP at night and during day naps. -O2 requirements at 2 L by nasal cannula. Wean off as tolerated.       Acute kidney injury/obstructive uropathy  -Serum creatinine with acute rise from 2.0 to 2.4--> 2.5.    -Etiology likely postrenal since patient was unable to void. Currently able to void on his own. No indication for talbot cath placement.    -Appreciate urology recommendations. Will f/u with urology outpatient    KAMRAN  -Educated on importance of CPAP use.     Morbid obesity due to excess calories (Body mass index is 48.76 kg/m².)   -Low calorie diet. Encourage therapeutic lifestyle changes.     DM2  -recent hemoglobin A1c of 6.9%. Oral meds on hold. -POC blood sugars and correction to cover.     HTN  -Continue home norvasc and metoprolol     HLD  -Continue home statin    Seasonal Allergies  -pt reports cough, chronic and improved with certrizine 55KC. Takes bid at home, will as nephro if this is ok for pt to do. Will order daily for now      DVT Prophylaxis: Lovenox  Diet: DIET LOW SODIUM 2 GM; 1800 ml  Code Status: Full Code    PT/OT Eval Status: Cleared for discharge home. PT AM-PAC 19/24, OT AM-PAC 18/24.  HHC at discharge    Dispo -continue with diuresis, wean off of O2, possible discharge in 1 to 2 days Home with home care    OSMAN Bravo - CNP

## 2021-04-13 NOTE — PROGRESS NOTES
Physical Therapy  Facility/Department: [address] ONCOLOGY  Daily Treatment Note  NAME: Kem Torres  : 1950  MRN: [id number]    Date of Service: 2021    Discharge Recommendations: Kem Torres scored a 18/24 on the AM-PAC short mobility form. Current research shows that an AM-PAC score of 18 or greater is typically associated with a discharge to the patient's home setting. Based on the patient's AM-PAC score and their current functional mobility deficits, it is recommended that the patient have 2-3 sessions per week of Physical Therapy at d/c to increase the patient's independence. At this time, this patient demonstrates the endurance and safety to discharge home with HHPT and a follow up treatment frequency of 2-3x/wk. Please see assessment section for further patient specific details. If patient discharges prior to next session this note will serve as a discharge summary. Please see below for the latest assessment towards goals. PT Equipment Recommendations  Equipment Needed: Yes  Mobility Devices: Nikki Connolly: Rolling    Assessment   Body structures, Functions, Activity limitations: Decreased functional mobility ; Decreased strength;Decreased endurance;Decreased balance;Decreased ADL status  Assessment: Pt continues to require SBA for transfers and ambulation however able to ambulate without AD this date. Pt able to maintain SpO2 above 90% on 2L during ambulation. Pt would benefit from continued skilled PT services to improve activity tolerance and allow for return to PLOF.   Treatment Diagnosis: decreased functional mobility, impaired gait, decreased endurance  Prognosis: Good  Decision Making: Low Complexity  Clinical Presentation: stable  PT Education: Goals;PT Role;Plan of Care;Transfer Training;Gait Training;Functional Mobility Training;General Safety  Patient Education: d/c recommendations- verbalized understanding  Barriers to Learning: none  REQUIRES PT FOLLOW UP: Yes  Activity Tolerance  Activity Tolerance: Patient Tolerated treatment well     Patient Diagnosis(es): The primary encounter diagnosis was Acute pulmonary edema ([facility] Diagnoses of Acute on chronic diastolic heart failure (HCC) and Spondylosis of lumbar region without myelopathy or radiculopathy were also pertinent to this visit. has a past medical history of Cancer ([facility] Hyperlipidemia, Hypertension, Obesity, Osteoarthritis, Type 2 diabetes mellitus without complication ([facility] and Type 2 diabetes mellitus without complication, without long-term current use of insulin ([facility] has a past surgical history that includes Total hip arthroplasty and joint replacement. Restrictions  Restrictions/Precautions  Restrictions/Precautions: Fall Risk(High Fall risk)  Required Braces or Orthoses?: No  Position Activity Restriction  Other position/activity restrictions: Nikki Desai is a 72 y.o. male with history of prostate cancer hypertension hyperlipidemia diabetes and obesity presents for evaluation of increasing shortness of breath with it worse with exertion over the past 3 to 4 months. Patient states that has been getting worse. He is also reporting eye drainage. States that he has been taking Zyrtec for this with some improvement. He denies any history of CHF. Denies any significant weight gain or leg swelling from baseline. Subjective   General  Chart Reviewed: Yes  Response To Previous Treatment: Patient with no complaints from previous session. Family / Caregiver Present: No  Subjective  Subjective: Pt reporting no pain at this time, agreeable to PT session. General Comment  Comments: Pt seated in bathroom upon arrival. RN in room and notified that purewick was leaking. Assisted pt with silvana and donning brief to contain purewick with RN.   Pain Screening  Patient Currently in Pain: Denies  Vital Signs  Patient Currently in Pain: Denies       Orientation  Orientation  Overall Orientation Status: Within Functional Limits    Objective   Bed mobility  Comment: Not addressed, pt seated at toilet upon arrival and seated in chair at end of session. Transfers  Sit to Stand: Stand by assistance(x2 toilet, x1 EOB)  Stand to sit: Stand by assistance  Comment: Pt with safe hand placement during transfers. No LOB. Ambulation  Ambulation?: Yes  Ambulation 1  Surface: level tile  Device: No Device  Other Apparatus: (2L)  Assistance: Stand by assistance  Quality of Gait: slight lateral sway noted, slightly decreased kari, wide Pete  Distance: 15'+5'  Comments: Pt steady on feet, no LOB. SpO2 aobve 90% on 2L during ambulation. Stairs/Curb  Stairs?: No     Balance  Posture: Fair  Sitting - Static: Good(independent seated at toilet for toileting)  Sitting - Dynamic: Good(supervision seated at toilet while RN assisting with donning brief)  Standing - Static: Good;-(SBA standing with intermittent UE support during dependent pericare ~1 minute total and while PT/RN assisting with cleaning floor from leaking purewick)  Standing - Dynamic: Good;-(SBA)         G-Code     OutComes Score                   AM-PAC Score  AM-PAC Inpatient Mobility Raw Score : 18 (04/13/21 1223)  AM-PAC Inpatient T-Scale Score : 43.63 (04/13/21 1223)  Mobility Inpatient CMS 0-100% Score: 46.58 (04/13/21 1223)  Mobility Inpatient CMS G-Code Modifier : CK (04/13/21 1223)          Goals  Short term goals  Time Frame for Short term goals: To be met prior to discharge  Short term goal 1: Bed mobility with mod I  Short term goal 2: Sit to/from stand with mod I  Short term goal 3: Ambulate 48' without AD and mod I  Patient Goals   Patient goals :  To go home  NO GOALS MET THIS DATE    Plan    Plan  Times per week: 3-5x  Times per day: Daily  Current Treatment Recommendations: Balance Training, Strengthening, Functional Mobility Training, Transfer Training, Endurance Training, Gait Training, Safety Education & Training, Home Exercise Program, Neuromuscular Re-education, Patient/Caregiver Education & Training, Equipment Evaluation, Education, & procurement, Modalities, Positioning  Safety Devices  Type of devices: Call light within reach, Gait belt, Left in chair, Nurse notified, Patient at risk for falls, All fall risk precautions in place, Chair alarm in place  Restraints  Initially in place: No     Therapy Time   Individual Concurrent Group Co-treatment   Time In 1055         Time Out 1120         Minutes 25              Timed Code Treatment Minutes:  25  Total Treatment Minutes:  [address], [address], [address] [facility]

## 2021-04-13 NOTE — PROGRESS NOTES
CLINICAL PHARMACY NOTE: MEDS TO [address] Select Patient?: No  Total # of Prescriptions Filled: 1   The following medications were delivered to the patient:  · Dexamethasone 1mg  Total # of Interventions Completed: 0  Time Spent (min): 30    Additional Documentation:  Medication delivered- patient signed  Michelle Pino

## 2021-04-14 VITALS
RESPIRATION RATE: 18 BRPM | TEMPERATURE: 98.9 F | DIASTOLIC BLOOD PRESSURE: 71 MMHG | WEIGHT: 293.8 LBS | SYSTOLIC BLOOD PRESSURE: 126 MMHG | HEIGHT: 65 IN | BODY MASS INDEX: 48.95 KG/M2 | HEART RATE: 75 BPM | OXYGEN SATURATION: 90 %

## 2021-04-14 LAB
ANION GAP SERPL CALCULATED.3IONS-SCNC: 11 MMOL/L (ref 3–16)
BASOPHILS ABSOLUTE: 0.1 K/UL (ref 0–0.2)
BASOPHILS RELATIVE PERCENT: 1.1 %
BUN BLDV-MCNC: 41 MG/DL (ref 7–20)
CALCIUM SERPL-MCNC: 9.2 MG/DL (ref 8.3–10.6)
CHLORIDE BLD-SCNC: 95 MMOL/L (ref 99–110)
CO2: 33 MMOL/L (ref 21–32)
CREAT SERPL-MCNC: 2.5 MG/DL (ref 0.8–1.3)
EOSINOPHILS ABSOLUTE: 0.4 K/UL (ref 0–0.6)
EOSINOPHILS RELATIVE PERCENT: 6.5 %
GFR AFRICAN AMERICAN: 31
GFR NON-AFRICAN AMERICAN: 26
GLUCOSE BLD-MCNC: 110 MG/DL (ref 70–99)
GLUCOSE BLD-MCNC: 119 MG/DL (ref 70–99)
GLUCOSE BLD-MCNC: 120 MG/DL (ref 70–99)
GLUCOSE BLD-MCNC: 262 MG/DL (ref 70–99)
HCT VFR BLD CALC: 38.8 % (ref 40.5–52.5)
HEMOGLOBIN: 12.5 G/DL (ref 13.5–17.5)
LYMPHOCYTES ABSOLUTE: 1.4 K/UL (ref 1–5.1)
LYMPHOCYTES RELATIVE PERCENT: 25.5 %
MAGNESIUM: 2.1 MG/DL (ref 1.8–2.4)
MCH RBC QN AUTO: 31.5 PG (ref 26–34)
MCHC RBC AUTO-ENTMCNC: 32.1 G/DL (ref 31–36)
MCV RBC AUTO: 98.2 FL (ref 80–100)
MONOCYTES ABSOLUTE: 0.9 K/UL (ref 0–1.3)
MONOCYTES RELATIVE PERCENT: 16.1 %
NEUTROPHILS ABSOLUTE: 2.8 K/UL (ref 1.7–7.7)
NEUTROPHILS RELATIVE PERCENT: 50.8 %
PDW BLD-RTO: 14.2 % (ref 12.4–15.4)
PERFORMED ON: ABNORMAL
PHOSPHORUS: 3.1 MG/DL (ref 2.5–4.9)
PLATELET # BLD: 348 K/UL (ref 135–450)
PMV BLD AUTO: 6.8 FL (ref 5–10.5)
POTASSIUM SERPL-SCNC: 3.8 MMOL/L (ref 3.5–5.1)
RBC # BLD: 3.96 M/UL (ref 4.2–5.9)
SODIUM BLD-SCNC: 139 MMOL/L (ref 136–145)
WBC # BLD: 5.5 K/UL (ref 4–11)

## 2021-04-14 PROCEDURE — 83735 ASSAY OF MAGNESIUM: CPT

## 2021-04-14 PROCEDURE — 6370000000 HC RX 637 (ALT 250 FOR IP): Performed by: INTERNAL MEDICINE

## 2021-04-14 PROCEDURE — 6360000002 HC RX W HCPCS: Performed by: INTERNAL MEDICINE

## 2021-04-14 PROCEDURE — 6370000000 HC RX 637 (ALT 250 FOR IP): Performed by: NURSE PRACTITIONER

## 2021-04-14 PROCEDURE — 85025 COMPLETE CBC W/AUTO DIFF WBC: CPT

## 2021-04-14 PROCEDURE — 6370000000 HC RX 637 (ALT 250 FOR IP): Performed by: PHYSICIAN ASSISTANT

## 2021-04-14 PROCEDURE — 99232 SBSQ HOSP IP/OBS MODERATE 35: CPT | Performed by: NURSE PRACTITIONER

## 2021-04-14 PROCEDURE — 80048 BASIC METABOLIC PNL TOTAL CA: CPT

## 2021-04-14 PROCEDURE — 2580000003 HC RX 258: Performed by: PHYSICIAN ASSISTANT

## 2021-04-14 PROCEDURE — 84100 ASSAY OF PHOSPHORUS: CPT

## 2021-04-14 PROCEDURE — 36415 COLL VENOUS BLD VENIPUNCTURE: CPT

## 2021-04-14 RX ORDER — FUROSEMIDE 80 MG
80 TABLET ORAL 2 TIMES DAILY
Qty: 60 TABLET | Refills: 0 | Status: SHIPPED | OUTPATIENT
Start: 2021-04-14 | End: 2021-04-14

## 2021-04-14 RX ORDER — FUROSEMIDE 40 MG/1
40 TABLET ORAL SEE ADMIN INSTRUCTIONS
Qty: 90 TABLET | Refills: 0 | Status: SHIPPED | OUTPATIENT
Start: 2021-04-14 | End: 2021-05-04 | Stop reason: SDUPTHER

## 2021-04-14 RX ORDER — DULAGLUTIDE 1.5 MG/.5ML
3 INJECTION, SOLUTION SUBCUTANEOUS
Qty: 8 PEN | Refills: 0 | Status: SHIPPED | OUTPATIENT
Start: 2021-04-14 | End: 2021-07-14

## 2021-04-14 RX ORDER — GUAIFENESIN/DEXTROMETHORPHAN 100-10MG/5
5 SYRUP ORAL EVERY 4 HOURS PRN
Qty: 120 ML | Refills: 0 | Status: SHIPPED | OUTPATIENT
Start: 2021-04-14 | End: 2021-04-24

## 2021-04-14 RX ORDER — FAMOTIDINE 20 MG/1
20 TABLET, FILM COATED ORAL DAILY
Qty: 60 TABLET | Refills: 0 | Status: SHIPPED | OUTPATIENT
Start: 2021-04-15 | End: 2021-07-14 | Stop reason: SDUPTHER

## 2021-04-14 RX ORDER — CETIRIZINE HYDROCHLORIDE 5 MG/1
5 TABLET ORAL DAILY
Qty: 30 TABLET | Refills: 0 | Status: SHIPPED | OUTPATIENT
Start: 2021-04-15 | End: 2021-05-27

## 2021-04-14 RX ADMIN — Medication 10 ML: at 09:12

## 2021-04-14 RX ADMIN — MICONAZOLE NITRATE: 20 POWDER TOPICAL at 09:12

## 2021-04-14 RX ADMIN — AMLODIPINE BESYLATE 10 MG: 5 TABLET ORAL at 09:11

## 2021-04-14 RX ADMIN — KETOTIFEN FUMARATE 1 DROP: 0.35 SOLUTION/ DROPS OPHTHALMIC at 09:12

## 2021-04-14 RX ADMIN — ASPIRIN 81 MG: 81 TABLET, COATED ORAL at 09:09

## 2021-04-14 RX ADMIN — ENOXAPARIN SODIUM 30 MG: 30 INJECTION SUBCUTANEOUS at 09:09

## 2021-04-14 RX ADMIN — FUROSEMIDE 80 MG: 40 TABLET ORAL at 09:11

## 2021-04-14 RX ADMIN — METOPROLOL SUCCINATE 100 MG: 50 TABLET, EXTENDED RELEASE ORAL at 09:09

## 2021-04-14 RX ADMIN — FAMOTIDINE 20 MG: 20 TABLET ORAL at 09:11

## 2021-04-14 RX ADMIN — INSULIN LISPRO 3 UNITS: 100 INJECTION, SOLUTION INTRAVENOUS; SUBCUTANEOUS at 13:38

## 2021-04-14 RX ADMIN — GUAIFENESIN AND DEXTROMETHORPHAN 5 ML: 100; 10 SYRUP ORAL at 03:00

## 2021-04-14 NOTE — PROGRESS NOTES
Pt dressed for discharge at this time awaiting family . Pt dressed self with with minimal help from staff.

## 2021-04-14 NOTE — PROGRESS NOTES
[address]  HEART FAILURE  Progress Note      Admit Date 4/5/2021     Reason for Consult:      Reason for Consultation/Chief Complaint: SOB    HPI:    Tremaine Patel is a 72 y.o. male with PMH obesity, HTN, HLD, DM admitted with SOB and CP, fluid overload. He has diuresed 5L on IV lasix      Subjective:  Patient is being seen for CHF. There were no acute overnight cardiac events. Today Mr. Estrada Chowdhury denies chest pain, shortness of breath, palpitations. He is feeling well today, is off O2.        Baseline Weight: 293  Wt Readings from Last 3 Encounters:   04/14/21 293 lb 12.8 oz (133.3 kg)   11/30/20 293 lb 3.2 oz (133 kg)   09/29/20 293 lb (132.9 kg)          Objective:   BP (!) 148/79   Pulse 77   Temp 97.9 °F (36.6 °C) (Oral)   Resp 18   Ht 5' 5\" (1.651 m)   Wt 293 lb 12.8 oz (133.3 kg)   SpO2 94%   BMI 48.89 kg/m²       Intake/Output Summary (Last 24 hours) at 4/14/2021 0951  Last data filed at 4/14/2021 0636  Gross per 24 hour   Intake 600 ml   Output 802 ml   Net -202 ml      Wt Readings from Last 3 Encounters:   04/14/21 293 lb 12.8 oz (133.3 kg)   11/30/20 293 lb 3.2 oz (133 kg)   09/29/20 293 lb (132.9 kg)      In: 240 [P.O.:240]  Out: 800       Physical Exam:  General Appearance:  Obese/Well Nourished  Respiratory:  · Resp Auscultation: Normal breath sounds without dullness  Cardiovascular:  · Auscultation: Regular rate and rhythm, normal S1S2, no m/g/r/c  · Palpation: Normal    · Pedal Pulses: 2+ and equal   Abdomen:  · Soft, NT, ND, + bs  Extremities:  · No Cyanosis or Clubbing  · Extremities: trace edema  Neurological/Psychiatric:  · Oriented to time, place, and person  · Non-anxious    MEDICATIONS:   Scheduled Meds:   Scheduled Meds:   cetirizine  5 mg Oral Daily    enoxaparin  30 mg Subcutaneous Daily    furosemide  80 mg Oral BID    amLODIPine  10 mg Oral Daily    aspirin  81 mg Oral Daily    atorvastatin  40 mg Oral Nightly    metoprolol succinate  100 mg Oral Daily    sodium chloride flush  10 mL Intravenous 2 times per day    famotidine  20 mg Oral Daily    insulin lispro  0-6 Units Subcutaneous TID WC    insulin lispro  0-3 Units Subcutaneous Nightly    miconazole   Topical BID    ketotifen  1 drop Both Eyes BID    guaiFENesin  600 mg Oral BID     Continuous Infusions:   sodium chloride      dextrose       PRN Meds:.guaiFENesin-dextromethorphan, sodium chloride, gabapentin, HYDROcodone-acetaminophen, sodium chloride flush, sodium chloride, magnesium hydroxide, acetaminophen, glucose, dextrose, glucagon (rDNA), dextrose, ondansetron  Continuous Infusions:   sodium chloride      dextrose         Intake/Output Summary (Last 24 hours) at 4/14/2021 0951  Last data filed at 4/14/2021 0636  Gross per 24 hour   Intake 600 ml   Output 802 ml   Net -202 ml       Lab Data:  CBC:   Lab Results   Component Value Date    WBC 5.5 04/14/2021    HGB 12.5 04/14/2021     04/14/2021     BMP:  Lab Results   Component Value Date     04/14/2021    K 3.8 04/14/2021    CL 95 04/14/2021    CO2 33 04/14/2021    BUN 41 04/14/2021    CREATININE 2.5 04/14/2021    GLUCOSE 119 04/14/2021     INR: No results found for: INR     CARDIAC LABS  ENZYMES:No results for input(s): CKMB, CKMBINDEX, TROPONINI in the last 72 hours.     Invalid input(s): CKTOTAL;3  FASTING LIPID PANEL:  Lab Results   Component Value Date    HDL 55 11/14/2011    LDLCALC 122 11/14/2011    TRIG 123 11/14/2011    TSH 3.58 04/06/2021     LIVER PROFILE:  Lab Results   Component Value Date    AST 23 04/05/2021    AST 29 02/19/2015    ALT 17 04/05/2021    ALT 28 02/19/2015     BNP:   Lab Results   Component Value Date    PROBNP 386 04/11/2021    PROBNP 552 04/09/2021    PROBNP 1,269 04/07/2021     Iron Studies:    Lab Results   Component Value Date    FERRITIN 73.3 02/12/2020    FERRITIN 117.8 09/06/2019    FERRITIN 88.0 05/17/2019     Lab Results   Component Value Date    IRON 78 02/12/2020    TIBC 314 02/12/2020 FERRITIN 73.3 02/12/2020        1. WEIGHT: Admit Weight: 293 lb (132.9 kg)      Today  Weight: 293 lb 12.8 oz (133.3 kg)   2. I/O     Intake/Output Summary (Last 24 hours) at 4/14/2021 0951  Last data filed at 4/14/2021 0636  Gross per 24 hour   Intake 600 ml   Output 802 ml   Net -202 ml       Cardiac Testing:   ECHO: 4/7/21   Overall left ventricular function is normal.   Ejection fraction is visually estimated to be 55 %. E/e'= 9.3 .   There is reversal of E/A inflow velocities across the mitral valve.   There is mild concentric left ventricular hypertrophy.   No definitive regional wall motion abnormalities are noted.   Grade II diastolic dysfunction with elevated LV filling pressures. Mild to moderate tricuspid regurgitation.   Estimated pulmonary artery systolic pressure is moderately elevated at 65   mmHg assuming a right atrial pressure of 15 mmHg.   The right ventricle is mildly enlarged. TAPSE= 2.72 RV S'= 17.2   IVC size is dilated (>2.1 cm) and collapses < 50% with respiration consistent with elevated RA pressure (15 mmHg). Assessment/Plan:     1. AHF-5L out, ok to discharge on lasix 80 bid, pt has f/u scheduled Friday in CHF clinic  2.  HTN- controlled on norvasc and toprol          I appreciate the opportunity of cooperating in the care of this individual.    Yumiko De La Torre, [facility], [address] 4/14/2021, 9:51 AM  Heart Failure  [facility]  [address] [place] [address], [address]  Ph: [phone]      Core Measures:   · Discharge instructions:   · LVEF documented:   · ACEI for LV dysfunction:   · Smoking Cessation:

## 2021-04-14 NOTE — DISCHARGE SUMMARY
Hospital Medicine Discharge Summary    Patient ID: Kem Torres      Patient's PCP: Petty Huff MD    Admit Date: 4/5/2021     Discharge Date:   4/14/2021    Admitting Physician: David Fabian MD     Discharge Physician: OSMAN Niño - CNP     Discharge Diagnoses:  Acute pulmonary edema secondary to acute on chronic diastolic heart failure  Acute hypoxic respiratory failure  Acute kidney injury/obstructive uropathy  Obstructive sleep apnea  Past medical history morbid obesity, type 2 diabetes, hypertension, hyperlipidemia, seasonal allergies    Course of hospitalization: Mannie Beavers a 72 y. o. male who presented to ED with complaint of progressively worsening shortness of breath which is worse with exertion for the last 3-4 months. He reports associated non-productive cough. He denies fever, dizziness, chest pain, edema, orthopnea, weight gain, abdominal pain, N/V/D/C, urinary symptoms. Patient has a history of KAMRAN and has not been using his CPAP. Acute pulmonary edema secondary likely acute on chronic diastolic congestive heart failure  -2D echo from 4/5/2021 showed LVEF of 50 % with grade 2 diastolic dysfunction.    -Clinically continues to have signs of fluid overload. -Treated with Lasix 40 mg IV twice daily + metolazone as per nephro. Discharged home on Lasix 80 in the morning and 40 in the afternoon per nephrology's recommendation.  -Educated on salt and fluid restrictions.  -daily weights  -admit weight 132.9kg, today weight  133.3 kg  -cardiology consulted     Acute hypoxic respiratory failure, POA  -Likely secondary to pulmonary vascular congestion from diastolic heart failure. -KAMRAN likely contributing as well. -Treated with diuretics  -Educated on using CPAP at night and during day naps.    -On room air at discharge, sats in the mid 90s     Acute kidney injury/obstructive uropathy  -Serum creatinine with acute rise from 2.0 to 2.4--> 2.5. -Etiology likely postrenal since patient was unable to void. Currently able to void on his own. No indication for talbot cath placement.    -Appreciate urology recommendations. Will f/u with urology outpatient  -Follow-up with nephrology outpatient     KAMRAN  -Educated on importance of CPAP use.     Morbid obesity due to excess calories (Body mass index is 48.76 kg/m².)   -Low calorie diet. Encourage therapeutic lifestyle changes.     DM2  -recent hemoglobin A1c of 6.9%.    -Patient was on Actos at home, should be avoided in congestive heart failure. Recommended Januvia, however patient is unable to afford it. Increased Trulicity dose to 3 mg daily. Recommend repeat A1c in 3 months. PCP to follow. HTN  -Continue home norvasc and metoprolol     HLD  -Continue home statin     Seasonal Allergies  -pt reports cough, chronic and improved with certrizine 40MN. Takes bid at home. Discussed this with nephrology. Patient should be taking no more than 5 mg daily. Educated patient and daughter on this. Active Hospital Problems    Diagnosis    Acute kidney injury superimposed on CKD ([address] [N17.9, N18.9]    Essential hypertension [I10]    Acute on chronic diastolic heart failure (HCC) [I50.33]    Acute respiratory failure with hypoxia ([address] [J96.01]    Morbid obesity ([address] [E66.01]       The patient was seen and examined on day of discharge and this discharge summary is in conjunction with any daily progress note from day of discharge. Physical Exam Performed:     BP (!) 152/80   Pulse 76   Temp 98.9 °F (37.2 °C) (Oral)   Resp 18   Ht 5' 5\" (1.651 m)   Wt 293 lb 12.8 oz (133.3 kg)   SpO2 90%   BMI 48.89 kg/m²       General appearance:  No apparent distress, appears stated age and cooperative. HEENT:  Normal cephalic, atraumatic without obvious deformity. Pupils equal, round, and reactive to light. Extra ocular muscles intact. Conjunctivae/corneas clear. Neck: Supple, with full range of motion.  No jugular venous distention. Trachea midline. Respiratory:  Normal respiratory effort. Clear to auscultation, bilaterally without Rales/Wheezes/Rhonchi. Cardiovascular:  Regular rate and rhythm with normal S1/S2 without murmurs, rubs or gallops. Abdomen: Soft, non-tender, non-distended with normal bowel sounds. Musculoskeletal:  No clubbing, cyanosis, trace edema bilaterally. Full range of motion without deformity. Skin: Skin color, texture, turgor normal.  No rashes or lesions. Neurologic:  Neurovascularly intact without any focal sensory/motor deficits. Cranial nerves: II-XII intact, grossly non-focal.  Psychiatric:  Alert and oriented, thought content appropriate, normal insight  Capillary Refill: Brisk,< 3 seconds   Peripheral Pulses: +2 palpable, equal bilaterally       Labs: For convenience and continuity at follow-up the following most recent labs are provided:      CBC:    Lab Results   Component Value Date    WBC 5.5 04/14/2021    HGB 12.5 04/14/2021    HCT 38.8 04/14/2021     04/14/2021       Renal:    Lab Results   Component Value Date     04/14/2021    K 3.8 04/14/2021    CL 95 04/14/2021    CO2 33 04/14/2021    BUN 41 04/14/2021    CREATININE 2.5 04/14/2021    CALCIUM 9.2 04/14/2021    PHOS 3.1 04/14/2021         Significant Diagnostic Studies    Radiology:   US RENAL COMPLETE   Final Result   1. Limited exam with no hydronephrosis. 2. Suspected urinary bladder wall irregularity, possibly due to chronic   outlet obstruction. Post void bladder volume could not be obtained. XR CHEST (2 VW)   Final Result   The above findings are most consistent moderate CHF.   Pneumonia is not   excluded                Consults:     IP CONSULT TO CARDIOLOGY  IP CONSULT TO NEPHROLOGY  IP CONSULT TO CASE MANAGEMENT  IP CONSULT TO UROLOGY  IP CONSULT TO HEART FAILURE NURSE/COORDINATOR  IP CONSULT TO HOME CARE NEEDS    Disposition: Home    Condition at Discharge: Stable    Discharge Instructions/Follow-up:      Follow up with PCP in 1 week, seek referral to allergist for cough  Follow up with Urology as directed  Follow up with Cardiology as directed  Follow up with Nephrology as directed    Check blood sugar daily  Weigh self daily       Code Status:  Full Code     Activity: activity as tolerated    Diet: cardiac diet, diabetic diet and renal diet      Discharge Medications:     Current Discharge Medication List           Details   famotidine (PEPCID) 20 MG tablet Take 1 tablet by mouth daily  Qty: 60 tablet, Refills: 0      miconazole (MICOTIN) 2 % powder Apply topically 2 times daily. Qty: 45 g, Refills: 1      furosemide (LASIX) 40 MG tablet Take 1 tablet by mouth See Admin Instructions Take 80mg in am, and 40 mg in evening  Qty: 90 tablet, Refills: 0      guaiFENesin-dextromethorphan (ROBITUSSIN DM) 100-10 MG/5ML syrup Take 5 mLs by mouth every 4 hours as needed for Cough  Qty: 120 mL, Refills: 0              Details   cetirizine (ZYRTEC) 5 MG tablet Take 1 tablet by mouth daily  Qty: 30 tablet, Refills: 0      Dulaglutide (TRULICITY) 1.5 BL/0.8OE SOPN Inject 3 mg into the skin every 7 days  Qty: 8 pen, Refills: 0    Comments: Disregard the .75mg dose. Details   metoprolol succinate (TOPROL XL) 100 MG extended release tablet Take 1 tablet by mouth daily  Qty: 90 tablet, Refills: 1      atorvastatin (LIPITOR) 40 MG tablet Take 1 tablet by mouth nightly  Qty: 90 tablet, Refills: 0      amLODIPine (NORVASC) 10 MG tablet Take 1 tablet by mouth daily  Qty: 90 tablet, Refills: 0      aspirin 81 MG EC tablet Take 81 mg by mouth daily      Acetaminophen (TYLENOL ARTHRITIS PAIN PO) Take by mouth OTC      HYDROcodone-acetaminophen (NORCO)  MG per tablet Take 1 tablet by mouth 3 times daily as needed (Pain).  Pain specialist       alendronate (FOSAMAX) 70 MG tablet Take 70 mg by mouth every 7 days      ammonium lactate (AMLACTIN) 12 % cream Apply topically as needed

## 2021-04-14 NOTE — CARE COORDINATION
CM received a voice message that family needed the price of discharge medications checked (Januvia). CM spoke with pharmacy staff at [facility] on [address] ([phone]), this medication cost is $135 for a 90 day supply. CM confirmed with patient and daughter that this is not affordable. Pharmacy staff also questioned having prescriptions for two doses of Furosemide sent in today (80 mg and 40 mg). CM messaged SHERRILL Muro NP of the above. She will call the pharmacy to address prescription needs.     Juan Jose Chow, BSN, CCM, RN  [facility]  [phone]

## 2021-04-14 NOTE — PROGRESS NOTES
04/08/2021    PROT 8.3 11/14/2011    LABALBU 2.6 04/08/2021    CALCIUM 9.2 04/14/2021    BILITOT 0.4 04/05/2021    ALKPHOS 86 04/05/2021    AST 23 04/05/2021    ALT 17 04/05/2021     Phosphorus:    Lab Results   Component Value Date    PHOS 3.1 04/14/2021     Troponin:    Lab Results   Component Value Date    TROPONINI 0.03 04/06/2021     U/A:    Lab Results   Component Value Date    COLORU Light yellow 04/06/2021    PROTEINU 100 04/06/2021    PHUR 5.0 04/06/2021    WBCUA >100 04/06/2021    RBCUA 8 03/21/2015    BACTERIA 4+ 03/21/2015    CLARITYU CLOUDY 04/06/2021    SPECGRAV 1.020 04/06/2021    LEUKOCYTESUR LARGE 04/06/2021    UROBILINOGEN 0.2 04/06/2021    BILIRUBINUR Negative 04/06/2021    BLOODU LARGE 04/06/2021    GLUCOSEU Negative 04/06/2021         IMPRESSION/RECOMMENDATIONS:      Diagnosis and Plan     1. SHANNAN  Cr 2.5, unchanged     2. CKD stage 3  Baseline Cr 1.9-2.2  Sees Dr Paulette Mcbride from our group    3. CHF   On Diuretics  O > I   Reduce Furosemide to 80/40     4. HTN    5. T2DM     6. Renal osteodystrophy  Ca 9.1, Phos 3.6, controlled    7.  Obesity     Lindy Johnson

## 2021-04-14 NOTE — CARE COORDINATION
Patient discharged 4/14/2021 to home with [address]  PHONE; [phone]: [phone]. Order/ AVS faxed and agency notifed. No other SW needs at this time.     All discharge needs met per case management    ELAYNE BhatN, CCM, RN  [facility]  [phone]

## 2021-04-14 NOTE — PLAN OF CARE
Problem: Falls - Risk of:  Goal: Will remain free from falls  Description: Will remain free from falls  Outcome: Ongoing     Problem: Falls - Risk of:  Goal: Absence of physical injury  Description: Absence of physical injury  Outcome: Ongoing
Problem: Falls - Risk of:  Goal: Will remain free from falls  Description: Will remain free from falls  Outcome: Ongoing  Goal: Absence of physical injury  Description: Absence of physical injury  Outcome: Ongoing     Problem: Pain:  Goal: Pain level will decrease  Description: Pain level will decrease  Outcome: Ongoing  Goal: Control of acute pain  Description: Control of acute pain  Outcome: Ongoing  Goal: Control of chronic pain  Description: Control of chronic pain  Outcome: Ongoing
Problem: Falls - Risk of:  Goal: Will remain free from falls  Description: Will remain free from falls  Outcome: Ongoing  Goal: Absence of physical injury  Description: Absence of physical injury  Outcome: Ongoing     Problem: Pain:  Goal: Pain level will decrease  Description: Pain level will decrease  Outcome: Ongoing  Goal: Control of acute pain  Description: Control of acute pain  Outcome: Ongoing  Goal: Control of chronic pain  Description: Control of chronic pain  Outcome: Ongoing
Problem: Falls - Risk of:  Goal: Will remain free from falls  Description: Will remain free from falls  Outcome: Ongoing  Goal: Absence of physical injury  Description: Absence of physical injury  Outcome: Ongoing     Problem: Pain:  Goal: Pain level will decrease  Description: Pain level will decrease  Outcome: Ongoing  Goal: Control of acute pain  Description: Control of acute pain  Outcome: Ongoing  Goal: Control of chronic pain  Description: Control of chronic pain  Outcome: Ongoing     Problem: Metabolic:  Goal: Ability to maintain appropriate glucose levels will improve  Description: Ability to maintain appropriate glucose levels will improve  Outcome: Ongoing     Problem: OXYGENATION/RESPIRATORY FUNCTION  Goal: Patient will maintain patent airway  Outcome: Ongoing  Goal: Patient will achieve/maintain normal respiratory rate/effort  Description: Respiratory rate and effort will be within normal limits for the patient  Outcome: Ongoing     Problem: HEMODYNAMIC STATUS  Goal: Patient has stable vital signs and fluid balance  Outcome: Ongoing     Problem: FLUID AND ELECTROLYTE IMBALANCE  Goal: Fluid and electrolyte balance are achieved/maintained  Outcome: Ongoing     Problem: ACTIVITY INTOLERANCE/IMPAIRED MOBILITY  Goal: Mobility/activity is maintained at optimum level for patient  Outcome: Ongoing     Problem: Skin Integrity:  Goal: Will show no infection signs and symptoms  Description: Will show no infection signs and symptoms  Outcome: Ongoing  Goal: Absence of new skin breakdown  Description: Absence of new skin breakdown  Outcome: Ongoing
IMBALANCE  Goal: Fluid and electrolyte balance are achieved/maintained  4/14/2021 1409 by Swetha Ramos RN  Outcome: Completed  4/14/2021 0246 by Krystyna Sanabria RN  Outcome: Ongoing     Problem: ACTIVITY INTOLERANCE/IMPAIRED MOBILITY  Goal: Mobility/activity is maintained at optimum level for patient  4/14/2021 1409 by Swetha Ramos RN  Outcome: Completed  4/14/2021 0246 by Krystyna Sanabria RN  Outcome: Ongoing     Problem: Skin Integrity:  Goal: Will show no infection signs and symptoms  Description: Will show no infection signs and symptoms  4/14/2021 1409 by Swetha Ramos RN  Outcome: Completed  4/14/2021 0246 by Krystyna Sanabria RN  Outcome: Ongoing  Goal: Absence of new skin breakdown  Description: Absence of new skin breakdown  4/14/2021 1409 by Swetha Ramos RN  Outcome: Completed  4/14/2021 0246 by Krystyna Sanabria RN  Outcome: Ongoing

## 2021-04-15 ENCOUNTER — CARE COORDINATION (OUTPATIENT)
Dept: CASE MANAGEMENT | Age: 71
End: 2021-04-15

## 2021-04-15 DIAGNOSIS — I50.33 ACUTE ON CHRONIC DIASTOLIC HEART FAILURE (HCC): Primary | ICD-10-CM

## 2021-04-15 PROCEDURE — 1111F DSCHRG MED/CURRENT MED MERGE: CPT | Performed by: INTERNAL MEDICINE

## 2021-04-15 NOTE — CARE COORDINATION
[address] Transitions Initial Follow Up Call    Call within 2 business days of discharge: Yes    Patient: Beatrix Crigler Patient : 1950   MRN: [id number]  Reason for Admission:   Discharge Date: 21 RARS: Readmission Risk Score: 34      Last Discharge [facility]       Complaint Diagnosis Description Type Department Provider    21 Shortness of Breath Acute pulmonary edema ([facility] [address] . .. ED to Hosp-Admission (Discharged) (ADMITTED) ANGELZ 5T Marcella Wang MD; [place]. .. Non-face-to-face services provided:  Obtained and reviewed discharge summary and/or continuity of care documents  [id number] completed     Was this an external facility discharge? No Discharge Facility:     Challenges to be reviewed by the provider   Additional needs identified to be addressed with provider No  none             Method of communication with provider : none    Discussed COVID-19 related testing which was not done at this time. Test results were not done. Patient informed of results, if available? No    Advance Care Planning:   Does patient have an Advance Directive:  not on file; education provided. Was this a readmission? No  Patient stated reason for admission: SOB  Patients top risk factors for readmission: medical condition    Care Transition Nurse (CTN) contacted the patient by telephone to perform post hospital discharge assessment. Verified name and  with patient as identifiers. Provided introduction to self, and explanation of the CTN role. CTN reviewed discharge instructions, medical action plan and red flags with patient who verbalized understanding. Patient given an opportunity to ask questions and does not have any further questions or concerns at this time. Were discharge instructions available to patient? Yes. Reviewed appropriate site of care based on symptoms and resources available to patient including: When to call 911.  The patient agrees to contact the PCP office well as COA will meet with family at [address] [employer] Transitions 24 Hour Call    Do you have any ongoing symptoms?: No  Do you have a copy of your discharge instructions?: Yes  Do you have all of your prescriptions and are they filled?: Yes  Have you been contacted by a [facility] Pharmacist?: No  Have you scheduled your follow up appointment?: Yes  How are you going to get to your appointment?: Car - family or friend to transport  Were you discharged with any Home Care or Post Acute Services: Yes  Post Acute Services: [address] [place] [address] Ramón Pelaez (Comment: [facility])  Do you feel like you have everything you need to keep you well at home?: Yes  Care Transitions Interventions  No Identified Needs         Follow Up  Future Appointments   Date Time Provider Shandra Sellers   4/16/2021 10:30 AM [facility] [id number], [facility], APRN - CNS FF Cardio [facility]   6/1/2021  4:30 PM MD Jn Avila [facility]       Miles Ayoub RN

## 2021-04-16 ENCOUNTER — TELEPHONE (OUTPATIENT)
Dept: OTHER | Age: 71
End: 2021-04-16

## 2021-04-16 ENCOUNTER — OFFICE VISIT (OUTPATIENT)
Dept: CARDIOLOGY CLINIC | Age: 71
End: 2021-04-16
Payer: MEDICARE

## 2021-04-16 VITALS
WEIGHT: 290.1 LBS | BODY MASS INDEX: 48.33 KG/M2 | RESPIRATION RATE: 18 BRPM | DIASTOLIC BLOOD PRESSURE: 72 MMHG | HEART RATE: 76 BPM | HEIGHT: 65 IN | SYSTOLIC BLOOD PRESSURE: 122 MMHG | OXYGEN SATURATION: 99 %

## 2021-04-16 DIAGNOSIS — G47.33 OSA (OBSTRUCTIVE SLEEP APNEA): ICD-10-CM

## 2021-04-16 DIAGNOSIS — N18.30 STAGE 3 CHRONIC KIDNEY DISEASE, UNSPECIFIED WHETHER STAGE 3A OR 3B CKD (HCC): ICD-10-CM

## 2021-04-16 DIAGNOSIS — E66.01 CLASS 3 SEVERE OBESITY DUE TO EXCESS CALORIES WITH SERIOUS COMORBIDITY AND BODY MASS INDEX (BMI) OF 45.0 TO 49.9 IN ADULT (HCC): ICD-10-CM

## 2021-04-16 DIAGNOSIS — I50.32 CHRONIC DIASTOLIC HEART FAILURE (HCC): Primary | ICD-10-CM

## 2021-04-16 PROCEDURE — 99496 TRANSJ CARE MGMT HIGH F2F 7D: CPT | Performed by: CLINICAL NURSE SPECIALIST

## 2021-04-16 NOTE — TELEPHONE ENCOUNTER
[facility] FAILURE PROGRAM  TELEPHONE ENCOUNTER FORM    Joana Robertson 1950    ASSESSMENT:   1. Baseline weight: 290 lbs  2. Current weight: 289 lbs  3. Patient weighing daily: Yes  4. Symptoms: feels good  5. Patient knows who to call with symptoms: Yes  6. Diet history:      Patient states sodium limitation is : 3000 mg      Patient states fluid limitation is 64 oz  Patient following diet as instructed: Yes  7. Medication history: taking medications as instructed Yes; medication side effects noted No  8. Patient is involved in exercise program: Yes  9. Patient knows date and time of follow up appointment: Yes  10. Patient has transportation to appointments: Yes    RECOMMENDATIONS:   1. Medication: taking as prescribed  2. Diet: no added salt diet  3. MD/ Clinic appointment: Today with China Smalls  4. Other: Patient seen while in office visit with CHF NP. Patient doing well. Daughter with him during visit. Encouraged him to call with any questions or concerns.

## 2021-04-16 NOTE — PATIENT INSTRUCTIONS
1.  Weekly labs with home care  2. Referral for sleep study  3. Continue all current medications  4.  <64 ounces fluid and <2000 mg sodium  5. RTO in 3 weeks or sooner with issues  6.   Wrap lower legs and keep elevated

## 2021-04-16 NOTE — PROGRESS NOTES
[facility]  Progress Note    Primary Care Doctor:  Petty Huff MD    Chief Complaint   Patient presents with    Follow-up     CHF- SOB        History of Present Illness:  72 y.o. male with history of ckd (Dr Don Mukherjee), prashanth. Dm, morbid obesity,  Follows with Dr Jesus Hanley    I had the pleasure of seeing Kem Torres in follow up for hospitalization 4/5-14/2021 for chest discomfort and SOB for months, diuresed [phone]->[phone]. He is ambulatory and with his daughter, Wendy Claire. [facility] has [facility]. He was following with Dr Socorro Hernandez for sleep but has not been wearing his cpap and do to transportation needs someone closer. His weight today is down to 290. He feels much better, is following low sodium diet and fluid restrictions. He denies any chest pain, palpitations, lightheadedness. He continues with some sob when walking long distances. probnp [id number]    Past Medical History:   has a past medical history of Cancer ([facility] [address] Hyperlipidemia, Hypertension, Obesity, Osteoarthritis, Type 2 diabetes mellitus without complication ([address] and Type 2 diabetes mellitus without complication, without long-term current use of insulin ([facility] [address] Surgical History:   has a past surgical history that includes Total hip arthroplasty and joint replacement. Social History:   reports that he quit smoking about 20 years ago. He started smoking about 41 years ago. He quit after 21.00 years of use. He has quit using smokeless tobacco. He reports current alcohol use. He reports that he does not use drugs. Family History:   Family History   Problem Relation Age of Onset    Hypertension Mother     Diabetes Mother     Hypertension Father        Home Medications:  Prior to Admission medications    Medication Sig Start Date End Date Taking?  Authorizing Provider   cetirizine (ZYRTEC) 5 MG tablet Take 1 tablet by mouth daily 4/15/21  Yes OSMAN Davis - CNP   famotidine (PEPCID) 20 MG tablet Take 1 by mouth daily   Yes Historical Provider, MD   ergocalciferol (ERGOCALCIFEROL) 03435 UNITS capsule Take 50,000 Units by mouth once a week. Yes Historical Provider, MD   Cyanocobalamin (VITAMIN B 12 PO) Take  by mouth daily. Yes Historical Provider, MD        Allergies: Indomethacin, Statins, Unable to assess, and Actos [pioglitazone]     Review of Systems:   · Constitutional: there has been no unanticipated weight loss. There's been no change in energy level, sleep pattern, or activity level. · Eyes: No visual changes or diplopia. No scleral icterus. · ENT: No Headaches, hearing loss or vertigo. No mouth sores or sore throat. · Cardiovascular: Reviewed in HPI  · Respiratory: No cough or wheezing, no sputum production. No hematemesis. · Gastrointestinal: No abdominal pain, appetite loss, blood in stools. No change in bowel or bladder habits. · Genitourinary: No dysuria, trouble voiding, or hematuria. · Musculoskeletal:  No gait disturbance, weakness or joint complaints. · Integumentary: No rash or pruritis. · Neurological: No headache, diplopia, change in muscle strength, numbness or tingling. No change in gait, balance, coordination, mood, affect, memory, mentation, behavior. · Psychiatric: No anxiety, no depression. · Endocrine: No malaise, fatigue or temperature intolerance. No excessive thirst, fluid intake, or urination. No tremor. · Hematologic/Lymphatic: No abnormal bruising or bleeding, blood clots or swollen lymph nodes. · Allergic/Immunologic: No nasal congestion or hives.     Physical Examination:    Vitals:    04/16/21 1039   BP: 122/72   Pulse: 76   Resp: 18   SpO2: 99%   Weight: 290 lb 1.6 oz (131.6 kg)   Height: 5' 5\" (1.651 m)        Constitutional and General Appearance: Warm and dry, no apparent distress, normal coloration  HEENT:  Normocephalic, atraumatic  Respiratory:  · Normal excursion and expansion without use of accessory muscles  · Resp Auscultation: Normal breath sounds without dullness  Cardiovascular:  · The apical impulses not displaced  · Heart tones are crisp and normal  · JVP normal cm H2O  · Regular rate and rhythm  · Peripheral pulses are symmetrical and full  · There is no clubbing, cyanosis of the extremities. · Bilateral lower ankle to mid calf edema  · Pedal Pulses: 2+ and equal   Abdomen:obese  · No masses or tenderness  · Liver/Spleen: No Abnormalities Noted  Neurological/Psychiatric:  · Alert and oriented in all spheres  · Moves all extremities well  · Exhibits normal gait balance and coordination  · No abnormalities of mood, affect, memory, mentation, or behavior are noted    Lab Data:    CBC:   Lab Results   Component Value Date    WBC 5.5 04/14/2021    WBC 5.0 04/13/2021    WBC 4.8 04/12/2021    RBC 3.96 04/14/2021    RBC 3.85 04/13/2021    RBC 3.87 04/12/2021    HGB 12.5 04/14/2021    HGB 12.4 04/13/2021    HGB 12.3 04/12/2021    HCT 38.8 04/14/2021    HCT 37.8 04/13/2021    HCT 37.8 04/12/2021    MCV 98.2 04/14/2021    MCV 98.1 04/13/2021    MCV 97.6 04/12/2021    RDW 14.2 04/14/2021    RDW 14.4 04/13/2021    RDW 14.5 04/12/2021     04/14/2021     04/13/2021     04/12/2021     BMP:  Lab Results   Component Value Date     04/14/2021     04/13/2021     04/12/2021    K 3.8 04/14/2021    K 3.6 04/13/2021    K 3.6 04/12/2021    CL 95 04/14/2021    CL 97 04/13/2021    CL 96 04/12/2021    CO2 33 04/14/2021    CO2 32 04/13/2021    CO2 32 04/12/2021    PHOS 3.1 04/14/2021    PHOS 3.6 04/13/2021    PHOS 3.9 04/12/2021    BUN 41 04/14/2021    BUN 41 04/13/2021    BUN 40 04/12/2021    CREATININE 2.5 04/14/2021    CREATININE 2.6 04/13/2021    CREATININE 2.6 04/12/2021     BNP:   Lab Results   Component Value Date    PROBNP 386 04/11/2021    PROBNP 552 04/09/2021    PROBNP 1,269 04/07/2021     Cardiac Imaging:  Echo 4/7/2021   Summary   Overall left ventricular function is normal.   Ejection fraction is visually estimated to be 55 %. Wrap lower legs and keep elevated    He has [facility]  I appreciate the opportunity of cooperating in the care of this individual.    Chad Valles, CNS, 4/16/2021, 12:20 PM

## 2021-04-19 ENCOUNTER — TELEPHONE (OUTPATIENT)
Dept: CARDIOLOGY CLINIC | Age: 71
End: 2021-04-19

## 2021-04-19 ENCOUNTER — HOSPITAL ENCOUNTER (OUTPATIENT)
Age: 71
Setting detail: SPECIMEN
Discharge: HOME | End: 2021-04-19
Payer: MEDICARE

## 2021-04-19 LAB
ANION GAP SERPL CALCULATED.3IONS-SCNC: 14 MMOL/L (ref 3–16)
BUN BLDV-MCNC: 24 MG/DL (ref 7–20)
CALCIUM SERPL-MCNC: 9.2 MG/DL (ref 8.3–10.6)
CHLORIDE BLD-SCNC: 101 MMOL/L (ref 99–110)
CO2: 27 MMOL/L (ref 21–32)
CREAT SERPL-MCNC: 2.4 MG/DL (ref 0.8–1.3)
GFR AFRICAN AMERICAN: 33
GFR NON-AFRICAN AMERICAN: 27
GLUCOSE BLD-MCNC: 97 MG/DL (ref 70–99)
POTASSIUM SERPL-SCNC: 4 MMOL/L (ref 3.5–5.1)
PRO-BNP: 394 PG/ML (ref 0–124)
SODIUM BLD-SCNC: 142 MMOL/L (ref 136–145)

## 2021-04-19 PROCEDURE — 80048 BASIC METABOLIC PNL TOTAL CA: CPT

## 2021-04-19 PROCEDURE — 36415 COLL VENOUS BLD VENIPUNCTURE: CPT

## 2021-04-19 PROCEDURE — 83880 ASSAY OF NATRIURETIC PEPTIDE: CPT

## 2021-04-19 NOTE — TELEPHONE ENCOUNTER
----- Message from OSMAN Dukes - CNS sent at 4/19/2021  1:06 PM EDT -----  Labs are stable  Continue current medications  Will send to Dr Maulik Mendoza as well  thanks

## 2021-04-20 ENCOUNTER — TELEPHONE (OUTPATIENT)
Dept: INTERNAL MEDICINE CLINIC | Age: 71
End: 2021-04-20

## 2021-04-20 NOTE — TELEPHONE ENCOUNTER
Karina nguyễn/[facility] requesting a verbal order for PT. frequency 2x/week x 6 weeks then 1x/week x 1 week.

## 2021-04-21 ENCOUNTER — TELEPHONE (OUTPATIENT)
Dept: INTERNAL MEDICINE CLINIC | Age: 71
End: 2021-04-21

## 2021-04-21 NOTE — TELEPHONE ENCOUNTER
----- Message from Adi Reyes sent at 4/20/2021  2:35 PM EDT -----  Subject: Appointment Request    Reason for Call: Urgent (Patient Request) Hospital Follow Up    QUESTIONS  Type of Appointment? Established Patient  Reason for appointment request? No appointments available during search  Additional Information for Provider? Patient was admitted to [facility] 4-5-21 through 4-14-21 for CHF. Jeffrey Leonardo called for   appointment but no appointments available. Jeffrey Leonardo was requesting   appointment for 4-26-21. Please call Thomas Leonardo [phone]  ---------------------------------------------------------------------------  --------------  Faisal Barker INFO  What is the best way for the office to contact you? OK to leave message on   voicemail  Preferred Call Back Phone Number? [phone]  ---------------------------------------------------------------------------  --------------  SCRIPT ANSWERS  Relationship to Patient? Other  Representative Name? Stacey  Additional information verified (besides Name and Date of Birth)? Address  Appointment reason? Well Care/Follow Ups  Select a Well Care/Follow Ups appointment reason? Adult Hospital Follow Up   [Inpatient Discharge, [address]  (Patient requests to see provider urgently. )? Yes  (Has the patient been discharged from the hospital within 2 business days   AND does not have a Telephone Encounter  Follow Up From [address] [place] [address]   documented in [address]?)? No  Do you have any questions for your primary care provider that need to be   answered prior to your appointment? (Use RN Triage if question pertains to   anything on the red flag list)? No  (Patient needs follow up visit after hospital discharge) Book first   available appointment within 7 days OF DISCHARGE, if no appt, proceed to   book the next available time slot within 14 days OF DISCHARGE AND Send   Message to Provider.  [address] Follow Up appointment cannot be booked   beyond 14 Days and should result in a Message to Provider. ? Yes   Have you been diagnosed with, tested for, or told that you are suspected   of having COVID-19 (Coronavirus)? No  Have you had a fever or taken medication to treat a fever within the past   3 days? No  Have you had a cough, shortness of breath or flu-like symptoms within the   past 3 days? No  Do you currently have flu-like symptoms including fever or chills, cough,   shortness of breath, or difficulty breathing, or new loss of taste or   smell? No  (Service Expert  click yes below to proceed with Dermira As Usual   Scheduling)?  Yes

## 2021-04-22 ENCOUNTER — CARE COORDINATION (OUTPATIENT)
Dept: CASE MANAGEMENT | Age: 71
End: 2021-04-22

## 2021-04-22 NOTE — CARE COORDINATION
[facility] Transitions Follow Up Call    2021    Patient: Beatrix Crigler  Patient : 1950   MRN: [id number]  Reason for Admission:   Discharge Date: 21 RARS: Readmission Risk Score: 29         Spoke with: Marlena Leyda5 Transitions Subsequent and Final Call    Subsequent and Final Calls  Do you have any ongoing symptoms?: No  Have your medications changed?: No  Do you have any questions related to your medications?: No  Do you currently have any active services?: Yes  Are you currently active with any services?: Home Health  Do you have any needs or concerns that I can assist you with?: No  Identified Barriers: None  Care Transitions Interventions  No Identified Needs  Other Interventions:         Pt states doing well. Denies SOB, CP. Patient mentioned he had a rash on his trunk area and arms, wasn't sure if it was from hospital stay or for some other reason. Patient asked me to call daughter and discuss with her. Home care nurse has been out to see it as well. He stated she did not give any advice about it. Follow up appointment listed below . Agreed to further [facility] follow up calls. Phone call to daughter, she states she is not sure what it is as well but he has had issues with skin rashes in the past, possibly eczema coming back. She states he is not complaining that it is bothering him and it is not gotten any worse. She is getting a cream to apply to it. He has a follow up with his PCP first week of May. If gets worse she will contact her for him to be seen earlier.        Follow Up  Future Appointments   Date Time Provider Shandra Sellers   2021 11:30 AM MD ANGEL Vinson [place]   2021  9:00 AM OSMAN Shah - CNS FF Cardio [facility]   2021  4:30 PM MD ANGEL Vinson [place]   2021 12:40 PM Daryle Mai, MD FF SLEEP MED [facility]       Coral Fortune RN

## 2021-04-24 RX ORDER — ALENDRONATE SODIUM 70 MG/1
TABLET ORAL
Qty: 12 TABLET | Refills: 1 | Status: SHIPPED | OUTPATIENT
Start: 2021-04-24 | End: 2021-07-14 | Stop reason: SDUPTHER

## 2021-04-26 ENCOUNTER — TELEPHONE (OUTPATIENT)
Dept: INTERNAL MEDICINE CLINIC | Age: 71
End: 2021-04-26

## 2021-04-28 ENCOUNTER — CARE COORDINATION (OUTPATIENT)
Dept: CASE MANAGEMENT | Age: 71
End: 2021-04-28

## 2021-04-30 ENCOUNTER — HOSPITAL ENCOUNTER (OUTPATIENT)
Age: 71
Discharge: HOME | End: 2021-04-30
Payer: MEDICARE

## 2021-04-30 ENCOUNTER — TELEPHONE (OUTPATIENT)
Dept: INTERNAL MEDICINE CLINIC | Age: 71
End: 2021-04-30

## 2021-04-30 DIAGNOSIS — E11.9 TYPE 2 DIABETES MELLITUS WITHOUT COMPLICATION, WITHOUT LONG-TERM CURRENT USE OF INSULIN (HCC): Primary | ICD-10-CM

## 2021-04-30 DIAGNOSIS — I50.32 CHRONIC DIASTOLIC HEART FAILURE (HCC): ICD-10-CM

## 2021-04-30 LAB
ANION GAP SERPL CALCULATED.3IONS-SCNC: 15 MMOL/L (ref 3–16)
BUN BLDV-MCNC: 33 MG/DL (ref 7–20)
CALCIUM SERPL-MCNC: 9.4 MG/DL (ref 8.3–10.6)
CHLORIDE BLD-SCNC: 105 MMOL/L (ref 99–110)
CO2: 26 MMOL/L (ref 21–32)
CREAT SERPL-MCNC: 2.6 MG/DL (ref 0.8–1.3)
GFR AFRICAN AMERICAN: 30
GFR NON-AFRICAN AMERICAN: 25
GLUCOSE BLD-MCNC: 115 MG/DL (ref 70–99)
POTASSIUM SERPL-SCNC: 4.4 MMOL/L (ref 3.5–5.1)
PRO-BNP: 242 PG/ML (ref 0–124)
SODIUM BLD-SCNC: 146 MMOL/L (ref 136–145)

## 2021-04-30 PROCEDURE — 83880 ASSAY OF NATRIURETIC PEPTIDE: CPT

## 2021-04-30 PROCEDURE — 80048 BASIC METABOLIC PNL TOTAL CA: CPT

## 2021-04-30 PROCEDURE — 36415 COLL VENOUS BLD VENIPUNCTURE: CPT

## 2021-05-03 ENCOUNTER — TELEPHONE (OUTPATIENT)
Dept: CARDIOLOGY CLINIC | Age: 71
End: 2021-05-03

## 2021-05-03 NOTE — TELEPHONE ENCOUNTER
----- Message from OSMAN Amador - CNP sent at 5/3/2021  2:42 PM EDT -----  Labs looking a little dry, decrease lasix to 60am/40 pm. Gaye Gonzalez

## 2021-05-04 ENCOUNTER — OFFICE VISIT (OUTPATIENT)
Dept: INTERNAL MEDICINE CLINIC | Age: 71
End: 2021-05-04
Payer: MEDICARE

## 2021-05-04 ENCOUNTER — TELEPHONE (OUTPATIENT)
Dept: CARDIOLOGY CLINIC | Age: 71
End: 2021-05-04

## 2021-05-04 VITALS
TEMPERATURE: 97.5 F | HEART RATE: 72 BPM | HEIGHT: 65 IN | WEIGHT: 283 LBS | OXYGEN SATURATION: 97 % | BODY MASS INDEX: 47.15 KG/M2 | DIASTOLIC BLOOD PRESSURE: 78 MMHG | SYSTOLIC BLOOD PRESSURE: 124 MMHG

## 2021-05-04 DIAGNOSIS — Z72.89 OTHER PROBLEMS RELATED TO LIFESTYLE: ICD-10-CM

## 2021-05-04 DIAGNOSIS — N18.30 TYPE 2 DIABETES MELLITUS WITH STAGE 3 CHRONIC KIDNEY DISEASE, WITHOUT LONG-TERM CURRENT USE OF INSULIN, UNSPECIFIED WHETHER STAGE 3A OR 3B CKD (HCC): ICD-10-CM

## 2021-05-04 DIAGNOSIS — E66.01 MORBID OBESITY (HCC): Primary | ICD-10-CM

## 2021-05-04 DIAGNOSIS — N18.30 CHRONIC RENAL IMPAIRMENT, STAGE 3 (MODERATE), UNSPECIFIED WHETHER STAGE 3A OR 3B CKD (HCC): ICD-10-CM

## 2021-05-04 DIAGNOSIS — C61 MALIGNANT NEOPLASM OF PROSTATE (HCC): ICD-10-CM

## 2021-05-04 DIAGNOSIS — E11.22 TYPE 2 DIABETES MELLITUS WITH STAGE 3 CHRONIC KIDNEY DISEASE, WITHOUT LONG-TERM CURRENT USE OF INSULIN, UNSPECIFIED WHETHER STAGE 3A OR 3B CKD (HCC): ICD-10-CM

## 2021-05-04 DIAGNOSIS — E11.69 CONTROLLED TYPE 2 DIABETES MELLITUS WITH OTHER SPECIFIED COMPLICATION, WITHOUT LONG-TERM CURRENT USE OF INSULIN (HCC): ICD-10-CM

## 2021-05-04 DIAGNOSIS — I50.33 ACUTE ON CHRONIC DIASTOLIC HEART FAILURE (HCC): ICD-10-CM

## 2021-05-04 DIAGNOSIS — Z12.5 SCREENING PSA (PROSTATE SPECIFIC ANTIGEN): ICD-10-CM

## 2021-05-04 DIAGNOSIS — F11.20 OPIOID TYPE DEPENDENCE, CONTINUOUS (HCC): ICD-10-CM

## 2021-05-04 PROCEDURE — 1111F DSCHRG MED/CURRENT MED MERGE: CPT | Performed by: INTERNAL MEDICINE

## 2021-05-04 PROCEDURE — 99215 OFFICE O/P EST HI 40 MIN: CPT | Performed by: INTERNAL MEDICINE

## 2021-05-04 RX ORDER — FUROSEMIDE 40 MG/1
40 TABLET ORAL SEE ADMIN INSTRUCTIONS
Qty: 90 TABLET | Refills: 1 | Status: SHIPPED | OUTPATIENT
Start: 2021-05-04 | End: 2021-05-06

## 2021-05-04 SDOH — ECONOMIC STABILITY: INCOME INSECURITY: HOW HARD IS IT FOR YOU TO PAY FOR THE VERY BASICS LIKE FOOD, HOUSING, MEDICAL CARE, AND HEATING?: SOMEWHAT HARD

## 2021-05-04 ASSESSMENT — PATIENT HEALTH QUESTIONNAIRE - PHQ9: 2. FEELING DOWN, DEPRESSED OR HOPELESS: 0

## 2021-05-04 NOTE — TELEPHONE ENCOUNTER
----- Message from OSMAN Pond - CNP sent at 5/3/2021  2:42 PM EDT -----  Labs looking a little dry, decrease lasix to 60am/40 pm. Aarti Mijraes

## 2021-05-04 NOTE — TELEPHONE ENCOUNTER
Patient's daughter notified of test results, needing a refill on the lasix sent to [facility] please.

## 2021-05-04 NOTE — PROGRESS NOTES
Post-Discharge Transitional Care Management Services or Hospital Follow Up      Juan Antonio Farris   YOB: 1950    Date of Office Visit:  5/4/2021  Date of Hospital Admission: 4/5/21  Date of Hospital Discharge: 4/14/21  Readmission Risk Score(high >=14%.  Medium >=10%):Readmission Risk Score: 34      Care management risk score Rising risk (score 2-5) and Complex Care (Scores >=6): 3     Non face to face  following discharge, date last encounter closed (first attempt may have been earlier): *No documented post hospital discharge outreach found in the last 14 days *No documented post hospital discharge outreach found in the last 14 days    Call initiated 2 business days of discharge: *No response recorded in the last 14 days     Patient Active Problem List   Diagnosis    Acquired spondylolisthesis    Acute idiopathic gout of right foot    Adjustment disorder with mixed disturbance of emotions and conduct    Anemia in chronic kidney disease    Aseptic necrosis of head and neck of femur    Chronic renal insufficiency, stage III (moderate)    Degeneration of lumbar or lumbosacral intervertebral disc    Esophageal reflux    Depression    Hypersomnolence    Hyperlipidemia    History of colonic polyps    Family history of malignant neoplasm of gastrointestinal tract    Malignant neoplasm of prostate ([address]    Impotence of organic origin    Morbid obesity ([address]    Opioid type dependence, continuous ([address]    KAMRAN (obstructive sleep apnea)    Osteoarthritis of right knee    Osteopenia    ROB (renal osteodystrophy)    Spondylosis of lumbar region without myelopathy or radiculopathy    Type 2 diabetes mellitus without complication, without long-term current use of insulin (HCC)    Acute respiratory failure with hypoxia (HCC)    Acute kidney injury superimposed on CKD ([address]    Essential hypertension    Acute on chronic diastolic heart failure (HCC)       Allergies   Allergen Reactions    Indomethacin Other (See Comments)     Dr. No Navarro told him not to take because affects the bladder.  Statins Other (See Comments)    Unable To Assess       Anesthesia that begins with S- stops my breathing    Actos [Pioglitazone] Rash       Medications listed as ordered at the time of discharge from [facility] Medication Instructions ANGELA:    Printed on:05/04/21 5136   Medication Information                      Acetaminophen (TYLENOL ARTHRITIS PAIN PO)  Take by mouth OTC             alendronate (FOSAMAX) 70 MG tablet  TAKE 1 TABLET BY MOUTH EVERY 7 DAYS             amLODIPine (NORVASC) 10 MG tablet  Take 1 tablet by mouth daily             ammonium lactate (AMLACTIN) 12 % cream  Apply topically as needed             aspirin 81 MG EC tablet  Take 81 mg by mouth daily             atorvastatin (LIPITOR) 40 MG tablet  Take 1 tablet by mouth nightly             cetirizine (ZYRTEC) 5 MG tablet  Take 1 tablet by mouth daily             Cyanocobalamin (VITAMIN B 12 PO)  Take  by mouth daily. Dulaglutide (TRULICITY) 1.5 SB/3.6XD SOPN  Inject 3 mg into the skin every 7 days             ergocalciferol (ERGOCALCIFEROL) 35163 UNITS capsule  Take 50,000 Units by mouth once a week. famotidine (PEPCID) 20 MG tablet  Take 1 tablet by mouth daily             furosemide (LASIX) 40 MG tablet  Take 1 tablet by mouth See Admin Instructions Take 60mg in am, and 40 mg in evening             glimepiride (AMARYL) 4 MG tablet  Take 4 mg by mouth every morning (before breakfast)             HYDROcodone-acetaminophen (NORCO)  MG per tablet  Take 1 tablet by mouth 3 times daily as needed (Pain).  Pain specialist              Insulin Pen Needle ([employer] PEN NEEDLES) 31G X 6 MM MISC  1 each by Does not apply route daily             Meals on Wheels MISC  by Does not apply route Patient is to receive 2-3 diabetic meals only             metoprolol succinate (TOPROL XL) 100 MG extended release tablet  Take 1 tablet by mouth daily             miconazole (MICOTIN) 2 % powder  Apply topically 2 times daily. salsalate (DISALCID) 750 MG tablet  Take 750 mg by mouth 2 times daily as needed             vitamin B-6 (PYRIDOXINE) 50 MG tablet  Take 50 mg by mouth daily                   Medications marked \"taking\" at this time  Outpatient Medications Marked as Taking for the 5/4/21 encounter (Office Visit) with Keyla Rivas MD   Medication Sig Dispense Refill    furosemide (LASIX) 40 MG tablet Take 1 tablet by mouth See Admin Instructions Take 60mg in am, and 40 mg in evening 90 tablet 1    Meals on Wheels MISC by Does not apply route Patient is to receive 2-3 diabetic meals only 1 each 0    alendronate (FOSAMAX) 70 MG tablet TAKE 1 TABLET BY MOUTH EVERY 7 DAYS 12 tablet 1    cetirizine (ZYRTEC) 5 MG tablet Take 1 tablet by mouth daily 30 tablet 0    famotidine (PEPCID) 20 MG tablet Take 1 tablet by mouth daily 60 tablet 0    miconazole (MICOTIN) 2 % powder Apply topically 2 times daily. 45 g 1    Dulaglutide (TRULICITY) 1.5 VL/4.5WV SOPN Inject 3 mg into the skin every 7 days 8 pen 0    metoprolol succinate (TOPROL XL) 100 MG extended release tablet Take 1 tablet by mouth daily 90 tablet 1    atorvastatin (LIPITOR) 40 MG tablet Take 1 tablet by mouth nightly 90 tablet 0    amLODIPine (NORVASC) 10 MG tablet Take 1 tablet by mouth daily 90 tablet 0    aspirin 81 MG EC tablet Take 81 mg by mouth daily      Acetaminophen (TYLENOL ARTHRITIS PAIN PO) Take by mouth OTC      HYDROcodone-acetaminophen (NORCO)  MG per tablet Take 1 tablet by mouth 3 times daily as needed (Pain).  Pain specialist       Insulin Pen Needle ([employer] PEN NEEDLES) 31G X 6 MM MISC 1 each by Does not apply route daily 100 each 3    salsalate (DISALCID) 750 MG tablet Take 750 mg by mouth 2 times daily as needed      vitamin B-6 (PYRIDOXINE) 50 MG tablet Take 50 mg by mouth daily      ergocalciferol (ERGOCALCIFEROL) 14243 UNITS capsule Take 50,000 Units by mouth once a week.  Cyanocobalamin (VITAMIN B 12 PO) Take  by mouth daily. Medications patient taking as of now reconciled against medications ordered at time of hospital discharge: Yes    Chief Complaint   Patient presents with    Follow-Up from Hospital     CHF       HPI    Inpatient course: Discharge summary reviewed- see chart. Interval history/Current status:     Acute pulmonary edema secondary to acute on chronic diastolic heart failure  Acute hypoxic respiratory failure  Acute kidney injury/obstructive uropathy  Obstructive sleep apnea  Past medical history morbid obesity, type 2 diabetes, hypertension, hyperlipidemia, seasonal allergies     Course of hospitalization: Kirit Greene a 72 y. o. male who presented to ED with complaint of progressively worsening shortness of breath which is worse with exertion for the last 3-4 months. He reports associated non-productive cough. He denies fever, dizziness, chest pain, edema, orthopnea, weight gain, abdominal pain, N/V/D/C, urinary symptoms. Patient has a history of KAMRAN and has not been using his CPAP.       Acute pulmonary edema secondary likely acute on chronic diastolic congestive heart failure  -2D echo from 4/5/2021 showed LVEF of 50 % with grade 2 diastolic dysfunction.    -Clinically continues to have signs of fluid overload.    -Treated with Lasix 40 mg IV twice daily + metolazone as per nephro.    Discharged home on Lasix 80 in the morning and 40 in the afternoon per nephrology's recommendation.  -Educated on salt and fluid restrictions.  -daily weights  -admit weight 132.9kg, today weight  133.3 kg  -cardiology consulted     Acute hypoxic respiratory failure, POA  -Likely secondary to pulmonary vascular congestion from diastolic heart failure.    -KAMRAN likely contributing as well.   -Treated with diuretics  -Educated on using CPAP at night and during day naps.    -On room air at discharge, sats in the mid 90s     Acute kidney injury/obstructive uropathy  -Serum creatinine with acute rise from 2.0 to 2.4--> 2.5.    -Etiology likely postrenal since patient was unable to void.  Currently able to void on his own.  No indication for talbot cath placement.    -Appreciate urology recommendations. Will f/u with urology outpatient  -Follow-up with nephrology outpatient     KAMRAN  -Educated on importance of CPAP use.     Morbid obesity due to excess calories (Body mass index is 48.76 kg/m². )   -Low calorie diet. Encourage therapeutic lifestyle changes.     DM2  -recent hemoglobin A1c of 6.9%.    -Patient was on Actos at home, should be avoided in congestive heart failure. Recommended Januvia, however patient is unable to afford it. Increased Trulicity dose to 3 mg daily. Recommend repeat A1c in 3 months. PCP to follow.     HTN  -Continue home norvasc and metoprolol     HLD  -Continue home statin     Seasonal Allergies  -pt reports cough, chronic and improved with certrizine 99LN. Takes bid at home. Discussed this with nephrology. Patient should be taking no more than 5 mg daily. Educated patient and daughter on this. Review of Systems    Vitals:    05/04/21 1140   BP: 124/78   Pulse: 72   Temp: 97.5 °F (36.4 °C)   SpO2: 97%   Weight: 283 lb (128.4 kg)   Height: 5' 5\" (1.651 m)     Body mass index is 47.09 kg/m². Wt Readings from Last 3 Encounters:   05/04/21 283 lb (128.4 kg)   04/16/21 290 lb 1.6 oz (131.6 kg)   04/14/21 293 lb 12.8 oz (133.3 kg)     BP Readings from Last 3 Encounters:   05/04/21 124/78   04/16/21 122/72   04/14/21 126/71       Wt Readings from Last 3 Encounters:   05/04/21 283 lb (128.4 kg)   04/16/21 290 lb 1.6 oz (131.6 kg)   04/14/21 293 lb 12.8 oz (133.3 kg)       Physical Exam  Vitals signs and nursing note reviewed. Constitutional:       General: He is not in acute distress. Appearance: He is well-developed. He is obese. He is not diaphoretic.    HENT: Head: Normocephalic and atraumatic. Right Ear: External ear normal.      Left Ear: External ear normal.      Nose: Nose normal.      Mouth/Throat:      Pharynx: No oropharyngeal exudate. Eyes:      General: No scleral icterus. Right eye: No discharge. Conjunctiva/sclera: Conjunctivae normal.      Pupils: Pupils are equal, round, and reactive to light. Neck:      Musculoskeletal: Normal range of motion and neck supple. Thyroid: No thyromegaly. Vascular: No JVD. Trachea: No tracheal deviation. Cardiovascular:      Rate and Rhythm: Normal rate and regular rhythm. Heart sounds: Normal heart sounds. Pulmonary:      Effort: Pulmonary effort is normal. No respiratory distress. Breath sounds: Normal breath sounds. No stridor. No wheezing or rales. Abdominal:      General: Bowel sounds are normal. There is no distension. Palpations: Abdomen is soft. There is no mass. Tenderness: There is no abdominal tenderness. There is no guarding or rebound. Musculoskeletal: Normal range of motion. General: No tenderness. Lymphadenopathy:      Cervical: No cervical adenopathy. Skin:     General: Skin is warm and dry. Capillary Refill: Capillary refill takes less than 2 seconds. Findings: No erythema or rash. Neurological:      General: No focal deficit present. Mental Status: He is alert and oriented to person, place, and time. Mental status is at baseline. Cranial Nerves: No cranial nerve deficit. Motor: No abnormal muscle tone. Deep Tendon Reflexes: Reflexes are normal and symmetric. Reflexes normal.   Psychiatric:         Mood and Affect: Mood normal.         Behavior: Behavior normal.         Thought Content: Thought content normal.         Judgment: Judgment normal.             Assessment/Plan:  1. Morbid obesity ([address]  -  Discussed exercise and diet    2.  Chronic renal impairment, stage 3 (moderate), unspecified whether stage 3a or 3b CKD  - low protein diet recommended  - MS DISCHARGE MEDS RECONCILED W/ CURRENT OUTPATIENT MED LIST    3. Malignant neoplasm of prostate ([facility]  -   - MS DISCHARGE MEDS RECONCILED W/ CURRENT OUTPATIENT MED LIST    4. Adult BMI 45.0-49.9 kg/sq m (HCC)  -  BMI was elevated today, and weight loss plan recommended is : medically supervised diet with primary care physician and daily exercise regimen.  - MS DISCHARGE MEDS RECONCILED W/ CURRENT OUTPATIENT MED LIST    5. Acute on chronic diastolic heart failure (HCC)    - MS DISCHARGE MEDS RECONCILED W/ CURRENT OUTPATIENT MED LIST    6. Opioid type dependence, continuous ([facility])  - stable medications  - MS DISCHARGE MEDS RECONCILED W/ CURRENT OUTPATIENT MED LIST    7. Controlled type 2 diabetes mellitus with other specified complication, without long-term current use of insulin ([facility]  -      8. Other problems related to lifestyle  - Hepatitis C Antibody; Future    9. Type 2 diabetes mellitus with stage 3 chronic kidney disease, without long-term current use of insulin, unspecified whether stage 3a or 3b CKD ([facility]  -   Lab Results   Component Value Date    LABA1C 5.7 02/10/2021     Lab Results   Component Value Date    .9 02/10/2021     -  DIABETES FOOT EXAM    10. Screening PSA (prostate specific antigen)  - discussed importance of screening  - Psa screening;  Future        Medical Decision Making: moderate complexity

## 2021-05-05 ENCOUNTER — CARE COORDINATION (OUTPATIENT)
Dept: CASE MANAGEMENT | Age: 71
End: 2021-05-05

## 2021-05-05 ENCOUNTER — HOSPITAL ENCOUNTER (OUTPATIENT)
Age: 71
Setting detail: SPECIMEN
Discharge: HOME | End: 2021-05-05
Payer: MEDICARE

## 2021-05-05 LAB
ANION GAP SERPL CALCULATED.3IONS-SCNC: 13 MMOL/L (ref 3–16)
BUN BLDV-MCNC: 27 MG/DL (ref 7–20)
CALCIUM SERPL-MCNC: 9.4 MG/DL (ref 8.3–10.6)
CHLORIDE BLD-SCNC: 105 MMOL/L (ref 99–110)
CO2: 25 MMOL/L (ref 21–32)
CREAT SERPL-MCNC: 2.1 MG/DL (ref 0.8–1.3)
GFR AFRICAN AMERICAN: 38
GFR NON-AFRICAN AMERICAN: 31
GLUCOSE BLD-MCNC: 96 MG/DL (ref 70–99)
POTASSIUM SERPL-SCNC: 4.2 MMOL/L (ref 3.5–5.1)
PRO-BNP: 356 PG/ML (ref 0–124)
SODIUM BLD-SCNC: 143 MMOL/L (ref 136–145)

## 2021-05-05 PROCEDURE — 83880 ASSAY OF NATRIURETIC PEPTIDE: CPT

## 2021-05-05 PROCEDURE — 80048 BASIC METABOLIC PNL TOTAL CA: CPT

## 2021-05-05 PROCEDURE — 36415 COLL VENOUS BLD VENIPUNCTURE: CPT

## 2021-05-05 NOTE — CARE COORDINATION
[facility] Transitions Follow Up Call    2021    Patient: Kallie Antoine  Patient : 1950   MRN: [id number]  Reason for Admission:   Discharge Date: 21 RARS: Readmission Risk Score: 29         Spoke with: Marlena LeydaAzael Transitions Subsequent and Final Call    Subsequent and Final Calls  Do you have any ongoing symptoms?: No  Have your medications changed?: No  Do you have any questions related to your medications?: No  Do you currently have any active services?: Yes  Are you currently active with any services?: Home Health  Do you have any needs or concerns that I can assist you with?: No  Identified Barriers: None  Care Transitions Interventions  No Identified Needs  Other Interventions:         Pt states doing well, no issues or concerns. Denies SOB, CP. Weight today is 281 lbs. Home care continuesto come out to the home. Patient states he can walk 400 feet now. F/U appts listed below.  Agreed to more CTC f/u calls      Follow Up  Future Appointments   Date Time Provider Shandra Sellers   2021  9:00 AM [facility] [address], [address], APRN - CNS FF Cardio [facility]   2021  4:30 PM Maylin Mays MD F PARK IM [facility]   2021 12:40 PM Sparkle Corley MD FF SLEEP MED [facility]       Reilly Timmons RN

## 2021-05-06 RX ORDER — FUROSEMIDE 40 MG/1
TABLET ORAL
Qty: 225 TABLET | Refills: 0 | Status: SHIPPED | OUTPATIENT
Start: 2021-05-06 | End: 2021-05-12

## 2021-05-06 NOTE — TELEPHONE ENCOUNTER
----- Message from OSMAN Jolley - CNS sent at 5/6/2021 12:33 PM EDT -----  Labs look much better  Continue current medications  thanks

## 2021-05-11 ENCOUNTER — HOSPITAL ENCOUNTER (OUTPATIENT)
Age: 71
Setting detail: SPECIMEN
Discharge: HOME | End: 2021-05-11
Payer: MEDICARE

## 2021-05-11 LAB
ANION GAP SERPL CALCULATED.3IONS-SCNC: 18 MMOL/L (ref 3–16)
BUN BLDV-MCNC: 30 MG/DL (ref 7–20)
CALCIUM SERPL-MCNC: 9.4 MG/DL (ref 8.3–10.6)
CHLORIDE BLD-SCNC: 104 MMOL/L (ref 99–110)
CO2: 19 MMOL/L (ref 21–32)
CREAT SERPL-MCNC: 2.5 MG/DL (ref 0.8–1.3)
GFR AFRICAN AMERICAN: 31
GFR NON-AFRICAN AMERICAN: 26
GLUCOSE BLD-MCNC: 61 MG/DL (ref 70–99)
POTASSIUM SERPL-SCNC: 5.5 MMOL/L (ref 3.5–5.1)
PRO-BNP: 194 PG/ML (ref 0–124)
SODIUM BLD-SCNC: 141 MMOL/L (ref 136–145)

## 2021-05-11 PROCEDURE — 36415 COLL VENOUS BLD VENIPUNCTURE: CPT

## 2021-05-11 PROCEDURE — 80048 BASIC METABOLIC PNL TOTAL CA: CPT

## 2021-05-11 PROCEDURE — 83880 ASSAY OF NATRIURETIC PEPTIDE: CPT

## 2021-05-12 ENCOUNTER — TELEPHONE (OUTPATIENT)
Dept: CARDIOLOGY CLINIC | Age: 71
End: 2021-05-12

## 2021-05-12 DIAGNOSIS — I50.32 CHRONIC DIASTOLIC HEART FAILURE (HCC): Primary | ICD-10-CM

## 2021-05-12 RX ORDER — FUROSEMIDE 40 MG/1
40 TABLET ORAL 2 TIMES DAILY
Qty: 225 TABLET | Refills: 0
Start: 2021-05-12 | End: 2021-05-14 | Stop reason: DRUGHIGH

## 2021-05-12 NOTE — TELEPHONE ENCOUNTER
Labs reviewed from 5/11/2021  Potassium elevated and fluid level continues to improve    Please check and see if he is taking an potassium, foods with increased potassium or salt substitute    Cut lasix to 40 mg twice a day    Recheck labs next week

## 2021-05-14 ENCOUNTER — CARE COORDINATION (OUTPATIENT)
Dept: CASE MANAGEMENT | Age: 71
End: 2021-05-14

## 2021-05-14 ENCOUNTER — OFFICE VISIT (OUTPATIENT)
Dept: CARDIOLOGY CLINIC | Age: 71
End: 2021-05-14
Payer: MEDICARE

## 2021-05-14 VITALS
HEIGHT: 65 IN | BODY MASS INDEX: 47.65 KG/M2 | SYSTOLIC BLOOD PRESSURE: 118 MMHG | HEART RATE: 84 BPM | DIASTOLIC BLOOD PRESSURE: 80 MMHG | WEIGHT: 286 LBS | OXYGEN SATURATION: 98 %

## 2021-05-14 DIAGNOSIS — N18.30 STAGE 3 CHRONIC KIDNEY DISEASE, UNSPECIFIED WHETHER STAGE 3A OR 3B CKD (HCC): ICD-10-CM

## 2021-05-14 DIAGNOSIS — G47.33 OSA (OBSTRUCTIVE SLEEP APNEA): ICD-10-CM

## 2021-05-14 DIAGNOSIS — I50.32 CHRONIC DIASTOLIC HEART FAILURE (HCC): Primary | ICD-10-CM

## 2021-05-14 DIAGNOSIS — E66.01 CLASS 3 SEVERE OBESITY DUE TO EXCESS CALORIES WITH SERIOUS COMORBIDITY AND BODY MASS INDEX (BMI) OF 45.0 TO 49.9 IN ADULT (HCC): ICD-10-CM

## 2021-05-14 PROCEDURE — 99214 OFFICE O/P EST MOD 30 MIN: CPT | Performed by: CLINICAL NURSE SPECIALIST

## 2021-05-14 RX ORDER — FUROSEMIDE 40 MG/1
40 TABLET ORAL 2 TIMES DAILY
Qty: 225 TABLET | Refills: 0 | Status: SHIPPED
Start: 2021-05-14 | End: 2021-08-02

## 2021-05-14 NOTE — CARE COORDINATION
Per [facility], pt had f/u appt with cardiology this morning, this nurse will f/u at a later date.

## 2021-05-14 NOTE — PATIENT INSTRUCTIONS
1.  Cut lasix to 40 mg twice a day  2. Cut back on bananas, peanut butter, potatoes  3. Continue all other medications  4. Check blood work with home care  5.   RTO in 2 months or sooner with issues

## 2021-05-14 NOTE — PROGRESS NOTES
[facility]  Progress Note    Primary Care Doctor:  Andreas Mahoney MD    Chief Complaint   Patient presents with    Follow-up    Congestive Heart Failure        History of Present Illness:  72 y.o. male with history of ckd (Dr Alem Olivera), prashanth. Dm, morbid obesity,  4/5-14/2021 for chest discomfort and SOB for months, diuresed 314->293. I had the pleasure of seeing Bola Umana in follow up for dHF. He is ambulatory and with his daughter, Amalia López. He is doing well, weight continues to drop slowly 290->286. He is scheduled to do sleep evaluation July 6 th. He denies any chest pain, palpitations, lightheadedness or increased edema. He is taking all his medications and watching his diet. He has been doing a lot of potassium foods. Past Medical History:   has a past medical history of Cancer ([facility] [address] Hyperlipidemia, Hypertension, Obesity, Osteoarthritis, Type 2 diabetes mellitus without complication ([facility] [address] and Type 2 diabetes mellitus without complication, without long-term current use of insulin ([facility] [address] Surgical History:   has a past surgical history that includes Total hip arthroplasty and joint replacement. Social History:   reports that he quit smoking about 20 years ago. He started smoking about 41 years ago. He quit after 21.00 years of use. He has quit using smokeless tobacco. He reports current alcohol use. He reports that he does not use drugs. Family History:   Family History   Problem Relation Age of Onset    Hypertension Mother     Diabetes Mother     Hypertension Father        Home Medications:  Prior to Admission medications    Medication Sig Start Date End Date Taking?  Authorizing Provider   furosemide (LASIX) 40 MG tablet Take 1 tablet by mouth 2 times daily 5/14/21  Yes OSMAN Rosen - CNS   Meals on Wheels MISC by Does not apply route Patient is to receive 2-3 diabetic meals only 4/30/21  Yes Duy Flood MD   alendronate (FOSAMAX) 70 MG tablet TAKE 1 TABLET BY MOUTH EVERY 7 DAYS 4/24/21  Yes Maylin Mays MD   famotidine (PEPCID) 20 MG tablet Take 1 tablet by mouth daily 4/15/21  Yes OSMAN Zambrano CNP   miconazole (MICOTIN) 2 % powder Apply topically 2 times daily. 4/14/21  Yes OSMAN Zambrano CNP   Dulaglutide (TRULICITY) 1.5 NK/5.3ZS SOPN Inject 3 mg into the skin every 7 days 4/14/21  Yes OSMAN Hicks CNP   metoprolol succinate (TOPROL XL) 100 MG extended release tablet Take 1 tablet by mouth daily 3/18/21  Yes Maylin Mays MD   atorvastatin (LIPITOR) 40 MG tablet Take 1 tablet by mouth nightly 3/18/21  Yes Maylin Mays MD   amLODIPine (NORVASC) 10 MG tablet Take 1 tablet by mouth daily 3/18/21  Yes Maylin Mays MD   aspirin 81 MG EC tablet Take 81 mg by mouth daily   Yes Historical Provider, MD   Acetaminophen (TYLENOL ARTHRITIS PAIN PO) Take by mouth OTC   Yes Historical Provider, MD   HYDROcodone-acetaminophen (NORCO)  MG per tablet Take 1 tablet by mouth 3 times daily as needed (Pain). Pain specialist    Yes Historical Provider, MD   Insulin Pen Needle ([employer] PEN NEEDLES) 31G X 6 MM MISC 1 each by Does not apply route daily 9/29/20  Yes Maylin Mays MD   ammonium lactate (AMLACTIN) 12 % cream Apply topically as needed   Yes Historical Provider, MD   glimepiride (AMARYL) 4 MG tablet Take 4 mg by mouth every morning (before breakfast) 7/1/20  Yes Historical Provider, MD   salsalate (DISALCID) 750 MG tablet Take 750 mg by mouth 2 times daily as needed 7/1/20  Yes Historical Provider, MD   vitamin B-6 (PYRIDOXINE) 50 MG tablet Take 50 mg by mouth daily   Yes Historical Provider, MD   ergocalciferol (ERGOCALCIFEROL) 54592 UNITS capsule Take 50,000 Units by mouth once a week.    Yes Historical Provider, MD   cetirizine (ZYRTEC) 5 MG tablet Take 1 tablet by mouth daily 4/15/21   Patsie Kallman Dumouchelle, APRN - CNP   Cyanocobalamin (VITAMIN B 12 PO) Take  by mouth daily. Historical Provider, MD        Allergies: Indomethacin, Statins, Unable to assess, and Actos [pioglitazone]     Review of Systems:   · Constitutional: there has been no unanticipated weight loss. There's been no change in energy level, sleep pattern, or activity level. · Eyes: No visual changes or diplopia. No scleral icterus. · ENT: No Headaches, hearing loss or vertigo. No mouth sores or sore throat. · Cardiovascular: Reviewed in HPI  · Respiratory: No cough or wheezing, no sputum production. No hematemesis. · Gastrointestinal: No abdominal pain, appetite loss, blood in stools. No change in bowel or bladder habits. · Genitourinary: No dysuria, trouble voiding, or hematuria. · Musculoskeletal:  No gait disturbance, weakness or joint complaints. · Integumentary: No rash or pruritis. · Neurological: No headache, diplopia, change in muscle strength, numbness or tingling. No change in gait, balance, coordination, mood, affect, memory, mentation, behavior. · Psychiatric: No anxiety, no depression. · Endocrine: No malaise, fatigue or temperature intolerance. No excessive thirst, fluid intake, or urination. No tremor. · Hematologic/Lymphatic: No abnormal bruising or bleeding, blood clots or swollen lymph nodes. · Allergic/Immunologic: No nasal congestion or hives.     Physical Examination:    Vitals:    05/14/21 0904   BP: 118/80   Site: Left Upper Arm   Position: Sitting   Cuff Size: Large Adult   Pulse: 84   SpO2: 98%   Weight: 286 lb (129.7 kg)   Height: 5' 5\" (1.651 m)        Constitutional and General Appearance: Warm and dry, no apparent distress, normal coloration  HEENT:  Normocephalic, atraumatic  Respiratory:  · Normal excursion and expansion without use of accessory muscles  · Resp Auscultation: Normal breath sounds without dullness  Cardiovascular:  · The apical impulses not displaced  · Heart tones are crisp and normal  · JVP normal cm H2O  · Regular rate and rhythm  · Peripheral pulses are symmetrical and full  · There is no clubbing, cyanosis of the extremities. · Lower ankle edema only bilaterally  · Pedal Pulses: 2+ and equal   Abdomen:obese  · No masses or tenderness  · Liver/Spleen: No Abnormalities Noted  Neurological/Psychiatric:  · Alert and oriented in all spheres  · Moves all extremities well  · Exhibits normal gait balance and coordination  · No abnormalities of mood, affect, memory, mentation, or behavior are noted    Lab Data:    CBC:   Lab Results   Component Value Date    WBC 5.5 04/14/2021    WBC 5.0 04/13/2021    WBC 4.8 04/12/2021    RBC 3.96 04/14/2021    RBC 3.85 04/13/2021    RBC 3.87 04/12/2021    HGB 12.5 04/14/2021    HGB 12.4 04/13/2021    HGB 12.3 04/12/2021    HCT 38.8 04/14/2021    HCT 37.8 04/13/2021    HCT 37.8 04/12/2021    MCV 98.2 04/14/2021    MCV 98.1 04/13/2021    MCV 97.6 04/12/2021    RDW 14.2 04/14/2021    RDW 14.4 04/13/2021    RDW 14.5 04/12/2021     04/14/2021     04/13/2021     04/12/2021     BMP:  Lab Results   Component Value Date     05/11/2021     05/05/2021     04/30/2021    K 5.5 05/11/2021    K 4.2 05/05/2021    K 4.4 04/30/2021     05/11/2021     05/05/2021     04/30/2021    CO2 19 05/11/2021    CO2 25 05/05/2021    CO2 26 04/30/2021    PHOS 3.1 04/14/2021    PHOS 3.6 04/13/2021    PHOS 3.9 04/12/2021    BUN 30 05/11/2021    BUN 27 05/05/2021    BUN 33 04/30/2021    CREATININE 2.5 05/11/2021    CREATININE 2.1 05/05/2021    CREATININE 2.6 04/30/2021     BNP:   Lab Results   Component Value Date    PROBNP 194 05/11/2021    PROBNP 356 05/05/2021    PROBNP 242 04/30/2021     Cardiac Imaging:  Echo 4/7/2021   Summary   Overall left ventricular function is normal.   Ejection fraction is visually estimated to be 55 %.  E/e'= 9.3 .   There is reversal of E/A inflow velocities across the mitral valve.   There is mild concentric left ventricular hypertrophy.   No definitive regional wall motion abnormalities are noted.   Grade II diastolic dysfunction with elevated LV filling pressures.   Mild to moderate tricuspid regurgitation.   Estimated pulmonary artery systolic pressure is moderately elevated at 65   mmHg assuming a right atrial pressure of 15 mmHg.   The right ventricle is mildly enlarged. TAPSE= 2.72 RV S'= 17.2   IVC size is dilated (>2.1 cm) and collapses < 50% with respiration   consistent with elevated RA pressure (15 mmHg    Stress test 8/12/2019  The LV EF is 55%  Normal global and regional wall motion in all territories. There is a medium sized, partially reversible defect in the anterior  wall consistent with moderate verena-infarct ischemia. 1.Non-diagnostic ECG for ischemia with pharmocologic stress. 2.Normal left ventriular systolic function. 3.Positive nuclear stress imaging suggestive of scar plus verena      infarct ischemia in the anterior wall. 4.Nuclear stress image findings indicate intermediate risk for      future ischemic event .     Echo 12/12/2019  Summary:  The left ventricular wall motion is normal.  Overall left ventricular ejection fraction is estimated to be 60-65%. There is mild concentric left ventricular hypertrophy. There is mild mitral regurgitation. Assessment:    1. Chronic diastolic heart failure ([address]    2. KAMRAN (obstructive sleep apnea)    3. Stage 3 chronic kidney disease, unspecified whether stage 3a or 3b CKD    4. Class 3 severe obesity due to excess calories with serious comorbidity and body mass index (BMI) of 45.0 to 49.9 in adult [facility]) Dr Aftab Cerrato:   Patient Instructions   1. Cut lasix to 40 mg twice a day  2. Cut back on bananas, peanut butter, potatoes  3. Continue all other medications  4. Check blood work with home care  5.   RTO in 2 months or sooner with issues      I appreciate the opportunity of cooperating in the care of this individual.    Jaime Wilkerson, CNS, 5/14/2021, 12:32 PM

## 2021-05-17 ENCOUNTER — CARE COORDINATION (OUTPATIENT)
Dept: CASE MANAGEMENT | Age: 71
End: 2021-05-17

## 2021-05-21 NOTE — PROGRESS NOTES
Andreas Notice    Age 72 y.o.    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                      General     Surgeon(s):  Angel Young, MD      DAY ADMIT ___  SDS/OP ___  OUTPT IN BED ___         Phone [phone] (home) [phone] (work)   PCP _____________________ Phone_________________ [address] ( ) Epic CE ( ) Appt ________    ADDITIONAL INFO __________________________________ Cardio/Consult _____________    NOTES _____________________________________________________________________    ____________________________________________________________________________    PAT APPT DATE:________ TIME: ________  FAXED QAD: _______  (__) H&P w/ hospitalist  ____________________________________________________________________________    COVID TEST: Date/Location______________        NURSING HISTORY COMPLETE: _______  (__) CBC       (__) W/ DIFF ___________  (__)  ECHO    __________  (__) Hgb A1C    ___________  (__) CHEST X RAY   __________  (__) LIPID PROFILE  ___________  (__) EKG   __________  (__) PT/PTT   ___________  (__) PFT's   __________  (__) BMP   ___________  (__) CAROTIDS  __________  (__) CMP   ___________  (__) VEIN MAPPING  __________  (__) U/A   ___________  (__) HISTORY & PHYSICAL __________  (__) URINE C & S  ___________  (__) CARDIAC CLEARANCE __________  (__) U/A W/ FLEX  ___________  (__) PULM.  CLEARANCE __________  (__) SERUM PREGNANCY ___________  (__) Check Epic DOS orders __________  (__) TYPE & SCREEN ________ repeat ( ) (__)  __________________ __________  (__) ALBUMIN   ___________  (__)  __________________ __________  (__) TRANSFERRIN  ___________  (__)  __________________ __________  (__) LIVER PROFILE  ___________  (__)  __________________ __________  (__) CARBOXY HGB  ___________  (__) URINE PREG DOS __________  (__) NICOTINE & MET. ___________  (__) BLOOD SUGAR DOS __________  (__) PREALBUMIN  ___________    (__) MRSA NASAL SWAB ___________  (__) BLOOD THINNERS __________  (__) ACE/ ARBS: _____________________    (__) BETABLOCKERS ___________________

## 2021-05-24 ENCOUNTER — HOSPITAL ENCOUNTER (OUTPATIENT)
Age: 71
Discharge: HOME | End: 2021-05-24
Payer: MEDICARE

## 2021-05-24 LAB
ALBUMIN SERPL-MCNC: 4.1 G/DL (ref 3.4–5)
ANION GAP SERPL CALCULATED.3IONS-SCNC: 18 MMOL/L (ref 3–16)
BUN BLDV-MCNC: 17 MG/DL (ref 7–20)
CALCIUM SERPL-MCNC: 9.6 MG/DL (ref 8.3–10.6)
CHLORIDE BLD-SCNC: 105 MMOL/L (ref 99–110)
CO2: 21 MMOL/L (ref 21–32)
CREAT SERPL-MCNC: 2.4 MG/DL (ref 0.8–1.3)
CREATININE URINE: 163.6 MG/DL (ref 39–259)
FERRITIN: 110.5 NG/ML (ref 30–400)
GFR AFRICAN AMERICAN: 33
GFR NON-AFRICAN AMERICAN: 27
GLUCOSE BLD-MCNC: 93 MG/DL (ref 70–99)
HCT VFR BLD CALC: 45.4 % (ref 40.5–52.5)
HEMOGLOBIN: 15.1 G/DL (ref 13.5–17.5)
IRON SATURATION: 38 % (ref 20–50)
IRON: 102 UG/DL (ref 59–158)
MCH RBC QN AUTO: 32.4 PG (ref 26–34)
MCHC RBC AUTO-ENTMCNC: 33.4 G/DL (ref 31–36)
MCV RBC AUTO: 97.1 FL (ref 80–100)
MICROALBUMIN UR-MCNC: 204 MG/DL
MICROALBUMIN/CREAT UR-RTO: 1246.9 MG/G (ref 0–30)
PARATHYROID HORMONE INTACT: 143 PG/ML (ref 14–72)
PDW BLD-RTO: 14.8 % (ref 12.4–15.4)
PHOSPHORUS: 2.7 MG/DL (ref 2.5–4.9)
PLATELET # BLD: 338 K/UL (ref 135–450)
PMV BLD AUTO: 7.9 FL (ref 5–10.5)
POTASSIUM SERPL-SCNC: 4.3 MMOL/L (ref 3.5–5.1)
RBC # BLD: 4.67 M/UL (ref 4.2–5.9)
SODIUM BLD-SCNC: 144 MMOL/L (ref 136–145)
TOTAL IRON BINDING CAPACITY: 268 UG/DL (ref 260–445)
TRANSFERRIN: 219 MG/DL (ref 200–360)
VITAMIN D 25-HYDROXY: 37.5 NG/ML
WBC # BLD: 7.2 K/UL (ref 4–11)

## 2021-05-24 PROCEDURE — 82570 ASSAY OF URINE CREATININE: CPT

## 2021-05-24 PROCEDURE — 83540 ASSAY OF IRON: CPT

## 2021-05-24 PROCEDURE — 82306 VITAMIN D 25 HYDROXY: CPT

## 2021-05-24 PROCEDURE — 80069 RENAL FUNCTION PANEL: CPT

## 2021-05-24 PROCEDURE — 84466 ASSAY OF TRANSFERRIN: CPT

## 2021-05-24 PROCEDURE — 82043 UR ALBUMIN QUANTITATIVE: CPT

## 2021-05-24 PROCEDURE — 82728 ASSAY OF FERRITIN: CPT

## 2021-05-24 PROCEDURE — 85027 COMPLETE CBC AUTOMATED: CPT

## 2021-05-24 PROCEDURE — 83970 ASSAY OF PARATHORMONE: CPT

## 2021-05-24 PROCEDURE — 36415 COLL VENOUS BLD VENIPUNCTURE: CPT

## 2021-06-01 ENCOUNTER — OFFICE VISIT (OUTPATIENT)
Dept: INTERNAL MEDICINE CLINIC | Age: 71
End: 2021-06-01
Payer: MEDICARE

## 2021-06-01 VITALS
DIASTOLIC BLOOD PRESSURE: 90 MMHG | HEART RATE: 78 BPM | OXYGEN SATURATION: 95 % | WEIGHT: 284.2 LBS | TEMPERATURE: 96.5 F | BODY MASS INDEX: 47.29 KG/M2 | SYSTOLIC BLOOD PRESSURE: 132 MMHG

## 2021-06-01 DIAGNOSIS — N18.30 TYPE 2 DIABETES MELLITUS WITH STAGE 3 CHRONIC KIDNEY DISEASE, WITHOUT LONG-TERM CURRENT USE OF INSULIN, UNSPECIFIED WHETHER STAGE 3A OR 3B CKD (HCC): ICD-10-CM

## 2021-06-01 DIAGNOSIS — M51.37 DEGENERATION OF LUMBAR OR LUMBOSACRAL INTERVERTEBRAL DISC: ICD-10-CM

## 2021-06-01 DIAGNOSIS — E11.22 TYPE 2 DIABETES MELLITUS WITH STAGE 3 CHRONIC KIDNEY DISEASE, WITHOUT LONG-TERM CURRENT USE OF INSULIN, UNSPECIFIED WHETHER STAGE 3A OR 3B CKD (HCC): ICD-10-CM

## 2021-06-01 DIAGNOSIS — I50.33 ACUTE ON CHRONIC DIASTOLIC HEART FAILURE (HCC): ICD-10-CM

## 2021-06-01 DIAGNOSIS — E66.01 MORBID OBESITY (HCC): ICD-10-CM

## 2021-06-01 DIAGNOSIS — N18.30 CHRONIC RENAL IMPAIRMENT, STAGE 3 (MODERATE), UNSPECIFIED WHETHER STAGE 3A OR 3B CKD (HCC): Primary | ICD-10-CM

## 2021-06-01 PROCEDURE — 99214 OFFICE O/P EST MOD 30 MIN: CPT | Performed by: INTERNAL MEDICINE

## 2021-06-01 RX ORDER — DULAGLUTIDE 3 MG/.5ML
3 INJECTION, SOLUTION SUBCUTANEOUS WEEKLY
Qty: 12 PEN | Refills: 1 | Status: SHIPPED | OUTPATIENT
Start: 2021-06-01 | End: 2021-07-14 | Stop reason: SDUPTHER

## 2021-06-01 RX ORDER — DULAGLUTIDE 1.5 MG/.5ML
3 INJECTION, SOLUTION SUBCUTANEOUS
Qty: 8 PEN | Refills: 1 | Status: CANCELLED | OUTPATIENT
Start: 2021-06-01

## 2021-06-01 ASSESSMENT — PATIENT HEALTH QUESTIONNAIRE - PHQ9
2. FEELING DOWN, DEPRESSED OR HOPELESS: 0
1. LITTLE INTEREST OR PLEASURE IN DOING THINGS: 0
SUM OF ALL RESPONSES TO PHQ9 QUESTIONS 1 & 2: 0
SUM OF ALL RESPONSES TO PHQ QUESTIONS 1-9: 0

## 2021-06-01 NOTE — PROGRESS NOTES
Subjective:      Patient ID: Ann Avery is a 72 y.o. male. Diabetes    Hypertension     Recent hospitalization for heart failure. Diabetes  Treatment Adherence:   Medication compliance:  noncompliant: with meals  Diet compliance:  compliant most of the time  Weight trend: stable  Current exercise: no regular exercise  Barriers: lack of motivation    Diabetes Mellitus Type 2: Current symptoms/problems include none. Home blood sugar records: patient does not test  Any episodes of hypoglycemia? no  Eye exam current (within one year): no  Tobacco history: He  reports that he quit smoking about 20 years ago. His smoking use included cigars. He started smoking about 41 years ago. He has a 5.25 pack-year smoking history. He has quit using smokeless tobacco.   Daily Aspirin? Yes    Hypertension:  Home blood pressure monitoring: No.  He is not adherent to a low sodium diet. Patient denies chest pain, shortness of breath, headache, lightheadedness, blurred vision and dry cough. Antihypertensive medication side effects: no medication side effects noted. Use of agents associated with hypertension: none. Hyperlipidemia:  No new myalgias or GI upset on atorvastatin (Lipitor). Lab Results   Component Value Date    LABA1C 5.7 02/10/2021    LABA1C 6.6 09/29/2020     Lab Results   Component Value Date    LABMICR 204.00 (H) 05/24/2021    CREATININE 2.4 (H) 05/24/2021     Lab Results   Component Value Date    ALT 17 04/05/2021    AST 23 04/05/2021     Lab Results   Component Value Date    CHOL 201 (H) 11/14/2011    TRIG 123 11/14/2011    HDL 55 11/14/2011    LDLCALC 122 (H) 11/14/2011        CKD:    Ann Avery is a 72 y.o. male who presents for follow-up of chronic renal disease caused by hypertension and diabetes.  Treatments have been prescribed for slowing the progression of CRF include cardiovascular risk factor reduction including advanced age (older than 54 for men, 72 for women), diabetes mellitus, dyslipidemia, hypertension, obesity (BMI >= 30 kg/m2) and sedentary lifestyle, glycemic control, ACE inhibitor therapy, blood pressure control and recognition and treatment of hypoglycemia. Patient has significant obesity and reports that he really does want to lose weight. He states he eats quite a bit. He has had success with weight loss in the past.    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    Past Medical History:   Diagnosis Date    Cancer [facility])     prostate    Hyperlipidemia     Hypertension     Obesity     Osteoarthritis     Type 2 diabetes mellitus without complication ([facility] [address]     Type 2 diabetes mellitus without complication, without long-term current use of insulin [facility] 2018     Past Surgical History:   Procedure Laterality Date    JOINT REPLACEMENT      TOTAL HIP ARTHROPLASTY       Family History   Problem Relation Age of Onset    Hypertension Mother     Diabetes Mother     Hypertension Father      Social History     Socioeconomic History    Marital status: Single     Spouse name: Not on file    Number of children: 2    Years of education: Not on file    Highest education level: High school graduate   Occupational History    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 He has 2 adult children.       Social Determinants of Health     Financial Resource Strain: Medium Risk    Difficulty of Paying Living Expenses: Somewhat hard   Food Insecurity: No Food Insecurity    Worried About Running Out of Food in the Last Year: Never true    Ran Out of Food in the Last Year: Never true   Transportation Needs: No Transportation Needs    Lack of Transportation (Medical): No    Lack of Transportation (Non-Medical): No   Physical Activity:     Days of Exercise per Week:     Minutes of Exercise per Session:    Stress: No Stress Concern Present    Feeling of Stress : Not at all   Social Connections: Moderately Integrated    Frequency of Communication with Friends and Family: More than three times a week    Frequency of Social Gatherings with Friends and Family: More than three times a week    Attends Anglican Services: 1 to 4 times per year    Active Member of [employer] or Organizations: Not on file    Attends Club or Organization Meetings: More than 4 times per year    Marital Status:    Intimate Partner Violence: Not At Risk    Fear of Current or Ex-Partner: No    Emotionally Abused: No    Physically Abused: No    Sexually Abused: No   \  Review of Systems   Constitutional: Negative. HENT: Negative. Eyes: Negative. Respiratory: Negative. Cardiovascular: Negative. Gastrointestinal: Negative. Endocrine: Negative. Musculoskeletal: Negative. Skin: Negative. Allergic/Immunologic: Negative. Neurological: Negative. Hematological: Negative. Psychiatric/Behavioral: Negative. All other systems reviewed and are negative. .    Allergies   Allergen Reactions    Indomethacin Other (See Comments)     Dr. Vickie Mobley told him not to take because affects the bladder.       Statins Other (See Comments)    Unable To Assess       Anesthesia that begins with S- stops my breathing    Actos [Pioglitazone] Rash       Current Outpatient Medications   Medication Sig Dispense Refill    cetirizine (ZYRTEC) 5 MG tablet TAKE 1 TABLET BY MOUTH DAILY 90 tablet 1    furosemide (LASIX) 40 MG tablet Take 1 tablet by mouth 2 times daily 225 tablet 0    Meals on Wheels MISC by Does not apply route Patient is to receive 2-3 diabetic meals only 1 each 0    alendronate (FOSAMAX) 70 MG tablet TAKE 1 TABLET BY MOUTH EVERY 7 DAYS 12 tablet 1    famotidine (PEPCID) 20 MG tablet Take 1 tablet by mouth daily 60 tablet 0    Dulaglutide (TRULICITY) 1.5 NG/6.5QC SOPN Inject 3 mg into the skin every 7 days 8 pen 0    metoprolol succinate (TOPROL XL) 100 MG extended release tablet Take 1 tablet by mouth daily 90 tablet 1    atorvastatin (LIPITOR) 40 MG tablet Take 1 tablet by mouth nightly 90 tablet 0    amLODIPine (NORVASC) 10 MG tablet Take 1 tablet by mouth daily 90 tablet 0    aspirin 81 MG EC tablet Take 81 mg by mouth daily      Acetaminophen (TYLENOL ARTHRITIS PAIN PO) Take by mouth OTC      HYDROcodone-acetaminophen (NORCO)  MG per tablet Take 1 tablet by mouth 3 times daily as needed (Pain). Pain specialist       Insulin Pen Needle ([employer] PEN NEEDLES) 31G X 6 MM MISC 1 each by Does not apply route daily 100 each 3    glimepiride (AMARYL) 4 MG tablet Take 4 mg by mouth every morning (before breakfast)      vitamin B-6 (PYRIDOXINE) 50 MG tablet Take 50 mg by mouth daily      ergocalciferol (ERGOCALCIFEROL) 05199 UNITS capsule Take 50,000 Units by mouth once a week.  Cyanocobalamin (VITAMIN B 12 PO) Take  by mouth daily.  miconazole (MICOTIN) 2 % powder Apply topically 2 times daily. (Patient not taking: Reported on 6/1/2021) 45 g 1    ammonium lactate (AMLACTIN) 12 % cream Apply topically as needed (Patient not taking: Reported on 6/1/2021)      salsalate (DISALCID) 750 MG tablet Take 750 mg by mouth 2 times daily as needed (Patient not taking: Reported on 6/1/2021)       No current facility-administered medications for this visit.        Vitals:    06/01/21 1644   BP: (!) 132/90   Pulse: 78   Temp: 96.5 °F (35.8 °C)   SpO2: 95%   Weight: 284 lb 3.2 oz (128.9 kg)     Body mass index is 47.29 kg/m². Wt Readings from Last 3 Encounters:   06/01/21 284 lb 3.2 oz (128.9 kg)   05/14/21 286 lb (129.7 kg)   05/04/21 283 lb (128.4 kg)     BP Readings from Last 3 Encounters:   06/01/21 (!) 132/90   05/14/21 118/80   05/04/21 124/78         Objective:   Physical Exam  Vitals and nursing note reviewed. Constitutional:       General: He is not in acute distress. Appearance: He is well-developed. He is obese. He is not ill-appearing or diaphoretic. HENT:      Head: Normocephalic and atraumatic. Right Ear: Tympanic membrane, ear canal and external ear normal.      Left Ear: Tympanic membrane, ear canal and external ear normal.      Nose: Nose normal.      Mouth/Throat:      Mouth: Mucous membranes are moist.      Pharynx: Oropharynx is clear. No oropharyngeal exudate. Eyes:      General: No scleral icterus. Right eye: No discharge. Left eye: No discharge. Extraocular Movements: Extraocular movements intact. Conjunctiva/sclera: Conjunctivae normal.      Pupils: Pupils are equal, round, and reactive to light. Neck:      Thyroid: No thyromegaly. Vascular: No JVD. Trachea: No tracheal deviation. Cardiovascular:      Rate and Rhythm: Normal rate and regular rhythm. Pulses: Normal pulses. Heart sounds: Normal heart sounds. No murmur heard. Pulmonary:      Effort: Pulmonary effort is normal. No respiratory distress. Breath sounds: Normal breath sounds. No stridor. No wheezing or rales. Abdominal:      General: Bowel sounds are normal. There is no distension. Palpations: Abdomen is soft. There is no mass. Tenderness: There is no abdominal tenderness. There is no guarding or rebound. Musculoskeletal:         General: No tenderness. Normal range of motion. Cervical back: Normal range of motion and neck supple. Lymphadenopathy:      Cervical: No cervical adenopathy. Skin:     General: Skin is warm and dry. Capillary Refill: Capillary refill takes less than 2 seconds. Findings: No erythema or rash. Neurological:      General: No focal deficit present. Mental Status: He is alert and oriented to person, place, and time. Cranial Nerves: No cranial nerve deficit. Sensory: No sensory deficit. Motor: No abnormal muscle tone. Coordination: Coordination normal.      Deep Tendon Reflexes: Reflexes are normal and symmetric. Reflexes normal.   Psychiatric:         Mood and Affect: Mood normal.         Behavior: Behavior normal.         Thought Content:  Thought content normal.         Judgment: Judgment normal.         Lab Review   Hospital Outpatient Visit on 05/24/2021   Component Date Value    Sodium 05/24/2021 144     Potassium 05/24/2021 4.3     Chloride 05/24/2021 105     CO2 05/24/2021 21     Anion Gap 05/24/2021 18*    Glucose 05/24/2021 93     BUN 05/24/2021 17     CREATININE 05/24/2021 2.4*    GFR Non- 05/24/2021 27*    GFR  05/24/2021 33*    Calcium 05/24/2021 9.6     Phosphorus 05/24/2021 2.7     Albumin 05/24/2021 4.1     Vit D, 25-Hydroxy 05/24/2021 37.5     WBC 05/24/2021 7.2     RBC 05/24/2021 4.67     Hemoglobin 05/24/2021 15.1     Hematocrit 05/24/2021 45.4     MCV 05/24/2021 97.1     MCH 05/24/2021 32.4     MCHC 05/24/2021 33.4     RDW 05/24/2021 14.8     Platelets 71/65/7068 338     MPV 05/24/2021 7.9     PTH 05/24/2021 143.0*    Microalbumin, Random Uri* 05/24/2021 204.00*    Creatinine, Ur 05/24/2021 163.6     Microalbumin Creatinine * 05/24/2021 1246.9*    Iron 05/24/2021 102     TIBC 05/24/2021 268     Iron Saturation 05/24/2021 38     Ferritin 05/24/2021 110.5     Transferrin 05/24/2021 219.0    Hospital Outpatient Visit on 05/11/2021   Component Date Value    Sodium 05/11/2021 141     Potassium 05/11/2021 5.5*    Chloride 05/11/2021 104     CO2 05/11/2021 19*    Anion Gap 05/11/2021 18*    Glucose 3b CKD ([facility]  -     dapagliflozin (FARXIGA) 10 MG tablet; Take 1 tablet by mouth every morning (Patient taking differently: Take 10 mg by mouth every morning Has not started this medication)  -     Renal Function Panel; Future        - -     dapagliflozin (FARXIGA) 10 MG tablet; Take 1 tablet by mouth every morning - to improve renal function, started with 5 mg sample #21     Morbid obesity (HCC)  -     Dulaglutide (TRULICITY) 3 IZ/0.6ZZ SOPN; Inject 3 mg into the skin once a week    Type 2 diabetes mellitus with stage 3 chronic kidney disease, without long-term current use of insulin, unspecified whether stage 3a or 3b CKD ([facility])  -     Dulaglutide (TRULICITY) 3 UI/9.2YR SOPN; Inject 3 mg into the skin once a week  -     dapagliflozin (FARXIGA) 10 MG tablet; Take 1 tablet by mouth every morning (Patient taking differently: Take 10 mg by mouth every morning Has not started this medication)    Acute on chronic diastolic heart failure (HCC)  - added farxiga    Degeneration of lumbar or lumbosacral intervertebral disc    Return in about 3 months (around 9/1/2021) for Diabetes(OVE)/CKD.

## 2021-06-03 ENCOUNTER — TELEPHONE (OUTPATIENT)
Dept: ADMINISTRATIVE | Age: 71
End: 2021-06-03

## 2021-06-03 NOTE — TELEPHONE ENCOUNTER
Submitted PA for Farxiga 10MG tablets  [facility]ia [facility] Key: [id number] - PA Case ID: [id number]   STATUS: Approved  5/4/21*6/3/22      . Please notify patient, thank you.

## 2021-06-11 ENCOUNTER — ANESTHESIA EVENT (OUTPATIENT)
Dept: OPERATING ROOM | Age: 71
End: 2021-06-11
Payer: MEDICARE

## 2021-06-15 ENCOUNTER — TELEPHONE (OUTPATIENT)
Dept: INTERNAL MEDICINE CLINIC | Age: 71
End: 2021-06-15

## 2021-06-15 NOTE — PROGRESS NOTES
Preoperative Screening for Elective Surgery/Invasive Procedures While COVID-19 present in the community     Have you had any of the following symptoms? NONE  o Fever, chills  o Cough  o Shortness of breath  o Muscle aches/pain  o Diarrhea  o Abdominal pain, nausea, vomiting  o Loss or decrease in taste and / or smell   Risk of Exposure  o Have you recently been hospitalized for COVID-19 or flu-like illness, if so when? NO  o Recently diagnosed with COVID-19, if so when? NO  o Recently tested for COVID-19, if so when? NO  o Have you been in close contact with a person or family member who currently has or recently had COVID-23? If yes, when and in what context? NO  o Do you live with anybody who in the last 14 days has had fever, chills, shortness of breath, muscle aches, flu-like illness? NO  o Do you have any close contacts or family members who are currently in the hospital for COVID-19 or flu-like illness? If yes, assess recent close contact with this person. NO    Indicate if the patient has a positive screen by answering yes to one or more of the above questions. Patients who test positive or screen positive prior to surgery or on the day of surgery should be evaluated in conjunction with the surgeon/proceduralist/anesthesiologist to determine the urgency of the procedure.    2ND COVID VACCINE 6/7/21

## 2021-06-15 NOTE — TELEPHONE ENCOUNTER
----- Message from Rodger Cabelloutant sent at 6/15/2021 12:41 PM EDT -----  Subject: Appointment Request    Reason for Call: Routine Pre-Op    QUESTIONS  Type of Appointment? Established Patient  Reason for appointment request? Available appointments did not meet   patient need  Additional Information for Provider? Patient having surgery on 06/21/2021   with Dr. Brandon Zaidi at [facility], needs pre op appt, and Labs,   screened green  ---------------------------------------------------------------------------  --------------  CALL BACK INFO  What is the best way for the office to contact you? OK to leave message on   voicemail  Preferred Call Back Phone Number? [phone]  ---------------------------------------------------------------------------  --------------  SCRIPT ANSWERS  Relationship to Patient? Self  Appointment reason? Symptomatic  Select script based on patient symptoms? Adult Pre-Op  Do you have question for your provider that need to be answered prior to   scheduling your pre-op appointment? No  Have you been diagnosed with, awaiting test results for, or told that you   are suspected of having COVID-19 (Coronavirus)? (If patient has tested   negative or was tested as a requirement for work, school, or travel and   not based on symptoms, answer no)? No  Do you currently have flu-like symptoms including fever or chills, cough,   shortness of breath, difficulty breathing, or new loss of taste or smell? No  Have you had close contact with someone with COVID-19 in the last 14 days? No  (Service Expert  click yes below to proceed with [employer]   Scheduling)?  Yes

## 2021-06-18 ENCOUNTER — OFFICE VISIT (OUTPATIENT)
Dept: INTERNAL MEDICINE CLINIC | Age: 71
End: 2021-06-18
Payer: MEDICARE

## 2021-06-18 VITALS
BODY MASS INDEX: 47.26 KG/M2 | TEMPERATURE: 97.1 F | HEART RATE: 82 BPM | OXYGEN SATURATION: 95 % | SYSTOLIC BLOOD PRESSURE: 126 MMHG | DIASTOLIC BLOOD PRESSURE: 82 MMHG | WEIGHT: 284 LBS

## 2021-06-18 DIAGNOSIS — N18.30 CHRONIC RENAL IMPAIRMENT, STAGE 3 (MODERATE), UNSPECIFIED WHETHER STAGE 3A OR 3B CKD (HCC): ICD-10-CM

## 2021-06-18 DIAGNOSIS — C61 PROSTATE CANCER (HCC): Primary | ICD-10-CM

## 2021-06-18 DIAGNOSIS — Z01.818 PRE-OP EXAM: ICD-10-CM

## 2021-06-18 LAB
ALBUMIN SERPL-MCNC: 4.2 G/DL (ref 3.4–5)
ANION GAP SERPL CALCULATED.3IONS-SCNC: 13 MMOL/L (ref 3–16)
BUN BLDV-MCNC: 19 MG/DL (ref 7–20)
CALCIUM SERPL-MCNC: 9.6 MG/DL (ref 8.3–10.6)
CHLORIDE BLD-SCNC: 103 MMOL/L (ref 99–110)
CO2: 25 MMOL/L (ref 21–32)
CREAT SERPL-MCNC: 2.2 MG/DL (ref 0.8–1.3)
GFR AFRICAN AMERICAN: 36
GFR NON-AFRICAN AMERICAN: 30
GLUCOSE BLD-MCNC: 108 MG/DL (ref 70–99)
PHOSPHORUS: 3 MG/DL (ref 2.5–4.9)
POTASSIUM SERPL-SCNC: 4.9 MMOL/L (ref 3.5–5.1)
SODIUM BLD-SCNC: 141 MMOL/L (ref 136–145)

## 2021-06-18 PROCEDURE — 99213 OFFICE O/P EST LOW 20 MIN: CPT | Performed by: NURSE PRACTITIONER

## 2021-06-18 RX ORDER — PIOGLITAZONEHYDROCHLORIDE 30 MG/1
30 TABLET ORAL DAILY
Qty: 90 TABLET | Refills: 0 | OUTPATIENT
Start: 2021-06-18

## 2021-06-18 NOTE — PROGRESS NOTES
Subjective:      Bebeto Lehman is a 72 y.o. male who presents to the office today for a preoperative consultation at the request of surgeon Dr. Natividad Oshea who plans on performing Transurethral,cystocopy on June 21. This consultation is requested for the specific conditions prompting preoperative evaluation (i.e. because of potential affect on operative risk): hypertension. Planned anesthesia is General.  The patient has the following known anesthesia issues: none  Patient has a bleeding risk of : no recent abnormal bleeding  Patient does not have objection to receiving blood products if needed. Patient's medications, allergies, past medical, surgical, social and family histories were reviewed and updated as appropriate. Review of Systems  Pertinent items are noted in HPI.    Vitals:    06/18/21 1145   BP: 126/82   Pulse: 82   Temp: 97.1 °F (36.2 °C)   SpO2: 95%     BP Readings from Last 3 Encounters:   06/18/21 126/82   06/01/21 (!) 132/90   05/14/21 118/80      Objective:      /82   Pulse 82   Temp 97.1 °F (36.2 °C)   Wt 284 lb (128.8 kg)   SpO2 95%   BMI 47.26 kg/m²     General Appearance:  Alert, cooperative, no distress, appears stated age   Head:  Normocephalic, without obvious abnormality, atraumatic   Eyes:  PERRL, conjunctiva/corneas clear, EOM's intact, fundi benign, both eyes   Ears:  Normal TM's and external ear canals, both ears   Nose: Nares normal, septum midline, mucosa normal, no drainage or sinus tenderness   Throat: Lips, mucosa, and tongue normal; teeth and gums normal   Neck: Supple, symmetrical, trachea midline, no adenopathy, thyroid: not enlarged, symmetric, no tenderness/mass/nodules, no carotid bruit or JVD   Back:   Symmetric, no curvature, ROM normal, no CVA tenderness   Lungs:   Clear to auscultation bilaterally, respirations unlabored   Chest Wall:  No tenderness or deformity   Heart:  Regular rate and rhythm, S1, S2 normal, no murmur, rub or gallop   Abdomen:   Soft, non-tender, bowel sounds active all four quadrants,  no masses, no organomegaly   Genitalia:  Normal male   Rectal:  Normal tone, normal prostate, no masses or tenderness;  guaiac negative stool   Extremities: Extremities normal, atraumatic, no cyanosis or edema   Pulses: 2+ and symmetric   Skin: Skin color, texture, turgor normal, no rashes or lesions   Lymph nodes: Cervical, supraclavicular, and axillary nodes normal   Neurologic: Normal         Predictors of intubation difficulty:   Morbid obesity? yes - 47.26   Anatomically abnormal facies? no   Prominent incisors? no   Receding mandible? no   Short, thick neck? no   Neck range of motion: normal   Mallampati score:  I (soft palate, uvula, fauces, tonsillar pillars visible)   Thyromental distance: < 6cm   Mouth openin cm   Dentition: Missing teeth (upper dentures)    Cardiographics  ECG: sinus wit premature pvc's  Echocardiogram: reviewed by cardiology    Imaging  Chest X-Ray: not indicated     Lab Review   Hospital Outpatient Visit on 2021   Component Date Value    Sodium 2021 144     Potassium 2021 4.3     Chloride 2021 105     CO2 2021 21     Anion Gap 2021 18*    Glucose 2021 93     BUN 2021 17     CREATININE 2021 2.4*    GFR Non- 2021 27*    GFR  2021 33*    Calcium 2021 9.6     Phosphorus 2021 2.7     Albumin 2021 4.1     Vit D, 25-Hydroxy 2021 37.5     WBC 2021 7.2     RBC 2021 4.67     Hemoglobin 2021 15.1     Hematocrit 2021 45.4     MCV 2021 97.1     MCH 2021 32.4     MCHC 2021 33.4     RDW 2021 14.8     Platelets  338     MPV 2021 7.9     PTH 2021 143.0*    Microalbumin, Random Uri* 2021 204.00*    Creatinine, Ur 2021 163.6     Microalbumin Creatinine * 2021 1246.9*    Iron 2021 102     TIBC 2021 268  Iron Saturation 05/24/2021 38     Ferritin 05/24/2021 110.5     Transferrin 05/24/2021 219.0    Hospital Outpatient Visit on 05/11/2021   Component Date Value    Sodium 05/11/2021 141     Potassium 05/11/2021 5.5*    Chloride 05/11/2021 104     CO2 05/11/2021 19*    Anion Gap 05/11/2021 18*    Glucose 05/11/2021 61*    BUN 05/11/2021 30*    CREATININE 05/11/2021 2.5*    GFR Non- 05/11/2021 26*    GFR  05/11/2021 31*    Calcium 05/11/2021 9.4     Pro-BNP 05/11/2021 194*   Hospital Outpatient Visit on 05/05/2021   Component Date Value    Sodium 05/05/2021 143     Potassium 05/05/2021 4.2     Chloride 05/05/2021 105     CO2 05/05/2021 25     Anion Gap 05/05/2021 13     Glucose 05/05/2021 96     BUN 05/05/2021 27*    CREATININE 05/05/2021 2.1*    GFR Non- 05/05/2021 31*    GFR  05/05/2021 38*    Calcium 05/05/2021 9.4     Pro-BNP 05/05/2021 356*   Hospital Outpatient Visit on 04/30/2021   Component Date Value    Sodium 04/30/2021 146*    Potassium 04/30/2021 4.4     Chloride 04/30/2021 105     CO2 04/30/2021 26     Anion Gap 04/30/2021 15     Glucose 04/30/2021 115*    BUN 04/30/2021 33*    CREATININE 04/30/2021 2.6*    GFR Non- 04/30/2021 25*    GFR  04/30/2021 30*    Calcium 04/30/2021 9.4     Pro-BNP 04/30/2021 242*   Hospital Outpatient Visit on 04/19/2021   Component Date Value    Sodium 04/19/2021 142     Potassium 04/19/2021 4.0     Chloride 04/19/2021 101     CO2 04/19/2021 27     Anion Gap 04/19/2021 14     Glucose 04/19/2021 97     BUN 04/19/2021 24*    CREATININE 04/19/2021 2.4*    GFR Non- 04/19/2021 27*    GFR  04/19/2021 33*    Calcium 04/19/2021 9.2     Pro-BNP 04/19/2021 394*   No results displayed because visit has over 200 results.       Hospital Outpatient Visit on 02/10/2021   Component Date Value    Hemoglobin A1C 02/10/2021 5.7     eAG 02/10/2021 116.9    Office Visit on 01/12/2021   Component Date Value    SARS-CoV-2, GASPER 01/12/2021 NOT DETECTED          Assessment:        72 y.o. male with planned surgery as above. Known risk factors for perioperative complications: Coronary disease    Difficulty with intubation is not anticipated. Cardiac Risk Estimation: per the Revised Cardiac Risk Index (Circ. 100:1043, 1999), the patient's risk factors for cardiac complications include none putting him in: RCI RISK CLASS I (0 risk factors, risk of major cardiac compl. appr. 0.5%)    Current medications which may produce withdrawal symptoms if withheld perioperatively: none       Plan:      1. Preoperative workup as follows ECG, hemoglobin, hematocrit, electrolytes  2. Change in medication regimen before surgery: none, continue med regimen including morning of surgery, w/sip of water  3. Prophylaxis for cardiac events with perioperative beta-blockers: should be considered, specific regimen per anesthesia  4. Invasive hemodynamic monitoring perioperatively: at the discretion of anesthesiologist  5. Deep vein thrombosis prophylaxis postoperatively:regimen to be chosen by surgical team  6. Surveillance for postoperative MI with ECG immediately postoperatively and on postoperative days 1 and 2 AND troponin levels 24 hours postoperatively and on day 4 or hospital discharge (whichever comes first): at the discretion of anesthesiologist  7.  Other measures: none

## 2021-06-21 ENCOUNTER — ANESTHESIA (OUTPATIENT)
Dept: OPERATING ROOM | Age: 71
End: 2021-06-21
Payer: MEDICARE

## 2021-06-21 ENCOUNTER — HOSPITAL ENCOUNTER (OUTPATIENT)
Age: 71
Setting detail: OUTPATIENT SURGERY
Discharge: HOME | End: 2021-06-21
Attending: UROLOGY | Admitting: UROLOGY
Payer: MEDICARE

## 2021-06-21 ENCOUNTER — APPOINTMENT (OUTPATIENT)
Dept: GENERAL RADIOLOGY | Age: 71
End: 2021-06-21
Attending: UROLOGY
Payer: MEDICARE

## 2021-06-21 VITALS
DIASTOLIC BLOOD PRESSURE: 86 MMHG | TEMPERATURE: 97 F | OXYGEN SATURATION: 95 % | SYSTOLIC BLOOD PRESSURE: 130 MMHG | WEIGHT: 279 LBS | HEART RATE: 78 BPM | BODY MASS INDEX: 46.48 KG/M2 | RESPIRATION RATE: 16 BRPM | HEIGHT: 65 IN

## 2021-06-21 VITALS
OXYGEN SATURATION: 100 % | DIASTOLIC BLOOD PRESSURE: 80 MMHG | SYSTOLIC BLOOD PRESSURE: 150 MMHG | RESPIRATION RATE: 1 BRPM

## 2021-06-21 LAB
ANION GAP SERPL CALCULATED.3IONS-SCNC: 15 MMOL/L (ref 3–16)
BUN BLDV-MCNC: 26 MG/DL (ref 7–20)
CALCIUM SERPL-MCNC: 9.4 MG/DL (ref 8.3–10.6)
CHLORIDE BLD-SCNC: 102 MMOL/L (ref 99–110)
CO2: 22 MMOL/L (ref 21–32)
CREAT SERPL-MCNC: 2.4 MG/DL (ref 0.8–1.3)
GFR AFRICAN AMERICAN: 33
GFR NON-AFRICAN AMERICAN: 27
GLUCOSE BLD-MCNC: 118 MG/DL (ref 70–99)
GLUCOSE BLD-MCNC: 121 MG/DL (ref 70–99)
GLUCOSE BLD-MCNC: 132 MG/DL (ref 70–99)
PERFORMED ON: ABNORMAL
PERFORMED ON: ABNORMAL
POTASSIUM REFLEX MAGNESIUM: 4.2 MMOL/L (ref 3.5–5.1)
SODIUM BLD-SCNC: 139 MMOL/L (ref 136–145)

## 2021-06-21 PROCEDURE — 2580000003 HC RX 258: Performed by: UROLOGY

## 2021-06-21 PROCEDURE — 80048 BASIC METABOLIC PNL TOTAL CA: CPT

## 2021-06-21 PROCEDURE — 6360000002 HC RX W HCPCS

## 2021-06-21 PROCEDURE — 2580000003 HC RX 258: Performed by: ANESTHESIOLOGY

## 2021-06-21 PROCEDURE — 6360000002 HC RX W HCPCS: Performed by: ANESTHESIOLOGY

## 2021-06-21 PROCEDURE — 3600000014 HC SURGERY LEVEL 4 ADDTL 15MIN: Performed by: UROLOGY

## 2021-06-21 PROCEDURE — 3700000000 HC ANESTHESIA ATTENDED CARE: Performed by: UROLOGY

## 2021-06-21 PROCEDURE — 3700000001 HC ADD 15 MINUTES (ANESTHESIA): Performed by: UROLOGY

## 2021-06-21 PROCEDURE — 6360000002 HC RX W HCPCS: Performed by: UROLOGY

## 2021-06-21 PROCEDURE — 6370000000 HC RX 637 (ALT 250 FOR IP): Performed by: ANESTHESIOLOGY

## 2021-06-21 PROCEDURE — C1758 CATHETER, URETERAL: HCPCS | Performed by: UROLOGY

## 2021-06-21 PROCEDURE — 7100000011 HC PHASE II RECOVERY - ADDTL 15 MIN: Performed by: UROLOGY

## 2021-06-21 PROCEDURE — 2500000003 HC RX 250 WO HCPCS: Performed by: ANESTHESIOLOGY

## 2021-06-21 PROCEDURE — 3600000004 HC SURGERY LEVEL 4 BASE: Performed by: UROLOGY

## 2021-06-21 PROCEDURE — 7100000001 HC PACU RECOVERY - ADDTL 15 MIN: Performed by: UROLOGY

## 2021-06-21 PROCEDURE — 7100000000 HC PACU RECOVERY - FIRST 15 MIN: Performed by: UROLOGY

## 2021-06-21 PROCEDURE — 7100000010 HC PHASE II RECOVERY - FIRST 15 MIN: Performed by: UROLOGY

## 2021-06-21 PROCEDURE — 6360000002 HC RX W HCPCS: Performed by: NURSE ANESTHETIST, CERTIFIED REGISTERED

## 2021-06-21 PROCEDURE — 2709999900 HC NON-CHARGEABLE SUPPLY: Performed by: UROLOGY

## 2021-06-21 PROCEDURE — 2500000003 HC RX 250 WO HCPCS: Performed by: NURSE ANESTHETIST, CERTIFIED REGISTERED

## 2021-06-21 PROCEDURE — C1769 GUIDE WIRE: HCPCS | Performed by: UROLOGY

## 2021-06-21 RX ORDER — SODIUM CHLORIDE 0.9 % (FLUSH) 0.9 %
10 SYRINGE (ML) INJECTION EVERY 12 HOURS SCHEDULED
Status: DISCONTINUED | OUTPATIENT
Start: 2021-06-21 | End: 2021-06-21 | Stop reason: HOSPADM

## 2021-06-21 RX ORDER — ONDANSETRON 2 MG/ML
INJECTION INTRAMUSCULAR; INTRAVENOUS PRN
Status: DISCONTINUED | OUTPATIENT
Start: 2021-06-21 | End: 2021-06-21 | Stop reason: SDUPTHER

## 2021-06-21 RX ORDER — MEPERIDINE HYDROCHLORIDE 50 MG/ML
12.5 INJECTION INTRAMUSCULAR; INTRAVENOUS; SUBCUTANEOUS EVERY 5 MIN PRN
Status: DISCONTINUED | OUTPATIENT
Start: 2021-06-21 | End: 2021-06-21 | Stop reason: HOSPADM

## 2021-06-21 RX ORDER — LIDOCAINE HYDROCHLORIDE 20 MG/ML
INJECTION, SOLUTION INFILTRATION; PERINEURAL PRN
Status: DISCONTINUED | OUTPATIENT
Start: 2021-06-21 | End: 2021-06-21 | Stop reason: SDUPTHER

## 2021-06-21 RX ORDER — PROMETHAZINE HYDROCHLORIDE 25 MG/ML
6.25 INJECTION, SOLUTION INTRAMUSCULAR; INTRAVENOUS
Status: DISCONTINUED | OUTPATIENT
Start: 2021-06-21 | End: 2021-06-21 | Stop reason: HOSPADM

## 2021-06-21 RX ORDER — FENTANYL CITRATE 50 UG/ML
INJECTION, SOLUTION INTRAMUSCULAR; INTRAVENOUS PRN
Status: DISCONTINUED | OUTPATIENT
Start: 2021-06-21 | End: 2021-06-21 | Stop reason: SDUPTHER

## 2021-06-21 RX ORDER — PROPOFOL 10 MG/ML
INJECTION, EMULSION INTRAVENOUS PRN
Status: DISCONTINUED | OUTPATIENT
Start: 2021-06-21 | End: 2021-06-21 | Stop reason: SDUPTHER

## 2021-06-21 RX ORDER — MORPHINE SULFATE 2 MG/ML
2 INJECTION, SOLUTION INTRAMUSCULAR; INTRAVENOUS EVERY 5 MIN PRN
Status: DISCONTINUED | OUTPATIENT
Start: 2021-06-21 | End: 2021-06-21 | Stop reason: HOSPADM

## 2021-06-21 RX ORDER — DEXAMETHASONE SODIUM PHOSPHATE 4 MG/ML
INJECTION, SOLUTION INTRA-ARTICULAR; INTRALESIONAL; INTRAMUSCULAR; INTRAVENOUS; SOFT TISSUE PRN
Status: DISCONTINUED | OUTPATIENT
Start: 2021-06-21 | End: 2021-06-21 | Stop reason: SDUPTHER

## 2021-06-21 RX ORDER — MORPHINE SULFATE 2 MG/ML
1 INJECTION, SOLUTION INTRAMUSCULAR; INTRAVENOUS EVERY 5 MIN PRN
Status: DISCONTINUED | OUTPATIENT
Start: 2021-06-21 | End: 2021-06-21 | Stop reason: HOSPADM

## 2021-06-21 RX ORDER — ALBUTEROL SULFATE 2.5 MG/3ML
SOLUTION RESPIRATORY (INHALATION)
Status: COMPLETED
Start: 2021-06-21 | End: 2021-06-21

## 2021-06-21 RX ORDER — SODIUM CHLORIDE, SODIUM LACTATE, POTASSIUM CHLORIDE, CALCIUM CHLORIDE 600; 310; 30; 20 MG/100ML; MG/100ML; MG/100ML; MG/100ML
INJECTION, SOLUTION INTRAVENOUS CONTINUOUS
Status: DISCONTINUED | OUTPATIENT
Start: 2021-06-21 | End: 2021-06-21 | Stop reason: HOSPADM

## 2021-06-21 RX ORDER — SODIUM CHLORIDE 0.9 % (FLUSH) 0.9 %
10 SYRINGE (ML) INJECTION PRN
Status: DISCONTINUED | OUTPATIENT
Start: 2021-06-21 | End: 2021-06-21 | Stop reason: HOSPADM

## 2021-06-21 RX ORDER — OXYCODONE HYDROCHLORIDE AND ACETAMINOPHEN 5; 325 MG/1; MG/1
1 TABLET ORAL PRN
Status: COMPLETED | OUTPATIENT
Start: 2021-06-21 | End: 2021-06-21

## 2021-06-21 RX ORDER — HYDRALAZINE HYDROCHLORIDE 20 MG/ML
5 INJECTION INTRAMUSCULAR; INTRAVENOUS EVERY 10 MIN PRN
Status: DISCONTINUED | OUTPATIENT
Start: 2021-06-21 | End: 2021-06-21 | Stop reason: HOSPADM

## 2021-06-21 RX ORDER — ROCURONIUM BROMIDE 10 MG/ML
INJECTION, SOLUTION INTRAVENOUS PRN
Status: DISCONTINUED | OUTPATIENT
Start: 2021-06-21 | End: 2021-06-21 | Stop reason: SDUPTHER

## 2021-06-21 RX ORDER — MAGNESIUM HYDROXIDE 1200 MG/15ML
LIQUID ORAL CONTINUOUS PRN
Status: COMPLETED | OUTPATIENT
Start: 2021-06-21 | End: 2021-06-21

## 2021-06-21 RX ORDER — IPRATROPIUM BROMIDE AND ALBUTEROL SULFATE 2.5; .5 MG/3ML; MG/3ML
1 SOLUTION RESPIRATORY (INHALATION) ONCE
Status: DISCONTINUED | OUTPATIENT
Start: 2021-06-21 | End: 2021-06-21 | Stop reason: HOSPADM

## 2021-06-21 RX ORDER — OXYCODONE HYDROCHLORIDE AND ACETAMINOPHEN 5; 325 MG/1; MG/1
2 TABLET ORAL PRN
Status: COMPLETED | OUTPATIENT
Start: 2021-06-21 | End: 2021-06-21

## 2021-06-21 RX ORDER — LABETALOL HYDROCHLORIDE 5 MG/ML
5 INJECTION, SOLUTION INTRAVENOUS EVERY 10 MIN PRN
Status: DISCONTINUED | OUTPATIENT
Start: 2021-06-21 | End: 2021-06-21 | Stop reason: HOSPADM

## 2021-06-21 RX ORDER — DIPHENHYDRAMINE HYDROCHLORIDE 50 MG/ML
12.5 INJECTION INTRAMUSCULAR; INTRAVENOUS
Status: DISCONTINUED | OUTPATIENT
Start: 2021-06-21 | End: 2021-06-21 | Stop reason: HOSPADM

## 2021-06-21 RX ORDER — ONDANSETRON 2 MG/ML
4 INJECTION INTRAMUSCULAR; INTRAVENOUS PRN
Status: DISCONTINUED | OUTPATIENT
Start: 2021-06-21 | End: 2021-06-21 | Stop reason: HOSPADM

## 2021-06-21 RX ORDER — SODIUM CHLORIDE 9 MG/ML
25 INJECTION, SOLUTION INTRAVENOUS PRN
Status: DISCONTINUED | OUTPATIENT
Start: 2021-06-21 | End: 2021-06-21 | Stop reason: HOSPADM

## 2021-06-21 RX ADMIN — HYDROMORPHONE HYDROCHLORIDE 0.5 MG: 1 INJECTION, SOLUTION INTRAMUSCULAR; INTRAVENOUS; SUBCUTANEOUS at 09:24

## 2021-06-21 RX ADMIN — DEXAMETHASONE SODIUM PHOSPHATE 10 MG: 4 INJECTION, SOLUTION INTRAMUSCULAR; INTRAVENOUS at 07:52

## 2021-06-21 RX ADMIN — SUGAMMADEX 200 MG: 100 INJECTION, SOLUTION INTRAVENOUS at 08:27

## 2021-06-21 RX ADMIN — OXYCODONE HYDROCHLORIDE AND ACETAMINOPHEN 1 TABLET: 5; 325 TABLET ORAL at 10:29

## 2021-06-21 RX ADMIN — PROPOFOL 200 MG: 10 INJECTION, EMULSION INTRAVENOUS at 07:39

## 2021-06-21 RX ADMIN — ALBUTEROL SULFATE 2.5 MG: 2.5 SOLUTION RESPIRATORY (INHALATION) at 09:01

## 2021-06-21 RX ADMIN — LIDOCAINE HYDROCHLORIDE 60 MG: 20 INJECTION, SOLUTION INFILTRATION; PERINEURAL at 07:39

## 2021-06-21 RX ADMIN — SODIUM CHLORIDE, SODIUM LACTATE, POTASSIUM CHLORIDE, AND CALCIUM CHLORIDE: .6; .31; .03; .02 INJECTION, SOLUTION INTRAVENOUS at 07:30

## 2021-06-21 RX ADMIN — ROCURONIUM BROMIDE 50 MG: 10 SOLUTION INTRAVENOUS at 07:39

## 2021-06-21 RX ADMIN — FAMOTIDINE 20 MG: 10 INJECTION, SOLUTION INTRAVENOUS at 07:15

## 2021-06-21 RX ADMIN — Medication 3000 MG: at 07:30

## 2021-06-21 RX ADMIN — FENTANYL CITRATE 100 MCG: 50 INJECTION INTRAMUSCULAR; INTRAVENOUS at 07:39

## 2021-06-21 RX ADMIN — HYDROMORPHONE HYDROCHLORIDE 0.5 MG: 1 INJECTION, SOLUTION INTRAMUSCULAR; INTRAVENOUS; SUBCUTANEOUS at 09:49

## 2021-06-21 RX ADMIN — ONDANSETRON 4 MG: 2 INJECTION INTRAMUSCULAR; INTRAVENOUS at 07:52

## 2021-06-21 ASSESSMENT — PAIN DESCRIPTION - PAIN TYPE
TYPE: SURGICAL PAIN
TYPE: SURGICAL PAIN

## 2021-06-21 ASSESSMENT — PULMONARY FUNCTION TESTS
PIF_VALUE: 1
PIF_VALUE: 26
PIF_VALUE: 26
PIF_VALUE: 0
PIF_VALUE: 3
PIF_VALUE: 26
PIF_VALUE: 1
PIF_VALUE: 28
PIF_VALUE: 1
PIF_VALUE: 28
PIF_VALUE: 26
PIF_VALUE: 26
PIF_VALUE: 27
PIF_VALUE: 2
PIF_VALUE: 27
PIF_VALUE: 26
PIF_VALUE: 26
PIF_VALUE: 3
PIF_VALUE: 1
PIF_VALUE: 29
PIF_VALUE: 27
PIF_VALUE: 26
PIF_VALUE: 27
PIF_VALUE: 26
PIF_VALUE: 28
PIF_VALUE: 26
PIF_VALUE: 29
PIF_VALUE: 25
PIF_VALUE: 26
PIF_VALUE: 28
PIF_VALUE: 26
PIF_VALUE: 1
PIF_VALUE: 26
PIF_VALUE: 28
PIF_VALUE: 2
PIF_VALUE: 28
PIF_VALUE: 27
PIF_VALUE: 27
PIF_VALUE: 26
PIF_VALUE: 27
PIF_VALUE: 26
PIF_VALUE: 1
PIF_VALUE: 11
PIF_VALUE: 26
PIF_VALUE: 28
PIF_VALUE: 26

## 2021-06-21 ASSESSMENT — PAIN DESCRIPTION - LOCATION
LOCATION: OTHER (COMMENT)
LOCATION: PENIS

## 2021-06-21 ASSESSMENT — PAIN DESCRIPTION - DESCRIPTORS: DESCRIPTORS: DISCOMFORT

## 2021-06-21 ASSESSMENT — PAIN - FUNCTIONAL ASSESSMENT
PAIN_FUNCTIONAL_ASSESSMENT: 0-10
PAIN_FUNCTIONAL_ASSESSMENT: ACTIVITIES ARE NOT PREVENTED

## 2021-06-21 ASSESSMENT — PAIN DESCRIPTION - PROGRESSION: CLINICAL_PROGRESSION: NOT CHANGED

## 2021-06-21 ASSESSMENT — PAIN SCALES - GENERAL
PAINLEVEL_OUTOF10: 6
PAINLEVEL_OUTOF10: 8
PAINLEVEL_OUTOF10: 4
PAINLEVEL_OUTOF10: 0
PAINLEVEL_OUTOF10: 8

## 2021-06-21 NOTE — PROGRESS NOTES
Patient resting in stretcher with safety features in place and call light in reach. Family at bedside and home med rec completed with daughter, Sirena Tinoco.

## 2021-06-21 NOTE — ANESTHESIA PRE PROCEDURE
Department of Anesthesiology  Preprocedure Note       Name:  Kem Torres   Age:  72 y.o.  :  1950                                          MRN:  [id number]         Date:  2021      Surgeon: Nuvia Underwood):  Mattie Minaya MD    Procedure: Procedure(s):  CYSTOSCOPY, TRANSURETHRAL INCISION OF BLADDER NECK CONTRACTURE WITH COLD KNIFE    Medications prior to admission:   Prior to Admission medications    Medication Sig Start Date End Date Taking? Authorizing Provider   Dulaglutide (TRULICITY) 3 OC/4.1EO SOPN Inject 3 mg into the skin once a week 21   Roz Hubbard MD   dapagliflozin (FARXIGA) 10 MG tablet Take 1 tablet by mouth every morning  Patient taking differently: Take 10 mg by mouth every morning Has not started this medication 21   Roz Hubbard MD   cetirizine (ZYRTEC) 5 MG tablet TAKE 1 TABLET BY MOUTH DAILY 21   Roz Hubbard MD   furosemide (LASIX) 40 MG tablet Take 1 tablet by mouth 2 times daily 21   OSMAN Portillo - CNS   Meals on Wheels MISC by Does not apply route Patient is to receive 2-3 diabetic meals only 21   Roz Hubbard MD   alendronate (FOSAMAX) 70 MG tablet TAKE 1 TABLET BY MOUTH EVERY 7 DAYS 21   Roz Hubbard MD   famotidine (PEPCID) 20 MG tablet Take 1 tablet by mouth daily 4/15/21   OSMAN Davis CNP   miconazole (MICOTIN) 2 % powder Apply topically 2 times daily.  21   OSMAN Davis CNP   Dulaglutide (TRULICITY) 1.5 KH/4.7CV SOPN Inject 3 mg into the skin every 7 days 21   OSMAN Davis CNP   metoprolol succinate (TOPROL XL) 100 MG extended release tablet Take 1 tablet by mouth daily 3/18/21   Roz Hubbard MD   atorvastatin (LIPITOR) 40 MG tablet Take 1 tablet by mouth nightly 3/18/21   Roz Hubbard MD   amLODIPine (NORVASC) 10 MG tablet Take 1 tablet by mouth daily 3/18/21   Roz Hubbard MD   aspirin 81 MG EC tablet Take 81 mg by mouth daily    Historical Provider, MD   Acetaminophen (TYLENOL ARTHRITIS PAIN PO) Take 1 tablet by mouth daily OTC     Historical Provider, MD   HYDROcodone-acetaminophen (NORCO)  MG per tablet Take 1 tablet by mouth 3 times daily as needed (Pain - back & hips). Pain specialist     Historical Provider, MD   Insulin Pen Needle ([employer] PEN NEEDLES) 31G X 6 MM MISC 1 each by Does not apply route daily 9/29/20   Maria E Spencer MD   ammonium lactate (AMLACTIN) 12 % cream Apply topically as needed     Historical Provider, MD   glimepiride (AMARYL) 4 MG tablet Take 4 mg by mouth every morning (before breakfast) 7/1/20   Historical Provider, MD   salsalate (DISALCID) 750 MG tablet Take 750 mg by mouth 2 times daily as needed  7/1/20   Historical Provider, MD   vitamin B-6 (PYRIDOXINE) 50 MG tablet Take 50 mg by mouth daily    Historical Provider, MD   ergocalciferol (ERGOCALCIFEROL) 53536 UNITS capsule Take 50,000 Units by mouth once a week. Historical Provider, MD   Cyanocobalamin (VITAMIN B 12 PO) Take 1 tablet by mouth daily     Historical Provider, MD       Current medications:    Current Facility-Administered Medications   Medication Dose Route Frequency Provider Last Rate Last Admin    lactated ringers infusion   Intravenous Continuous Kaiden Zaidi MD        sodium chloride flush 0.9 % injection 10 mL  10 mL Intravenous 2 times per day Kaiden Zaidi MD        sodium chloride flush 0.9 % injection 10 mL  10 mL Intravenous PRN Kaiden Zaidi MD        0.9 % sodium chloride infusion  25 mL Intravenous PRN Kaiden Zaidi MD        famotidine (PEPCID) injection 20 mg  20 mg Intravenous Once Kaiden Zaidi MD        ceFAZolin (ANCEF) 3000 mg in dextrose 5 % 100 mL IVPB  3,000 mg Intravenous On Call to [place]. #5 Espinozae Shayy Wilson MD           Allergies:     Allergies   Allergen Reactions    Indomethacin Other (See Comments)      Taco Pruett told him not to take because affects the bladder.      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.25    Anemia in chronic kidney disease N18.9, D63.1    Aseptic necrosis of head and neck of femur M87.059    Chronic renal insufficiency, stage III (moderate) ([facility]) N18.30    Degeneration of lumbar or lumbosacral intervertebral disc M51.37    Esophageal reflux K21.9    Depression F32.9    Hypersomnolence G47.10    Hyperlipidemia E78.5    History of colonic polyps Z86.010    Family history of malignant neoplasm of gastrointestinal tract Z80.0    Malignant neoplasm of prostate ([address] C61    Impotence of organic origin N52.9    Morbid obesity ([address] E66.01    Opioid type dependence, continuous ([address] F11.20    KAMRAN (obstructive sleep apnea) G47.33    Osteoarthritis of right knee M17.11    Osteopenia M85.80    ROB (renal osteodystrophy) N25.0    Spondylosis of lumbar region without myelopathy or radiculopathy M47.816    Type 2 diabetes mellitus without complication, without long-term current use of insulin (HCC) E11.9    Acute respiratory failure with hypoxia ([facility]) J96.01    Acute kidney injury superimposed on CKD ([facility]) N17.9, N18.9    Essential hypertension I10    Acute on chronic diastolic heart failure (HCC) I50.33       Past Medical History:        Diagnosis Date    Anesthesia     anxiety regarding ETT    Cancer ([address]     prostate    Hyperlipidemia     Hypertension     Obesity     KAMRAN treated with BiPAP     nasal    Osteoarthritis     Type 2 diabetes mellitus without complication (HCC)     Type 2 diabetes mellitus without complication, without long-term current use of CALCIUM 9.6 06/18/2021    BILITOT 0.4 04/05/2021    ALKPHOS 86 04/05/2021    AST 23 04/05/2021    ALT 17 04/05/2021       POC Tests: No results for input(s): POCGLU, POCNA, POCK, POCCL, POCBUN, POCHEMO, POCHCT in the last 72 hours. Coags: No results found for: PROTIME, INR, APTT    HCG (If Applicable): No results found for: PREGTESTUR, PREGSERUM, HCG, HCGQUANT     ABGs: No results found for: PHART, PO2ART, GGX8WAE, FBG6MIV, BEART, T2GFDQGK     Type & Screen (If Applicable):  No results found for: LABABO, LABRH    Drug/Infectious Status (If Applicable):  No results found for: HIV, HEPCAB    COVID-19 Screening (If Applicable):   Lab Results   Component Value Date    COVID19 NOT DETECTED 01/12/2021           Anesthesia Evaluation  Patient summary reviewed history of anesthetic complications (? pseudocholinesterase deficiency): Airway: Mallampati: III  TM distance: >3 FB   Neck ROM: full  Mouth opening: > = 3 FB Dental: normal exam   (+) edentulous      Pulmonary:Negative Pulmonary ROS and normal exam  breath sounds clear to auscultation  (+) sleep apnea:                             Cardiovascular:Negative CV ROS  Exercise tolerance: good (>4 METS),   (+) hypertension:, CHF:, pulmonary hypertension:,         Rhythm: regular  Rate: normal                    Neuro/Psych:   Negative Neuro/Psych ROS              GI/Hepatic/Renal: Neg GI/Hepatic/Renal ROS  (+) GERD: well controlled, renal disease:, morbid obesity          Endo/Other: Negative Endo/Other ROS   (+) Diabetes, . Abdominal:           Vascular: negative vascular ROS. Pre-Operative Diagnosis: Stress incontinence [N39.3]    72 y.o.   BMI:  Body mass index is 46.43 kg/m².      Vitals:    06/15/21 1101 06/21/21 0658 06/21/21 0711   BP:   (!) 135/92   Pulse:   80   Resp:   20   Temp:   97.1 °F (36.2 °C)   TempSrc:   Temporal   SpO2:   94%   Weight: 280 lb (127 kg) 279 lb (126.6 kg)    Height: 5' 5\" (1.651 m) 5' 5\" (1.651 m)        Allergies   Allergen Reactions    Indomethacin Other (See Comments)     Dr. Ben Chappell told him not to take because affects the bladder.  Statins Other (See Comments)    Unable To Assess       Anesthesia that begins with S- stops my breathing    Actos [Pioglitazone] Rash       Social History     Tobacco Use    Smoking status: Former Smoker     Packs/day: 0.25     Years: 21.00     Pack years: 5.25     Types: Cigars     Start date: 37     Quit date: 2001     Years since quittin.4    Smokeless tobacco: Former User   Substance Use Topics    Alcohol use: Yes     Comment: occ       LABS:    CBC  Lab Results   Component Value Date/Time    WBC 7.2 2021 12:49 PM    HGB 15.1 2021 12:49 PM    HCT 45.4 2021 12:49 PM     2021 12:49 PM     RENAL  Lab Results   Component Value Date/Time     2021 01:04 PM    K 4.9 2021 01:04 PM     2021 01:04 PM    CO2 25 2021 01:04 PM    BUN 19 2021 01:04 PM    CREATININE 2.2 (H) 2021 01:04 PM    GLUCOSE 108 (H) 2021 01:04 PM     COAGS  No results found for: PROTIME, INR, APTT     Anesthesia Plan      general     ASA 3     (I discussed with the patient the risks and benefits of PIV, anesthesia, IV Narcotics, PACU. All questions were answered the patient agrees with the plan and wishes to proceed)  Induction: intravenous.                           Elia Shaikh MD   2021

## 2021-06-21 NOTE — H&P
Urology Attending H&P Note      History: This is a 72 y.o. male who presents today for operative intervention for [address] [place] [address]    Family History, Social History, Review of Systems:  Reviewed and agreed to as per chart    Vitals:  BP (!) 135/92   Pulse 80   Temp 97.1 °F (36.2 °C) (Temporal)   Resp 20   Ht 5' 5\" (1.651 m)   Wt 279 lb (126.6 kg)   SpO2 94%   BMI 46.43 kg/m²   Temp  Av.1 °F (36.2 °C)  Min: 97.1 °F (36.2 °C)  Max: 97.1 °F (36.2 °C)  No intake or output data in the 24 hours ending 21 0726      Physical:   Well developed, well nourished in no acute distress   Mood indicates no abnormalities. Pt doesnt appear depressed   Orientated to time and place   Neck is supple, trachea is midline   Respiratory effort is normal   Cardiovascular show no extremity swelling   Abdomen no masses or hernias are palpated, there is no tenderness. Liver and Spleen appear normal.   Skin show no abnormal lesions   Lymph nodes are not palpated in the inguinal, neck, or axillary area.     Labs:  WBC:    Lab Results   Component Value Date    WBC 7.2 2021     Hemoglobin/Hematocrit:    Lab Results   Component Value Date    HGB 15.1 2021    HCT 45.4 2021     BMP:    Lab Results   Component Value Date     2021    K 4.2 2021     2021    CO2 22 2021    BUN 26 2021    LABALBU 4.2 2021    CREATININE 2.4 2021    CALCIUM 9.4 2021    GFRAA 33 2021    GFRAA 53 2013    LABGLOM 27 2021     PT/INR:  No results found for: PROTIME, INR  PTT:  No results found for: APTT[APTT      Impression/Plan:     -- to the OR   -- antibiotics on call  -- discussed with patient - all questions answered    Thank you for the opportunity to be involved in the care of this patient - please call with questions    Janet Winter MD  [facility]  Office - [phone]

## 2021-06-21 NOTE — PROGRESS NOTES
Received bedside report from [facility]. Patient A/O x 4. Cook cathter in place and draining pink clear urine.  Patient pain level is an 6/10, tolerating PO  Mary Vinson RN

## 2021-06-21 NOTE — OP NOTE
Operative Note      Patient: Evelin Maxwell  YOB: 1950  MRN: [id number]    Date of Procedure: 6/21/2021    Pre-Op Diagnosis: refractory bladder neck contracture    Post-Op Diagnosis: Same       Procedure(s):  Cystoscopy  Transurethral incision of bladder neck contracture  Complex Talbot placement over wire    Surgeon(s):  Gabriella Chauhan MD    Assistant:   Surgical Assistant: Carissa Leija    Anesthesia: General    Estimated Blood Loss (mL): Minimal    Complications: None    Specimens:   * No specimens in log *    Implants:  * No implants in log *      Drains:   Urethral Catheter Latex 24 fr (Active)       Findings: pinpoint BNC with extensive scarring - incised and allowed 24Fr scope easily - 24Fr talbot placed     Detailed Description of Procedure: The patient was brought to the operating room and placed supine on the operating room table.  Timeout was performed. After adequate anesthesia, she was then repositioned in the dorsal lithotomy position and prepped & draped in the standard surgical fashion. A 21 [place]ian rigid cystoscope easily advanced into the urethra. A pinpoint BNC was seen. I passed an Amplatz super stiff wire into the bladder and removed the scope. I then placed the 24Fr sheath with cold knife. I then carefully incised laterally and inferiorly. The scar was extremely fibrotic. It took a while but eventually the area opened up nicely. There was some bleeding from the tissue which was good to see. I avoided the 12 o clock area for concern of fistula formation. I then removed the scope and over the wire placed a 24Fr talbot with 15cc sterile water in the balloon. Return of pink urine was seen. The patient was then awakened from anesthesia, extubated, and brought to the PACU in stable condition.  There were no apparent complications.      PLAN  Remove talbot Thursday June 24  See me back in 6-8 weeks    Electronically signed by Gabriella Chauhan MD on 6/21/2021 at 9:06 AM

## 2021-06-21 NOTE — ANESTHESIA POSTPROCEDURE EVALUATION
Department of Anesthesiology  Postprocedure Note    Patient: Miguel Campbell  MRN: [id number]  YOB: 1950  Date of evaluation: 6/21/2021  Time:  2:28 PM     Procedure Summary     Date: 06/21/21 Room / Location: [facility] [address] / [facility]    Anesthesia Start: 0730 Anesthesia Stop: 9559    Procedure: CYSTOSCOPY, TRANSURETHRAL INCISION OF BLADDER NECK CONTRACTURE WITH COLD KNIFE AND COMPLEX URETHRAL CATHETER PLACEMENT (N/A ) Diagnosis:       Stress incontinence      (STRESS INCONTINENCE)    Surgeons: Elizabeth Xavier MD Responsible Provider: Marbin Graves MD    Anesthesia Type: general ASA Status: 3          Anesthesia Type: general    Patricia Phase I: Patricia Score: 10    Patricia Phase II: Patricia Score: 10    Last vitals: Reviewed and per EMR flowsheets.        Anesthesia Post Evaluation    Comments: Postoperative Anesthesia Note    Name:    Miguel Campbell  MRN:      [id number]    Patient Vitals in the past 12 hrs:  06/21/21 1020, BP:130/86, Temp:97 °F (36.1 °C), Temp src:Temporal, Pulse:78, Resp:16, SpO2:95 %  06/21/21 0930, BP:(!) 144/81, Pulse:74, SpO2:91 %  06/21/21 0925, BP:135/85, Pulse:76, SpO2:93 %  06/21/21 0921, Temp:96.8 °F (36 °C), Temp src:Temporal  06/21/21 0920, BP:123/77, Pulse:75, SpO2:92 %  06/21/21 0915, BP:(!) 112/90, Pulse:86, SpO2:96 %  06/21/21 0850, BP:(!) 156/84, Temp:96.8 °F (36 °C), Temp src:Temporal, Pulse:82  06/21/21 0711, BP:(!) 135/92, Temp:97.1 °F (36.2 °C), Temp src:Temporal, Pulse:80, Resp:20, SpO2:94 %  06/21/21 0658, Height:5' 5\" (1.651 m), Weight:279 lb (126.6 kg)     LABS:    CBC  Lab Results       Component                Value               Date/Time                  WBC                      7.2                 05/24/2021 12:49 PM        HGB                      15.1                05/24/2021 12:49 PM        HCT                      45.4                05/24/2021 12:49 PM        PLT                      338                 05/24/2021 12:49 PM   RENAL Lab Results       Component                Value               Date/Time                  NA                       139                 06/21/2021 06:51 AM        K                        4.2                 06/21/2021 06:51 AM        CL                       102                 06/21/2021 06:51 AM        CO2                      22                  06/21/2021 06:51 AM        BUN                      26 (H)              06/21/2021 06:51 AM        CREATININE               2.4 (H)             06/21/2021 06:51 AM        GLUCOSE                  132 (H)             06/21/2021 06:51 AM   COAGS  No results found for: PROTIME, INR, APTT    Intake & Output:  @86OYJS@    Nausea & Vomiting:  No    Level of Consciousness:  Awake    Pain Assessment:  Adequate analgesia    Anesthesia Complications:  No apparent anesthetic complications    SUMMARY      Vital signs stable  OK to discharge from Stage I post anesthesia care.   Care transferred from Anesthesiology department on discharge from perioperative area

## 2021-06-21 NOTE — PROGRESS NOTES
Reviewed AVS with patient and patient daughter. Patient given talbot cathter supplies such as a 10ml syringe for cathter removal on 6/24/21, graduate to empty and measure urine at home. All questions and concerns addressed at this time.   Mckenzie Flores RN

## 2021-06-21 NOTE — PROGRESS NOTES
Bedside report and transfer of care received from  [facility]. Isabel Cullen RN

## 2021-06-30 ENCOUNTER — TELEPHONE (OUTPATIENT)
Dept: ADMINISTRATIVE | Age: 71
End: 2021-06-30

## 2021-06-30 RX ORDER — ATORVASTATIN CALCIUM 40 MG/1
TABLET, FILM COATED ORAL
Qty: 90 TABLET | Refills: 0 | Status: SHIPPED | OUTPATIENT
Start: 2021-06-30 | End: 2021-07-14 | Stop reason: SDUPTHER

## 2021-06-30 RX ORDER — AMLODIPINE BESYLATE 10 MG/1
10 TABLET ORAL DAILY
Qty: 90 TABLET | Refills: 0 | Status: SHIPPED | OUTPATIENT
Start: 2021-06-30 | End: 2021-07-14 | Stop reason: SDUPTHER

## 2021-06-30 NOTE — TELEPHONE ENCOUNTER
Submitted PA for amLODIPine Besylate 10MG tablets Key: [id number]   Via CMM STATUS: Drug is covered by current benefit plan. No further PA activity needed    . Please notify patient, thank you.

## 2021-06-30 NOTE — TELEPHONE ENCOUNTER
Submitted PA for Atorvastatin Calcium 40MG tablets Key: [id number]    Via CMM STATUS: Drug is covered by current benefit plan. No further PA activity needed    . Please notify patient, thank you.

## 2021-07-01 ENCOUNTER — CARE COORDINATION (OUTPATIENT)
Dept: CARE COORDINATION | Age: 71
End: 2021-07-01

## 2021-07-06 ENCOUNTER — CARE COORDINATION (OUTPATIENT)
Dept: CARE COORDINATION | Age: 71
End: 2021-07-06

## 2021-07-06 NOTE — CARE COORDINATION
ACM:  Patient declined Care Coordination. Patient says he is doing well, and does not have any needs at this time.

## 2021-07-09 ENCOUNTER — TELEPHONE (OUTPATIENT)
Dept: INTERNAL MEDICINE CLINIC | Age: 71
End: 2021-07-09

## 2021-07-13 ENCOUNTER — TELEPHONE (OUTPATIENT)
Dept: INTERNAL MEDICINE CLINIC | Age: 71
End: 2021-07-13

## 2021-07-13 NOTE — TELEPHONE ENCOUNTER
Patient had labs drawn on 6/21/21 and wanted the results of the labs and discuss medical issues. Please call.

## 2021-07-14 ENCOUNTER — OFFICE VISIT (OUTPATIENT)
Dept: INTERNAL MEDICINE CLINIC | Age: 71
End: 2021-07-14
Payer: MEDICARE

## 2021-07-14 VITALS
DIASTOLIC BLOOD PRESSURE: 76 MMHG | OXYGEN SATURATION: 96 % | WEIGHT: 283 LBS | SYSTOLIC BLOOD PRESSURE: 122 MMHG | TEMPERATURE: 97.3 F | HEART RATE: 79 BPM | BODY MASS INDEX: 47.09 KG/M2

## 2021-07-14 DIAGNOSIS — E66.01 MORBID OBESITY (HCC): ICD-10-CM

## 2021-07-14 DIAGNOSIS — R09.82 POST-NASAL DRIP: Primary | ICD-10-CM

## 2021-07-14 DIAGNOSIS — N18.30 CHRONIC RENAL IMPAIRMENT, STAGE 3 (MODERATE), UNSPECIFIED WHETHER STAGE 3A OR 3B CKD (HCC): ICD-10-CM

## 2021-07-14 DIAGNOSIS — E11.22 TYPE 2 DIABETES MELLITUS WITH STAGE 3 CHRONIC KIDNEY DISEASE, WITHOUT LONG-TERM CURRENT USE OF INSULIN, UNSPECIFIED WHETHER STAGE 3A OR 3B CKD (HCC): ICD-10-CM

## 2021-07-14 DIAGNOSIS — N18.30 TYPE 2 DIABETES MELLITUS WITH STAGE 3 CHRONIC KIDNEY DISEASE, WITHOUT LONG-TERM CURRENT USE OF INSULIN, UNSPECIFIED WHETHER STAGE 3A OR 3B CKD (HCC): ICD-10-CM

## 2021-07-14 PROCEDURE — 99214 OFFICE O/P EST MOD 30 MIN: CPT | Performed by: INTERNAL MEDICINE

## 2021-07-14 RX ORDER — METOPROLOL SUCCINATE 100 MG/1
100 TABLET, EXTENDED RELEASE ORAL DAILY
Qty: 90 TABLET | Refills: 1 | Status: SHIPPED | OUTPATIENT
Start: 2021-07-14 | End: 2021-12-29

## 2021-07-14 RX ORDER — AMLODIPINE BESYLATE 10 MG/1
10 TABLET ORAL DAILY
Qty: 90 TABLET | Refills: 0 | Status: ON HOLD | OUTPATIENT
Start: 2021-07-14 | End: 2021-08-12 | Stop reason: HOSPADM

## 2021-07-14 RX ORDER — ALENDRONATE SODIUM 70 MG/1
TABLET ORAL
Qty: 12 TABLET | Refills: 1 | Status: SHIPPED | OUTPATIENT
Start: 2021-07-14 | End: 2021-10-20 | Stop reason: SDUPTHER

## 2021-07-14 RX ORDER — FLUTICASONE PROPIONATE 50 MCG
2 SPRAY, SUSPENSION (ML) NASAL DAILY
Qty: 2 BOTTLE | Refills: 1 | Status: SHIPPED | OUTPATIENT
Start: 2021-07-14 | End: 2021-11-22

## 2021-07-14 RX ORDER — ATORVASTATIN CALCIUM 40 MG/1
40 TABLET, FILM COATED ORAL DAILY
Qty: 90 TABLET | Refills: 1 | Status: SHIPPED | OUTPATIENT
Start: 2021-07-14 | End: 2021-07-16 | Stop reason: ALTCHOICE

## 2021-07-14 RX ORDER — FAMOTIDINE 20 MG/1
20 TABLET, FILM COATED ORAL DAILY
Qty: 90 TABLET | Refills: 1 | Status: SHIPPED | OUTPATIENT
Start: 2021-07-14 | End: 2021-12-26

## 2021-07-14 RX ORDER — CETIRIZINE HYDROCHLORIDE 5 MG/1
5 TABLET ORAL DAILY
Qty: 90 TABLET | Refills: 1 | Status: SHIPPED | OUTPATIENT
Start: 2021-07-14

## 2021-07-14 RX ORDER — DULAGLUTIDE 3 MG/.5ML
3 INJECTION, SOLUTION SUBCUTANEOUS WEEKLY
Qty: 12 PEN | Refills: 1 | Status: SHIPPED | OUTPATIENT
Start: 2021-07-14 | End: 2021-12-27

## 2021-07-14 ASSESSMENT — PATIENT HEALTH QUESTIONNAIRE - PHQ9
SUM OF ALL RESPONSES TO PHQ QUESTIONS 1-9: 0
1. LITTLE INTEREST OR PLEASURE IN DOING THINGS: 0
2. FEELING DOWN, DEPRESSED OR HOPELESS: 0
SUM OF ALL RESPONSES TO PHQ9 QUESTIONS 1 & 2: 0

## 2021-07-14 ASSESSMENT — ENCOUNTER SYMPTOMS
ALLERGIC/IMMUNOLOGIC NEGATIVE: 1
GASTROINTESTINAL NEGATIVE: 1
EYES NEGATIVE: 1

## 2021-07-14 NOTE — PROGRESS NOTES
Subjective:      Patient ID: Nitish Conley is a 72 y.o. male. Chief Complaint   Patient presents with    Results     discuss medication and blood work    Cough       HPI      Diabetes  Treatment Adherence:   Medication compliance:  noncompliant: with meals  Diet compliance:  compliant most of the time  Weight trend: stable  Current exercise: no regular exercise  Barriers: lack of motivation    Diabetes Mellitus Type 2: Current symptoms/problems include none. Home blood sugar records: patient does not test  Any episodes of hypoglycemia? no  Eye exam current (within one year): no  Tobacco history: He  reports that he quit smoking about 20 years ago. His smoking use included cigars. He started smoking about 41 years ago. He has a 5.25 pack-year smoking history. He has quit using smokeless tobacco.   Daily Aspirin? Yes    Hypertension:  Home blood pressure monitoring: No.  He is not adherent to a low sodium diet. Patient denies chest pain, shortness of breath, headache, lightheadedness, blurred vision and dry cough. Antihypertensive medication side effects: no me   Objective:        Vitals:    07/14/21 0936   BP: 122/76   Site: Right Lower Arm   Position: Sitting   Cuff Size: Medium Adult   Pulse: 79   Temp: 97.3 °F (36.3 °C)   TempSrc: Infrared   SpO2: 96%   Weight: 283 lb (128.4 kg)       Data Review   BMP:   Lab Results   Component Value Date    GLUCOSE 132 06/21/2021    CO2 22 06/21/2021    BUN 26 06/21/2021    CREATININE 2.4 06/21/2021    CALCIUM 9.4 06/21/2021        1. Discussed health maintenance, including regular aerobic exercise, low fat/low salt diet, and periodic exams. 2. Medication Changes: no change  3. Discussed cost of Filiberto Noelle and trying Mail order  Hyperlipidemia:  No new myalgias or GI upset on atorvastatin (Lipitor).        Lab Results   Component Value Date    LABA1C 5.7 02/10/2021    LABA1C 6.6 09/29/2020     Lab Results   Component Value Date    LABMICR 204.00 (H) 05/24/2021 CREATININE 2.4 (H) 06/21/2021     Lab Results   Component Value Date    ALT 17 04/05/2021    AST 23 04/05/2021     Lab Results   Component Value Date    CHOL 201 (H) 11/14/2011    TRIG 123 11/14/2011    HDL 55 11/14/2011    LDLCALC 122 (H) 11/14/2011        CKD:    Norma Luna is a 72 y.o. male who presents for follow-up of chronic renal disease caused by hypertension and diabetes. Treatments have been prescribed for slowing the progression of CRF include cardiovascular risk factor reduction including advanced age (older than 54 for men, 72 for women), diabetes mellitus, dyslipidemia, hypertension, obesity (BMI >= 30 kg/m2) and sedentary lifestyle, glycemic control, ACE inhibitor therapy, blood pressure control and recognition and treatment of hypoglycemia. Patient has significant obesity and reports that he really does want to lose weight. He states he eats quite a bit.   He has had success with weight loss in the past.    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    Past Medical History:   Diagnosis Date    Anesthesia     anxiety regarding ETT    Cancer ([address]     prostate    Hyperlipidemia     Hypertension     Obesity     KAMRAN treated with BiPAP     nasal    Osteoarthritis     Type 2 diabetes mellitus without complication ([address]     Type 2 diabetes mellitus without complication, without long-term current use of insulin ([address] 2/7/2018    Urinary incontinence, stress, male     Wears dentures      Past Surgical History:   Procedure Laterality Date    CYSTOSCOPY N/A 6/21/2021    CYSTOSCOPY, TRANSURETHRAL INCISION OF BLADDER NECK CONTRACTURE WITH COLD KNIFE AND COMPLEX URETHRAL CATHETER PLACEMENT performed by Renetta Hardy MD at [address] Left     THR    KNEE ARTHROSCOPY Right     SHOULDER SURGERY Bilateral     TOTAL HIP ARTHROPLASTY       Family History   Problem Relation Age of Onset    Hypertension Mother     Diabetes Mother     Hypertension Father      Social History     Socioeconomic History    Marital status: Single     Spouse name: Not on file    Number of children: 2    Years of education: Not on file    Highest education level: High school graduate   Occupational History    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 He has 2 adult children. Social Determinants of Health     Financial Resource Strain: Medium Risk    Difficulty of Paying Living Expenses: Somewhat hard   Food Insecurity: No Food Insecurity    Worried About Running Out of Food in the Last Year: Never true    Julien of Food in the Last Year: Never true   Transportation Needs: No Transportation Needs    Lack of Transportation (Medical): No    Lack of Transportation (Non-Medical): No   Physical Activity:     Days of Exercise per Week:     Minutes of Exercise per Session:    Stress: No Stress Concern Present    Feeling of Stress : Not at all   Social Connections:  Moderately Integrated    Frequency of Communication with Friends and Family: More than three times a week    Frequency of Social Gatherings with Friends and Family: More than three times a week    Attends Religion Services: 1 to 4 times per year    Active Member of [employer] or Organizations: Not on file    Attends Club or Organization Meetings: More than 4 times per year    Marital Status:    Intimate Partner Violence: Not At Risk    Fear of Current or Ex-Partner: No    Emotionally Abused: No    Physically Abused: No    Sexually Abused: No   \  Review of Systems   Constitutional: Negative. HENT: Negative. Eyes: Negative. Cardiovascular: Negative. Gastrointestinal: Negative. Endocrine: Negative. Musculoskeletal: Negative. Skin: Negative. Allergic/Immunologic: Negative. Neurological: Negative. Hematological: Negative. Psychiatric/Behavioral: Negative. All other systems reviewed and are negative. .    Allergies   Allergen Reactions    Indomethacin Other (See Comments)     Dr. Ashanti Palacios told him not to take because affects the bladder.  Statins Other (See Comments)    Succinylcholine      ? ? Succinylcholine \"allergy\"  Pt. Should be worked  Up for pseudocholinesterase deficiency. Pt. States he qwas told in the past that he had trouble with an anesthesia drug that begins with an \"s\" and that it took him awhile to breathe in PACU, but could provide no further details.      Unable To Assess       Anesthesia that begins with S- stops my breathing- daughter reports succinylcholine   Questionable pseudocholinesterase deficiency    Actos [Pioglitazone] Rash       Current Outpatient Medications   Medication Sig Dispense Refill    amLODIPine (NORVASC) 10 MG tablet TAKE 1 TABLET BY MOUTH DAILY 90 tablet 0    atorvastatin (LIPITOR) 40 MG tablet TAKE 1 TABLET BY MOUTH EVERY NIGHT 90 tablet 0    Dulaglutide (TRULICITY) 3 VE/7.5YZ SOPN Inject 3 mg into the skin once a week 12 pen 1    dapagliflozin (FARXIGA) 10 MG tablet Take 1 tablet by mouth every morning (Patient taking differently: Take 10 mg by mouth every morning Has not started this medication) 90 tablet 3    cetirizine (ZYRTEC) 5 MG tablet TAKE 1 TABLET BY MOUTH DAILY 90 tablet 1    furosemide (LASIX) 40 MG tablet Take 1 tablet by mouth 2 times daily 225 tablet 0    Meals on Wheels MISC by Does not apply route Patient is to receive 2-3 diabetic meals only 1 each 0    alendronate (FOSAMAX) 70 MG tablet TAKE 1 TABLET BY MOUTH EVERY 7 DAYS 12 tablet 1    famotidine (PEPCID) 20 MG tablet Take 1 tablet by mouth daily 60 tablet 0    miconazole (MICOTIN) 2 % powder Apply topically 2 times daily. 45 g 1    Dulaglutide (TRULICITY) 1.5 CI/1.3VB SOPN Inject 3 mg into the skin every 7 days 8 pen 0    metoprolol succinate (TOPROL XL) 100 MG extended release tablet Take 1 tablet by mouth daily 90 tablet 1    aspirin 81 MG EC tablet Take 81 mg by mouth daily      Acetaminophen (TYLENOL ARTHRITIS PAIN PO) Take 1 tablet by mouth daily OTC       HYDROcodone-acetaminophen (NORCO)  MG per tablet Take 1 tablet by mouth 3 times daily as needed (Pain - back & hips). Pain specialist       Insulin Pen Needle ([employer] PEN NEEDLES) 31G X 6 MM MISC 1 each by Does not apply route daily 100 each 3    ammonium lactate (AMLACTIN) 12 % cream Apply topically as needed       glimepiride (AMARYL) 4 MG tablet Take 4 mg by mouth every morning (before breakfast)      salsalate (DISALCID) 750 MG tablet Take 750 mg by mouth 2 times daily as needed       vitamin B-6 (PYRIDOXINE) 50 MG tablet Take 50 mg by mouth daily      ergocalciferol (ERGOCALCIFEROL) 17227 UNITS capsule Take 50,000 Units by mouth once a week.  Cyanocobalamin (VITAMIN B 12 PO) Take 1 tablet by mouth daily        No current facility-administered medications for this visit. Vitals:    07/14/21 0936   BP: 122/76   Site: Right Lower Arm   Position: Sitting   Cuff Size: Medium Adult   Pulse: 79   Temp: 97.3 °F (36.3 °C)   TempSrc: Infrared   SpO2: 96%   Weight: 283 lb (128.4 kg)     Body mass index is 47.09 kg/m². Wt Readings from Last 3 Encounters:   07/14/21 283 lb (128.4 kg)   06/21/21 279 lb (126.6 kg)   06/18/21 284 lb (128.8 kg)     BP Readings from Last 3 Encounters:   07/14/21 122/76   06/21/21 (!) 150/80   06/21/21 130/86         Objective:   Physical Exam  Vitals and nursing note reviewed.    Constitutional: General: He is not in acute distress. Appearance: He is well-developed. He is obese. He is not ill-appearing or diaphoretic. HENT:      Head: Normocephalic and atraumatic. Right Ear: Tympanic membrane, ear canal and external ear normal.      Left Ear: Tympanic membrane, ear canal and external ear normal.      Nose: Nose normal.      Mouth/Throat:      Mouth: Mucous membranes are moist.      Pharynx: Oropharynx is clear. No oropharyngeal exudate. Eyes:      General: No scleral icterus. Right eye: No discharge. Left eye: No discharge. Extraocular Movements: Extraocular movements intact. Conjunctiva/sclera: Conjunctivae normal.      Pupils: Pupils are equal, round, and reactive to light. Neck:      Thyroid: No thyromegaly. Vascular: No JVD. Trachea: No tracheal deviation. Cardiovascular:      Rate and Rhythm: Normal rate and regular rhythm. Pulses: Normal pulses. Heart sounds: Normal heart sounds. No murmur heard. Pulmonary:      Effort: Pulmonary effort is normal. No respiratory distress. Breath sounds: Normal breath sounds. No stridor. No wheezing or rales. Abdominal:      General: Bowel sounds are normal. There is no distension. Palpations: Abdomen is soft. There is no mass. Tenderness: There is no abdominal tenderness. There is no guarding or rebound. Musculoskeletal:         General: No tenderness. Normal range of motion. Cervical back: Normal range of motion and neck supple. Lymphadenopathy:      Cervical: No cervical adenopathy. Skin:     General: Skin is warm and dry. Capillary Refill: Capillary refill takes less than 2 seconds. Findings: No erythema or rash. Neurological:      General: No focal deficit present. Mental Status: He is alert and oriented to person, place, and time. Cranial Nerves: No cranial nerve deficit. Sensory: No sensory deficit. Motor: No abnormal muscle tone. Coordination: Coordination normal.      Deep Tendon Reflexes: Reflexes are normal and symmetric. Reflexes normal.   Psychiatric:         Mood and Affect: Mood normal.         Behavior: Behavior normal.         Thought Content:  Thought content normal.         Judgment: Judgment normal.         Lab Review   Admission on 06/21/2021, Discharged on 06/21/2021   Component Date Value    Sodium 06/21/2021 139     Potassium reflex Magnesi* 06/21/2021 4.2     Chloride 06/21/2021 102     CO2 06/21/2021 22     Anion Gap 06/21/2021 15     Glucose 06/21/2021 132*    BUN 06/21/2021 26*    CREATININE 06/21/2021 2.4*    GFR Non- 06/21/2021 27*    GFR  06/21/2021 33*    Calcium 06/21/2021 9.4     POC Glucose 06/21/2021 121*    Performed on 06/21/2021 ACCU-CHEK     POC Glucose 06/21/2021 118*    Performed on 06/21/2021 ACCU-CHEK    Orders Only on 06/18/2021   Component Date Value    Sodium 06/18/2021 141     Potassium 06/18/2021 4.9     Chloride 06/18/2021 103     CO2 06/18/2021 25     Anion Gap 06/18/2021 13     Glucose 06/18/2021 108*    BUN 06/18/2021 19     CREATININE 06/18/2021 2.2*    GFR Non- 06/18/2021 30*    GFR  06/18/2021 36*    Calcium 06/18/2021 9.6     Phosphorus 06/18/2021 3.0     Albumin 06/18/2021 4.2    Hospital Outpatient Visit on 05/24/2021   Component Date Value    Sodium 05/24/2021 144     Potassium 05/24/2021 4.3     Chloride 05/24/2021 105     CO2 05/24/2021 21     Anion Gap 05/24/2021 18*    Glucose 05/24/2021 93     BUN 05/24/2021 17     CREATININE 05/24/2021 2.4*    GFR Non- 05/24/2021 27*    GFR  05/24/2021 33*    Calcium 05/24/2021 9.6     Phosphorus 05/24/2021 2.7     Albumin 05/24/2021 4.1     Vit D, 25-Hydroxy 05/24/2021 37.5     WBC 05/24/2021 7.2     RBC 05/24/2021 4.67     Hemoglobin 05/24/2021 15.1     Hematocrit 05/24/2021 45.4     MCV 05/24/2021 97.1     04/19/2021 24*    CREATININE 04/19/2021 2.4*    GFR Non- 04/19/2021 27*    GFR  04/19/2021 33*    Calcium 04/19/2021 9.2     Pro-BNP 04/19/2021 394*   No results displayed because visit has over 200 results. Hospital Outpatient Visit on 02/10/2021   Component Date Value    Hemoglobin A1C 02/10/2021 5.7     eAG 02/10/2021 116.9        Assessment/Plan:  Rosalee Muñoz was seen today for results and cough. Diagnoses and all orders for this visit:    Post-nasal drip  -     fluticasone (FLONASE) 50 MCG/ACT nasal spray; 2 sprays by Each Nostril route daily    Morbid obesity (HCC)  -     Dulaglutide (TRULICITY) 3 LB/9.5OK SOPN; Inject 3 mg into the skin once a week    Type 2 diabetes mellitus with stage 3 chronic kidney disease, without long-term current use of insulin, unspecified whether stage 3a or 3b CKD (HCC)  -     Dulaglutide (TRULICITY) 3 OJ/0.1FI SOPN; Inject 3 mg into the skin once a week  -     dapagliflozin (FARXIGA) 10 MG tablet; Take 1 tablet by mouth every morning    Chronic renal impairment, stage 3 (moderate), unspecified whether stage 3a or 3b CKD (HCC)  -     dapagliflozin (FARXIGA) 10 MG tablet; Take 1 tablet by mouth every morning    Other orders  -     metoprolol succinate (TOPROL XL) 100 MG extended release tablet; Take 1 tablet by mouth daily  -     famotidine (PEPCID) 20 MG tablet; Take 1 tablet by mouth daily  -     atorvastatin (LIPITOR) 40 MG tablet; Take 1 tablet by mouth daily  -     cetirizine (ZYRTEC) 5 MG tablet; Take 1 tablet by mouth daily  -     amLODIPine (NORVASC) 10 MG tablet; Take 1 tablet by mouth daily  -     alendronate (FOSAMAX) 70 MG tablet; TAKE 1 TABLET BY MOUTH EVERY 7 DAYS      Return in about 4 weeks (around 8/11/2021) for kidney labs in 6 weeks -- call if problems with cost of medications.     PAtient medication was very expensive at [employer]

## 2021-07-16 ENCOUNTER — OFFICE VISIT (OUTPATIENT)
Dept: CARDIOLOGY CLINIC | Age: 71
End: 2021-07-16
Payer: MEDICARE

## 2021-07-16 VITALS
DIASTOLIC BLOOD PRESSURE: 76 MMHG | HEIGHT: 65 IN | SYSTOLIC BLOOD PRESSURE: 118 MMHG | WEIGHT: 282.5 LBS | HEART RATE: 84 BPM | OXYGEN SATURATION: 92 % | BODY MASS INDEX: 47.07 KG/M2

## 2021-07-16 DIAGNOSIS — E66.01 CLASS 3 SEVERE OBESITY DUE TO EXCESS CALORIES WITH SERIOUS COMORBIDITY AND BODY MASS INDEX (BMI) OF 45.0 TO 49.9 IN ADULT (HCC): ICD-10-CM

## 2021-07-16 DIAGNOSIS — G47.33 OSA (OBSTRUCTIVE SLEEP APNEA): ICD-10-CM

## 2021-07-16 DIAGNOSIS — I50.32 CHRONIC DIASTOLIC HEART FAILURE (HCC): Primary | ICD-10-CM

## 2021-07-16 DIAGNOSIS — N18.30 STAGE 3 CHRONIC KIDNEY DISEASE, UNSPECIFIED WHETHER STAGE 3A OR 3B CKD (HCC): ICD-10-CM

## 2021-07-16 PROCEDURE — 99214 OFFICE O/P EST MOD 30 MIN: CPT | Performed by: CLINICAL NURSE SPECIALIST

## 2021-07-16 RX ORDER — ATORVASTATIN CALCIUM 40 MG/1
40 TABLET, FILM COATED ORAL DAILY
COMMUNITY
End: 2021-10-04

## 2021-07-16 NOTE — PROGRESS NOTES
[facility]  Progress Note    Primary Care Doctor:  Juju Regalado MD    Chief Complaint   Patient presents with    Congestive Heart Failure    Hypertension        History of Present Illness:  72 y.o. male with history of ckd (Dr Dickerson Leader), prashanth. Dm, morbid obesity,  4/5-14/2021 for chest discomfort and SOB for months, diuresed [phone]->293. I had the pleasure of seeing Steve Stokes in follow up for F. He is ambulatory and by his self. He has his weights from home ranging 279-281. He feels great, no shortness of breath, palpitations, lightheadedness or chest pain. He does have a little ankle edema. He is enjoying wearing his cpap and it has made a difference. 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 Past Medical History:   has a past medical history of Anesthesia, Cancer ([facility] [address] Hyperlipidemia, Hypertension, Obesity, PRASHANTH treated with BiPAP, Osteoarthritis, Type 2 diabetes mellitus without complication ([facility] [address] Type 2 diabetes mellitus without complication, without long-term current use of insulin ([facility] [address] Urinary incontinence, stress, male, and Wears dentures. Surgical History:   has a past surgical history that includes Total hip arthroplasty; joint replacement (Left); shoulder surgery (Bilateral); Knee arthroscopy (Right); and Cystoscopy (N/A, 6/21/2021). Social History:   reports that he quit smoking about 20 years ago. His smoking use included cigars. He started smoking about 41 years ago. He has a 5.25 pack-year smoking history. He has quit using smokeless tobacco. He reports current alcohol use. He reports that he does not use drugs. Family History:   Family History   Problem Relation Age of Onset    Hypertension Mother     Diabetes Mother     Hypertension Father        Home Medications:  Prior to Admission medications    Medication Sig Start Date End Date Taking?  Authorizing Provider   atorvastatin (LIPITOR) 40 MG tablet Take 40 mg by mouth daily   Yes Historical Provider, MD   metoprolol succinate (TOPROL XL) 100 MG extended release tablet Take 1 tablet by mouth daily 7/14/21  Yes Amee Haas MD   famotidine (PEPCID) 20 MG tablet Take 1 tablet by mouth daily 7/14/21 10/12/21 Yes Amee Haas MD   Dulaglutide (TRULICITY) 3 YE/2.6HE SOPN Inject 3 mg into the skin once a week 7/14/21  Yes Amee Haas MD   dapagliflozin (FARXIGA) 10 MG tablet Take 1 tablet by mouth every morning 7/14/21  Yes Amee Haas MD   cetirizine (ZYRTEC) 5 MG tablet Take 1 tablet by mouth daily 7/14/21  Yes Amee Haas MD   amLODIPine (NORVASC) 10 MG tablet Take 1 tablet by mouth daily 7/14/21  Yes Amee Haas MD   alendronate (FOSAMAX) 70 MG tablet TAKE 1 TABLET BY MOUTH EVERY 7 DAYS 7/14/21  Yes Amee Haas MD   fluticasone [facility]) 50 MCG/ACT nasal spray 2 sprays by Each Nostril route daily 7/14/21  Yes Amee Haas MD   furosemide (LASIX) 40 MG tablet Take 1 tablet by mouth 2 times daily 5/14/21  Yes Marietta Crocker Kitchen, APRN - CNS   aspirin 81 MG EC tablet Take 81 mg by mouth daily   Yes Historical Provider, MD   Acetaminophen (TYLENOL ARTHRITIS PAIN PO) Take 1 tablet by mouth daily OTC    Yes Historical Provider, MD   HYDROcodone-acetaminophen (NORCO)  MG per tablet Take 1 tablet by mouth 3 times daily as needed (Pain - back & hips).  Pain specialist    Yes Historical Provider, MD   Insulin Pen Needle ([employer] PEN NEEDLES) 31G X 6 MM MISC 1 each by Does not apply route daily 9/29/20  Yes Amee Haas MD   ammonium lactate (AMLACTIN) 12 % cream Apply topically as needed    Yes Historical Provider, MD   glimepiride (AMARYL) 4 MG tablet Take 4 mg by mouth every morning (before breakfast) 7/1/20  Yes Historical Provider, MD   vitamin B-6 (PYRIDOXINE) 50 MG tablet Take 50 mg by mouth daily   Yes Historical Provider, MD   ergocalciferol (ERGOCALCIFEROL) 86606 UNITS capsule Take 50,000 Units by mouth once a week. Yes Historical Provider, MD   Meals on Wheels MISC by Does not apply route Patient is to receive 2-3 diabetic meals only 4/30/21   Mary Jeronimo MD   salsalate (DISALCID) 750 MG tablet Take 750 mg by mouth 2 times daily as needed  7/1/20   Historical Provider, MD        Allergies: Indomethacin, Statins, Succinylcholine, Unable to assess, and Actos [pioglitazone]     Review of Systems:   · Constitutional: there has been no unanticipated weight loss. There's been no change in energy level, sleep pattern, or activity level. · Eyes: No visual changes or diplopia. No scleral icterus. · ENT: No Headaches, hearing loss or vertigo. No mouth sores or sore throat. · Cardiovascular: Reviewed in HPI  · Respiratory: No cough or wheezing, no sputum production. No hematemesis. · Gastrointestinal: No abdominal pain, appetite loss, blood in stools. No change in bowel or bladder habits. · Genitourinary: No dysuria, trouble voiding, or hematuria. · Musculoskeletal:  No gait disturbance, weakness or joint complaints. · Integumentary: No rash or pruritis. · Neurological: No headache, diplopia, change in muscle strength, numbness or tingling. No change in gait, balance, coordination, mood, affect, memory, mentation, behavior. · Psychiatric: No anxiety, no depression. · Endocrine: No malaise, fatigue or temperature intolerance. No excessive thirst, fluid intake, or urination. No tremor. · Hematologic/Lymphatic: No abnormal bruising or bleeding, blood clots or swollen lymph nodes. · Allergic/Immunologic: No nasal congestion or hives.     Physical Examination:    Vitals:    07/16/21 1041   BP: 118/76   Site: Left Upper Arm   Position: Sitting   Cuff Size: Large Adult   Pulse: 84   SpO2: 92%   Weight: 282 lb 8 oz (128.1 kg)   Height: 5' 5\" (1.651 m)        Constitutional and General Appearance: Warm and dry, no apparent distress, normal coloration  HEENT:  Normocephalic, 04/30/2021     Cardiac Imaging:  Echo 4/7/2021   Summary   Overall left ventricular function is normal.   Ejection fraction is visually estimated to be 55 %. E/e'= 9.3 .   There is reversal of E/A inflow velocities across the mitral valve.   There is mild concentric left ventricular hypertrophy.   No definitive regional wall motion abnormalities are noted.   Grade II diastolic dysfunction with elevated LV filling pressures.   Mild to moderate tricuspid regurgitation.   Estimated pulmonary artery systolic pressure is moderately elevated at 65   mmHg assuming a right atrial pressure of 15 mmHg.   The right ventricle is mildly enlarged. TAPSE= 2.72 RV S'= 17.2   IVC size is dilated (>2.1 cm) and collapses < 50% with respiration   consistent with elevated RA pressure (15 mmHg    Stress test 8/12/2019  The LV EF is 55%  Normal global and regional wall motion in all territories. There is a medium sized, partially reversible defect in the anterior  wall consistent with moderate verena-infarct ischemia. 1.Non-diagnostic ECG for ischemia with pharmocologic stress. 2.Normal left ventriular systolic function. 3.Positive nuclear stress imaging suggestive of scar plus verena      infarct ischemia in the anterior wall. 4.Nuclear stress image findings indicate intermediate risk for      future ischemic event .     Echo 12/12/2019  Summary:  The left ventricular wall motion is normal.  Overall left ventricular ejection fraction is estimated to be 60-65%. There is mild concentric left ventricular hypertrophy. There is mild mitral regurgitation. Assessment:    1. Chronic diastolic heart failure ([address]    2. KAMRAN (obstructive sleep apnea) on cpap   3. Stage 3 chronic kidney disease, unspecified whether stage 3a or 3b CKD    4. Class 3 severe obesity due to excess calories with serious comorbidity and body mass index (BMI) of 45.0 to 49.9 in adult [facility]) Dr Bailee Zhang:   Patient Instructions   1.   Continue current medications  2. Glad to hear about the bike   3. Blood work today  4. RTO in 4 months or sooner with issues  5.   Keep up the great work and wear cpap      I appreciate the opportunity of cooperating in the care of this individual.    OSMAN Bell CNS, CNS, 7/16/2021, 11:26 AM

## 2021-07-16 NOTE — PATIENT INSTRUCTIONS
1.  Continue current medications  2. Glad to hear about the bike   3. Blood work today  4. RTO in 4 months or sooner with issues  5.   Keep up the great work and wear cpap

## 2021-08-08 ENCOUNTER — HOSPITAL ENCOUNTER (INPATIENT)
Age: 71
LOS: 4 days | Discharge: HOME | DRG: 694 | End: 2021-08-12
Attending: EMERGENCY MEDICINE | Admitting: FAMILY MEDICINE
Payer: MEDICARE

## 2021-08-08 ENCOUNTER — APPOINTMENT (OUTPATIENT)
Dept: CT IMAGING | Age: 71
DRG: 694 | End: 2021-08-08
Payer: MEDICARE

## 2021-08-08 DIAGNOSIS — N17.9 AKI (ACUTE KIDNEY INJURY) (HCC): Primary | ICD-10-CM

## 2021-08-08 DIAGNOSIS — R33.9 URINARY RETENTION: ICD-10-CM

## 2021-08-08 LAB
A/G RATIO: 1.1 (ref 1.1–2.2)
ALBUMIN SERPL-MCNC: 4.1 G/DL (ref 3.4–5)
ALP BLD-CCNC: 98 U/L (ref 40–129)
ALT SERPL-CCNC: 56 U/L (ref 10–40)
ANION GAP SERPL CALCULATED.3IONS-SCNC: 16 MMOL/L (ref 3–16)
AST SERPL-CCNC: 65 U/L (ref 15–37)
BASOPHILS ABSOLUTE: 0 K/UL (ref 0–0.2)
BASOPHILS RELATIVE PERCENT: 0.9 %
BILIRUB SERPL-MCNC: 0.6 MG/DL (ref 0–1)
BUN BLDV-MCNC: 28 MG/DL (ref 7–20)
CALCIUM SERPL-MCNC: 9.4 MG/DL (ref 8.3–10.6)
CHLORIDE BLD-SCNC: 102 MMOL/L (ref 99–110)
CO2: 22 MMOL/L (ref 21–32)
CREAT SERPL-MCNC: 3.3 MG/DL (ref 0.8–1.3)
EOSINOPHILS ABSOLUTE: 0.2 K/UL (ref 0–0.6)
EOSINOPHILS RELATIVE PERCENT: 3.9 %
GFR AFRICAN AMERICAN: 23
GFR NON-AFRICAN AMERICAN: 19
GLOBULIN: 3.7 G/DL
GLUCOSE BLD-MCNC: 118 MG/DL (ref 70–99)
GLUCOSE BLD-MCNC: 120 MG/DL (ref 70–99)
HCT VFR BLD CALC: 40.8 % (ref 40.5–52.5)
HEMOGLOBIN: 13.7 G/DL (ref 13.5–17.5)
LYMPHOCYTES ABSOLUTE: 1.8 K/UL (ref 1–5.1)
LYMPHOCYTES RELATIVE PERCENT: 30.9 %
MCH RBC QN AUTO: 32.8 PG (ref 26–34)
MCHC RBC AUTO-ENTMCNC: 33.5 G/DL (ref 31–36)
MCV RBC AUTO: 98 FL (ref 80–100)
MONOCYTES ABSOLUTE: 0.7 K/UL (ref 0–1.3)
MONOCYTES RELATIVE PERCENT: 12.9 %
NEUTROPHILS ABSOLUTE: 2.9 K/UL (ref 1.7–7.7)
NEUTROPHILS RELATIVE PERCENT: 51.4 %
PDW BLD-RTO: 15.9 % (ref 12.4–15.4)
PERFORMED ON: ABNORMAL
PLATELET # BLD: 342 K/UL (ref 135–450)
PMV BLD AUTO: 6.9 FL (ref 5–10.5)
POTASSIUM SERPL-SCNC: 3.6 MMOL/L (ref 3.5–5.1)
RBC # BLD: 4.17 M/UL (ref 4.2–5.9)
SODIUM BLD-SCNC: 140 MMOL/L (ref 136–145)
TOTAL PROTEIN: 7.8 G/DL (ref 6.4–8.2)
WBC # BLD: 5.7 K/UL (ref 4–11)

## 2021-08-08 PROCEDURE — 6370000000 HC RX 637 (ALT 250 FOR IP): Performed by: NURSE PRACTITIONER

## 2021-08-08 PROCEDURE — 74176 CT ABD & PELVIS W/O CONTRAST: CPT

## 2021-08-08 PROCEDURE — 6360000002 HC RX W HCPCS: Performed by: FAMILY MEDICINE

## 2021-08-08 PROCEDURE — 6370000000 HC RX 637 (ALT 250 FOR IP): Performed by: FAMILY MEDICINE

## 2021-08-08 PROCEDURE — 99285 EMERGENCY DEPT VISIT HI MDM: CPT

## 2021-08-08 PROCEDURE — 51798 US URINE CAPACITY MEASURE: CPT

## 2021-08-08 PROCEDURE — 1200000000 HC SEMI PRIVATE

## 2021-08-08 PROCEDURE — 80053 COMPREHEN METABOLIC PANEL: CPT

## 2021-08-08 PROCEDURE — 36415 COLL VENOUS BLD VENIPUNCTURE: CPT

## 2021-08-08 PROCEDURE — 85025 COMPLETE CBC W/AUTO DIFF WBC: CPT

## 2021-08-08 RX ORDER — TAMSULOSIN HYDROCHLORIDE 0.4 MG/1
0.4 CAPSULE ORAL DAILY
COMMUNITY
End: 2021-12-10

## 2021-08-08 RX ORDER — ACETAMINOPHEN 650 MG/1
650 SUPPOSITORY RECTAL EVERY 6 HOURS PRN
Status: DISCONTINUED | OUTPATIENT
Start: 2021-08-08 | End: 2021-08-12 | Stop reason: HOSPADM

## 2021-08-08 RX ORDER — AMLODIPINE BESYLATE 5 MG/1
10 TABLET ORAL DAILY
Status: DISCONTINUED | OUTPATIENT
Start: 2021-08-09 | End: 2021-08-11

## 2021-08-08 RX ORDER — TAMSULOSIN HYDROCHLORIDE 0.4 MG/1
0.4 CAPSULE ORAL NIGHTLY
Status: DISCONTINUED | OUTPATIENT
Start: 2021-08-08 | End: 2021-08-08

## 2021-08-08 RX ORDER — ONDANSETRON 2 MG/ML
4 INJECTION INTRAMUSCULAR; INTRAVENOUS EVERY 6 HOURS PRN
Status: DISCONTINUED | OUTPATIENT
Start: 2021-08-08 | End: 2021-08-12 | Stop reason: HOSPADM

## 2021-08-08 RX ORDER — ASPIRIN 81 MG/1
81 TABLET ORAL DAILY
Status: DISCONTINUED | OUTPATIENT
Start: 2021-08-09 | End: 2021-08-12 | Stop reason: HOSPADM

## 2021-08-08 RX ORDER — SODIUM CHLORIDE 9 MG/ML
25 INJECTION, SOLUTION INTRAVENOUS PRN
Status: DISCONTINUED | OUTPATIENT
Start: 2021-08-08 | End: 2021-08-12 | Stop reason: HOSPADM

## 2021-08-08 RX ORDER — HYDROCODONE BITARTRATE AND ACETAMINOPHEN 10; 325 MG/1; MG/1
1 TABLET ORAL 3 TIMES DAILY PRN
Status: DISCONTINUED | OUTPATIENT
Start: 2021-08-08 | End: 2021-08-12 | Stop reason: HOSPADM

## 2021-08-08 RX ORDER — INSULIN LISPRO 100 [IU]/ML
0-12 INJECTION, SOLUTION INTRAVENOUS; SUBCUTANEOUS
Status: DISCONTINUED | OUTPATIENT
Start: 2021-08-08 | End: 2021-08-12 | Stop reason: HOSPADM

## 2021-08-08 RX ORDER — DEXTROSE MONOHYDRATE 25 G/50ML
12.5 INJECTION, SOLUTION INTRAVENOUS PRN
Status: DISCONTINUED | OUTPATIENT
Start: 2021-08-08 | End: 2021-08-12 | Stop reason: HOSPADM

## 2021-08-08 RX ORDER — NICOTINE POLACRILEX 4 MG
15 LOZENGE BUCCAL PRN
Status: DISCONTINUED | OUTPATIENT
Start: 2021-08-08 | End: 2021-08-12 | Stop reason: HOSPADM

## 2021-08-08 RX ORDER — NITROFURANTOIN 25; 75 MG/1; MG/1
100 CAPSULE ORAL 2 TIMES DAILY
Status: ON HOLD | COMMUNITY
End: 2021-08-12 | Stop reason: HOSPADM

## 2021-08-08 RX ORDER — TAMSULOSIN HYDROCHLORIDE 0.4 MG/1
0.4 CAPSULE ORAL DAILY
Status: DISCONTINUED | OUTPATIENT
Start: 2021-08-09 | End: 2021-08-12 | Stop reason: HOSPADM

## 2021-08-08 RX ORDER — POLYETHYLENE GLYCOL 3350 17 G/17G
17 POWDER, FOR SOLUTION ORAL DAILY PRN
Status: DISCONTINUED | OUTPATIENT
Start: 2021-08-08 | End: 2021-08-12 | Stop reason: HOSPADM

## 2021-08-08 RX ORDER — ONDANSETRON 4 MG/1
4 TABLET, ORALLY DISINTEGRATING ORAL EVERY 8 HOURS PRN
Status: DISCONTINUED | OUTPATIENT
Start: 2021-08-08 | End: 2021-08-12 | Stop reason: HOSPADM

## 2021-08-08 RX ORDER — SODIUM CHLORIDE 0.9 % (FLUSH) 0.9 %
5-40 SYRINGE (ML) INJECTION PRN
Status: DISCONTINUED | OUTPATIENT
Start: 2021-08-08 | End: 2021-08-12 | Stop reason: HOSPADM

## 2021-08-08 RX ORDER — SODIUM CHLORIDE 0.9 % (FLUSH) 0.9 %
5-40 SYRINGE (ML) INJECTION EVERY 12 HOURS SCHEDULED
Status: DISCONTINUED | OUTPATIENT
Start: 2021-08-08 | End: 2021-08-12 | Stop reason: HOSPADM

## 2021-08-08 RX ORDER — DEXTROSE MONOHYDRATE 50 MG/ML
100 INJECTION, SOLUTION INTRAVENOUS PRN
Status: DISCONTINUED | OUTPATIENT
Start: 2021-08-08 | End: 2021-08-12 | Stop reason: HOSPADM

## 2021-08-08 RX ORDER — ACETAMINOPHEN 325 MG/1
650 TABLET ORAL EVERY 6 HOURS PRN
Status: DISCONTINUED | OUTPATIENT
Start: 2021-08-08 | End: 2021-08-12 | Stop reason: HOSPADM

## 2021-08-08 RX ORDER — ATORVASTATIN CALCIUM 80 MG/1
40 TABLET, FILM COATED ORAL DAILY
Status: DISCONTINUED | OUTPATIENT
Start: 2021-08-09 | End: 2021-08-12 | Stop reason: HOSPADM

## 2021-08-08 RX ORDER — INSULIN LISPRO 100 [IU]/ML
0-6 INJECTION, SOLUTION INTRAVENOUS; SUBCUTANEOUS NIGHTLY
Status: DISCONTINUED | OUTPATIENT
Start: 2021-08-08 | End: 2021-08-12 | Stop reason: HOSPADM

## 2021-08-08 RX ORDER — METOPROLOL SUCCINATE 50 MG/1
100 TABLET, EXTENDED RELEASE ORAL DAILY
Status: DISCONTINUED | OUTPATIENT
Start: 2021-08-09 | End: 2021-08-12 | Stop reason: HOSPADM

## 2021-08-08 RX ORDER — OXYBUTYNIN CHLORIDE 5 MG/1
5 TABLET ORAL ONCE
Status: COMPLETED | OUTPATIENT
Start: 2021-08-08 | End: 2021-08-08

## 2021-08-08 RX ADMIN — ENOXAPARIN SODIUM 30 MG: 30 INJECTION SUBCUTANEOUS at 20:49

## 2021-08-08 RX ADMIN — OXYBUTYNIN CHLORIDE 5 MG: 5 TABLET ORAL at 20:49

## 2021-08-08 RX ADMIN — HYDROCODONE BITARTRATE AND ACETAMINOPHEN 1 TABLET: 10; 325 TABLET ORAL at 20:49

## 2021-08-08 ASSESSMENT — PAIN DESCRIPTION - LOCATION
LOCATION: ABDOMEN
LOCATION: OTHER (COMMENT)
LOCATION: ABDOMEN;PENIS

## 2021-08-08 ASSESSMENT — PAIN DESCRIPTION - FREQUENCY
FREQUENCY: CONTINUOUS
FREQUENCY: CONTINUOUS

## 2021-08-08 ASSESSMENT — PAIN DESCRIPTION - PAIN TYPE
TYPE: ACUTE PAIN
TYPE: ACUTE PAIN
TYPE: ACUTE PAIN;CHRONIC PAIN

## 2021-08-08 ASSESSMENT — ENCOUNTER SYMPTOMS
RHINORRHEA: 0
COUGH: 0
NAUSEA: 0
SHORTNESS OF BREATH: 0
VOMITING: 0
ABDOMINAL PAIN: 0
DIARRHEA: 0

## 2021-08-08 ASSESSMENT — PAIN DESCRIPTION - ONSET
ONSET: ON-GOING
ONSET: ON-GOING

## 2021-08-08 ASSESSMENT — PAIN SCALES - GENERAL
PAINLEVEL_OUTOF10: 3
PAINLEVEL_OUTOF10: 10
PAINLEVEL_OUTOF10: 8

## 2021-08-08 ASSESSMENT — PAIN DESCRIPTION - PROGRESSION
CLINICAL_PROGRESSION: GRADUALLY IMPROVING
CLINICAL_PROGRESSION: GRADUALLY IMPROVING

## 2021-08-08 ASSESSMENT — PAIN DESCRIPTION - ORIENTATION
ORIENTATION: LOWER

## 2021-08-08 ASSESSMENT — PAIN DESCRIPTION - DESCRIPTORS: DESCRIPTORS: SHARP

## 2021-08-08 NOTE — ED NOTES
Patient reports he is still unable to void. Declines to be straight cathed.       [facility]  08/08/21 [id number]

## 2021-08-08 NOTE — ED NOTES
[facility] Patient to be transported to floor via wheelchair.       Aaliyah Ortiz RN  08/08/21 [id number]

## 2021-08-08 NOTE — ED NOTES
Patient reports that he is currently followed by urology, has trouble voiding. States that is unable to void today. Patient set up with urinal, declines to be straight cathed for urine sample.       Harriet Yee RN  08/08/21 8767

## 2021-08-08 NOTE — ED NOTES
Bladder scan done, 324ml of urine. Pt labs drawn and sent to lab.   Pt sent to [facility] with urine cup     Dhruv Villela RN  08/08/21 0927

## 2021-08-08 NOTE — PROGRESS NOTES
Pt to room 4483, settled in room, attempted to void, unsuccessful. Vitals obtained. Night RN to resume care.  Call light within reach , bed in locked position, alarm on, aware to call out for needs, dietary notified of admission for dinner tray

## 2021-08-08 NOTE — ED PROVIDER NOTES
[address] [facility] Emergency Department      Pt Name: Mary Hurtado  MRN: [id number]  [place] 1950  Date of evaluation: 8/8/2021  Provider: Buck Jaimes MD  I independently performed a history and physical on Mary Hurtado. All diagnostic, treatment, and disposition decisions were made by myself in conjunction with the advanced practice provider. HPI: Mary Hurtado presented with   Chief Complaint   Patient presents with    Urinary Retention     pt c/o urinary spasms, unable to urinate standing or sitting, must be laying on his belly or in a recline position. is taking antibiotics and flomax for urinary spasm per urologist     Mary Hurtado has a past medical history of Anesthesia, Cancer ([facility] [address] Hyperlipidemia, Hypertension, Obesity, KAMRAN treated with BiPAP, Osteoarthritis, Type 2 diabetes mellitus without complication ([facility] [address] Type 2 diabetes mellitus without complication, without long-term current use of insulin ([facility] [address] (2/7/2018), Urinary incontinence, stress, male, and Wears dentures. He has a past surgical history that includes Total hip arthroplasty; joint replacement (Left); shoulder surgery (Bilateral); Knee arthroscopy (Right); and Cystoscopy (N/A, 6/21/2021). No current facility-administered medications on file prior to encounter.      Current Outpatient Medications on File Prior to Encounter   Medication Sig Dispense Refill    tamsulosin (FLOMAX) 0.4 MG capsule Take 0.4 mg by mouth daily      nitrofurantoin, macrocrystal-monohydrate, (MACROBID) 100 MG capsule Take 100 mg by mouth 2 times daily      furosemide (LASIX) 40 MG tablet TAKE 1 AND 1/2 TABLETS BY MOUTH EVERY MORNING THEN TAKE 1 TABLET BY MOUTH EVERY EVENING 225 tablet 0    atorvastatin (LIPITOR) 40 MG tablet Take 40 mg by mouth daily      metoprolol succinate (TOPROL XL) 100 MG extended release tablet Take 1 tablet by mouth daily 90 tablet 1    famotidine (PEPCID) 20 MG tablet Take 1 tablet by mouth daily 90 tablet 1    Dulaglutide (TRULICITY) 3 EP/6.7GE SOPN Inject 3 mg into the skin once a week 12 pen 1    dapagliflozin (FARXIGA) 10 MG tablet Take 1 tablet by mouth every morning 90 tablet 1    cetirizine (ZYRTEC) 5 MG tablet Take 1 tablet by mouth daily 90 tablet 1    amLODIPine (NORVASC) 10 MG tablet Take 1 tablet by mouth daily 90 tablet 0    alendronate (FOSAMAX) 70 MG tablet TAKE 1 TABLET BY MOUTH EVERY 7 DAYS 12 tablet 1    aspirin 81 MG EC tablet Take 81 mg by mouth daily      HYDROcodone-acetaminophen (NORCO)  MG per tablet Take 1 tablet by mouth 3 times daily as needed (Pain - back & hips). Pain specialist       glimepiride (AMARYL) 4 MG tablet Take 4 mg by mouth every morning (before breakfast)      salsalate (DISALCID) 750 MG tablet Take 750 mg by mouth 2 times daily as needed       vitamin B-6 (PYRIDOXINE) 50 MG tablet Take 50 mg by mouth daily      ergocalciferol (ERGOCALCIFEROL) 02628 UNITS capsule Take 50,000 Units by mouth once a week.       fluticasone (FLONASE) 50 MCG/ACT nasal spray 2 sprays by Each Nostril route daily 2 Bottle 1    Meals on Wheels MISC by Does not apply route Patient is to receive 2-3 diabetic meals only 1 each 0    Acetaminophen (TYLENOL ARTHRITIS PAIN PO) Take 1 tablet by mouth daily OTC       Insulin Pen Needle ([employer] PEN NEEDLES) 31G X 6 MM MISC 1 each by Does not apply route daily 100 each 3    ammonium lactate (AMLACTIN) 12 % cream Apply topically as needed        PHYSICAL EXAM  Vitals: BP (!) 161/92   Pulse 94   Temp 97.9 °F (36.6 °C) (Oral)   Resp 22   Ht 5' 5\" (1.651 m)   Wt 281 lb 8 oz (127.7 kg)   SpO2 93%   BMI 46.84 kg/m²   Constitutional:  72 y.o. male alert  HENT:  Atraumatic, oral mucosa moist  Neck:  No visible JVD, supple  Chest/Lungs:  Respiratory effort normal, clear  Abdomen:  Non-distended, morbidly obese, NT  Back:  No gross deformity  Extremities:  Normal tone and perfusion    Medical Decision Making and Plan: Briefly, this is an 72 y.o.male who presented with inability to urinate, on macrobid and flomax. Recent UTI diagnosis. Hx of prostate Ca, had the procedure below:  Discussed findings with Dr Homero Kraft. He did not feel emergent Cook needed on a Sunday afternoon despite the SHANNAN. The patient would be at higher risk for complications from attempt by nursing staff for Cook placement due to his known contracture, stricture, scar tissue. We had considered discharge with follow up procedure the next day. Patient does have concerns for severe spasms like he had the evening prior. Ultimately decision made for admission to the hospital for procedure, closer follow up on kidney function. Hospitalist has accepted admission. Date of Procedure: 6/21/2021  Pre-Op Diagnosis: refractory bladder neck contracture  Post-Op Diagnosis: Same  Procedure(s):  Cystoscopy  Transurethral incision of bladder neck contracture  Complex Cook placement over wire  Surgeon(s):  Renea Kim MD    For further details of [facility] encounter, please see documentation by advanced practice provider KYLIE Zavala.     Labs Reviewed   CBC WITH AUTO DIFFERENTIAL - Abnormal; Notable for the following components:       Result Value    RBC 4.17 (*)     RDW 15.9 (*)     All other components within normal limits    Narrative:     Performed at:  [facility] [address],  [place]   Phone [phone]   COMPREHENSIVE METABOLIC PANEL - Abnormal; Notable for the following components:    Glucose 118 (*)     BUN 28 (*)     CREATININE 3.3 (*)     GFR Non- 19 (*)     GFR  23 (*)     ALT 56 (*)     AST 65 (*)     All other components within normal limits    Narrative:     Performed at:  [facility] [address],  [place], [place]   Phone [phone]   CBC WITH AUTO DIFFERENTIAL - Abnormal; Notable for the following components:    RDW 15.7 (*)     All other components within normal limits    Narrative:     Performed at:  [facility] [address], [facility], [address]   Phone [phone]   RENAL FUNCTION PANEL - Abnormal; Notable for the following components:    Glucose 110 (*)     BUN 34 (*)     CREATININE 4.5 (*)     GFR Non- 13 (*)     GFR  16 (*)     All other components within normal limits    Narrative:     Performed at:  [facility] [address], [facility], [address]   Phone [phone]   POCT GLUCOSE - Abnormal; Notable for the following components:    POC Glucose 120 (*)     All other components within normal limits    Narrative:     Performed at:  [facility] [address], [facility], [address]   Phone [phone]   POCT GLUCOSE - Abnormal; Notable for the following components:    POC Glucose 111 (*)     All other components within normal limits    Narrative:     Performed at:  [facility] [address], [facility], [address]   Phone [phone]   URINE RT REFLEX TO CULTURE   POCT GLUCOSE    Narrative:     Performed at:  [facility] [address], [facility], [address]   Phone [phone]   POCT GLUCOSE   POCT GLUCOSE   POCT GLUCOSE   POCT GLUCOSE   POCT GLUCOSE     Previous BUN/crea:    BUN   Date/Time Value Ref Range Status   08/09/2021 10:00 AM 34 (H) 7 - 20 mg/dL Final   08/08/2021 10:59 AM 28 (H) 7 - 20 mg/dL Final   06/21/2021 06:51 AM 26 (H) 7 - 20 mg/dL Final     CREATININE   Date/Time Value Ref Range Status   08/09/2021 10:00 AM 4.5 (H) 0.8 - 1.3 mg/dL Final   08/08/2021 10:59 AM 3.3 (H) 0.8 - 1.3 mg/dL Final   06/21/2021 06:51 AM 2.4 (H) 0.8 - 1.3 mg/dL Final     RADIOLOGY:     CT ABDOMEN PELVIS WO CONTRAST Additional Contrast? None   Preliminary Result   1.  Moderate bilateral hydronephrosis

## 2021-08-08 NOTE — ED PROVIDER NOTES
[address] [facility]        Pt Name: Mathieu Mittal  MRN: [id number]  [place] 1950  Date of evaluation: 8/8/2021  Provider: Gisela Anne PA-C  PCP: Melisa Cooper MD  Note Started: 11:30 AM EDT        I have seen and evaluated this patient with my supervising physician Nancy Austin, *. CHIEF COMPLAINT       Chief Complaint   Patient presents with    Urinary Retention     pt c/o urinary spasms, unable to urinate standing or sitting, must be laying on his belly or in a recline position. is taking antibiotics and flomax for urinary spasm per urologist       HISTORY OF PRESENT ILLNESS   (Location, Timing/Onset, Context/Setting, Quality, Duration, Modifying Factors, Severity, Associated Signs and Symptoms)  Note limiting factors. Chief Complaint: Retention, dysuria     Mathieu Mittal is a 72 y.o. male who presents to the emergency department today for evaluation for urinary retention as well as dysuria. The patient states that he has a history of urinary incontinence, he is followed by the urology group, he states that 2 weeks ago he did undergo a urethral dilatation. The patient states that since that time he has had intermittent \"spasms\" to his bladder which she will rated as a 3/10. And he states that he will have some discomfort with urination. He denies any hematuria. He has no abdominal pain or flank pain. Patient denies any fever or chills. He has no nausea, vomiting or diarrhea. Patient states that he has also had UTIs, he states he is currently on Macrobid. The patient states he only has discomfort when he feels the urge to urinate. He feels that this time that he is able to urinate. The patient otherwise has no complaints at this time    Nursing Notes were all reviewed and agreed with or any disagreements were addressed in the HPI.     REVIEW OF SYSTEMS    (2-9 systems for level 4, 10 or more for level 5)     Review of Systems   Constitutional: Negative for activity change, appetite change, chills and fever. HENT: Negative for congestion and rhinorrhea. Respiratory: Negative for cough and shortness of breath. Cardiovascular: Negative for chest pain. Gastrointestinal: Negative for abdominal pain, diarrhea, nausea and vomiting. Genitourinary: Positive for difficulty urinating and dysuria. Negative for hematuria. Positives and Pertinent negatives as per HPI. Except as noted above in the ROS, all other systems were reviewed and negative.        PAST MEDICAL HISTORY     Past Medical History:   Diagnosis Date    Anesthesia     anxiety regarding ETT    Cancer ([facility] [address]     prostate    Hyperlipidemia     Hypertension     Obesity     KAMRAN treated with BiPAP     nasal    Osteoarthritis     Type 2 diabetes mellitus without complication (HCC)     Type 2 diabetes mellitus without complication, without long-term current use of insulin ([facility] [address] 2/7/2018    Urinary incontinence, stress, male     Wears dentures          SURGICAL HISTORY     Past Surgical History:   Procedure Laterality Date    CYSTOSCOPY N/A 6/21/2021    CYSTOSCOPY, TRANSURETHRAL INCISION OF BLADDER NECK CONTRACTURE WITH COLD KNIFE AND COMPLEX URETHRAL CATHETER PLACEMENT performed by Reuben Dunlap MD at [address] Left     THR    KNEE ARTHROSCOPY Right     SHOULDER SURGERY Bilateral     TOTAL HIP ARTHROPLASTY           CURRENTMEDICATIONS       Previous Medications    ACETAMINOPHEN (TYLENOL ARTHRITIS PAIN PO)    Take 1 tablet by mouth daily OTC     ALENDRONATE (FOSAMAX) 70 MG TABLET    TAKE 1 TABLET BY MOUTH EVERY 7 DAYS    AMLODIPINE (NORVASC) 10 MG TABLET    Take 1 tablet by mouth daily    AMMONIUM LACTATE (AMLACTIN) 12 % CREAM    Apply topically as needed     ASPIRIN 81 MG EC TABLET    Take 81 mg by mouth daily    ATORVASTATIN (LIPITOR) 40 MG TABLET    Take 40 mg by mouth daily    CETIRIZINE (ZYRTEC) 5 MG TABLET Take 1 tablet by mouth daily    DAPAGLIFLOZIN (FARXIGA) 10 MG TABLET    Take 1 tablet by mouth every morning    DULAGLUTIDE (TRULICITY) 3 XR/6.6OH SOPN    Inject 3 mg into the skin once a week    ERGOCALCIFEROL (ERGOCALCIFEROL) 74821 UNITS CAPSULE    Take 50,000 Units by mouth once a week. FAMOTIDINE (PEPCID) 20 MG TABLET    Take 1 tablet by mouth daily    FLUTICASONE (FLONASE) 50 MCG/ACT NASAL SPRAY    2 sprays by Each Nostril route daily    FUROSEMIDE (LASIX) 40 MG TABLET    TAKE 1 AND 1/2 TABLETS BY MOUTH EVERY MORNING THEN TAKE 1 TABLET BY MOUTH EVERY EVENING    GLIMEPIRIDE (AMARYL) 4 MG TABLET    Take 4 mg by mouth every morning (before breakfast)    HYDROCODONE-ACETAMINOPHEN (NORCO)  MG PER TABLET    Take 1 tablet by mouth 3 times daily as needed (Pain - back & hips).  Pain specialist     INSULIN PEN NEEDLE ([employer] PEN NEEDLES) 31G X 6 MM MISC    1 each by Does not apply route daily    MEALS ON WHEELS MISC    by Does not apply route Patient is to receive 2-3 diabetic meals only    METOPROLOL SUCCINATE (TOPROL XL) 100 MG EXTENDED RELEASE TABLET    Take 1 tablet by mouth daily    SALSALATE (DISALCID) 750 MG TABLET    Take 750 mg by mouth 2 times daily as needed     VITAMIN B-6 (PYRIDOXINE) 50 MG TABLET    Take 50 mg by mouth daily         ALLERGIES     Indomethacin, Statins, Succinylcholine, Unable to assess, and Actos [pioglitazone]    FAMILYHISTORY       Family History   Problem Relation Age of Onset    Hypertension Mother     Diabetes Mother     Hypertension Father           SOCIAL HISTORY       Social History     Tobacco Use    Smoking status: Former Smoker     Packs/day: 0.25     Years: 21.00     Pack years: 5.25     Types: Cigars     Start date: 37     Quit date: 2001     Years since quittin.6    Smokeless tobacco: Former User   Vaping Use    Vaping Use: Never used   Substance Use Topics    Alcohol use: Yes     Comment: occ    Drug use: Never       SCREENINGS    Mariah Coma Scale  Eye Opening: Spontaneous  Best Verbal Response: Oriented  Best Motor Response: Obeys commands  Mariah Coma Scale Score: 15        PHYSICAL EXAM    (up to 7 for level 4, 8 or more for level 5)     ED Triage Vitals [08/08/21 1019]   BP Temp Temp Source Pulse Resp SpO2 Height Weight   (!) 166/90 97.9 °F (36.6 °C) Oral 94 22 93 % 5' 5\" (1.651 m) 281 lb 8 oz (127.7 kg)       Physical Exam  Vitals and nursing note reviewed. Constitutional:       Appearance: He is well-developed. He is not diaphoretic. HENT:      Head: Normocephalic and atraumatic. Right Ear: External ear normal.      Left Ear: External ear normal.      Nose: Nose normal.   Eyes:      General:         Right eye: No discharge. Left eye: No discharge. Neck:      Trachea: No tracheal deviation. Cardiovascular:      Rate and Rhythm: Normal rate and regular rhythm. Heart sounds: No murmur heard. Pulmonary:      Effort: Pulmonary effort is normal. No respiratory distress. Breath sounds: Normal breath sounds. No wheezing or rales. Abdominal:      General: Bowel sounds are normal. There is no distension. Palpations: Abdomen is soft. Tenderness: There is no abdominal tenderness. There is no guarding. Musculoskeletal:         General: Normal range of motion. Cervical back: Normal range of motion and neck supple. Skin:     General: Skin is warm and dry. Neurological:      Mental Status: He is alert and oriented to person, place, and time.    Psychiatric:         Behavior: Behavior normal.         DIAGNOSTIC RESULTS   LABS:    Labs Reviewed   CBC WITH AUTO DIFFERENTIAL - Abnormal; Notable for the following components:       Result Value    RBC 4.17 (*)     RDW 15.9 (*)     All other components within normal limits    Narrative:     Performed at:  [facility] [address], [place], [facility] [address]   Phone [phone]   COMPREHENSIVE METABOLIC PANEL - Abnormal; Notable for the following components:    Glucose 118 (*)     BUN 28 (*)     CREATININE 3.3 (*)     GFR Non- 19 (*)     GFR  23 (*)     ALT 56 (*)     AST 65 (*)     All other components within normal limits    Narrative:     Performed at:  [facility] [address] [place],  [place], [address]   Phone [phone]   URINE RT REFLEX TO CULTURE       When ordered only abnormal lab results are displayed. All other labs were within normal range or not returned as of this dictation. EKG: When ordered, EKG's are interpreted by the Emergency Department Physician in the absence of a cardiologist.  Please see their note for interpretation of EKG. RADIOLOGY:   Non-plain film images such as CT, Ultrasound and MRI are read by the radiologist. Plain radiographic images are visualized and preliminarily interpreted by the ED Provider with the below findings:        Interpretation per the Radiologist below, if available at the time of this note:    CT ABDOMEN PELVIS WO CONTRAST Additional Contrast? None   Preliminary Result   1. Moderate bilateral hydronephrosis and hydroureter. This demonstrates   long-term stability with no obstructing calculi. Findings may be related to   bladder outlet obstruction or neurogenic bladder. Distal ureteral strictures   would be difficult to exclude. There is progressive bilateral renal atrophy,   right greater than left. 2. Colonic diverticulosis with no acute features. No results found.         PROCEDURES   Unless otherwise noted below, none     Procedures    CRITICAL CARE TIME   N/A    CONSULTS:  IP CONSULT TO UROLOGY  IP CONSULT TO UROLOGY      EMERGENCY DEPARTMENT COURSE and DIFFERENTIAL DIAGNOSIS/MDM:   Vitals:    Vitals:    08/08/21 1300 08/08/21 1330 08/08/21 1530 08/08/21 1600   BP: (!) 141/79 (!) 161/92 (!) 146/98 (!) 152/85   Pulse:       Resp:       Temp:       TempSrc:       SpO2: 93%   92%   Weight: Height:           Patient was given the following medications:  Medications - No data to display        Recently, this is a 67-year-old male who presents to the emergency department today for evaluation for urinary retention. The patient states that he underwent a sterile dilatation 2 weeks ago, however in review the patient's chart it appears that this was at the end of June. The patient states that since that time he has had intermittent infections, dysuria and urinary retention. He is followed by the urology group    On examination, his abdomen is benign    Bladder scan shows over 325 mL. The patient's not been able to urinate, I am concerned for urinary retention    CBC shows no evidence of leukocytosis or anemia. His CMP does show worsening kidney function with creatinine of 3.3. I did discuss the case with urology, as CT does show moderate bilateral hydronephrosis and hydroureter long term stability no obstructing calculi. Patient does not necessarily have any pain right now however I am concerned that he has been in the emergency room for almost 6 hours not been able to urinate and I am concerned for urinary retention. As the patient does not have pain per urology does not need an emergent Cook placement but will come into place one if patient does develop pain, patient otherwise can be admitted to be evaluated. Hospitalist has been paged for admission, patient is updated and agreeable with plan. Stable for admission    FINAL IMPRESSION      1. SHANNAN (acute kidney injury) ([facility] [address]    2. Urinary retention          DISPOSITION/PLAN   DISPOSITION Decision To Admit 08/08/2021 03:37:14 PM      PATIENT REFERRED TO:  No follow-up provider specified.     DISCHARGE MEDICATIONS:  New Prescriptions    No medications on file       DISCONTINUED MEDICATIONS:  Discontinued Medications    No medications on file              (Please note that portions of this note were completed with a voice recognition program. Efforts were made to edit the dictations but occasionally words are mis-transcribed.)    Shital Alvares PA-C (electronically signed)           Shital Alvares PA-C  08/08/21 0182

## 2021-08-09 LAB
ALBUMIN SERPL-MCNC: 4 G/DL (ref 3.4–5)
ANION GAP SERPL CALCULATED.3IONS-SCNC: 15 MMOL/L (ref 3–16)
BASOPHILS ABSOLUTE: 0.1 K/UL (ref 0–0.2)
BASOPHILS RELATIVE PERCENT: 1.1 %
BILIRUBIN URINE: NEGATIVE
BLOOD, URINE: ABNORMAL
BUN BLDV-MCNC: 34 MG/DL (ref 7–20)
CALCIUM SERPL-MCNC: 9.7 MG/DL (ref 8.3–10.6)
CHLORIDE BLD-SCNC: 101 MMOL/L (ref 99–110)
CLARITY: CLEAR
CO2: 21 MMOL/L (ref 21–32)
COLOR: YELLOW
CREAT SERPL-MCNC: 4.5 MG/DL (ref 0.8–1.3)
CREATININE URINE: 186.2 MG/DL (ref 39–259)
EOSINOPHILS ABSOLUTE: 0.3 K/UL (ref 0–0.6)
EOSINOPHILS RELATIVE PERCENT: 5.6 %
EPITHELIAL CELLS, UA: 1 /HPF (ref 0–5)
GFR AFRICAN AMERICAN: 16
GFR NON-AFRICAN AMERICAN: 13
GLUCOSE BLD-MCNC: 106 MG/DL (ref 70–99)
GLUCOSE BLD-MCNC: 110 MG/DL (ref 70–99)
GLUCOSE BLD-MCNC: 111 MG/DL (ref 70–99)
GLUCOSE BLD-MCNC: 90 MG/DL (ref 70–99)
GLUCOSE BLD-MCNC: 97 MG/DL (ref 70–99)
GLUCOSE URINE: NEGATIVE MG/DL
HCT VFR BLD CALC: 42.4 % (ref 40.5–52.5)
HEMOGLOBIN: 14.4 G/DL (ref 13.5–17.5)
HYALINE CASTS: 5 /LPF (ref 0–8)
KETONES, URINE: NEGATIVE MG/DL
LEUKOCYTE ESTERASE, URINE: ABNORMAL
LYMPHOCYTES ABSOLUTE: 1.4 K/UL (ref 1–5.1)
LYMPHOCYTES RELATIVE PERCENT: 25.6 %
MCH RBC QN AUTO: 33.1 PG (ref 26–34)
MCHC RBC AUTO-ENTMCNC: 33.9 G/DL (ref 31–36)
MCV RBC AUTO: 97.7 FL (ref 80–100)
MICROSCOPIC EXAMINATION: YES
MONOCYTES ABSOLUTE: 0.8 K/UL (ref 0–1.3)
MONOCYTES RELATIVE PERCENT: 14.2 %
NEUTROPHILS ABSOLUTE: 2.9 K/UL (ref 1.7–7.7)
NEUTROPHILS RELATIVE PERCENT: 53.5 %
NITRITE, URINE: NEGATIVE
PDW BLD-RTO: 15.7 % (ref 12.4–15.4)
PERFORMED ON: ABNORMAL
PERFORMED ON: ABNORMAL
PERFORMED ON: NORMAL
PERFORMED ON: NORMAL
PH UA: 5.5 (ref 5–8)
PHOSPHORUS: 4.8 MG/DL (ref 2.5–4.9)
PLATELET # BLD: 321 K/UL (ref 135–450)
PMV BLD AUTO: 7.1 FL (ref 5–10.5)
POTASSIUM SERPL-SCNC: 4.4 MMOL/L (ref 3.5–5.1)
PROTEIN UA: 100 MG/DL
RBC # BLD: 4.35 M/UL (ref 4.2–5.9)
RBC UA: 11 /HPF (ref 0–4)
SODIUM BLD-SCNC: 137 MMOL/L (ref 136–145)
SODIUM URINE: 36 MMOL/L
SPECIFIC GRAVITY UA: 1.01 (ref 1–1.03)
URINE REFLEX TO CULTURE: YES
URINE TYPE: ABNORMAL
UROBILINOGEN, URINE: 0.2 E.U./DL
WBC # BLD: 5.4 K/UL (ref 4–11)
WBC UA: 42 /HPF (ref 0–5)

## 2021-08-09 PROCEDURE — 36415 COLL VENOUS BLD VENIPUNCTURE: CPT

## 2021-08-09 PROCEDURE — 81001 URINALYSIS AUTO W/SCOPE: CPT

## 2021-08-09 PROCEDURE — 1200000000 HC SEMI PRIVATE

## 2021-08-09 PROCEDURE — 80069 RENAL FUNCTION PANEL: CPT

## 2021-08-09 PROCEDURE — 51798 US URINE CAPACITY MEASURE: CPT

## 2021-08-09 PROCEDURE — 2580000003 HC RX 258: Performed by: FAMILY MEDICINE

## 2021-08-09 PROCEDURE — 84300 ASSAY OF URINE SODIUM: CPT

## 2021-08-09 PROCEDURE — 2580000003 HC RX 258: Performed by: INTERNAL MEDICINE

## 2021-08-09 PROCEDURE — 0T7D8ZZ DILATION OF URETHRA, VIA NATURAL OR ARTIFICIAL OPENING ENDOSCOPIC: ICD-10-PCS | Performed by: UROLOGY

## 2021-08-09 PROCEDURE — 82570 ASSAY OF URINE CREATININE: CPT

## 2021-08-09 PROCEDURE — 6370000000 HC RX 637 (ALT 250 FOR IP): Performed by: NURSE PRACTITIONER

## 2021-08-09 PROCEDURE — 87086 URINE CULTURE/COLONY COUNT: CPT

## 2021-08-09 PROCEDURE — 6370000000 HC RX 637 (ALT 250 FOR IP): Performed by: FAMILY MEDICINE

## 2021-08-09 PROCEDURE — 85025 COMPLETE CBC W/AUTO DIFF WBC: CPT

## 2021-08-09 RX ORDER — SODIUM CHLORIDE 9 MG/ML
INJECTION, SOLUTION INTRAVENOUS CONTINUOUS
Status: DISCONTINUED | OUTPATIENT
Start: 2021-08-09 | End: 2021-08-10

## 2021-08-09 RX ORDER — FINASTERIDE 5 MG/1
5 TABLET, FILM COATED ORAL DAILY
Status: DISCONTINUED | OUTPATIENT
Start: 2021-08-09 | End: 2021-08-12 | Stop reason: HOSPADM

## 2021-08-09 RX ADMIN — METOPROLOL SUCCINATE 100 MG: 50 TABLET, EXTENDED RELEASE ORAL at 09:29

## 2021-08-09 RX ADMIN — Medication 10 ML: at 00:01

## 2021-08-09 RX ADMIN — TAMSULOSIN HYDROCHLORIDE 0.4 MG: 0.4 CAPSULE ORAL at 09:29

## 2021-08-09 RX ADMIN — HYDROCODONE BITARTRATE AND ACETAMINOPHEN 1 TABLET: 10; 325 TABLET ORAL at 11:28

## 2021-08-09 RX ADMIN — SODIUM CHLORIDE: 9 INJECTION, SOLUTION INTRAVENOUS at 19:02

## 2021-08-09 RX ADMIN — FINASTERIDE 5 MG: 5 TABLET, FILM COATED ORAL at 09:29

## 2021-08-09 RX ADMIN — Medication 10 ML: at 20:32

## 2021-08-09 RX ADMIN — AMLODIPINE BESYLATE 10 MG: 5 TABLET ORAL at 09:29

## 2021-08-09 ASSESSMENT — PAIN DESCRIPTION - PROGRESSION
CLINICAL_PROGRESSION: GRADUALLY IMPROVING

## 2021-08-09 ASSESSMENT — PAIN SCALES - GENERAL
PAINLEVEL_OUTOF10: 8
PAINLEVEL_OUTOF10: 3
PAINLEVEL_OUTOF10: 10
PAINLEVEL_OUTOF10: 0

## 2021-08-09 ASSESSMENT — PAIN DESCRIPTION - LOCATION: LOCATION: OTHER (COMMENT)

## 2021-08-09 NOTE — CARE COORDINATION
Discharge Planning Assessment  Rn/SW discharge planner met w/patient/family member to discuss reason for admission, current living situation, and potential needs at the time of discharge. Demographics/Insurance verified Yes    Current type of dwellinst floor apt-level entry    Living arrangements:lives alone-pt stated his family is supportive    Level of function/support: independent w/ADL's    PCP: Sid Espana    Last Visit to PCP:21    DME: has cane and walker at home-does not use. CPAP through [facility]    Active with any community resources/agencies/skilled home care: stated had COA at one point in time, currently has no services in the home    Medication compliance issues:no issues    Financial issues that could impact healthcare:no issues    Transportation at time of d/c (Discussed w/pt/family that on the day of discharge home, tentative time of discharge will be between 10am and noon): family     Discussed and provided facilities of choice if transition to a skilled nursing facility is required a the time of discharge-N/A at this time. Tentative discharge plan: met w/pt to determine any potential dc needs. Pt stated that he had COA supports at one time-plans to discuss w/children and will reach out to [employer] if needed. Pt at this time anticipates no dc needs.      Electronically signed by CL Flowers  [phone]

## 2021-08-09 NOTE — PROGRESS NOTES
[facility] PROGRESS NOTE    8/9/2021 8:32 AM        Name: Rojas Chilel . Admitted: 8/8/2021  Primary Care Provider: Yenny Pagan MD (Tel: [phone])      Subjective:  .     Admitted with inability to void  Has not voided since Friday  Not even leaking urine since Friday     Recent bladder scan was 520    Reports bladder spasms   Unable to insert talbot this am  Anticipate placement around noon today under anesthesia   Reviewed interval ancillary notes    Current Medications  amLODIPine (NORVASC) tablet 10 mg, Daily  atorvastatin (LIPITOR) tablet 40 mg, Daily  aspirin EC tablet 81 mg, Daily  HYDROcodone-acetaminophen (NORCO)  MG per tablet 1 tablet, TID PRN  metoprolol succinate (TOPROL XL) extended release tablet 100 mg, Daily  glucose (GLUTOSE) 40 % oral gel 15 g, PRN  dextrose 50 % IV solution, PRN  glucagon (rDNA) injection 1 mg, PRN  dextrose 5 % solution, PRN  sodium chloride flush 0.9 % injection 5-40 mL, 2 times per day  sodium chloride flush 0.9 % injection 5-40 mL, PRN  0.9 % sodium chloride infusion, PRN  enoxaparin (LOVENOX) injection 30 mg, Daily  ondansetron (ZOFRAN-ODT) disintegrating tablet 4 mg, Q8H PRN   Or  ondansetron (ZOFRAN) injection 4 mg, Q6H PRN  polyethylene glycol (GLYCOLAX) packet 17 g, Daily PRN  acetaminophen (TYLENOL) tablet 650 mg, Q6H PRN   Or  acetaminophen (TYLENOL) suppository 650 mg, Q6H PRN  insulin lispro (1 Unit Dial) 0-12 Units, TID WC  insulin lispro (1 Unit Dial) 0-6 Units, Nightly  tamsulosin (FLOMAX) capsule 0.4 mg, Daily        Objective:  BP (!) 159/85   Pulse 61   Temp 97.6 °F (36.4 °C) (Oral)   Resp 20   Ht 5' 5\" (1.651 m)   Wt 284 lb 9.8 oz (129.1 kg)   SpO2 92%   BMI 47.36 kg/m²   No intake or output data in the 24 hours ending 08/09/21 0832   Wt Readings from Last 3 Encounters:   08/09/21 284 lb 9.8 oz (129.1 kg)   07/16/21 282 lb 8 oz (128.1 kg)   07/14/21 283 lb (128.4 kg)       General appearance:  Appears comfortable, he is alert and very pleasant , obese   Eyes: Sclera clear. Pupils equal.  ENT: Moist oral mucosa. Trachea midline, no adenopathy. Cardiovascular: Regular rhythm, normal S1, S2. No murmur. No edema in lower extremities  Respiratory: Not using accessory muscles. Good inspiratory effort. Clear to auscultation bilaterally, no wheeze or crackles. GI: Abdomen soft, no tenderness, not distended, normal bowel sounds, tenderness noted over the bladder  Musculoskeletal: No cyanosis in digits, neck supple  Neurology: CN 2-12 grossly intact. No speech or motor deficits  Psych: Normal affect. Alert and oriented in time, place and person  Skin: Warm, dry, normal turgor    Labs and Tests:  CBC:   Recent Labs     08/08/21  1059   WBC 5.7   HGB 13.7        BMP:    Recent Labs     08/08/21  1059 08/09/21  1000    137   K 3.6 4.4    101   CO2 22 21   BUN 28* 34*   CREATININE 3.3* 4.5*   GLUCOSE 118* 110*     Hepatic:   Recent Labs     08/08/21  1059   AST 65*   ALT 56*   BILITOT 0.6   ALKPHOS 98     Results for Faith Kent (MRN [id number]) as of 8/9/2021 08:32   Ref. Range 8/8/2021 19:55 8/9/2021 07:25   POC Glucose Latest Ref Range: 70 - 99 mg/dl 120 (H) 111 (H)     Last AIC 5.7  Impression:     1. Moderate bilateral hydronephrosis and hydroureter.  This demonstrates   long-term stability with no obstructing calculi.  Findings may be related to   bladder outlet obstruction or neurogenic bladder.  Distal ureteral strictures   would be difficult to exclude.  There is progressive bilateral renal atrophy,   right greater than left. 2. Colonic diverticulosis with no acute features. Problem List  Active Problems:    Urinary retention  Resolved Problems:    * No resolved hospital problems. *       Assessment & Plan:   1.  Acute on chronic kidney disease:  Baseline appears to be 2.2 , acute injury likely due to obstructive uropathy. will ask nephology to follow   2. Urinary retention: he has complex history that includes prostatectomy 2005 on ADT , he is also s/p bladder neck contracture incision 2011 and 2017. He has a transurethral bladder neck resection scheduled in a few weeks with his urologist, Dr Joe Stover. Urology now following and will insert catheter under anesthesia   3. T2DM: will continue with humalog correction as needed.   Historically his AIC have been less than 6       Diet: Diet NPO  Code:Full Code  DVT PPX      OSMAN Marie CNP   8/9/2021 8:32 AM

## 2021-08-09 NOTE — CONSULTS
Urology Consult Note  [facility]     Patient: Edith Ortega MRN: [id number]  Room/Bed: [facility]9863/0703-94   YOB: 1950  Age/Sex: 72 y.o.male  Admission Date: 8/8/2021     Date of Service:  8/9/2021    Consulting Provider: OSMAN Fernandez CNP  Admitting/Requesting Physician: Leandra Hay MD  Primary Care Physician: Roosevelt Burrell MD    Reason for Consult: Acute Urinary Retention, SHANNAN    ASSESSMENT/PLAN     73 yo AA male with hx of prostate cancer s/p robotic radical prostatectomy 2005, on ADT, s/p MCPP 2011, s/p bladder neck contracture incision 2011 and 2017. Recent rentention and complex talbot over wire 06/2021. Urologist is Dr. Buster Thorne who was planning transurethral bladder neck resection in a few weeks. His PVR is 520 this morning and he is noted to have acute on chronic kidney injury with a Cr of 3.3. On exam he complains of suprapubic pain with a urinal in place, straining to urinate. Recommendations:  -Pt will require complex talbot catheter insertion  -Keep NPO for bedside cystoscopy complex talbot placement at noon  -He will need to f/u with Dr. Buster Thorne to have [facility] DR WILL SPRINGER  -Will continue to follow, call with any questions      All patient questions were answered. He understands the plan as listed above. HISTORY     Chief Complaint:   Chief Complaint   Patient presents with    Urinary Retention     pt c/o urinary spasms, unable to urinate standing or sitting, must be laying on his belly or in a recline position. is taking antibiotics and flomax for urinary spasm per urologist       History of Present Illness: Edith Ortega is a 72 y.o. male with AUR. Onset of symptoms was days ago with worsening course since that time. Symptoms are aggravated by bladder neck contracture. Symptoms improved with talbot placement. Associated symptoms include pelvic pain. Patient also reports decreased urine output.  He has tried the following treatments: talbot catheter insertion, Could not pass. Now needs Complex talbot placement with cysto today. Past Medical History:  He has a past medical history of Anesthesia, Cancer ([facility] [address] Hyperlipidemia, Hypertension, Obesity, KAMRAN treated with BiPAP, Osteoarthritis, Type 2 diabetes mellitus without complication ([facility] [address] Type 2 diabetes mellitus without complication, without long-term current use of insulin ([facility] [address] (2/7/2018), Urinary incontinence, stress, male, and Wears dentures. Hospital Problem List:  Active Problems:    Urinary retention  Resolved Problems:    * No resolved hospital problems. *      Past Surgical History:  He has a past surgical history that includes Total hip arthroplasty; joint replacement (Left); shoulder surgery (Bilateral); Knee arthroscopy (Right); and Cystoscopy (N/A, 6/21/2021). Social History:  He reports that he quit smoking about 20 years ago. His smoking use included cigars. He started smoking about 41 years ago. He has a 5.25 pack-year smoking history. He has quit using smokeless tobacco. He reports current alcohol use. He reports that he does not use drugs. Family History:  family history includes Diabetes in his mother; Hypertension in his father and mother. Allergies: Allergies   Allergen Reactions    Indomethacin Other (See Comments)     Dr. Jign Alcantar told him not to take because affects the bladder.  Statins Other (See Comments)    Succinylcholine      ? ? Succinylcholine \"allergy\"  Pt. Should be worked  Up for pseudocholinesterase deficiency. Pt. States he qwas told in the past that he had trouble with an anesthesia drug that begins with an \"s\" and that it took him awhile to breathe in PACU, but could provide no further details.      Unable To Assess       Anesthesia that begins with S- stops my breathing- daughter reports succinylcholine   Questionable pseudocholinesterase deficiency    Actos [Pioglitazone] Rash       Medications:  Scheduled Meds:   finasteride  5 mg Oral Daily    amLODIPine 10 mg Oral Daily    atorvastatin  40 mg Oral Daily    aspirin  81 mg Oral Daily    metoprolol succinate  100 mg Oral Daily    sodium chloride flush  5-40 mL Intravenous 2 times per day    enoxaparin  30 mg Subcutaneous Daily    insulin lispro  0-12 Units Subcutaneous TID WC    insulin lispro  0-6 Units Subcutaneous Nightly    tamsulosin  0.4 mg Oral Daily     Continuous Infusions:   dextrose      sodium chloride       PRN Meds:HYDROcodone-acetaminophen, glucose, dextrose, glucagon (rDNA), dextrose, sodium chloride flush, sodium chloride, ondansetron **OR** ondansetron, polyethylene glycol, acetaminophen **OR** acetaminophen    Review of Systems:  Pertinent positives/negatives reviewed in HPI. All other systems reviewed and negative, unless noted below. Constitutional: Negative  Genitourinary: see HPI  HEENT: Negative   Cardiovascular: Negative   Respiratory: Negative   Gastrointestinal: Negative   Musculoskeletal: Negative   Neurological: Negative   Psychiatric: Negative   Integumentary: Negative     PHYSICAL EXAM     Vitals:    08/09/21 0929   BP:    Pulse: 70   Resp:    Temp:    SpO2:      CONSTITUTIONAL: Obese, The patient is well nourished/developed, with no distress noted. NEUROLOGICAL/PSYCHIATRIC: Oriented to place and time, normal affected noted. NECK: The neck is symmetrical and supple, with no masses noted. CARDIOVASCULAR: Regular rate and rhythm, no evidence of swelling noted. RESPIRATORY: Normal respiratory effort with no wheezing noted. ABDOMEN: Abdomen soft, non-tender, non-distended. No enlarged liver or spleen. No hernias noted. Stool occult blood not indicated. SKIN: Skin appears normal.  LYMPHATICS: No adenopathy noted. CVA: No CVA tenderness bilaterally. GENITOURINARY: The penis is without rash or lesions and meatus with expected size and location. The scrotum appears normal. Bilateral testicles appears to be of normal size and location.  No masses or tenderness noted of testicles or epididymis. Ins/Outs:  No intake or output data in the 24 hours ending 08/09/21 1018    LABS     CBC   Lab Results   Component Value Date    WBC 5.7 08/08/2021    RBC 4.17 08/08/2021    HGB 13.7 08/08/2021    HCT 40.8 08/08/2021    MCV 98.0 08/08/2021    MCH 32.8 08/08/2021    MCHC 33.5 08/08/2021    RDW 15.9 08/08/2021     08/08/2021    MPV 6.9 08/08/2021     BMP   Lab Results   Component Value Date     08/08/2021    K 3.6 08/08/2021    K 4.2 06/21/2021     08/08/2021    CO2 22 08/08/2021    BUN 28 08/08/2021    CREATININE 3.3 08/08/2021    GLUCOSE 118 08/08/2021    CALCIUM 9.4 08/08/2021     Urinalysis:   Lab Results   Component Value Date    COLORU Light yellow 04/06/2021    GLUCOSEU Negative 04/06/2021    BLOODU LARGE 04/06/2021    NITRU Negative 04/06/2021    LEUKOCYTESUR LARGE 04/06/2021     Urine culture: No results for input(s): Dariel Mendez in the last 72 hours. PSA:   Lab Results   Component Value Date    PSA <0.01 03/07/2016    PSA <0.01 10/09/2015    PSA <0.01 04/14/2012         IMAGING     CT ABDOMEN PELVIS WO CONTRAST Additional Contrast? None    Result Date: 8/8/2021  EXAMINATION: CT OF THE ABDOMEN AND PELVIS WITHOUT CONTRAST 8/8/2021 2:23 pm TECHNIQUE: CT of the abdomen and pelvis was performed without the administration of intravenous contrast. Multiplanar reformatted images are provided for review. Dose modulation, iterative reconstruction, and/or weight based adjustment of the mA/kV was utilized to reduce the radiation dose to as low as reasonably achievable.  COMPARISON: 02/09/2015 HISTORY: ORDERING SYSTEM PROVIDED HISTORY: SHANNAN, retention r/o stoen TECHNOLOGIST PROVIDED HISTORY: Reason for exam:->SHANNAN, retention r/o stoen Additional Contrast?->None Decision Support Exception - unselect if not a suspected or confirmed emergency medical condition->Emergency Medical Condition (MA) Reason for Exam: SHANNAN, retention, r/o stone Acuity: Unknown Type of Exam: Unknown FINDINGS: Lower Chest: No acute infiltrate at the lung bases. Dependent atelectasis in the left lower lobe and right middle lobe. Scattered coronary vascular calcification. Organs: Stable 1.3 cm left hepatic cyst or hemangioma. The unenhanced liver, spleen, pancreas and adrenal glands are unremarkable. No acute biliary findings. There is stable bilateral hydronephrosis and hydroureter. Hydroureter extends to the level of the urinary bladder with no obstructing calculi identified. There is progressive bilateral renal atrophy, right greater than left, with cortical scarring. GI/Bowel: Colonic diverticulosis with no acute features. No small bowel distension. The stomach and duodenal sweep are unremarkable. Pelvis: Previous prostatectomy. A penile implant is in place. Moderate distention of the urinary bladder. No significant free pelvic fluid. Peritoneum/Retroperitoneum: The abdominal aorta is normal in caliber with scattered calcified plaque. No retroperitoneal adenopathy or upper abdominal ascites. Bones/Soft Tissues: No acute osseous or soft tissue abnormality. Status post left hip arthroplasty. Degenerative disc disease at L3-4 through L5-S1. Stable sclerosis involving the right femoral head suggesting AVN. 1. Moderate bilateral hydronephrosis and hydroureter. This demonstrates long-term stability with no obstructing calculi. Findings may be related to bladder outlet obstruction or neurogenic bladder. Distal ureteral strictures would be difficult to exclude. There is progressive bilateral renal atrophy, right greater than left. 2. Colonic diverticulosis with no acute features.             Electronically signed by: OSMAN Allison CNP  8/9/2021   [facility]  Office Contact: [phone]

## 2021-08-09 NOTE — PROGRESS NOTES
Admission assessment complete. See flow sheet. Meds given per MAR. Pt admitted from home. Came in with decreased urine output for past week. Completely stopped voiding yesterday per patient. Admitted with SHANNAN, UTI. Pt is AxO x4. Take pills WWW. Urinal at bedside. Urology consult. Pt having intense bladder spasms, increasing pressure. Urology messaged. To be NPO after midnight for possible OR. Bladder scanned for 426ml. Norco for pain. Pt expressed no other needs at this time. Will continue to educate patient as needed  call light and belongings in reach, bed in lowest position, wheels locked in place, side rails up x 2, walkways free of clutter    The care plan and education has been reviewed and mutually agreed upon with the patient.

## 2021-08-09 NOTE — CONSULTS
[facility]  Phone: [phone]  Fax: [phone]  [email or web address]          Assessment/Plan:    Oliguric SHANNAN on CKD 3b:  -Etiology of SHANNAN likely due to obstructive uropathy (B/L hydrourteronephrosis)  -Awaiting talbot placement via cystoscopy per Urology  -Once talbot placed, start IVF given likelihood of post-obstructive diuresis  -Strict I/Os and daily weights  -Dose medications for GFR<15 in setting of SHANNAN    CKD 3b:  -Presumed due to previous AKIs/obstructive uropathy  -Follows with Dr. Aidee Garzon  -Baseline Cr likely 2.1-2.4mg/dL per chart review    Hypertension:  -Near goal  -Continue current regimen given likelihood of post-obstructive diuresis    CKD-MBD:  -At goal    Urinary Retention/B/L Hydroureteronephrosis:  -In setting of prostate ca s/p prostatectomy  -S/p urethral dilation last month  -Awaiting talbot placement via cystoscopy per Urology  -Management per Urology            Elvia Mckeon MD, BETI  Nephrologist  [facility]                                                                                                                                                                                                                                                                                                    Reason for Consult:  SHANNAN on CKD    History of Present Illness:  72 y.o. male with CKD 3 presumed due to recurrent AKIs/obstructive uropathy, admitted for recurrent urinary retention. Patient has a complicated  history which includes prostate ca s/p prostatectomy and recent urethral dilation last month. Patient presented to ED with bladder spasms/urinary retention. Patient follows with Dr. Aidee Garzon. On admission, patient's Cr was found to be 3.3, which ni to 4.5 this AM (baseline Cr likely 2.1-2.4mg/dL per chart review). Denies dysuria, hematuria, foamy urine, NSAID use, recent IV contrast exposure.  Currently, patient is complaining of abdominal pain, but has no uremic symptoms. Nephrology consulted for evaluation/management of SHANNAN. Past Medical History:        Diagnosis Date    Anesthesia     anxiety regarding ETT    Cancer ([facility] [address]     prostate    Hyperlipidemia     Hypertension     Obesity     KAMRAN treated with BiPAP     nasal    Osteoarthritis     Type 2 diabetes mellitus without complication (HCC)     Type 2 diabetes mellitus without complication, without long-term current use of insulin ([facility] [address] 2018    Urinary incontinence, stress, male     Wears dentures        Past Surgical History:        Procedure Laterality Date    CYSTOSCOPY N/A 2021    CYSTOSCOPY, TRANSURETHRAL INCISION OF BLADDER NECK CONTRACTURE WITH COLD KNIFE AND COMPLEX URETHRAL CATHETER PLACEMENT performed by Sushil Albright MD at [facility] Left     THR    KNEE ARTHROSCOPY Right     SHOULDER SURGERY Bilateral     TOTAL HIP ARTHROPLASTY         Allergies: Indomethacin, Statins, Succinylcholine, Unable to assess, and Actos [pioglitazone]    Social History:    Social History     Socioeconomic History    Marital status: Single     Spouse name: Not on file    Number of children: 2    Years of education: Not on file    Highest education level: High school graduate   Occupational History    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 He has 2 adult children.       Social Determinants of Health     Financial Resource Strain: Medium morning (before breakfast) 7/1/20  Yes Historical Provider, MD   salsalate (DISALCID) 750 MG tablet Take 750 mg by mouth 2 times daily as needed  7/1/20  Yes Historical Provider, MD   vitamin B-6 (PYRIDOXINE) 50 MG tablet Take 50 mg by mouth daily   Yes Historical Provider, MD   ergocalciferol (ERGOCALCIFEROL) 30427 UNITS capsule Take 50,000 Units by mouth once a week. Yes Historical Provider, MD   fluticasone [facility]) 50 MCG/ACT nasal spray 2 sprays by Each Nostril route daily 7/14/21   Vladimir Whitney MD   Meals on Wheels MISC by Does not apply route Patient is to receive 2-3 diabetic meals only 4/30/21   Vladimir Whitney MD   Acetaminophen (TYLENOL ARTHRITIS PAIN PO) Take 1 tablet by mouth daily OTC     Historical Provider, MD   Insulin Pen Needle ([employer] PEN NEEDLES) 31G X 6 MM MISC 1 each by Does not apply route daily 9/29/20   Vladimir Whitney MD   ammonium lactate (AMLACTIN) 12 % cream Apply topically as needed     Historical Provider, MD     Scheduled Meds:   finasteride  5 mg Oral Daily    amLODIPine  10 mg Oral Daily    atorvastatin  40 mg Oral Daily    aspirin  81 mg Oral Daily    metoprolol succinate  100 mg Oral Daily    sodium chloride flush  5-40 mL Intravenous 2 times per day    enoxaparin  30 mg Subcutaneous Daily    insulin lispro  0-12 Units Subcutaneous TID WC    insulin lispro  0-6 Units Subcutaneous Nightly    tamsulosin  0.4 mg Oral Daily     Continuous Infusions:   dextrose      sodium chloride       PRN Meds:. HYDROcodone-acetaminophen, glucose, dextrose, glucagon (rDNA), dextrose, sodium chloride flush, sodium chloride, ondansetron **OR** ondansetron, polyethylene glycol, acetaminophen **OR** acetaminophen      Labs:  CBC with Differential:    Lab Results   Component Value Date    WBC 5.4 08/09/2021    RBC 4.35 08/09/2021    HGB 14.4 08/09/2021    HCT 42.4 08/09/2021     08/09/2021    MCV 97.7 08/09/2021    MCH 33.1 08/09/2021    MCHC 33.9 08/09/2021    RDW 15.7 08/09/2021    BANDSPCT 6 02/19/2015    LYMPHOPCT 25.6 08/09/2021    MONOPCT 14.2 08/09/2021    BASOPCT 1.1 08/09/2021    MONOSABS 0.8 08/09/2021    LYMPHSABS 1.4 08/09/2021    EOSABS 0.3 08/09/2021    BASOSABS 0.1 08/09/2021     BMP:    Lab Results   Component Value Date     08/09/2021    K 4.4 08/09/2021    K 4.2 06/21/2021     08/09/2021    CO2 21 08/09/2021    BUN 34 08/09/2021    LABALBU 4.0 08/09/2021    CREATININE 4.5 08/09/2021    CALCIUM 9.7 08/09/2021    GFRAA 16 08/09/2021    GFRAA 53 02/18/2013    LABGLOM 13 08/09/2021    GLUCOSE 110 08/09/2021     U/A:    Lab Results   Component Value Date    COLORU Light yellow 04/06/2021    PROTEINU 100 04/06/2021    PHUR 5.0 04/06/2021    WBCUA >100 04/06/2021    RBCUA 8 03/21/2015    BACTERIA 4+ 03/21/2015    CLARITYU CLOUDY 04/06/2021    SPECGRAV 1.020 04/06/2021    LEUKOCYTESUR LARGE 04/06/2021    UROBILINOGEN 0.2 04/06/2021    BILIRUBINUR Negative 04/06/2021    BLOODU LARGE 04/06/2021    GLUCOSEU Negative 04/06/2021

## 2021-08-09 NOTE — H&P
HOSPITALISTS HISTORY AND PHYSICAL    08/08/21  Patient Information:  Ga Zamarripa is a 72 y.o. male [id number]  PCP:  Yenny Pagan MD (Tel: [phone] )    Chief complaint:    Chief Complaint   Patient presents with    Urinary Retention     pt c/o urinary spasms, unable to urinate standing or sitting, must be laying on his belly or in a recline position. is taking antibiotics and flomax for urinary spasm per urologist        History of Present Illness:  Rojas Chilel is a 72 y.o. male with h/o prostate Carcinoma, s/p prostatectomy , CKD stage 3, anemia, HTN presents with c/o unable to pass urine . He followed up with urology last month. He underwent urethral dilation  and was placed on Macrobid and flomax . Bladder scan in the ED showed > 300 cc urine . The pt has repeatedly refused straight catheterization . CT abdomen and pelvis showed moderate hydronephrosis . Labs reveal worsening renal failure with cr 3.3  He remains afebrile. WBC is normal. Urology has been consulted     REVIEW OF SYSTEMS:   Constitutional: Negative for fever,chills or night sweats  ENT: Negative for rhinorrhea, epistaxis, hoarseness, sore throat. Respiratory: Negative for shortness of breath,wheezing  Cardiovascular: Negative for chest pain, palpitations   Gastrointestinal: Negative for nausea, vomiting, diarrhea  Genitourinary: Negative for polyuria, dysuria   Hematologic/Lymphatic: Negative for bleeding tendency, easy bruising  Musculoskeletal: Negative for myalgias and arthralgias  Neurologic: Negative for confusion,dysarthria. Skin: Negative for itching,rash  Psychiatric: Negative for depression,anxiety, agitation. Endocrine: Negative for polydipsia,polyuria,heat /cold intolerance.     Past Medical History:   has a past medical history of Anesthesia, Cancer ([facility] [address] Hyperlipidemia, Hypertension, Obesity, KAMRAN treated with BiPAP, Osteoarthritis, Type 2 diabetes mellitus without complication ([facility] [address] Type 2 diabetes mellitus without complication, without long-term current use of insulin ([facility] [address] Urinary incontinence, stress, male, and Wears dentures. Past Surgical History:   has a past surgical history that includes Total hip arthroplasty; joint replacement (Left); shoulder surgery (Bilateral); Knee arthroscopy (Right); and Cystoscopy (N/A, 6/21/2021). Medications:  No current facility-administered medications on file prior to encounter. Current Outpatient Medications on File Prior to Encounter   Medication Sig Dispense Refill    tamsulosin (FLOMAX) 0.4 MG capsule Take 0.4 mg by mouth daily      nitrofurantoin, macrocrystal-monohydrate, (MACROBID) 100 MG capsule Take 100 mg by mouth 2 times daily      furosemide (LASIX) 40 MG tablet TAKE 1 AND 1/2 TABLETS BY MOUTH EVERY MORNING THEN TAKE 1 TABLET BY MOUTH EVERY EVENING 225 tablet 0    atorvastatin (LIPITOR) 40 MG tablet Take 40 mg by mouth daily      metoprolol succinate (TOPROL XL) 100 MG extended release tablet Take 1 tablet by mouth daily 90 tablet 1    famotidine (PEPCID) 20 MG tablet Take 1 tablet by mouth daily 90 tablet 1    Dulaglutide (TRULICITY) 3 GR/9.9JD SOPN Inject 3 mg into the skin once a week 12 pen 1    dapagliflozin (FARXIGA) 10 MG tablet Take 1 tablet by mouth every morning 90 tablet 1    cetirizine (ZYRTEC) 5 MG tablet Take 1 tablet by mouth daily 90 tablet 1    amLODIPine (NORVASC) 10 MG tablet Take 1 tablet by mouth daily 90 tablet 0    alendronate (FOSAMAX) 70 MG tablet TAKE 1 TABLET BY MOUTH EVERY 7 DAYS 12 tablet 1    aspirin 81 MG EC tablet Take 81 mg by mouth daily      HYDROcodone-acetaminophen (NORCO)  MG per tablet Take 1 tablet by mouth 3 times daily as needed (Pain - back & hips).  Pain specialist       glimepiride (AMARYL) 4 MG tablet Take 4 mg by mouth every morning (before breakfast)      salsalate (DISALCID) 750 MG tablet Take 750 mg by mouth 2 times daily as needed       vitamin B-6 (PYRIDOXINE) 50 MG tablet Take 50 mg by mouth daily      ergocalciferol (ERGOCALCIFEROL) 29369 UNITS capsule Take 50,000 Units by mouth once a week.  fluticasone (FLONASE) 50 MCG/ACT nasal spray 2 sprays by Each Nostril route daily 2 Bottle 1    Meals on Wheels MISC by Does not apply route Patient is to receive 2-3 diabetic meals only 1 each 0    Acetaminophen (TYLENOL ARTHRITIS PAIN PO) Take 1 tablet by mouth daily OTC       Insulin Pen Needle ([employer] PEN NEEDLES) 31G X 6 MM MISC 1 each by Does not apply route daily 100 each 3    ammonium lactate (AMLACTIN) 12 % cream Apply topically as needed          Allergies: Allergies   Allergen Reactions    Indomethacin Other (See Comments)     Dr. Meryl David told him not to take because affects the bladder.  Statins Other (See Comments)    Succinylcholine      ? ? Succinylcholine \"allergy\"  Pt. Should be worked  Up for pseudocholinesterase deficiency. Pt. States he qwas told in the past that he had trouble with an anesthesia drug that begins with an \"s\" and that it took him awhile to breathe in PACU, but could provide no further details.  Unable To Assess       Anesthesia that begins with S- stops my breathing- daughter reports succinylcholine   Questionable pseudocholinesterase deficiency    Actos [Pioglitazone] Rash        Social History:   reports that he quit smoking about 20 years ago. His smoking use included cigars. He started smoking about 41 years ago. He has a 5.25 pack-year smoking history. He has quit using smokeless tobacco. He reports current alcohol use. He reports that he does not use drugs.      Family History:  family history includes Diabetes in his mother; Hypertension in his father and mother. ,     Physical Exam:  /82   Pulse 88   Temp 97.6 °F (36.4 °C) (Oral)   Resp 20   Ht 5' 5\" (1.651 m)   Wt 284 lb 9.8 oz (129.1 kg)   SpO2 90%   BMI 47.36 kg/m²     General appearance:  Appears comfortable. Well nourished  Eyes: Sclera clear, pupils equal  ENT: Moist mucus membranes, no thrush. Trachea midline. Cardiovascular: Regular rhythm, normal S1, S2. No murmur, gallop, rub. No edema in lower extremities  Respiratory: Clear to auscultation bilaterally, no wheeze, good inspiratory effort  Gastrointestinal: Abdomen soft, non-tender, not distended, normal bowel sounds  Musculoskeletal: No cyanosis in digits, neck supple  Neurology: Cranial nerves grossly intact. Alert and oriented in time, place and person. No speech or motor deficits  Psychiatry: Appropriate affect. Not agitated  Skin: Warm, dry, normal turgor, no rash    Labs:  CBC:   Lab Results   Component Value Date    WBC 5.7 08/08/2021    RBC 4.17 08/08/2021    HGB 13.7 08/08/2021    HCT 40.8 08/08/2021    MCV 98.0 08/08/2021    MCH 32.8 08/08/2021    MCHC 33.5 08/08/2021    RDW 15.9 08/08/2021     08/08/2021    MPV 6.9 08/08/2021     BMP:    Lab Results   Component Value Date     08/08/2021    K 3.6 08/08/2021    K 4.2 06/21/2021     08/08/2021    CO2 22 08/08/2021    BUN 28 08/08/2021    CREATININE 3.3 08/08/2021    CALCIUM 9.4 08/08/2021    GFRAA 23 08/08/2021    GFRAA 53 02/18/2013    LABGLOM 19 08/08/2021    GLUCOSE 118 08/08/2021       Chest Xray:   EKG:        Problem List  Active Problems:    Urinary retention  Resolved Problems:    * No resolved hospital problems. *        Assessment/Plan:         1. Urinary retention with hydronephrosis d/t chronic obstructive uropathy   CT abdomen showed moderate b/l hydronephrosis and hydroureter. Bladder scan showed > 400 cc urine   Pt refused straight catheterization   Urology has been consulted     Acute on chronic renal failure  Cr is trending up to 3.3 from 2.4  Will consult nephrology    HTN cont Norvasc and Toprol xl    Admit as inpatient.  I anticipate hospitalization spanning more than two midnights for investigation and treatment of the above medically necessary diagnoses.       Tony Schofield MD    08/08/21

## 2021-08-10 LAB
ALBUMIN SERPL-MCNC: 3.5 G/DL (ref 3.4–5)
ANION GAP SERPL CALCULATED.3IONS-SCNC: 16 MMOL/L (ref 3–16)
BUN BLDV-MCNC: 33 MG/DL (ref 7–20)
CALCIUM SERPL-MCNC: 9.1 MG/DL (ref 8.3–10.6)
CHLORIDE BLD-SCNC: 104 MMOL/L (ref 99–110)
CO2: 19 MMOL/L (ref 21–32)
CREAT SERPL-MCNC: 3.3 MG/DL (ref 0.8–1.3)
GFR AFRICAN AMERICAN: 23
GFR NON-AFRICAN AMERICAN: 19
GLUCOSE BLD-MCNC: 103 MG/DL (ref 70–99)
GLUCOSE BLD-MCNC: 93 MG/DL (ref 70–99)
GLUCOSE BLD-MCNC: 94 MG/DL (ref 70–99)
GLUCOSE BLD-MCNC: 96 MG/DL (ref 70–99)
GLUCOSE BLD-MCNC: 96 MG/DL (ref 70–99)
GLUCOSE BLD-MCNC: 98 MG/DL (ref 70–99)
HCT VFR BLD CALC: 41.3 % (ref 40.5–52.5)
HEMOGLOBIN: 13.7 G/DL (ref 13.5–17.5)
MCH RBC QN AUTO: 32.5 PG (ref 26–34)
MCHC RBC AUTO-ENTMCNC: 33.2 G/DL (ref 31–36)
MCV RBC AUTO: 98 FL (ref 80–100)
PDW BLD-RTO: 15.7 % (ref 12.4–15.4)
PERFORMED ON: ABNORMAL
PERFORMED ON: NORMAL
PHOSPHORUS: 3.5 MG/DL (ref 2.5–4.9)
PLATELET # BLD: 331 K/UL (ref 135–450)
PMV BLD AUTO: 6.9 FL (ref 5–10.5)
POTASSIUM SERPL-SCNC: 4 MMOL/L (ref 3.5–5.1)
RBC # BLD: 4.22 M/UL (ref 4.2–5.9)
SODIUM BLD-SCNC: 139 MMOL/L (ref 136–145)
URINE CULTURE, ROUTINE: NORMAL
WBC # BLD: 6 K/UL (ref 4–11)

## 2021-08-10 PROCEDURE — 6370000000 HC RX 637 (ALT 250 FOR IP): Performed by: FAMILY MEDICINE

## 2021-08-10 PROCEDURE — 36415 COLL VENOUS BLD VENIPUNCTURE: CPT

## 2021-08-10 PROCEDURE — 2580000003 HC RX 258: Performed by: FAMILY MEDICINE

## 2021-08-10 PROCEDURE — 1200000000 HC SEMI PRIVATE

## 2021-08-10 PROCEDURE — 80069 RENAL FUNCTION PANEL: CPT

## 2021-08-10 PROCEDURE — 6360000002 HC RX W HCPCS: Performed by: FAMILY MEDICINE

## 2021-08-10 PROCEDURE — 85027 COMPLETE CBC AUTOMATED: CPT

## 2021-08-10 PROCEDURE — 2580000003 HC RX 258: Performed by: INTERNAL MEDICINE

## 2021-08-10 PROCEDURE — 6370000000 HC RX 637 (ALT 250 FOR IP): Performed by: NURSE PRACTITIONER

## 2021-08-10 RX ORDER — SODIUM CHLORIDE, SODIUM LACTATE, POTASSIUM CHLORIDE, CALCIUM CHLORIDE 600; 310; 30; 20 MG/100ML; MG/100ML; MG/100ML; MG/100ML
INJECTION, SOLUTION INTRAVENOUS CONTINUOUS
Status: DISCONTINUED | OUTPATIENT
Start: 2021-08-10 | End: 2021-08-11

## 2021-08-10 RX ADMIN — Medication 10 ML: at 20:02

## 2021-08-10 RX ADMIN — ASPIRIN 81 MG: 81 TABLET, COATED ORAL at 08:12

## 2021-08-10 RX ADMIN — AMLODIPINE BESYLATE 10 MG: 5 TABLET ORAL at 08:12

## 2021-08-10 RX ADMIN — FINASTERIDE 5 MG: 5 TABLET, FILM COATED ORAL at 08:11

## 2021-08-10 RX ADMIN — ENOXAPARIN SODIUM 30 MG: 30 INJECTION SUBCUTANEOUS at 08:15

## 2021-08-10 RX ADMIN — SODIUM CHLORIDE, POTASSIUM CHLORIDE, SODIUM LACTATE AND CALCIUM CHLORIDE: 600; 310; 30; 20 INJECTION, SOLUTION INTRAVENOUS at 13:29

## 2021-08-10 RX ADMIN — HYDROCODONE BITARTRATE AND ACETAMINOPHEN 1 TABLET: 10; 325 TABLET ORAL at 08:11

## 2021-08-10 RX ADMIN — TAMSULOSIN HYDROCHLORIDE 0.4 MG: 0.4 CAPSULE ORAL at 08:12

## 2021-08-10 RX ADMIN — ATORVASTATIN CALCIUM 40 MG: 80 TABLET, FILM COATED ORAL at 08:11

## 2021-08-10 RX ADMIN — METOPROLOL SUCCINATE 100 MG: 50 TABLET, EXTENDED RELEASE ORAL at 08:12

## 2021-08-10 ASSESSMENT — PAIN SCALES - GENERAL
PAINLEVEL_OUTOF10: 0
PAINLEVEL_OUTOF10: 8

## 2021-08-10 NOTE — PROGRESS NOTES
[facility] PROGRESS NOTE    8/10/2021 2:43 PM        Name: Sol Cifuentes . Admitted: 8/8/2021  Primary Care Provider: Hernán Lowe MD (Tel: [phone])      Subjective:  . Admitted with inability to void  Had not voided since Friday  Underwent complicated talbot placement yesterday. Patient feeling well, denies pain. Very pleasant and eager to go home.      Current Medications  lactated ringers infusion, Continuous  finasteride (PROSCAR) tablet 5 mg, Daily  amLODIPine (NORVASC) tablet 10 mg, Daily  atorvastatin (LIPITOR) tablet 40 mg, Daily  aspirin EC tablet 81 mg, Daily  HYDROcodone-acetaminophen (NORCO)  MG per tablet 1 tablet, TID PRN  metoprolol succinate (TOPROL XL) extended release tablet 100 mg, Daily  glucose (GLUTOSE) 40 % oral gel 15 g, PRN  dextrose 50 % IV solution, PRN  glucagon (rDNA) injection 1 mg, PRN  dextrose 5 % solution, PRN  sodium chloride flush 0.9 % injection 5-40 mL, 2 times per day  sodium chloride flush 0.9 % injection 5-40 mL, PRN  0.9 % sodium chloride infusion, PRN  enoxaparin (LOVENOX) injection 30 mg, Daily  ondansetron (ZOFRAN-ODT) disintegrating tablet 4 mg, Q8H PRN   Or  ondansetron (ZOFRAN) injection 4 mg, Q6H PRN  polyethylene glycol (GLYCOLAX) packet 17 g, Daily PRN  acetaminophen (TYLENOL) tablet 650 mg, Q6H PRN   Or  acetaminophen (TYLENOL) suppository 650 mg, Q6H PRN  insulin lispro (1 Unit Dial) 0-12 Units, TID WC  insulin lispro (1 Unit Dial) 0-6 Units, Nightly  tamsulosin (FLOMAX) capsule 0.4 mg, Daily        Objective:  /82   Pulse 77   Temp 97.6 °F (36.4 °C) (Oral)   Resp 16   Ht 5' 5\" (1.651 m)   Wt 282 lb 3 oz (128 kg)   SpO2 96%   BMI 46.96 kg/m²     Intake/Output Summary (Last 24 hours) at 8/10/2021 1443  Last data filed at 8/10/2021 1335  Gross per 24 hour   Intake 10 ml   Output 1175 ml   Net -1165 ml      Wt Readings from Last 3 Encounters:   08/10/21 282 lb 3 oz (128 kg)   07/16/21 282 lb 8 oz (128.1 kg)   07/14/21 283 lb (128.4 kg)       General appearance:  Appears comfortable, he is alert and very pleasant , obese   Eyes: Sclera clear. Pupils equal.  ENT: Moist oral mucosa. Trachea midline, no adenopathy. Cardiovascular: Regular rhythm, normal S1, S2. No murmur. No edema in lower extremities  Respiratory: Not using accessory muscles. Good inspiratory effort. Clear to auscultation bilaterally, no wheeze or crackles. GI: Abdomen soft, no tenderness, not distended, normal bowel sounds, tenderness noted over the bladder  Musculoskeletal: No cyanosis in digits, neck supple  Neurology: CN 2-12 grossly intact. No speech or motor deficits  Psych: Normal affect. Alert and oriented in time, place and person  Skin: Warm, dry, normal turgor    Labs and Tests:  CBC:   Recent Labs     08/08/21  1059 08/09/21  1000 08/10/21  1125   WBC 5.7 5.4 6.0   HGB 13.7 14.4 13.7    321 331     BMP:    Recent Labs     08/08/21  1059 08/09/21  1000 08/10/21  1125    137 139   K 3.6 4.4 4.0    101 104   CO2 22 21 19*   BUN 28* 34* 33*   CREATININE 3.3* 4.5* 3.3*   GLUCOSE 118* 110* 96     Hepatic:   Recent Labs     08/08/21  1059   AST 65*   ALT 56*   BILITOT 0.6   ALKPHOS 98     Last AIC 5.7  Impression:     1. Moderate bilateral hydronephrosis and hydroureter.  This demonstrates   long-term stability with no obstructing calculi.  Findings may be related to   bladder outlet obstruction or neurogenic bladder.  Distal ureteral strictures   would be difficult to exclude.  There is progressive bilateral renal atrophy,   right greater than left. 2. Colonic diverticulosis with no acute features. Problem List  Active Problems:    Urinary retention  Resolved Problems:    * No resolved hospital problems. *       Assessment & Plan:   1.  Acute on chronic kidney disease:  Baseline appears to be 2.2 , acute injury likely due to obstructive uropathy. Creat still 3.3. IV fluids changed per Nephrology. Repeat labs in am. Hopefully can dc home tomorrow. 2. Urinary retention: he has complex history that includes prostatectomy 2005 on ADT , he is also s/p bladder neck contracture incision 2011 and 2017. He has a transurethral bladder neck resection scheduled in a few weeks with his urologist, Dr Wilbur Servin. Will dc home with a talbot  3. T2DM: will continue with humalog correction as needed. Historically his AIC have been less than 6       Diet: ADULT DIET;  Regular; 5 carb choices (75 gm/meal)  Code:Full Code  DVT PPX      Reno Patel PA-C   8/10/2021 2:43 PM

## 2021-08-10 NOTE — PROGRESS NOTES
Urology Progress Note  [facility]    Provider: OSMAN Hanks CNP  Patient ID:  Admission Date: 2021 Name: Argenis Brown Date: 2021 MRN: [id number]   Patient Location: [id number] : 1950  Attending: Mya Finley MD Date of Service: 8/10/2021  PCP: Wil Cantrell MD     Diagnoses:  Acute Urinary Retention, SHANNAN    Assessment/Plan:  71 yo AA male with hx of prostate cancer s/p robotic radical prostatectomy , on ADT, s/p MCPP , s/p bladder neck contracture incision  and . Recent rentention and complex talbot over wire 2021. Urologist is Dr. Gavino Baugh who was planning transurethral bladder neck resection in a few weeks. His PVR is 520 this morning and he is noted to have acute on chronic kidney injury with a Cr of 3.3. CT a/p showing bilat hydronephrosis as below. On exam he complains of suprapubic pain with a urinal in place, straining to urinate. Impression   1. Moderate bilateral hydronephrosis and hydroureter.  This demonstrates   long-term stability with no obstructing calculi.  Findings may be related to   bladder outlet obstruction or neurogenic bladder.  Distal ureteral strictures   would be difficult to exclude.  There is progressive bilateral renal atrophy,   right greater than left. 2. Colonic diverticulosis with no acute features.        Recommendations:  -s/p complex talbot insertion yesterday, talbot is draining clear yellow  -He will need discharged with his talbot in place  -[facility]  -Will continue to follow peripherally, call with any questions        The patient had a chance to ask questions which were answered. he understands the above plan. Subjective:   Trish Santiago is a 72 y.o. male. He was seen and examined this morning. Today we discussed discharge with talbot in place and [facility] DR WILL SPRINGER procedure.     Objective:   Vitals:  Vitals:    08/10/21 1030   BP: 136/82   Pulse: 77   Resp: 16   Temp: 97.6 °F (36.4 °C)   SpO2:        Intake/Output Summary (Last 24 hours) at 8/10/2021 1044  Last data filed at 8/10/2021 0515  Gross per 24 hour   Intake 10 ml   Output 800 ml   Net -790 ml     Physical Exam:  Gen: Obese, Alert and oriented x3, no acute distress  CV: Regular rate   Resp: unlabored respirations  Abd: Cook in place, circumcised, Soft, non-distended, non-tender, no masses  Ext: no peripheral edema noted, moves upper and lower extremities spontaneously  Skin: warmand well perfused, no rashes noted on the face, or arms.      Labs:  Lab Results   Component Value Date    WBC 5.4 08/09/2021    HGB 14.4 08/09/2021    HCT 42.4 08/09/2021    MCV 97.7 08/09/2021     08/09/2021     Lab Results   Component Value Date    CREATININE 4.5 (H) 08/09/2021    BUN 34 (H) 08/09/2021     08/09/2021    K 4.4 08/09/2021     08/09/2021    CO2 21 08/09/2021       Tasha Tena, APRN - CNP   8/10/2021

## 2021-08-10 NOTE — OP NOTE
condition.     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 Briana Dukes for management of bladder neck contracture long-term, he has been coordinating this patient's care to date and I discussed the patient with him in detail based on today's findings           Electronically signed by: Thi Modi MD, BETI, 8/9/2021  [facility]  Office Contact: [phone]

## 2021-08-10 NOTE — PROGRESS NOTES
Shift assessment completed. A&O x4, VSS. Bed in lowest position, call light and table within reach, x2 side rails, no slip socks on and fall sign posted. No further needs expressed at this time. The care plan and education has been reviewed and mutually agreed upon with the patient.

## 2021-08-10 NOTE — PROGRESS NOTES
[facility]  Phone: [phone]  Fax: [phone]  [email or web address] [facility]          Assessment/Plan:    Oliguric SHANNAN on CKD 3b:  -Etiology of SHANNAN likely due to obstructive uropathy (B/L hydrourteronephrosis)  -Cr improving this AM  -Continue IVF given post-obstructive diuresis, but change formulation to LR    CKD 3b:  -Presumed due to previous AKIs/obstructive uropathy  -Follows with Dr. Arcelia Chairez  -Baseline Cr likely 2.1-2.4mg/dL per chart review     Hypertension:  -At goal  -Continue current regimen     CKD-MBD:  -At goal     Urinary Retention/B/L Hydroureteronephrosis:  -In setting of prostate ca s/p prostatectomy  -S/p urethral dilation 1 month PTA  -S/p complex talbot placement via cystoscopy by Urology  -Will need to DC with talbot  -Management per Urology        Faye Robert MD, BETI  Nephrologist  [facility]                                                                                                                                                                                                                                                                                                    Consult: SHANNAN on CKD  Brief HPI: 72 y.o. male admitted for recurrent urinary retention, complicated by ARF      Subjective:  -Talbot placed via cystoscopy yesterday  -No acute events overnight  -Cr improving this AM      Review of Systems: Anxious for DC. All other ROS negative.   Social History: No visitors present at bedside      Medications:  Scheduled Meds:   finasteride  5 mg Oral Daily    amLODIPine  10 mg Oral Daily    atorvastatin  40 mg Oral Daily    aspirin  81 mg Oral Daily    metoprolol succinate  100 mg Oral Daily    sodium chloride flush  5-40 mL Intravenous 2 times per day    enoxaparin  30 mg Subcutaneous Daily    insulin lispro  0-12 Units Subcutaneous TID     insulin lispro  0-6 Units Subcutaneous Nightly    tamsulosin  0.4 mg Oral Daily     Continuous Infusions:   sodium chloride 100 mL/hr at 08/09/21 1902    dextrose      sodium chloride       PRN Meds:. HYDROcodone-acetaminophen, glucose, dextrose, glucagon (rDNA), dextrose, sodium chloride flush, sodium chloride, ondansetron **OR** ondansetron, polyethylene glycol, acetaminophen **OR** acetaminophen        Vitals:  /82   Pulse 77   Temp 97.6 °F (36.4 °C) (Oral)   Resp 16   Ht 5' 5\" (1.651 m)   Wt 282 lb 3 oz (128 kg)   SpO2 96%   BMI 46.96 kg/m²   I/O last 3 completed shifts: In: 10 [I.V.:10]  Out: 800 [Urine:800]  No intake/output data recorded. Physical Exam:  Gen: NAD  HEENT: Sclera anicteric, MMM  Neck: Supple. No JVD  CV: RRR, S1/S2  Pulm: CTAB/L  Abd: Soft, NT, Morbidly Obese  : (+) Cook  Ext: Trace-1+ pitting edema in B/L LE  Neuro: Awake/Alert, Answers questions appropriately  Skin: Warm.  No significant visible rashes appreciated  Psych: Normal affect/mood        Labs:  CBC with Differential:    Lab Results   Component Value Date    WBC 6.0 08/10/2021    RBC 4.22 08/10/2021    HGB 13.7 08/10/2021    HCT 41.3 08/10/2021     08/10/2021    MCV 98.0 08/10/2021    MCH 32.5 08/10/2021    MCHC 33.2 08/10/2021    RDW 15.7 08/10/2021    BANDSPCT 6 02/19/2015    LYMPHOPCT 25.6 08/09/2021    MONOPCT 14.2 08/09/2021    BASOPCT 1.1 08/09/2021    MONOSABS 0.8 08/09/2021    LYMPHSABS 1.4 08/09/2021    EOSABS 0.3 08/09/2021    BASOSABS 0.1 08/09/2021     BMP:    Lab Results   Component Value Date     08/10/2021    K 4.0 08/10/2021    K 4.2 06/21/2021     08/10/2021    CO2 19 08/10/2021    BUN 33 08/10/2021    LABALBU 3.5 08/10/2021    CREATININE 3.3 08/10/2021    CALCIUM 9.1 08/10/2021    GFRAA 23 08/10/2021    GFRAA 53 02/18/2013    LABGLOM 19 08/10/2021    GLUCOSE 96 08/10/2021     U/A:    Lab Results   Component Value Date    COLORU YELLOW 08/09/2021    PROTEINU 100 08/09/2021    PHUR 5.5 08/09/2021    WBCUA 42 08/09/2021    RBCUA 11 08/09/2021    BACTERIA 4+ 03/21/2015 CLARITYU Clear 08/09/2021    SPECGRAV 1.013 08/09/2021    LEUKOCYTESUR MODERATE 08/09/2021    UROBILINOGEN 0.2 08/09/2021    BILIRUBINUR Negative 08/09/2021    BLOODU SMALL 08/09/2021    GLUCOSEU Negative 08/09/2021

## 2021-08-10 NOTE — PROGRESS NOTES
Shift assessment completed. A&O vx4. No c/o pain at this time. Cook output 450. Bed in lowest position, call light and table within reach, x2 side rails, no slip socks on and fall sign posted. No further needs expressed at this time. The care plan and education has been reviewed and mutually agreed upon with the patient.

## 2021-08-11 LAB
ALBUMIN SERPL-MCNC: 3.5 G/DL (ref 3.4–5)
ANION GAP SERPL CALCULATED.3IONS-SCNC: 11 MMOL/L (ref 3–16)
BUN BLDV-MCNC: 28 MG/DL (ref 7–20)
CALCIUM SERPL-MCNC: 8.7 MG/DL (ref 8.3–10.6)
CHLORIDE BLD-SCNC: 104 MMOL/L (ref 99–110)
CO2: 22 MMOL/L (ref 21–32)
CREAT SERPL-MCNC: 2.8 MG/DL (ref 0.8–1.3)
GFR AFRICAN AMERICAN: 27
GFR NON-AFRICAN AMERICAN: 23
GLUCOSE BLD-MCNC: 100 MG/DL (ref 70–99)
GLUCOSE BLD-MCNC: 101 MG/DL (ref 70–99)
GLUCOSE BLD-MCNC: 111 MG/DL (ref 70–99)
GLUCOSE BLD-MCNC: 132 MG/DL (ref 70–99)
GLUCOSE BLD-MCNC: 98 MG/DL (ref 70–99)
PERFORMED ON: ABNORMAL
PERFORMED ON: NORMAL
PHOSPHORUS: 3.5 MG/DL (ref 2.5–4.9)
POTASSIUM SERPL-SCNC: 3.8 MMOL/L (ref 3.5–5.1)
SODIUM BLD-SCNC: 137 MMOL/L (ref 136–145)

## 2021-08-11 PROCEDURE — 1200000000 HC SEMI PRIVATE

## 2021-08-11 PROCEDURE — 2580000003 HC RX 258: Performed by: FAMILY MEDICINE

## 2021-08-11 PROCEDURE — 80069 RENAL FUNCTION PANEL: CPT

## 2021-08-11 PROCEDURE — 6370000000 HC RX 637 (ALT 250 FOR IP): Performed by: INTERNAL MEDICINE

## 2021-08-11 PROCEDURE — 6360000002 HC RX W HCPCS: Performed by: FAMILY MEDICINE

## 2021-08-11 PROCEDURE — 6370000000 HC RX 637 (ALT 250 FOR IP): Performed by: FAMILY MEDICINE

## 2021-08-11 PROCEDURE — 6370000000 HC RX 637 (ALT 250 FOR IP): Performed by: NURSE PRACTITIONER

## 2021-08-11 RX ORDER — LANOLIN ALCOHOL/MO/W.PET/CERES
3 CREAM (GRAM) TOPICAL NIGHTLY PRN
Status: DISCONTINUED | OUTPATIENT
Start: 2021-08-11 | End: 2021-08-12 | Stop reason: HOSPADM

## 2021-08-11 RX ORDER — NIFEDIPINE 30 MG/1
30 TABLET, EXTENDED RELEASE ORAL DAILY
Status: DISCONTINUED | OUTPATIENT
Start: 2021-08-11 | End: 2021-08-12 | Stop reason: HOSPADM

## 2021-08-11 RX ADMIN — TAMSULOSIN HYDROCHLORIDE 0.4 MG: 0.4 CAPSULE ORAL at 09:02

## 2021-08-11 RX ADMIN — NIFEDIPINE 30 MG: 30 TABLET, FILM COATED, EXTENDED RELEASE ORAL at 13:39

## 2021-08-11 RX ADMIN — AMLODIPINE BESYLATE 10 MG: 5 TABLET ORAL at 09:02

## 2021-08-11 RX ADMIN — FINASTERIDE 5 MG: 5 TABLET, FILM COATED ORAL at 09:03

## 2021-08-11 RX ADMIN — ENOXAPARIN SODIUM 30 MG: 30 INJECTION SUBCUTANEOUS at 09:01

## 2021-08-11 RX ADMIN — Medication 10 ML: at 21:35

## 2021-08-11 RX ADMIN — ATORVASTATIN CALCIUM 40 MG: 80 TABLET, FILM COATED ORAL at 09:02

## 2021-08-11 RX ADMIN — METOPROLOL SUCCINATE 100 MG: 50 TABLET, EXTENDED RELEASE ORAL at 09:02

## 2021-08-11 RX ADMIN — ASPIRIN 81 MG: 81 TABLET, COATED ORAL at 09:02

## 2021-08-11 ASSESSMENT — PAIN SCALES - GENERAL
PAINLEVEL_OUTOF10: 0

## 2021-08-11 NOTE — PROGRESS NOTES
[facility]  Phone: [phone]  Fax: [phone]  [facility]. [facility]          Assessment/Plan:    Oliguric SHANNAN on CKD 3b:  -Etiology of SHANNAN likely due to obstructive uropathy (B/L hydrourteronephrosis)  -Cr improving daily, but still not back to baseline  -Can trial off IVF today  -If able to maintain PO fluid intake and Cr continues to improve, can likely DC tomorrow from Nephrology standpoint    CKD 3b:  -Presumed due to previous AKIs/obstructive uropathy  -Follows with Dr. Aidee Garzon  -Baseline Cr likely 2.1-2.4mg/dL per chart review     Hypertension:  -Above goal  -Change amlodipine to nifedipine XL 30mg PO daily     CKD-MBD:  -At goal     Urinary Retention/B/L Hydroureteronephrosis:  -In setting of prostate ca s/p prostatectomy  -S/p urethral dilation 1 month PTA  -S/p complex talbot placement via cystoscopy (8/9/21)  -Will need to DC with talbot  -Management per Urology        Elvia Mckeon MD, BETI  Nephrologist  [facility]                                                                                                                                                                                                                                                                                                    Consult: SHANNAN on CKD  Brief HPI: 72 y.o. male admitted for recurrent urinary retention, complicated by ARF      Subjective:  -No acute events overnight  -Cr improving this AM      Review of Systems: Anxious for DC. All other ROS negative.   Social History: No visitors present at bedside      Medications:  Scheduled Meds:   finasteride  5 mg Oral Daily    amLODIPine  10 mg Oral Daily    atorvastatin  40 mg Oral Daily    aspirin  81 mg Oral Daily    metoprolol succinate  100 mg Oral Daily    sodium chloride flush  5-40 mL Intravenous 2 times per day    enoxaparin  30 mg Subcutaneous Daily    insulin lispro  0-12 Units Subcutaneous TID WC    insulin lispro 0-6 Units Subcutaneous Nightly    tamsulosin  0.4 mg Oral Daily     Continuous Infusions:   lactated ringers 100 mL/hr at 08/10/21 2003    dextrose      sodium chloride       PRN Meds:.Fixodent Complete, HYDROcodone-acetaminophen, glucose, dextrose, glucagon (rDNA), dextrose, sodium chloride flush, sodium chloride, ondansetron **OR** ondansetron, polyethylene glycol, acetaminophen **OR** acetaminophen        Vitals:  BP (!) 172/92   Pulse 72   Temp 97.6 °F (36.4 °C) (Oral)   Resp 15   Ht 5' 5\" (1.651 m)   Wt 294 lb 15.6 oz (133.8 kg)   SpO2 98%   BMI 49.09 kg/m²   I/O last 3 completed shifts: In: 10 [I.V.:10]  Out: 2025 [Urine:2025]  I/O this shift:  In: -   Out: 650 [Urine:650]      Physical Exam:  Gen: NAD  HEENT: Sclera anicteric, MMM  Neck: Supple. No JVD  CV: RRR, S1/S2  Pulm: CTAB/L  Abd: Soft, NT, Morbidly Obese  : (+) Cook  Ext: Trace-1+ pitting edema in B/L LE  Neuro: Awake/Alert, Answers questions appropriately  Skin: Warm.  No significant visible rashes appreciated  Psych: Normal affect/mood        Labs:  CBC with Differential:    Lab Results   Component Value Date    WBC 6.0 08/10/2021    RBC 4.22 08/10/2021    HGB 13.7 08/10/2021    HCT 41.3 08/10/2021     08/10/2021    MCV 98.0 08/10/2021    MCH 32.5 08/10/2021    MCHC 33.2 08/10/2021    RDW 15.7 08/10/2021    BANDSPCT 6 02/19/2015    LYMPHOPCT 25.6 08/09/2021    MONOPCT 14.2 08/09/2021    BASOPCT 1.1 08/09/2021    MONOSABS 0.8 08/09/2021    LYMPHSABS 1.4 08/09/2021    EOSABS 0.3 08/09/2021    BASOSABS 0.1 08/09/2021     BMP:    Lab Results   Component Value Date     08/11/2021    K 3.8 08/11/2021    K 4.2 06/21/2021     08/11/2021    CO2 22 08/11/2021    BUN 28 08/11/2021    LABALBU 3.5 08/11/2021    CREATININE 2.8 08/11/2021    CALCIUM 8.7 08/11/2021    GFRAA 27 08/11/2021    GFRAA 53 02/18/2013    LABGLOM 23 08/11/2021    GLUCOSE 100 08/11/2021     U/A:    Lab Results   Component Value Date    COLORU YELLOW 08/09/2021    PROTEINU 100 08/09/2021    PHUR 5.5 08/09/2021    WBCUA 42 08/09/2021    RBCUA 11 08/09/2021    BACTERIA 4+ 03/21/2015    CLARITYU Clear 08/09/2021    SPECGRAV 1.013 08/09/2021    LEUKOCYTESUR MODERATE 08/09/2021    UROBILINOGEN 0.2 08/09/2021    BILIRUBINUR Negative 08/09/2021    BLOODU SMALL 08/09/2021    GLUCOSEU Negative 08/09/2021

## 2021-08-11 NOTE — PLAN OF CARE
Problem: Falls - Risk of:  Goal: Will remain free from falls  Description: Will remain free from falls  8/11/2021 0941 by Gaye Seals RN  Outcome: Ongoing  8/11/2021 0334 by Eugene Garber RN  Outcome: Ongoing  Goal: Absence of physical injury  Description: Absence of physical injury  8/11/2021 0941 by Gaye Seals RN  Outcome: Ongoing  8/11/2021 0334 by Eugene Garber RN  Outcome: Ongoing     Problem: Pain:  Goal: Pain level will decrease  Description: Pain level will decrease  8/11/2021 0941 by Gaye Seals RN  Outcome: Ongoing  8/11/2021 0334 by Eugene Garber RN  Outcome: Ongoing  Goal: Control of acute pain  Description: Control of acute pain  8/11/2021 0941 by Gaye Seals RN  Outcome: Ongoing  8/11/2021 0334 by Eugene Garber RN  Outcome: Ongoing  Goal: Control of chronic pain  Description: Control of chronic pain  8/11/2021 0941 by Gaye Seals RN  Outcome: Ongoing  8/11/2021 0334 by Eugene Garber RN  Outcome: Ongoing

## 2021-08-11 NOTE — PROGRESS NOTES
Morning assessment completed, /92, scheduled BP meds given. care plan educated with pt, denies pain at this time, will continue to monitor. Call light within reach.

## 2021-08-11 NOTE — PROGRESS NOTES
Temp: 97.6 °F (36.4 °C)   SpO2: 98%       Intake/Output Summary (Last 24 hours) at 8/11/2021 1212  Last data filed at 8/11/2021 1121  Gross per 24 hour   Intake 10 ml   Output 2675 ml   Net -2665 ml     Physical Exam:  Gen: Obese, Alert and oriented x3, no acute distress  CV: Regular rate   Resp: unlabored respirations  Abd: Cook in place, circumcised, Soft, non-distended, non-tender, no masses  Ext: no peripheral edema noted, moves upper and lower extremities spontaneously  Skin: warmand well perfused, no rashes noted on the face, or arms.      Labs:  Lab Results   Component Value Date    WBC 6.0 08/10/2021    HGB 13.7 08/10/2021    HCT 41.3 08/10/2021    MCV 98.0 08/10/2021     08/10/2021     Lab Results   Component Value Date    CREATININE 2.8 (H) 08/11/2021    BUN 28 (H) 08/11/2021     08/11/2021    K 3.8 08/11/2021     08/11/2021    CO2 22 08/11/2021       Elk Finger, APRN - CNP   8/11/2021

## 2021-08-12 VITALS
WEIGHT: 286.77 LBS | RESPIRATION RATE: 16 BRPM | TEMPERATURE: 97.6 F | HEART RATE: 81 BPM | HEIGHT: 65 IN | OXYGEN SATURATION: 95 % | SYSTOLIC BLOOD PRESSURE: 152 MMHG | DIASTOLIC BLOOD PRESSURE: 87 MMHG | BODY MASS INDEX: 47.78 KG/M2

## 2021-08-12 LAB
ALBUMIN SERPL-MCNC: 3.6 G/DL (ref 3.4–5)
ANION GAP SERPL CALCULATED.3IONS-SCNC: 12 MMOL/L (ref 3–16)
BUN BLDV-MCNC: 22 MG/DL (ref 7–20)
CALCIUM SERPL-MCNC: 8.8 MG/DL (ref 8.3–10.6)
CHLORIDE BLD-SCNC: 108 MMOL/L (ref 99–110)
CO2: 22 MMOL/L (ref 21–32)
CREAT SERPL-MCNC: 2.3 MG/DL (ref 0.8–1.3)
GFR AFRICAN AMERICAN: 34
GFR NON-AFRICAN AMERICAN: 28
GLUCOSE BLD-MCNC: 101 MG/DL (ref 70–99)
GLUCOSE BLD-MCNC: 121 MG/DL (ref 70–99)
GLUCOSE BLD-MCNC: 95 MG/DL (ref 70–99)
PERFORMED ON: ABNORMAL
PERFORMED ON: NORMAL
PHOSPHORUS: 3 MG/DL (ref 2.5–4.9)
POTASSIUM SERPL-SCNC: 4 MMOL/L (ref 3.5–5.1)
SODIUM BLD-SCNC: 142 MMOL/L (ref 136–145)

## 2021-08-12 PROCEDURE — 6370000000 HC RX 637 (ALT 250 FOR IP): Performed by: NURSE PRACTITIONER

## 2021-08-12 PROCEDURE — 2580000003 HC RX 258: Performed by: FAMILY MEDICINE

## 2021-08-12 PROCEDURE — 6360000002 HC RX W HCPCS: Performed by: FAMILY MEDICINE

## 2021-08-12 PROCEDURE — 36415 COLL VENOUS BLD VENIPUNCTURE: CPT

## 2021-08-12 PROCEDURE — 6370000000 HC RX 637 (ALT 250 FOR IP): Performed by: INTERNAL MEDICINE

## 2021-08-12 PROCEDURE — 6370000000 HC RX 637 (ALT 250 FOR IP): Performed by: FAMILY MEDICINE

## 2021-08-12 PROCEDURE — 80069 RENAL FUNCTION PANEL: CPT

## 2021-08-12 RX ORDER — NIFEDIPINE 30 MG/1
30 TABLET, FILM COATED, EXTENDED RELEASE ORAL DAILY
Qty: 30 TABLET | Refills: 1 | Status: SHIPPED | OUTPATIENT
Start: 2021-08-13 | End: 2021-11-04 | Stop reason: SDUPTHER

## 2021-08-12 RX ORDER — FINASTERIDE 5 MG/1
5 TABLET, FILM COATED ORAL DAILY
Qty: 30 TABLET | Refills: 1 | Status: SHIPPED | OUTPATIENT
Start: 2021-08-13

## 2021-08-12 RX ADMIN — TAMSULOSIN HYDROCHLORIDE 0.4 MG: 0.4 CAPSULE ORAL at 09:03

## 2021-08-12 RX ADMIN — Medication 10 ML: at 09:06

## 2021-08-12 RX ADMIN — FINASTERIDE 5 MG: 5 TABLET, FILM COATED ORAL at 09:03

## 2021-08-12 RX ADMIN — ASPIRIN 81 MG: 81 TABLET, COATED ORAL at 09:03

## 2021-08-12 RX ADMIN — ATORVASTATIN CALCIUM 40 MG: 80 TABLET, FILM COATED ORAL at 09:03

## 2021-08-12 RX ADMIN — METOPROLOL SUCCINATE 100 MG: 50 TABLET, EXTENDED RELEASE ORAL at 09:03

## 2021-08-12 RX ADMIN — NIFEDIPINE 30 MG: 30 TABLET, FILM COATED, EXTENDED RELEASE ORAL at 09:03

## 2021-08-12 RX ADMIN — ENOXAPARIN SODIUM 30 MG: 30 INJECTION SUBCUTANEOUS at 09:03

## 2021-08-12 ASSESSMENT — PAIN SCALES - GENERAL: PAINLEVEL_OUTOF10: 0

## 2021-08-12 NOTE — PROGRESS NOTES
Shift assessment and AM vitals complete, VSS. AM meds given. Pt doing well, creat lower this AM 2.3, waiting for nephrology to sign off. Pt may go home with talbot and f/u with nephro. Pt doing well, no pain at this time. The care plan and education has been reviewed and mutually agreed upon with the patient.

## 2021-08-12 NOTE — PROGRESS NOTES
2000:  PM shift assessment complete, see flow sheets. Medications given per orders, see MAR. Pt. A&O x4, denies pain at this time. Cook catheter draining clear/yellow uriine. 2300:  Message sent to provider via [employer]. See orders.

## 2021-08-12 NOTE — PROGRESS NOTES
[facility]  Phone: [phone]  Fax: [phone]  [facility]. [facility]          Assessment/Plan:    Oliguric SHANNAN on CKD 3b:  -Etiology of SHANNAN likely due to obstructive uropathy (B/L hydrourteronephrosis)  -Cr improving daily and back to baseline this AM  -OK to DC from Nephrology standpoint  -Outpatient follow up with Dr. Patrick Muñoz in 2 weeks  -Repeat BMP prior to office visit (will be arranged by our office)  -Encouraged PO fluid intake to match UOp (down to 2L per day)  -Do not restart farxiga, lasix as patient has post-obstructive diuresis     CKD 3b:  -Presumed due to previous AKIs/obstructive uropathy  -Follows with Dr. Patrick Muñoz  -Baseline Cr likely 2.1-2.4mg/dL per chart review     Hypertension:  -Improved with changing amlodipine to nifedipine XL 30mg  -DC on current regimen     CKD-MBD:  -At goal     Urinary Retention/B/L Hydroureteronephrosis:  -In setting of prostate ca s/p prostatectomy  -S/p urethral dilation 1 month PTA  -S/p complex talbot placement via cystoscopy (8/9/21)  -DC with talbot  -Management per Urology          Magno Gann MD, BETI  Nephrologist  [facility]                                                                                                                                                                                                                                                                                                    Consult: SHANNAN on CKD  Brief HPI: 72 y.o. male admitted for recurrent urinary retention, complicated by ARF      Subjective:  -No acute events overnight  -Cr improving this AM      Review of Systems: Anxious for DC. All other ROS negative.   Social History: No visitors present at bedside      Medications:  Scheduled Meds:   NIFEdipine  30 mg Oral Daily    finasteride  5 mg Oral Daily    atorvastatin  40 mg Oral Daily    aspirin  81 mg Oral Daily    metoprolol succinate  100 mg Oral Daily    sodium chloride LABGLOM 28 08/12/2021    GLUCOSE 101 08/12/2021     U/A:    Lab Results   Component Value Date    COLORU YELLOW 08/09/2021    PROTEINU 100 08/09/2021    PHUR 5.5 08/09/2021    WBCUA 42 08/09/2021    RBCUA 11 08/09/2021    BACTERIA 4+ 03/21/2015    CLARITYU Clear 08/09/2021    SPECGRAV 1.013 08/09/2021    LEUKOCYTESUR MODERATE 08/09/2021    UROBILINOGEN 0.2 08/09/2021    BILIRUBINUR Negative 08/09/2021    BLOODU SMALL 08/09/2021    GLUCOSEU Negative 08/09/2021

## 2021-08-12 NOTE — PLAN OF CARE
Problem: Falls - Risk of:  Goal: Will remain free from falls  Description: Will remain free from falls  8/12/2021 1431 by Levy Valles RN  Outcome: Completed  8/12/2021 0122 by Masha Bruno RN  Outcome: Ongoing  Goal: Absence of physical injury  Description: Absence of physical injury  8/12/2021 1431 by Levy Valles RN  Outcome: Completed  8/12/2021 0122 by Masha Bruno RN  Outcome: Ongoing     Problem: Pain:  Goal: Pain level will decrease  Description: Pain level will decrease  8/12/2021 1431 by Levy Valles RN  Outcome: Completed  8/12/2021 0122 by Masha Bruno RN  Outcome: Ongoing  Goal: Control of acute pain  Description: Control of acute pain  8/12/2021 1431 by Levy Valles RN  Outcome: Completed  8/12/2021 0122 by Masha Bruno RN  Outcome: Ongoing  Goal: Control of chronic pain  Description: Control of chronic pain  8/12/2021 1431 by Levy Valles RN  Outcome: Completed  8/12/2021 0122 by Masha Bruno RN  Outcome: Ongoing

## 2021-08-12 NOTE — DISCHARGE SUMMARY
[facility]    Patient Demographics    Patient. Mickeal Bence  Date of Birth. 1950  MRN. [id number]     Primary care provider. Babs Blizzard, MD  (Tel: [phone])    Admit date: 8/8/2021    Discharge date (blank if same as Note Date): Note Date: 8/12/2021     Reason for Hospitalization. Chief Complaint   Patient presents with    Urinary Retention     pt c/o urinary spasms, unable to urinate standing or sitting, must be laying on his belly or in a recline position. is taking antibiotics and flomax for urinary spasm per urologist         Significant Findings. Active Problems:    Urinary retention  Resolved Problems:    * No resolved hospital problems. *       Problems and results from this hospitalization that need follow up. 1. Urinary retention  2. Acute on chronic renal failure    Significant test results and incidental findings. 1. CT abd/pelvis  1. Moderate bilateral hydronephrosis and hydroureter.  This demonstrates   long-term stability with no obstructing calculi.  Findings may be related to   bladder outlet obstruction or neurogenic bladder.  Distal ureteral strictures   would be difficult to exclude.  There is progressive bilateral renal atrophy,   right greater than left. 2. Colonic diverticulosis with no acute features. Invasive procedures and treatments. 1. none    Problem-based Hospital Course. Patient is a very pleasant 70-year-old male with a history of prostate cancer status post prostatectomy who presented to the hospital with urinary retention he has previously had urethral dilation. In the emergency room bladder scan suggested 300 cc of urine. His creatinine was 3.3 when his baseline is normally 2.1. CT scan the abdomen pelvis showed moderate hydronephrosis. Patient was admitted and seen by urology. He had a complicated Cook placement.  He was seen by nephrology as well for his SHANNAN. He was hydrated and his renal function improved back to his baseline of 2.3. Patient was discharged home in stable condition with the Cook. He will follow-up with Dr. Latisha Thapa tomorrow. Consults. IP CONSULT TO UROLOGY  IP CONSULT TO UROLOGY  IP CONSULT TO NEPHROLOGY  IP CONSULT TO NEPHROLOGY    Physical examination on discharge day. BP (!) 152/87   Pulse 81   Temp 97.6 °F (36.4 °C) (Oral)   Resp 16   Ht 5' 5\" (1.651 m)   Wt 286 lb 12.3 oz (130.1 kg)   SpO2 95%   BMI 47.72 kg/m²   General appearance. Alert. Looks comfortable. HEENT. Sclera clear. Moist mucus membranes. Cardiovascular. Regular rate and rhythm, normal S1, S2. No murmur. Respiratory. Not using accessory muscles. Clear to auscultation bilaterally, no wheeze. Gastrointestinal. Abdomen obese, soft, non-tender, not distended, normal bowel sounds  Neurology. Facial symmetry. No speech deficits. Moving all extremities equally. Extremities. No edema in lower extremities. Skin. Warm, dry, normal turgor    Condition at time of discharge stable    Medication instructions provided to patient at discharge.      Medication List      START taking these medications    finasteride 5 MG tablet  Commonly known as: PROSCAR  Take 1 tablet by mouth daily  Start taking on: August 13, 2021  Notes to patient: Use: urinary retention  Side effects: dizziness, weakness     NIFEdipine 30 MG extended release tablet  Commonly known as: ADALAT CC  Take 1 tablet by mouth daily  Start taking on: August 13, 2021  Notes to patient: Use: headache, tiredness, constipation         CONTINUE taking these medications    alendronate 70 MG tablet  Commonly known as: FOSAMAX  TAKE 1 TABLET BY MOUTH EVERY 7 DAYS     ammonium lactate 12 % cream  Commonly known as: AMLACTIN     aspirin 81 MG EC tablet     atorvastatin 40 MG tablet  Commonly known as: LIPITOR     cetirizine 5 MG tablet  Commonly known as: ZYRTEC  Take 1 tablet by mouth daily     ergocalciferol 1.25 MG (30756 UT) capsule  Commonly known as: ERGOCALCIFEROL     famotidine 20 MG tablet  Commonly known as: PEPCID  Take 1 tablet by mouth daily     fluticasone 50 MCG/ACT nasal spray  Commonly known as: FLONASE  2 sprays by Each Nostril route daily     glimepiride 4 MG tablet  Commonly known as: AMARYL     [employer] Pen Needles 31G X 6 MM Misc  Generic drug: Insulin Pen Needle  1 each by Does not apply route daily     Meals on Wheels Misc  by Does not apply route Patient is to receive 2-3 diabetic meals only     metoprolol succinate 100 MG extended release tablet  Commonly known as: TOPROL XL  Take 1 tablet by mouth daily     Norco  MG per tablet  Generic drug: HYDROcodone-acetaminophen     salsalate 750 MG tablet  Commonly known as: DISALCID     tamsulosin 0.4 MG capsule  Commonly known as: FLOMAX     Trulicity 3 EM/7.0RB Sopn  Generic drug: Dulaglutide  Inject 3 mg into the skin once a week     TYLENOL ARTHRITIS PAIN PO     vitamin B-6 50 MG tablet  Commonly known as: PYRIDOXINE        STOP taking these medications    amLODIPine 10 MG tablet  Commonly known as: NORVASC     dapagliflozin 10 MG tablet  Commonly known as: Farxiga     furosemide 40 MG tablet  Commonly known as: LASIX     nitrofurantoin (macrocrystal-monohydrate) 100 MG capsule  Commonly known as: MACROBID           Where to Get Your Medications      These medications were sent to [address] [phone] - F [phone]  [address] [place] [phone]    Phone: [phone]   · finasteride 5 MG tablet  · NIFEdipine 30 MG extended release tablet         Discharge recommendations given to patient. Follow Up. Urology Friday as scheduled. Nephrology in 1-2 weeks  Disposition. home  Activity. activity as tolerated  Diet: ADULT DIET; Regular; 5 carb choices (75 gm/meal)      Spent 35 minutes in discharge process. Signed:   Humaira Barber PA-C 8/12/2021 2:20 PM

## 2021-08-13 ENCOUNTER — HOSPITAL ENCOUNTER (OUTPATIENT)
Age: 71
Discharge: HOME | End: 2021-08-13
Payer: MEDICARE

## 2021-08-13 ENCOUNTER — CARE COORDINATION (OUTPATIENT)
Dept: CASE MANAGEMENT | Age: 71
End: 2021-08-13

## 2021-08-13 ENCOUNTER — TELEPHONE (OUTPATIENT)
Dept: CARDIOLOGY CLINIC | Age: 71
End: 2021-08-13

## 2021-08-13 DIAGNOSIS — I50.32 CHRONIC DIASTOLIC HEART FAILURE (HCC): ICD-10-CM

## 2021-08-13 LAB
ANION GAP SERPL CALCULATED.3IONS-SCNC: 15 MMOL/L (ref 3–16)
BUN BLDV-MCNC: 17 MG/DL (ref 7–20)
CALCIUM SERPL-MCNC: 9.9 MG/DL (ref 8.3–10.6)
CHLORIDE BLD-SCNC: 107 MMOL/L (ref 99–110)
CO2: 21 MMOL/L (ref 21–32)
CREAT SERPL-MCNC: 2.5 MG/DL (ref 0.8–1.3)
GFR AFRICAN AMERICAN: 31
GFR NON-AFRICAN AMERICAN: 26
GLUCOSE BLD-MCNC: 96 MG/DL (ref 70–99)
POTASSIUM SERPL-SCNC: 4.6 MMOL/L (ref 3.5–5.1)
PRO-BNP: 418 PG/ML (ref 0–124)
SODIUM BLD-SCNC: 143 MMOL/L (ref 136–145)

## 2021-08-13 PROCEDURE — 36415 COLL VENOUS BLD VENIPUNCTURE: CPT

## 2021-08-13 PROCEDURE — 83880 ASSAY OF NATRIURETIC PEPTIDE: CPT

## 2021-08-13 PROCEDURE — 80048 BASIC METABOLIC PNL TOTAL CA: CPT

## 2021-08-13 NOTE — TELEPHONE ENCOUNTER
Nephrology stopped his lasix at discharge  Does he have follow up with them? When is he going to get labs done next?

## 2021-08-13 NOTE — CARE COORDINATION
Daniele 45 Transitions Initial Follow Up Call    Call within 2 business days of discharge: Yes    Patient: Meghan Bob Patient : 1950   MRN: [id number]  Reason for Admission:   Discharge Date: 21 RARS: Readmission Risk Score: 33      Last Discharge [address] [address]       Complaint Diagnosis Description Type Department Provider    21 Urinary [facility] . .. ED to Hosp-Admission (Discharged) (ADMITTED) GARCÍA 4T Hallie Silvestre MD; Jose. ..         Initial 24 hr call attempted, contact info left on vm      Follow Up  Future Appointments   Date Time Provider Shandra Sellers   2021 10:00 AM MD ANGEL Pryor [facility] [place] - [facility]   2021 10:00 AM MD ANGEL Pryor [facility] [place] - [facility]   10/11/2021  1:40 PM Huy De Dios MD FF SLEEP [facility]   2021 10:00 AM OSMAN Henley - CNS FF Card[facility]       Bernie Culver RN

## 2021-08-13 NOTE — TELEPHONE ENCOUNTER
Spoke to patient's daughter she is asking if Lasix should be restarted due to weight gain, she stated he sounds sob with exertion, and no swelling in hands legs or feet. Also the stopped the amlodipine and now he is taking nifedipine can you tell her if this is okay for them to change the medications.   Please advise

## 2021-08-13 NOTE — TELEPHONE ENCOUNTER
Christianotish Duboise called back and message below was relayed to her,She asked what labs did he need done that he had a lot of labs done in hospital.Katiuska was informed that a BNP and BMP was ordered at last office visit with [address] [place] [address]. Rocky Duboise stated she would talk with Michelle Fernandes and get labs done and f/u with Nephrology per message from [address] [place] [address]. EncouragedTracey to call office with any other questions.

## 2021-08-16 ENCOUNTER — TELEPHONE (OUTPATIENT)
Dept: CARDIOLOGY CLINIC | Age: 71
End: 2021-08-16

## 2021-08-16 ENCOUNTER — CARE COORDINATION (OUTPATIENT)
Dept: CASE MANAGEMENT | Age: 71
End: 2021-08-16

## 2021-08-16 NOTE — TELEPHONE ENCOUNTER
----- Message from OSMAN Amaya - CNS sent at 8/16/2021  8:35 AM EDT -----  Did he get a hold of nephrology regarding resuming his diuretics?   His creat is at baseline  thanks

## 2021-08-16 NOTE — CARE COORDINATION
Daniele 45 Transitions Initial Follow Up Call    Call within 2 business days of discharge: Yes    Patient: Dorine Bence Patient : 1950   MRN: [id number]  Reason for Admission:   Discharge Date: 21 RARS: Readmission Risk Score: 33      Last Discharge [facility]       Complaint Diagnosis Description Type Department Provider    21 Urinary Retention SHANNAN (acute kidney injury) ([facility] . .. ED to Hosp-Admission (Discharged) (ADMITTED) GARCÍA 4T Hallie Matson MD; Jose. ..        2nd and final attempt at an initial 24 hour call, contact info left on vm         Follow Up  Future Appointments   Date Time Provider Shandra Sellers   2021 10:00 AM MD ANGEL Red[place] [facility] [place] - D[facility]   2021 10:00 AM MD ANGEL [place] [facility] [place] - [facility]   10/11/2021  1:40 PM Serafin Sam MD FF SLEEP [facility]   2021 10:00 AM Marietta Tapia APRN - CNS FF Cardio [facility]       Hallie Roblero RN

## 2021-08-18 ENCOUNTER — HOSPITAL ENCOUNTER (OUTPATIENT)
Age: 71
Discharge: HOME | End: 2021-08-18
Payer: MEDICARE

## 2021-08-18 LAB
ANION GAP SERPL CALCULATED.3IONS-SCNC: 12 MMOL/L (ref 3–16)
BUN BLDV-MCNC: 13 MG/DL (ref 7–20)
CALCIUM SERPL-MCNC: 9.9 MG/DL (ref 8.3–10.6)
CHLORIDE BLD-SCNC: 103 MMOL/L (ref 99–110)
CO2: 25 MMOL/L (ref 21–32)
CREAT SERPL-MCNC: 2 MG/DL (ref 0.8–1.3)
GFR AFRICAN AMERICAN: 40
GFR NON-AFRICAN AMERICAN: 33
GLUCOSE BLD-MCNC: 86 MG/DL (ref 70–99)
POTASSIUM SERPL-SCNC: 4.3 MMOL/L (ref 3.5–5.1)
SODIUM BLD-SCNC: 140 MMOL/L (ref 136–145)

## 2021-08-18 PROCEDURE — 80048 BASIC METABOLIC PNL TOTAL CA: CPT

## 2021-08-18 PROCEDURE — 36415 COLL VENOUS BLD VENIPUNCTURE: CPT

## 2021-08-23 RX ORDER — TORSEMIDE 20 MG/1
20 TABLET ORAL DAILY
COMMUNITY

## 2021-08-23 NOTE — TELEPHONE ENCOUNTER
I spoke with pt and he stated that he IS taking a water pill. He did not know the name. He advised me to call his daughter [facility] [phone]. She told me that they had contacted nephrology and he was placed on Torsemide 20 mg daily and he got labs a couple days later. His weight had decreased as well.  Med was added to his list.

## 2021-08-27 ENCOUNTER — OFFICE VISIT (OUTPATIENT)
Dept: INTERNAL MEDICINE CLINIC | Age: 71
End: 2021-08-27
Payer: MEDICARE

## 2021-08-27 VITALS
TEMPERATURE: 97.2 F | SYSTOLIC BLOOD PRESSURE: 112 MMHG | HEART RATE: 56 BPM | WEIGHT: 273 LBS | DIASTOLIC BLOOD PRESSURE: 66 MMHG | BODY MASS INDEX: 45.43 KG/M2 | OXYGEN SATURATION: 96 %

## 2021-08-27 DIAGNOSIS — N17.9 ACUTE RENAL FAILURE, UNSPECIFIED ACUTE RENAL FAILURE TYPE (HCC): ICD-10-CM

## 2021-08-27 DIAGNOSIS — I50.33 ACUTE ON CHRONIC DIASTOLIC HEART FAILURE (HCC): Primary | ICD-10-CM

## 2021-08-27 PROCEDURE — 99214 OFFICE O/P EST MOD 30 MIN: CPT | Performed by: INTERNAL MEDICINE

## 2021-08-27 RX ORDER — LATANOPROST 50 UG/ML
1 SOLUTION/ DROPS OPHTHALMIC NIGHTLY
COMMUNITY

## 2021-08-27 RX ORDER — BRIMONIDINE TARTRATE 2 MG/ML
SOLUTION/ DROPS OPHTHALMIC 2 TIMES DAILY
COMMUNITY

## 2021-08-27 ASSESSMENT — PATIENT HEALTH QUESTIONNAIRE - PHQ9
SUM OF ALL RESPONSES TO PHQ9 QUESTIONS 1 & 2: 0
SUM OF ALL RESPONSES TO PHQ QUESTIONS 1-9: 0
1. LITTLE INTEREST OR PLEASURE IN DOING THINGS: 0
2. FEELING DOWN, DEPRESSED OR HOPELESS: 0
SUM OF ALL RESPONSES TO PHQ QUESTIONS 1-9: 0
SUM OF ALL RESPONSES TO PHQ QUESTIONS 1-9: 0

## 2021-08-27 ASSESSMENT — ENCOUNTER SYMPTOMS
GASTROINTESTINAL NEGATIVE: 1
EYES NEGATIVE: 1
ALLERGIC/IMMUNOLOGIC NEGATIVE: 1

## 2021-08-27 NOTE — PATIENT INSTRUCTIONS
Change appt to end of sept  No farxiga for now  Check kidney in 2 weeks  Keep going with the trulicity  Keep eating healthy

## 2021-08-27 NOTE — PROGRESS NOTES
Subjective:      Patient ID: Larisa Kirk is a 72 y.o. male. Chief Complaint   Patient presents with   [facility] f/u 8/8/2021 - 8/12/2021 (4 days) for SHANNAN       HPI      Diabetes  Treatment Adherence:   Medication compliance:  noncompliant: with meals  Diet compliance:  compliant most of the time  Weight trend: stable  Current exercise: no regular exercise  Barriers: lack of motivation    Diabetes Mellitus Type 2: Current symptoms/problems include none. Home blood sugar records: patient does not test  Any episodes of hypoglycemia? no  Eye exam current (within one year): no  Tobacco history: He  reports that he quit smoking about 20 years ago. His smoking use included cigars. He started smoking about 41 years ago. He has a 5.25 pack-year smoking history. He has quit using smokeless tobacco.   Daily Aspirin? Yes    Hypertension:  Home blood pressure monitoring: No.  He is not adherent to a low sodium diet. Patient denies chest pain, shortness of breath, headache, lightheadedness, blurred vision and dry cough. Antihypertensive medication side effects: no me   Objective:        Vitals:    08/27/21 1045   BP: 112/66   Site: Left Upper Arm   Position: Sitting   Cuff Size: Thigh   Pulse: 56   Temp: 97.2 °F (36.2 °C)   TempSrc: Infrared   SpO2: 96%   Weight: 273 lb (123.8 kg)       Data Review   BMP:   Lab Results   Component Value Date    GLUCOSE 86 08/18/2021    CO2 25 08/18/2021    BUN 13 08/18/2021    CREATININE 2.0 08/18/2021    CALCIUM 9.9 08/18/2021        1. Discussed health maintenance, including regular aerobic exercise, low fat/low salt diet, and periodic exams. 2. Medication Changes: no change  3. Discussed cost of [place] and trying Mail order  Hyperlipidemia:  No new myalgias or GI upset on atorvastatin (Lipitor).        Lab Results   Component Value Date    LABA1C 5.7 02/10/2021    LABA1C 6.6 09/29/2020     Lab Results   Component Value Date    LABMICR YES 08/09/2021    CREATININE 2.0 (H) 08/18/2021     Lab Results   Component Value Date    ALT 56 (H) 08/08/2021    AST 65 (H) 08/08/2021     Lab Results   Component Value Date    CHOL 201 (H) 11/14/2011    TRIG 123 11/14/2011    HDL 55 11/14/2011    LDLCALC 122 (H) 11/14/2011        CKD:    Dorine Bence is a 72 y.o. male who presents for follow-up of chronic renal disease caused by hypertension and diabetes. Treatments have been prescribed for slowing the progression of CRF include cardiovascular risk factor reduction including advanced age (older than 54 for men, 72 for women), diabetes mellitus, dyslipidemia, hypertension, obesity (BMI >= 30 kg/m2) and sedentary lifestyle, glycemic control, ACE inhibitor therapy, blood pressure control and recognition and treatment of hypoglycemia. Patient has significant obesity and reports that he really does want to lose weight. He states he eats quite a bit.   He has had success with weight loss in the past.    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    Past Medical History:   Diagnosis Date    Anesthesia     anxiety regarding ETT    Cancer ([address]     prostate    Hyperlipidemia     Hypertension     Obesity     KAMRAN treated with BiPAP     nasal    Osteoarthritis     Type 2 diabetes mellitus without complication ([address]     Type 2 diabetes mellitus without complication, without long-term current use of insulin ([address] 2/7/2018    Urinary incontinence, stress, male     Wears dentures      Past Surgical History:   Procedure Laterality Date    CYSTOSCOPY N/A 6/21/2021    CYSTOSCOPY, TRANSURETHRAL INCISION OF BLADDER NECK CONTRACTURE WITH COLD KNIFE AND COMPLEX URETHRAL CATHETER PLACEMENT performed by Sushil Albright MD at [address] Left     THR    KNEE ARTHROSCOPY Right     SHOULDER SURGERY Bilateral     TOTAL HIP ARTHROPLASTY       Family History   Problem Relation Age of Onset    Hypertension Mother    Alison Lubin Diabetes Mother    Alison Lubin Hypertension Father      Social History     Socioeconomic History    Marital status: Single     Spouse name: Not on file    Number of children: 2    Years of education: Not on file    Highest education level: High school graduate   Occupational History    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 He has 2 adult children. Social Determinants of Health     Financial Resource Strain: Medium Risk    Difficulty of Paying Living Expenses: Somewhat hard   Food Insecurity: No Food Insecurity    Worried About Running Out of Food in the Last Year: Never true    Julien of Food in the Last Year: Never true   Transportation Needs: No Transportation Needs    Lack of Transportation (Medical): No    Lack of Transportation (Non-Medical): No   Physical Activity:     Days of Exercise per Week:     Minutes of Exercise per Session:    Stress: No Stress Concern Present    Feeling of Stress : Not at all   Social Connections:  Moderately Integrated    Frequency of Communication with Friends and Family: More than three times a week    Frequency of Social Gatherings with Friends and Family: More than three times a week    Attends Christian Services: 1 to 4 times per year    Active Member of [employer] or Organizations: Not on file    Attends Club or Organization Meetings: More than 4 times per year    Marital Status:    Intimate Partner Violence: Not At Risk    Fear of Current or Ex-Partner: No    Emotionally Abused: No    Physically Abused: No    Sexually Abused: No   \  Review of Systems   Constitutional: Negative. HENT: Negative. Eyes: Negative. Cardiovascular: Negative. Gastrointestinal: Negative. Endocrine: Negative. Musculoskeletal: Negative. Skin: Negative. Allergic/Immunologic: Negative. Neurological: Negative. Hematological: Negative. Psychiatric/Behavioral: Negative. All other systems reviewed and are negative. .    Allergies   Allergen Reactions    Indomethacin Other (See Comments)     Dr. Tracey Naylor told him not to take because affects the bladder.  Statins Other (See Comments)    Succinylcholine      ? ? Succinylcholine \"allergy\"  Pt. Should be worked  Up for pseudocholinesterase deficiency. Pt. States he qwas told in the past that he had trouble with an anesthesia drug that begins with an \"s\" and that it took him awhile to breathe in PACU, but could provide no further details.      Unable To Assess       Anesthesia that begins with S- stops my breathing- daughter reports succinylcholine   Questionable pseudocholinesterase deficiency    Actos [Pioglitazone] Rash       Current Outpatient Medications   Medication Sig Dispense Refill    brimonidine (ALPHAGAN) 0.2 % ophthalmic solution Apply to eye 2 times daily      latanoprost (XALATAN) 0.005 % ophthalmic solution 1 drop nightly      torsemide (DEMADEX) 20 MG tablet Take 20 mg by mouth daily      NIFEdipine (ADALAT CC) 30 MG extended release tablet Take 1 tablet by mouth daily 30 tablet 1    finasteride (PROSCAR) 5 MG tablet Take 1 tablet by mouth daily 30 tablet 1    atorvastatin (LIPITOR) 40 MG tablet Take 40 mg by mouth daily      metoprolol succinate (TOPROL XL) 100 MG extended release tablet Take 1 tablet by mouth daily 90 tablet 1    famotidine (PEPCID) 20 MG tablet Take 1 tablet by mouth daily 90 tablet 1    Dulaglutide (TRULICITY) 3 AG/9.0YA SOPN Inject 3 mg into the nursing note reviewed. Constitutional:       General: He is not in acute distress. Appearance: He is well-developed. He is obese. He is not ill-appearing or diaphoretic. HENT:      Head: Normocephalic and atraumatic. Right Ear: Tympanic membrane, ear canal and external ear normal.      Left Ear: Tympanic membrane, ear canal and external ear normal.      Nose: Nose normal.      Mouth/Throat:      Mouth: Mucous membranes are moist.      Pharynx: Oropharynx is clear. No oropharyngeal exudate. Eyes:      General: No scleral icterus. Right eye: No discharge. Left eye: No discharge. Extraocular Movements: Extraocular movements intact. Conjunctiva/sclera: Conjunctivae normal.      Pupils: Pupils are equal, round, and reactive to light. Neck:      Thyroid: No thyromegaly. Vascular: No JVD. Trachea: No tracheal deviation. Cardiovascular:      Rate and Rhythm: Normal rate and regular rhythm. Pulses: Normal pulses. Heart sounds: Normal heart sounds. No murmur heard. Pulmonary:      Effort: Pulmonary effort is normal. No respiratory distress. Breath sounds: Normal breath sounds. No stridor. No wheezing or rales. Abdominal:      General: Bowel sounds are normal. There is no distension. Palpations: Abdomen is soft. There is no mass. Tenderness: There is no abdominal tenderness. There is no guarding or rebound. Musculoskeletal:         General: No tenderness. Normal range of motion. Cervical back: Normal range of motion and neck supple. Lymphadenopathy:      Cervical: No cervical adenopathy. Skin:     General: Skin is warm and dry. Capillary Refill: Capillary refill takes less than 2 seconds. Findings: No erythema or rash. Neurological:      General: No focal deficit present. Mental Status: He is alert and oriented to person, place, and time. Cranial Nerves: No cranial nerve deficit.       Sensory: No sensory deficit. Motor: No abnormal muscle tone. Coordination: Coordination normal.      Deep Tendon Reflexes: Reflexes are normal and symmetric. Reflexes normal.   Psychiatric:         Mood and Affect: Mood normal.         Behavior: Behavior normal.         Thought Content:  Thought content normal.         Judgment: Judgment normal.         Lab Review   Hospital Outpatient Visit on 08/18/2021   Component Date Value    Sodium 08/18/2021 140     Potassium 08/18/2021 4.3     Chloride 08/18/2021 103     CO2 08/18/2021 25     Anion Gap 08/18/2021 12     Glucose 08/18/2021 86     BUN 08/18/2021 13     CREATININE 08/18/2021 2.0*    GFR Non- 08/18/2021 33*    GFR  08/18/2021 40*    Calcium 08/18/2021 9.9    Hospital Outpatient Visit on 08/13/2021   Component Date Value    Sodium 08/13/2021 143     Potassium 08/13/2021 4.6     Chloride 08/13/2021 107     CO2 08/13/2021 21     Anion Gap 08/13/2021 15     Glucose 08/13/2021 96     BUN 08/13/2021 17     CREATININE 08/13/2021 2.5*    GFR Non- 08/13/2021 26*    GFR  08/13/2021 31*    Calcium 08/13/2021 9.9     Pro-BNP 08/13/2021 418*   Admission on 08/08/2021, Discharged on 08/12/2021   Component Date Value    WBC 08/08/2021 5.7     RBC 08/08/2021 4.17*    Hemoglobin 08/08/2021 13.7     Hematocrit 08/08/2021 40.8     MCV 08/08/2021 98.0     MCH 08/08/2021 32.8     MCHC 08/08/2021 33.5     RDW 08/08/2021 15.9*    Platelets 69/04/9865 342     MPV 08/08/2021 6.9     Neutrophils % 08/08/2021 51.4     Lymphocytes % 08/08/2021 30.9     Monocytes % 08/08/2021 12.9     Eosinophils % 08/08/2021 3.9     Basophils % 08/08/2021 0.9     Neutrophils Absolute 08/08/2021 2.9     Lymphocytes Absolute 08/08/2021 1.8     Monocytes Absolute 08/08/2021 0.7     Eosinophils Absolute 08/08/2021 0.2     Basophils Absolute 08/08/2021 0.0     Sodium 08/08/2021 140     Potassium 08/08/2021 3.6     Chloride 08/08/2021 102     CO2 08/08/2021 22     Anion Gap 08/08/2021 16     Glucose 08/08/2021 118*    BUN 08/08/2021 28*    CREATININE 08/08/2021 3.3*    GFR Non- 08/08/2021 19*    GFR  08/08/2021 23*    Calcium 08/08/2021 9.4     Total Protein 08/08/2021 7.8     Albumin 08/08/2021 4.1     Albumin/Globulin Ratio 08/08/2021 1.1     Total Bilirubin 08/08/2021 0.6     Alkaline Phosphatase 08/08/2021 98     ALT 08/08/2021 56*    AST 08/08/2021 65*    Globulin 08/08/2021 3.7     Color, UA 08/09/2021 YELLOW     Clarity, UA 08/09/2021 Clear     Glucose, Ur 08/09/2021 Negative     Bilirubin Urine 08/09/2021 Negative     Ketones, Urine 08/09/2021 Negative     Specific Gravity, UA 08/09/2021 1.013     Blood, Urine 08/09/2021 SMALL*    pH, UA 08/09/2021 5.5     Protein, UA 08/09/2021 100*    Urobilinogen, Urine 08/09/2021 0.2     Nitrite, Urine 08/09/2021 Negative     Leukocyte Esterase, Urine 08/09/2021 MODERATE*    Microscopic Examination 08/09/2021 YES     Urine Type 08/09/2021 NotGiven     Urine Reflex to Culture 08/09/2021 Yes     POC Glucose 08/08/2021 120*    Performed on 08/08/2021 ACCU-CHEK     POC Glucose 08/09/2021 111*    Performed on 08/09/2021 ACCU-CHEK     WBC 08/09/2021 5.4     RBC 08/09/2021 4.35     Hemoglobin 08/09/2021 14.4     Hematocrit 08/09/2021 42.4     MCV 08/09/2021 97.7     MCH 08/09/2021 33.1     MCHC 08/09/2021 33.9     RDW 08/09/2021 15.7*    Platelets 89/76/4409 321     MPV 08/09/2021 7.1     Neutrophils % 08/09/2021 53.5     Lymphocytes % 08/09/2021 25.6     Monocytes % 08/09/2021 14.2     Eosinophils % 08/09/2021 5.6     Basophils % 08/09/2021 1.1     Neutrophils Absolute 08/09/2021 2.9     Lymphocytes Absolute 08/09/2021 1.4     Monocytes Absolute 08/09/2021 0.8     Eosinophils Absolute 08/09/2021 0.3     Basophils Absolute 08/09/2021 0.1     Sodium 08/09/2021 137     Potassium 08/09/2021 4.4     Chloride 08/09/2021 101     CO2 08/09/2021 21     Anion Gap 08/09/2021 15     Glucose 08/09/2021 110*    BUN 08/09/2021 34*    CREATININE 08/09/2021 4.5*    GFR Non- 08/09/2021 13*    GFR  08/09/2021 16*    Calcium 08/09/2021 9.7     Phosphorus 08/09/2021 4.8     Albumin 08/09/2021 4.0     POC Glucose 08/09/2021 97     Performed on 08/09/2021 ACCU-CHEK     Sodium, Ur 08/09/2021 36     Creatinine, Ur 08/09/2021 186.2     Hyaline Casts, UA 08/09/2021 5     WBC, UA 08/09/2021 42*    RBC, UA 08/09/2021 11*    Epithelial Cells, UA 08/09/2021 1     Urine Culture, Routine 08/09/2021 No growth at 18 to 36 hours     POC Glucose 08/09/2021 90     Performed on 08/09/2021 ACCU-CHEK     POC Glucose 08/09/2021 106*    Performed on 08/09/2021 ACCU-CHEK     POC Glucose 08/10/2021 98     Performed on 08/10/2021 ACCU-CHEK     POC Glucose 08/10/2021 103*    Performed on 08/10/2021 ACCU-CHEK     WBC 08/10/2021 6.0     RBC 08/10/2021 4.22     Hemoglobin 08/10/2021 13.7     Hematocrit 08/10/2021 41.3     MCV 08/10/2021 98.0     MCH 08/10/2021 32.5     MCHC 08/10/2021 33.2     RDW 08/10/2021 15.7*    Platelets 21/80/6275 331     MPV 08/10/2021 6.9     Sodium 08/10/2021 139     Potassium 08/10/2021 4.0     Chloride 08/10/2021 104     CO2 08/10/2021 19*    Anion Gap 08/10/2021 16     Glucose 08/10/2021 96     BUN 08/10/2021 33*    CREATININE 08/10/2021 3.3*    GFR Non- 08/10/2021 19*    GFR  08/10/2021 23*    Calcium 08/10/2021 9.1     Phosphorus 08/10/2021 3.5     Albumin 08/10/2021 3.5     POC Glucose 08/10/2021 94     Performed on 08/10/2021 ACCU-CHEK     POC Glucose 08/10/2021 96     Performed on 08/10/2021 ACCU-CHEK     Sodium 08/11/2021 137     Potassium 08/11/2021 3.8     Chloride 08/11/2021 104     CO2 08/11/2021 22     Anion Gap 08/11/2021 11     Glucose 08/11/2021 100*    BUN 08/11/2021 28*    CREATININE 08/11/2021 2.8*    GFR Non- 08/11/2021 23*    GFR  08/11/2021 27*    Calcium 08/11/2021 8.7     Phosphorus 08/11/2021 3.5     Albumin 08/11/2021 3.5     POC Glucose 08/10/2021 93     Performed on 08/10/2021 ACCU-CHEK     POC Glucose 08/11/2021 101*    Performed on 08/11/2021 ACCU-CHEK     POC Glucose 08/11/2021 111*    Performed on 08/11/2021 ACCU-CHEK     POC Glucose 08/11/2021 98     Performed on 08/11/2021 ACCU-CHEK     Sodium 08/12/2021 142     Potassium 08/12/2021 4.0     Chloride 08/12/2021 108     CO2 08/12/2021 22     Anion Gap 08/12/2021 12     Glucose 08/12/2021 101*    BUN 08/12/2021 22*    CREATININE 08/12/2021 2.3*    GFR Non- 08/12/2021 28*    GFR  08/12/2021 34*    Calcium 08/12/2021 8.8     Phosphorus 08/12/2021 3.0     Albumin 08/12/2021 3.6     POC Glucose 08/11/2021 132*    Performed on 08/11/2021 ACCU-CHEK     POC Glucose 08/12/2021 121*    Performed on 08/12/2021 ACCU-CHEK     POC Glucose 08/12/2021 95     Performed on 08/12/2021 ACCU-CHEK    Admission on 06/21/2021, Discharged on 06/21/2021   Component Date Value    Sodium 06/21/2021 139     Potassium reflex Magnesi* 06/21/2021 4.2     Chloride 06/21/2021 102     CO2 06/21/2021 22     Anion Gap 06/21/2021 15     Glucose 06/21/2021 132*    BUN 06/21/2021 26*    CREATININE 06/21/2021 2.4*    GFR Non- 06/21/2021 27*    GFR  06/21/2021 33*    Calcium 06/21/2021 9.4     POC Glucose 06/21/2021 121*    Performed on 06/21/2021 ACCU-CHEK     POC Glucose 06/21/2021 118*    Performed on 06/21/2021 ACCU-CHEK    Orders Only on 06/18/2021   Component Date Value    Sodium 06/18/2021 141     Potassium 06/18/2021 4.9     Chloride 06/18/2021 103     CO2 06/18/2021 25     Anion Gap 06/18/2021 13     Glucose 06/18/2021 108*    BUN 06/18/2021 19     CREATININE 06/18/2021 2.2*    GFR Non- 06/18/2021 30*    GFR  06/18/2021 36*    Calcium 06/18/2021 9.6     Phosphorus 06/18/2021 3.0     Albumin 06/18/2021 4.2    Hospital Outpatient Visit on 05/24/2021   Component Date Value    Sodium 05/24/2021 144     Potassium 05/24/2021 4.3     Chloride 05/24/2021 105     CO2 05/24/2021 21     Anion Gap 05/24/2021 18*    Glucose 05/24/2021 93     BUN 05/24/2021 17     CREATININE 05/24/2021 2.4*    GFR Non- 05/24/2021 27*    GFR  05/24/2021 33*    Calcium 05/24/2021 9.6     Phosphorus 05/24/2021 2.7     Albumin 05/24/2021 4.1     Vit D, 25-Hydroxy 05/24/2021 37.5     WBC 05/24/2021 7.2     RBC 05/24/2021 4.67     Hemoglobin 05/24/2021 15.1     Hematocrit 05/24/2021 45.4     MCV 05/24/2021 97.1     MCH 05/24/2021 32.4     MCHC 05/24/2021 33.4     RDW 05/24/2021 14.8     Platelets 90/87/2492 338     MPV 05/24/2021 7.9     PTH 05/24/2021 143.0*    Microalbumin, Random Uri* 05/24/2021 204.00*    Creatinine, Ur 05/24/2021 163.6     Microalbumin Creatinine * 05/24/2021 1246.9*    Iron 05/24/2021 102     TIBC 05/24/2021 268     Iron Saturation 05/24/2021 38     Ferritin 05/24/2021 110.5     Transferrin 05/24/2021 219.0    Hospital Outpatient Visit on 05/11/2021   Component Date Value    Sodium 05/11/2021 141     Potassium 05/11/2021 5.5*    Chloride 05/11/2021 104     CO2 05/11/2021 19*    Anion Gap 05/11/2021 18*    Glucose 05/11/2021 61*    BUN 05/11/2021 30*    CREATININE 05/11/2021 2.5*    GFR Non- 05/11/2021 26*    GFR  05/11/2021 31*    Calcium 05/11/2021 9.4     Pro-BNP 05/11/2021 [id number] Outpatient Visit on 05/05/2021   Component Date Value    Sodium 05/05/2021 143     Potassium 05/05/2021 4.2     Chloride 05/05/2021 105     CO2 05/05/2021 25     Anion Gap 05/05/2021 13     Glucose 05/05/2021 96     BUN 05/05/2021 27*    CREATININE 05/05/2021 2.1*    GFR Non- 05/05/2021 31*    GFR  05/05/2021 38*    Calcium 05/05/2021 9.4     Pro-BNP 05/05/2021 356*   Hospital Outpatient Visit on 04/30/2021   Component Date Value    Sodium 04/30/2021 146*    Potassium 04/30/2021 4.4     Chloride 04/30/2021 105     CO2 04/30/2021 26     Anion Gap 04/30/2021 15     Glucose 04/30/2021 115*    BUN 04/30/2021 33*    CREATININE 04/30/2021 2.6*    GFR Non- 04/30/2021 25*    GFR  04/30/2021 30*    Calcium 04/30/2021 9.4     Pro-BNP 04/30/2021 [id number] [facility] Outpatient Visit on 04/19/2021   Component Date Value    Sodium 04/19/2021 142     Potassium 04/19/2021 4.0     Chloride 04/19/2021 101     CO2 04/19/2021 27     Anion Gap 04/19/2021 14     Glucose 04/19/2021 97     BUN 04/19/2021 24*    CREATININE 04/19/2021 2.4*    GFR Non- 04/19/2021 27*    GFR  04/19/2021 33*    Calcium 04/19/2021 9.2     Pro-BNP 04/19/2021 394*   There may be more visits with results that are not included. Assessment/Plan:  Sherine Almaraz was seen today for check-up.     Diagnoses and all orders for this visit:    Acute on chronic diastolic heart failure ([address]  - continue demadex    Acute renal failure, unspecified acute renal failure type ([address]  -follow closely        PAtient medication was very expensive at [employer]

## 2021-09-10 ENCOUNTER — HOSPITAL ENCOUNTER (OUTPATIENT)
Age: 71
Discharge: HOME | End: 2021-09-10
Payer: MEDICARE

## 2021-09-10 LAB
ALBUMIN SERPL-MCNC: 4.2 G/DL (ref 3.4–5)
ANION GAP SERPL CALCULATED.3IONS-SCNC: 15 MMOL/L (ref 3–16)
BUN BLDV-MCNC: 19 MG/DL (ref 7–20)
CALCIUM SERPL-MCNC: 9.8 MG/DL (ref 8.3–10.6)
CHLORIDE BLD-SCNC: 102 MMOL/L (ref 99–110)
CO2: 24 MMOL/L (ref 21–32)
CREAT SERPL-MCNC: 2.3 MG/DL (ref 0.8–1.3)
CREATININE URINE: 103.7 MG/DL (ref 39–259)
FERRITIN: 173.6 NG/ML (ref 30–400)
GFR AFRICAN AMERICAN: 34
GFR NON-AFRICAN AMERICAN: 28
GLUCOSE BLD-MCNC: 117 MG/DL (ref 70–99)
HCT VFR BLD CALC: 45.9 % (ref 40.5–52.5)
HEMOGLOBIN: 15.1 G/DL (ref 13.5–17.5)
IRON SATURATION: 25 % (ref 20–50)
IRON: 70 UG/DL (ref 59–158)
MAGNESIUM: 1.8 MG/DL (ref 1.8–2.4)
MCH RBC QN AUTO: 31.9 PG (ref 26–34)
MCHC RBC AUTO-ENTMCNC: 32.9 G/DL (ref 31–36)
MCV RBC AUTO: 96.9 FL (ref 80–100)
MICROALBUMIN UR-MCNC: 110 MG/DL
MICROALBUMIN/CREAT UR-RTO: 1060.8 MG/G (ref 0–30)
PARATHYROID HORMONE INTACT: 101.7 PG/ML (ref 14–72)
PDW BLD-RTO: 14.7 % (ref 12.4–15.4)
PHOSPHORUS: 2.3 MG/DL (ref 2.5–4.9)
PLATELET # BLD: 358 K/UL (ref 135–450)
PMV BLD AUTO: 8.2 FL (ref 5–10.5)
POTASSIUM SERPL-SCNC: 3.9 MMOL/L (ref 3.5–5.1)
RBC # BLD: 4.73 M/UL (ref 4.2–5.9)
SODIUM BLD-SCNC: 141 MMOL/L (ref 136–145)
TOTAL IRON BINDING CAPACITY: 283 UG/DL (ref 260–445)
TRANSFERRIN: 221 MG/DL (ref 200–360)
URIC ACID, SERUM: 12.9 MG/DL (ref 3.5–7.2)
WBC # BLD: 8.6 K/UL (ref 4–11)

## 2021-09-10 PROCEDURE — 84550 ASSAY OF BLOOD/URIC ACID: CPT

## 2021-09-10 PROCEDURE — 36415 COLL VENOUS BLD VENIPUNCTURE: CPT

## 2021-09-10 PROCEDURE — 83970 ASSAY OF PARATHORMONE: CPT

## 2021-09-10 PROCEDURE — 84466 ASSAY OF TRANSFERRIN: CPT

## 2021-09-10 PROCEDURE — 83735 ASSAY OF MAGNESIUM: CPT

## 2021-09-10 PROCEDURE — 82306 VITAMIN D 25 HYDROXY: CPT

## 2021-09-10 PROCEDURE — 85027 COMPLETE CBC AUTOMATED: CPT

## 2021-09-10 PROCEDURE — 82728 ASSAY OF FERRITIN: CPT

## 2021-09-10 PROCEDURE — 82043 UR ALBUMIN QUANTITATIVE: CPT

## 2021-09-10 PROCEDURE — 80069 RENAL FUNCTION PANEL: CPT

## 2021-09-10 PROCEDURE — 83540 ASSAY OF IRON: CPT

## 2021-09-10 PROCEDURE — 82570 ASSAY OF URINE CREATININE: CPT

## 2021-09-11 LAB — VITAMIN D 25-HYDROXY: 40.7 NG/ML

## 2021-10-04 RX ORDER — ATORVASTATIN CALCIUM 40 MG/1
TABLET, FILM COATED ORAL
Qty: 90 TABLET | Refills: 1 | Status: SHIPPED | OUTPATIENT
Start: 2021-10-04 | End: 2021-10-15 | Stop reason: SDUPTHER

## 2021-10-15 RX ORDER — ATORVASTATIN CALCIUM 40 MG/1
TABLET, FILM COATED ORAL
Qty: 90 TABLET | Refills: 1 | Status: SHIPPED | OUTPATIENT
Start: 2021-10-15 | End: 2022-03-21

## 2021-10-19 NOTE — TELEPHONE ENCOUNTER
Recent Visits  Date Type Provider Dept   08/27/21 Office Visit Gonzalez Trinidad MD [facility]   07/14/21 Office Visit Gonzalez Trinidad MD [facility]   06/18/21 Office Visit OSMAN Cisneros - CNP [facility]   06/01/21 Office Visit Gonzalez Trinidad MD [facility]   05/04/21 Office Visit Gonzalez Trinidad MD [facility]   01/12/21 Office Visit OSMAN Ruby [facility]   11/30/20 Office Visit Gonzalez Trinidad MD [facility]   09/29/20 Office Visit Gonzalez Trinidad MD [facility]   09/09/20 Office Visit Gonzalez Trinidad MD [facility]   Showing recent visits within past 540 days with a meds authorizing provider and meeting all other requirements  Future Appointments  No visits were found meeting these conditions.   Showing future appointments within next 150 days with a meds authorizing provider and meeting all other requirements     8/27/2021

## 2021-10-20 RX ORDER — ALENDRONATE SODIUM 70 MG/1
TABLET ORAL
Qty: 12 TABLET | Refills: 1 | Status: SHIPPED | OUTPATIENT
Start: 2021-10-20 | End: 2022-03-04

## 2021-11-04 DIAGNOSIS — N18.30 TYPE 2 DIABETES MELLITUS WITH STAGE 3 CHRONIC KIDNEY DISEASE, WITHOUT LONG-TERM CURRENT USE OF INSULIN, UNSPECIFIED WHETHER STAGE 3A OR 3B CKD (HCC): Primary | ICD-10-CM

## 2021-11-04 DIAGNOSIS — E11.22 TYPE 2 DIABETES MELLITUS WITH STAGE 3 CHRONIC KIDNEY DISEASE, WITHOUT LONG-TERM CURRENT USE OF INSULIN, UNSPECIFIED WHETHER STAGE 3A OR 3B CKD (HCC): Primary | ICD-10-CM

## 2021-11-04 DIAGNOSIS — N18.30 CHRONIC RENAL IMPAIRMENT, STAGE 3 (MODERATE), UNSPECIFIED WHETHER STAGE 3A OR 3B CKD (HCC): ICD-10-CM

## 2021-11-04 DIAGNOSIS — E66.01 MORBID OBESITY (HCC): ICD-10-CM

## 2021-11-04 RX ORDER — NIFEDIPINE 30 MG/1
30 TABLET, FILM COATED, EXTENDED RELEASE ORAL DAILY
Qty: 30 TABLET | Refills: 1 | Status: SHIPPED | OUTPATIENT
Start: 2021-11-04 | End: 2021-11-05

## 2021-11-04 RX ORDER — GLIMEPIRIDE 4 MG/1
4 TABLET ORAL
Qty: 30 TABLET | Refills: 1 | Status: SHIPPED | OUTPATIENT
Start: 2021-11-04 | End: 2021-12-03

## 2021-11-04 NOTE — TELEPHONE ENCOUNTER
Recent Visits  Date Type Provider Dept   08/27/21 Office Visit Ramakrishna Peace MD [facility]   07/14/21 Office Visit Ramakrishna Peace MD [facility]   06/18/21 Office Visit OSMAN Sewell - CNP [facility]   06/01/21 Office Visit Ramakrishna Peace MD [facility]   05/04/21 Office Visit Ramakrishna Peace MD [facility]   01/12/21 Office Visit OSMAN Jimenez [facility]   11/30/20 Office Visit Ramakrishna Peace MD [facility]   09/29/20 Office Visit Ramakrishna Peace MD [facility]   09/09/20 Office Visit Ramakrishna Peace MD [facility]   Showing recent visits within past 540 days with a meds authorizing provider and meeting all other requirements  Future Appointments  No visits were found meeting these conditions.   Showing future appointments within next 150 days with a meds authorizing provider and meeting all other requirements     8/27/2021

## 2021-11-05 NOTE — TELEPHONE ENCOUNTER
Recent Visits  Date Type Provider Dept   08/27/21 Office Visit Rosina Zurita MD [facility]   07/14/21 Office Visit Rosina Zurita MD [facility]   06/18/21 Office Visit OSMAN Ogden - CNP [facility]   06/01/21 Office Visit Rosina Zurita MD [facility]   05/04/21 Office Visit Rosina Zurita MD [facility]   01/12/21 Office Visit OSMAN Goff [facility]   11/30/20 Office Visit Rosina Zurita MD [facility]   09/29/20 Office Visit Rosina Zurita MD [facility]   09/09/20 Office Visit Rosina Zurita MD [facility]   Showing recent visits within past 540 days with a meds authorizing provider and meeting all other requirements  Future Appointments  No visits were found meeting these conditions.   Showing future appointments within next 150 days with a meds authorizing provider and meeting all other requirements

## 2021-11-09 RX ORDER — NIFEDIPINE 30 MG/1
30 TABLET, FILM COATED, EXTENDED RELEASE ORAL DAILY
Qty: 90 TABLET | Refills: 0 | Status: SHIPPED | OUTPATIENT
Start: 2021-11-09 | End: 2021-12-06

## 2021-11-09 RX ORDER — GLIMEPIRIDE 4 MG/1
TABLET ORAL
Qty: 90 TABLET | OUTPATIENT
Start: 2021-11-09

## 2021-11-22 DIAGNOSIS — R09.82 POST-NASAL DRIP: ICD-10-CM

## 2021-11-22 RX ORDER — FLUTICASONE PROPIONATE 50 MCG
SPRAY, SUSPENSION (ML) NASAL
Qty: 32 G | Refills: 1 | Status: SHIPPED | OUTPATIENT
Start: 2021-11-22 | End: 2022-03-16

## 2021-12-03 RX ORDER — GLIMEPIRIDE 4 MG/1
TABLET ORAL
Qty: 60 TABLET | Refills: 0 | Status: SHIPPED | OUTPATIENT
Start: 2021-12-03 | End: 2021-12-10

## 2021-12-06 RX ORDER — NIFEDIPINE 30 MG/1
30 TABLET, FILM COATED, EXTENDED RELEASE ORAL DAILY
Qty: 60 TABLET | Refills: 0 | Status: SHIPPED | OUTPATIENT
Start: 2021-12-06 | End: 2021-12-10

## 2021-12-10 ENCOUNTER — OFFICE VISIT (OUTPATIENT)
Dept: CARDIOLOGY CLINIC | Age: 71
End: 2021-12-10
Payer: MEDICARE

## 2021-12-10 VITALS
BODY MASS INDEX: 43.65 KG/M2 | HEART RATE: 91 BPM | DIASTOLIC BLOOD PRESSURE: 80 MMHG | OXYGEN SATURATION: 94 % | WEIGHT: 262 LBS | SYSTOLIC BLOOD PRESSURE: 124 MMHG | HEIGHT: 65 IN

## 2021-12-10 DIAGNOSIS — I50.32 CHRONIC DIASTOLIC HEART FAILURE (HCC): Primary | ICD-10-CM

## 2021-12-10 DIAGNOSIS — N18.30 STAGE 3 CHRONIC KIDNEY DISEASE, UNSPECIFIED WHETHER STAGE 3A OR 3B CKD (HCC): ICD-10-CM

## 2021-12-10 DIAGNOSIS — G47.33 OSA (OBSTRUCTIVE SLEEP APNEA): ICD-10-CM

## 2021-12-10 DIAGNOSIS — E66.01 CLASS 3 SEVERE OBESITY DUE TO EXCESS CALORIES WITH SERIOUS COMORBIDITY AND BODY MASS INDEX (BMI) OF 45.0 TO 49.9 IN ADULT (HCC): ICD-10-CM

## 2021-12-10 PROCEDURE — 99214 OFFICE O/P EST MOD 30 MIN: CPT | Performed by: CLINICAL NURSE SPECIALIST

## 2021-12-10 RX ORDER — NIFEDIPINE 30 MG/1
30 TABLET, FILM COATED, EXTENDED RELEASE ORAL DAILY
COMMUNITY
End: 2022-03-01

## 2021-12-10 RX ORDER — AMLODIPINE BESYLATE 10 MG/1
10 TABLET ORAL DAILY
COMMUNITY
End: 2021-12-10

## 2021-12-10 NOTE — PATIENT INSTRUCTIONS
1. Check blood work today  2. Continue all current medications  3. RTO in 6 months  4.   Keep on wearing cpap and loosing weight

## 2021-12-10 NOTE — PROGRESS NOTES
NIFEdipine (ADALAT CC) 30 MG extended release tablet Take 30 mg by mouth daily   Yes Historical Provider, MD   fluticasone (FLONASE) 50 MCG/ACT nasal spray USE 2 SPRAYS IN EACH NOSTRIL DAILY 11/22/21  Yes Mikey Groves MD   alendronate (FOSAMAX) 70 MG tablet TAKE 1 TABLET BY MOUTH EVERY 7 DAYS 10/20/21  Yes Mikey Groves MD   atorvastatin (LIPITOR) 40 MG tablet TAKE 1 TABLET BY MOUTH EVERY NIGHT 10/15/21  Yes Mikey Groves MD   brimonidine (ALPHAGAN) 0.2 % ophthalmic solution Apply to eye 2 times daily   Yes Historical Provider, MD   latanoprost (XALATAN) 0.005 % ophthalmic solution 1 drop nightly   Yes Historical Provider, MD   torsemide (DEMADEX) 20 MG tablet Take 20 mg by mouth daily   Yes Historical Provider, MD   finasteride (PROSCAR) 5 MG tablet Take 1 tablet by mouth daily 8/13/21  Yes Levy Andujar PA-C   metoprolol succinate (TOPROL XL) 100 MG extended release tablet Take 1 tablet by mouth daily 7/14/21  Yes Mikey Groves MD   famotidine (PEPCID) 20 MG tablet Take 1 tablet by mouth daily 7/14/21 12/10/21 Yes Mikey Groves MD   Dulaglutide (TRULICITY) 3 YR/7.9KO SOPN Inject 3 mg into the skin once a week 7/14/21  Yes Mikey Groves MD   cetirizine (ZYRTEC) 5 MG tablet Take 1 tablet by mouth daily 7/14/21  Yes Mikey Groves MD   Meals on Wheels MISC by Does not apply route Patient is to receive 2-3 diabetic meals only 4/30/21  Yes Mikey Groves MD   aspirin 81 MG EC tablet Take 81 mg by mouth daily   Yes Historical Provider, MD   Acetaminophen (TYLENOL ARTHRITIS PAIN PO) Take 1 tablet by mouth daily OTC    Yes Historical Provider, MD   HYDROcodone-acetaminophen (NORCO)  MG per tablet Take 1 tablet by mouth 3 times daily as needed (Pain - back & hips).  Pain specialist    Yes Historical Provider, MD   Insulin Pen Needle ([employer] PEN NEEDLES) 31G X 6 MM MISC 1 each by Does not apply route daily 9/29/20  Yes Bonnie Vogel Taylor Fajardo MD   ammonium lactate (AMLACTIN) 12 % cream Apply topically as needed    Yes Historical Provider, MD   salsalate (DISALCID) 750 MG tablet Take 750 mg by mouth 2 times daily as needed  7/1/20  Yes Historical Provider, MD   vitamin B-6 (PYRIDOXINE) 50 MG tablet Take 50 mg by mouth daily   Yes Historical Provider, MD   ergocalciferol (ERGOCALCIFEROL) 05633 UNITS capsule Take 50,000 Units by mouth once a week. Yes Historical Provider, MD        Allergies: Indomethacin, Statins, Succinylcholine, Unable to assess, and Actos [pioglitazone]     Review of Systems:   · Constitutional: there has been no unanticipated weight loss. There's been no change in energy level, sleep pattern, or activity level. · Eyes: No visual changes or diplopia. No scleral icterus. · ENT: No Headaches, hearing loss or vertigo. No mouth sores or sore throat. · Cardiovascular: Reviewed in HPI  · Respiratory: No cough or wheezing, no sputum production. No hematemesis. · Gastrointestinal: No abdominal pain, appetite loss, blood in stools. No change in bowel or bladder habits. · Genitourinary: No dysuria, trouble voiding, or hematuria. · Musculoskeletal:  No gait disturbance, weakness or joint complaints. · Integumentary: No rash or pruritis. · Neurological: No headache, diplopia, change in muscle strength, numbness or tingling. No change in gait, balance, coordination, mood, affect, memory, mentation, behavior. · Psychiatric: No anxiety, no depression. · Endocrine: No malaise, fatigue or temperature intolerance. No excessive thirst, fluid intake, or urination. No tremor. · Hematologic/Lymphatic: No abnormal bruising or bleeding, blood clots or swollen lymph nodes. · Allergic/Immunologic: No nasal congestion or hives.     Physical Examination:    Vitals:    12/10/21 1041   BP: 124/80   Site: Left Upper Arm   Position: Sitting   Cuff Size: Large Adult   Pulse: 91   SpO2: 94%   Weight: 262 lb (118.8 kg)   Height: 5' 5\" (1.651 m)        Constitutional and General Appearance: Warm and dry, no apparent distress, normal coloration  HEENT:  Normocephalic, atraumatic  Respiratory:  · Normal excursion and expansion without use of accessory muscles  · Resp Auscultation: Normal breath sounds without dullness  Cardiovascular:  · The apical impulses not displaced  · Heart tones are crisp and normal  · JVP normal cm H2O  · Regular rate and rhythm  · Peripheral pulses are symmetrical and full  · There is no clubbing, cyanosis of the extremities.   · No ankle edema  · Pedal Pulses: 2+ and equal   Abdomen:obese  · No masses or tenderness  · Liver/Spleen: No Abnormalities Noted  Neurological/Psychiatric:  · Alert and oriented in all spheres  · Moves all extremities well  · Exhibits normal gait balance and coordination  · No abnormalities of mood, affect, memory, mentation, or behavior are noted    Lab Data:    CBC:   Lab Results   Component Value Date    WBC 8.6 09/10/2021    WBC 6.0 08/10/2021    WBC 5.4 08/09/2021    RBC 4.73 09/10/2021    RBC 4.22 08/10/2021    RBC 4.35 08/09/2021    HGB 15.1 09/10/2021    HGB 13.7 08/10/2021    HGB 14.4 08/09/2021    HCT 45.9 09/10/2021    HCT 41.3 08/10/2021    HCT 42.4 08/09/2021    MCV 96.9 09/10/2021    MCV 98.0 08/10/2021    MCV 97.7 08/09/2021    RDW 14.7 09/10/2021    RDW 15.7 08/10/2021    RDW 15.7 08/09/2021     09/10/2021     08/10/2021     08/09/2021     BMP:  Lab Results   Component Value Date     09/10/2021     08/18/2021     08/13/2021    K 3.9 09/10/2021    K 4.3 08/18/2021    K 4.6 08/13/2021    K 4.2 06/21/2021     09/10/2021     08/18/2021     08/13/2021    CO2 24 09/10/2021    CO2 25 08/18/2021    CO2 21 08/13/2021    PHOS 2.3 09/10/2021    PHOS 3.0 08/12/2021    PHOS 3.5 08/11/2021    BUN 19 09/10/2021    BUN 13 08/18/2021    BUN 17 08/13/2021    CREATININE 2.3 09/10/2021    CREATININE 2.0 08/18/2021    CREATININE 2.5 08/13/2021     BNP: Lab Results   Component Value Date    PROBNP 418 08/13/2021    PROBNP 194 05/11/2021    PROBNP 356 05/05/2021     Cardiac Imaging:  Echo 4/7/2021   Summary   Overall left ventricular function is normal.   Ejection fraction is visually estimated to be 55 %. E/e'= 9.3 .   There is reversal of E/A inflow velocities across the mitral valve.   There is mild concentric left ventricular hypertrophy.   No definitive regional wall motion abnormalities are noted.   Grade II diastolic dysfunction with elevated LV filling pressures.   Mild to moderate tricuspid regurgitation.   Estimated pulmonary artery systolic pressure is moderately elevated at 65   mmHg assuming a right atrial pressure of 15 mmHg.   The right ventricle is mildly enlarged. TAPSE= 2.72 RV S'= 17.2   IVC size is dilated (>2.1 cm) and collapses < 50% with respiration   consistent with elevated RA pressure (15 mmHg    Stress test 8/12/2019  The LV EF is 55%  Normal global and regional wall motion in all territories. There is a medium sized, partially reversible defect in the anterior  wall consistent with moderate verena-infarct ischemia. 1.Non-diagnostic ECG for ischemia with pharmocologic stress. 2.Normal left ventriular systolic function. 3.Positive nuclear stress imaging suggestive of scar plus verena      infarct ischemia in the anterior wall. 4.Nuclear stress image findings indicate intermediate risk for      future ischemic event .     Echo 12/12/2019  Summary:  The left ventricular wall motion is normal.  Overall left ventricular ejection fraction is estimated to be 60-65%. There is mild concentric left ventricular hypertrophy. There is mild mitral regurgitation. Assessment:    1. Chronic diastolic heart failure ([address]    2. KAMRAN (obstructive sleep apnea) on cpap   3. Stage 3 chronic kidney disease, unspecified whether stage 3a or 3b CKD    4.  Class 3 severe obesity due to excess calories with serious comorbidity and body mass index (BMI) of 45.0 to 49.9 in adult [facility]) Dr Edmundo Domínguez:   Patient Instructions   1. Check blood work today  2. Continue all current medications  3. RTO in 6 months  4.   Keep on wearing cpap and loosing weight      I appreciate the opportunity of cooperating in the care of this individual.    Darylene Poland, OSMAN - CNS, CNS, 12/10/2021, 1:18 PM

## 2021-12-13 ENCOUNTER — HOSPITAL ENCOUNTER (OUTPATIENT)
Age: 71
Discharge: HOME | End: 2021-12-13
Payer: MEDICARE

## 2021-12-13 DIAGNOSIS — I50.32 CHRONIC DIASTOLIC HEART FAILURE (HCC): ICD-10-CM

## 2021-12-13 LAB
ANION GAP SERPL CALCULATED.3IONS-SCNC: 15 MMOL/L (ref 3–16)
BUN BLDV-MCNC: 15 MG/DL (ref 7–20)
CALCIUM SERPL-MCNC: 9.3 MG/DL (ref 8.3–10.6)
CHLORIDE BLD-SCNC: 105 MMOL/L (ref 99–110)
CO2: 24 MMOL/L (ref 21–32)
CREAT SERPL-MCNC: 1.9 MG/DL (ref 0.8–1.3)
GFR AFRICAN AMERICAN: 43
GFR NON-AFRICAN AMERICAN: 35
GLUCOSE BLD-MCNC: 68 MG/DL (ref 70–99)
POTASSIUM SERPL-SCNC: 4.2 MMOL/L (ref 3.5–5.1)
PRO-BNP: 244 PG/ML (ref 0–124)
SODIUM BLD-SCNC: 144 MMOL/L (ref 136–145)

## 2021-12-13 PROCEDURE — 80048 BASIC METABOLIC PNL TOTAL CA: CPT

## 2021-12-13 PROCEDURE — 36415 COLL VENOUS BLD VENIPUNCTURE: CPT

## 2021-12-13 PROCEDURE — 83880 ASSAY OF NATRIURETIC PEPTIDE: CPT

## 2021-12-15 RX ORDER — GLIMEPIRIDE 4 MG/1
4 TABLET ORAL
COMMUNITY
End: 2022-01-02

## 2021-12-23 ENCOUNTER — HOSPITAL ENCOUNTER (OUTPATIENT)
Dept: CT IMAGING | Age: 71
Discharge: HOME | End: 2021-12-23
Payer: MEDICARE

## 2021-12-23 ENCOUNTER — HOSPITAL ENCOUNTER (OUTPATIENT)
Dept: INTERVENTIONAL RADIOLOGY/VASCULAR | Age: 71
Discharge: HOME | End: 2021-12-23

## 2021-12-23 VITALS
TEMPERATURE: 97.9 F | OXYGEN SATURATION: 94 % | DIASTOLIC BLOOD PRESSURE: 100 MMHG | WEIGHT: 275 LBS | BODY MASS INDEX: 45.82 KG/M2 | SYSTOLIC BLOOD PRESSURE: 157 MMHG | RESPIRATION RATE: 18 BRPM | HEIGHT: 65 IN | HEART RATE: 78 BPM

## 2021-12-23 DIAGNOSIS — R35.0 BENIGN PROSTATIC HYPERPLASIA WITH URINARY FREQUENCY: ICD-10-CM

## 2021-12-23 DIAGNOSIS — N39.9: ICD-10-CM

## 2021-12-23 DIAGNOSIS — R35.0 URINARY FREQUENCY: ICD-10-CM

## 2021-12-23 DIAGNOSIS — C61 MALIGNANT NEOPLASM OF PROSTATE (HCC): ICD-10-CM

## 2021-12-23 DIAGNOSIS — N52.31 ERECTILE DYSFUNCTION AFTER RADICAL PROSTATECTOMY: ICD-10-CM

## 2021-12-23 DIAGNOSIS — N39.0 URINARY TRACT INFECTION WITHOUT HEMATURIA, SITE UNSPECIFIED: ICD-10-CM

## 2021-12-23 DIAGNOSIS — N13.30 HYDRONEPHROSIS, UNSPECIFIED HYDRONEPHROSIS TYPE: ICD-10-CM

## 2021-12-23 DIAGNOSIS — G83.4 CAUDA EQUINA SYNDROME WITH DETRUSOR HYPERREFLEXIA OF BLADDER (HCC): ICD-10-CM

## 2021-12-23 DIAGNOSIS — N39.41 BENIGN PROSTATIC HYPERPLASIA (BPH) WITH URINARY URGE INCONTINENCE: ICD-10-CM

## 2021-12-23 DIAGNOSIS — N32.81 CAUDA EQUINA SYNDROME WITH DETRUSOR HYPERREFLEXIA OF BLADDER (HCC): ICD-10-CM

## 2021-12-23 DIAGNOSIS — N52.39 OTHER AND UNSPECIFIED POSTPROCEDURAL ERECTILE DYSFUNCTION: ICD-10-CM

## 2021-12-23 DIAGNOSIS — N40.1 BENIGN PROSTATIC HYPERPLASIA (BPH) WITH URINARY URGE INCONTINENCE: ICD-10-CM

## 2021-12-23 DIAGNOSIS — N40.1 BENIGN PROSTATIC HYPERPLASIA WITH URINARY FREQUENCY: ICD-10-CM

## 2021-12-23 LAB
ANION GAP SERPL CALCULATED.3IONS-SCNC: 11 MMOL/L (ref 3–16)
APTT: 42.6 SEC (ref 26.2–38.6)
BUN BLDV-MCNC: 24 MG/DL (ref 7–20)
CALCIUM SERPL-MCNC: 9.5 MG/DL (ref 8.3–10.6)
CHLORIDE BLD-SCNC: 103 MMOL/L (ref 99–110)
CO2: 28 MMOL/L (ref 21–32)
CREAT SERPL-MCNC: 2.2 MG/DL (ref 0.8–1.3)
GFR AFRICAN AMERICAN: 36
GFR NON-AFRICAN AMERICAN: 30
GLUCOSE BLD-MCNC: 131 MG/DL (ref 70–99)
HCT VFR BLD CALC: 44.5 % (ref 40.5–52.5)
HEMOGLOBIN: 14.6 G/DL (ref 13.5–17.5)
INR BLD: 0.99 (ref 0.88–1.12)
MCH RBC QN AUTO: 31.2 PG (ref 26–34)
MCHC RBC AUTO-ENTMCNC: 32.8 G/DL (ref 31–36)
MCV RBC AUTO: 94.9 FL (ref 80–100)
PDW BLD-RTO: 15.2 % (ref 12.4–15.4)
PLATELET # BLD: 345 K/UL (ref 135–450)
PMV BLD AUTO: 6.9 FL (ref 5–10.5)
POTASSIUM SERPL-SCNC: 4.4 MMOL/L (ref 3.5–5.1)
PROTHROMBIN TIME: 11.2 SEC (ref 9.9–12.7)
RBC # BLD: 4.69 M/UL (ref 4.2–5.9)
SODIUM BLD-SCNC: 142 MMOL/L (ref 136–145)
WBC # BLD: 6.2 K/UL (ref 4–11)

## 2021-12-23 PROCEDURE — 7100000011 HC PHASE II RECOVERY - ADDTL 15 MIN

## 2021-12-23 PROCEDURE — 99152 MOD SED SAME PHYS/QHP 5/>YRS: CPT

## 2021-12-23 PROCEDURE — 49406 IMAGE CATH FLUID PERI/RETRO: CPT

## 2021-12-23 PROCEDURE — 7100000010 HC PHASE II RECOVERY - FIRST 15 MIN

## 2021-12-23 PROCEDURE — 85610 PROTHROMBIN TIME: CPT

## 2021-12-23 PROCEDURE — 80048 BASIC METABOLIC PNL TOTAL CA: CPT

## 2021-12-23 PROCEDURE — 85730 THROMBOPLASTIN TIME PARTIAL: CPT

## 2021-12-23 PROCEDURE — 36415 COLL VENOUS BLD VENIPUNCTURE: CPT

## 2021-12-23 PROCEDURE — 85027 COMPLETE CBC AUTOMATED: CPT

## 2021-12-23 PROCEDURE — 6360000002 HC RX W HCPCS: Performed by: RADIOLOGY

## 2021-12-23 RX ORDER — FENTANYL CITRATE 50 UG/ML
INJECTION, SOLUTION INTRAMUSCULAR; INTRAVENOUS
Status: COMPLETED | OUTPATIENT
Start: 2021-12-23 | End: 2021-12-23

## 2021-12-23 RX ORDER — ACETAMINOPHEN 325 MG/1
650 TABLET ORAL EVERY 4 HOURS PRN
Status: DISCONTINUED | OUTPATIENT
Start: 2021-12-23 | End: 2021-12-24 | Stop reason: HOSPADM

## 2021-12-23 RX ORDER — MIDAZOLAM HYDROCHLORIDE 1 MG/ML
INJECTION INTRAMUSCULAR; INTRAVENOUS
Status: COMPLETED | OUTPATIENT
Start: 2021-12-23 | End: 2021-12-23

## 2021-12-23 RX ADMIN — MIDAZOLAM 1 MG: 1 INJECTION INTRAMUSCULAR; INTRAVENOUS at 09:15

## 2021-12-23 RX ADMIN — FENTANYL CITRATE 25 MCG: 50 INJECTION, SOLUTION INTRAMUSCULAR; INTRAVENOUS at 09:15

## 2021-12-23 RX ADMIN — MIDAZOLAM 1 MG: 1 INJECTION INTRAMUSCULAR; INTRAVENOUS at 09:04

## 2021-12-23 RX ADMIN — MIDAZOLAM 1 MG: 1 INJECTION INTRAMUSCULAR; INTRAVENOUS at 09:00

## 2021-12-23 RX ADMIN — FENTANYL CITRATE 25 MCG: 50 INJECTION, SOLUTION INTRAMUSCULAR; INTRAVENOUS at 09:04

## 2021-12-23 RX ADMIN — FENTANYL CITRATE 50 MCG: 50 INJECTION, SOLUTION INTRAMUSCULAR; INTRAVENOUS at 08:59

## 2021-12-23 NOTE — BRIEF OP NOTE
Brief Operative Note      Patient: Jacek Geiger MRN: [id number]     YOB: 1950  Age: 80 y.o. Sex: male        DATE OF PROCEDURE: 12/23/2021     PROCEDURE PERFORMED: Supra pubic urinary bladder catheter    SURGEON: Rosemary Rose MD, MD    ANESTHESIA: Fentanyl, Versed    Estimated Blood Loss:none    Complications: None. Device implanted: 12 fr locked pigtail catheter to leg bag. Specimen: 200 cc urine evacuated in addition to the 200 cc sterile saline used for initial distension.     Electronically signed by Rosemary Rose MD on 12/23/2021 at 9:44 AM

## 2021-12-23 NOTE — PRE SEDATION
Sedation Pre-Procedure Note    Patient Name: Linsey Yarbrough   YOB: 1950  Room/Bed: Room/bed info not found  Medical Record Number: [id number]  Date: 12/23/2021   Time: 8:51 AM       Indication:  Supra pubic urinary bladder catheter placement    Consent: I have discussed with the patient and/or the patient representative the indication, alternatives, and the possible risks and/or complications of the planned procedure and the anesthesia methods. The patient and/or patient representative appear to understand and agree to proceed. Vital Signs:   Vitals:    12/23/21 0757   BP: (!) 174/105   Pulse: 80   Resp: 18   Temp: 96.8 °F (36 °C)   SpO2: 96%       Past Medical History:   has a past medical history of Anesthesia, Cancer ([facility] [address] Hyperlipidemia, Hypertension, Obesity, KAMRAN treated with BiPAP, Osteoarthritis, Type 2 diabetes mellitus without complication ([facility] [address] Type 2 diabetes mellitus without complication, without long-term current use of insulin ([facility] [address] Urinary incontinence, stress, male, and Wears dentures. Past Surgical History:   has a past surgical history that includes Total hip arthroplasty; joint replacement (Left); shoulder surgery (Bilateral); Knee arthroscopy (Right); and Cystoscopy (N/A, 6/21/2021). Medications:   Scheduled Meds:   Continuous Infusions:   PRN Meds:   Home Meds:   Prior to Admission medications    Medication Sig Start Date End Date Taking?  Authorizing Provider   Cholecalciferol (VITAMIN D) 50 MCG (2000 UT) CAPS capsule Take by mouth Once week   Yes Historical Provider, MD   glimepiride (AMARYL) 4 MG tablet Take 4 mg by mouth every morning (before breakfast)   Yes Historical Provider, MD   NIFEdipine (ADALAT CC) 30 MG extended release tablet Take 30 mg by mouth daily   Yes Historical Provider, MD   alendronate (FOSAMAX) 70 MG tablet TAKE 1 TABLET BY MOUTH EVERY 7 DAYS 10/20/21  Yes Benjamin Segovia MD   atorvastatin (LIPITOR) 40 MG tablet TAKE 1 TABLET BY MOUTH EVERY NIGHT 10/15/21  Yes Hayden Ayala MD   torsemide (DEMADEX) 20 MG tablet Take 20 mg by mouth daily   Yes Historical Provider, MD   metoprolol succinate (TOPROL XL) 100 MG extended release tablet Take 1 tablet by mouth daily 7/14/21  Yes Hayden Ayala MD   salsalate (DISALCID) 750 MG tablet Take 750 mg by mouth 2 times daily as needed  7/1/20  Yes Historical Provider, MD   vitamin B-6 (PYRIDOXINE) 50 MG tablet Take 50 mg by mouth daily   Yes Historical Provider, MD   ergocalciferol (ERGOCALCIFEROL) 58315 UNITS capsule Take 50,000 Units by mouth once a week. Yes Historical Provider, MD   fluticasone (FLONASE) 50 MCG/ACT nasal spray USE 2 SPRAYS IN EACH NOSTRIL DAILY 11/22/21   Hayden Ayala MD   brimonidine (ALPHAGAN) 0.2 % ophthalmic solution Apply to eye 2 times daily    Historical Provider, MD   latanoprost (XALATAN) 0.005 % ophthalmic solution 1 drop nightly    Historical Provider, MD   finasteride (PROSCAR) 5 MG tablet Take 1 tablet by mouth daily 8/13/21   Mattie Conteh PA-C   famotidine (PEPCID) 20 MG tablet Take 1 tablet by mouth daily 7/14/21 12/10/21  Hayden Ayala MD   Dulaglutide (TRULICITY) 3 YQ/9.5HV SOPN Inject 3 mg into the skin once a week 7/14/21   Hayden Ayala MD   cetirizine (ZYRTEC) 5 MG tablet Take 1 tablet by mouth daily 7/14/21   Hayden Ayala MD   Meals on Wheels MISC by Does not apply route Patient is to receive 2-3 diabetic meals only 4/30/21   Hayden Ayala MD   aspirin 81 MG EC tablet Take 81 mg by mouth daily    Historical Provider, MD   Acetaminophen (TYLENOL ARTHRITIS PAIN PO) Take 1 tablet by mouth daily OTC     Historical Provider, MD   HYDROcodone-acetaminophen (NORCO)  MG per tablet Take 1 tablet by mouth 3 times daily as needed (Pain - back & hips).  Pain specialist     Historical Provider, MD   Insulin Pen Needle ([employer] PEN NEEDLES) 31G X 6 MM MISC 1 each by Does not apply route daily 9/29/20 Rhenda Goldmann, MD   ammonium lactate (AMLACTIN) 12 % cream Apply topically as needed     Historical Provider, MD     Coumadin Use Last 7 Days:  no  Antiplatelet drug therapy use last 7 days: no  Other anticoagulant use last 7 days: no  Additional Medication Information:  na      Pre-Sedation Documentation and Exam:   I have reviewed the patient's history and review of systems.     Mallampati Airway Assessment:  Mallampati Class II - (soft palate, fauces & uvula are visible)    Prior History of Anesthesia Complications:   none    ASA Classification:  Class 2 - A normal healthy patient with mild systemic disease    Sedation/ Anesthesia Plan:   intravenous sedation    Medications Planned:   midazolam (Versed) intravenously and fentanyl intravenously    Patient is an appropriate candidate for plan of sedation: yes    Electronically signed by Wei Bradford MD on 12/23/2021 at 8:51 AM

## 2021-12-26 RX ORDER — FAMOTIDINE 20 MG/1
TABLET, FILM COATED ORAL
Qty: 90 TABLET | Refills: 1 | Status: SHIPPED | OUTPATIENT
Start: 2021-12-26 | End: 2022-05-06

## 2021-12-29 RX ORDER — METOPROLOL SUCCINATE 100 MG/1
TABLET, EXTENDED RELEASE ORAL
Qty: 90 TABLET | Refills: 0 | Status: SHIPPED | OUTPATIENT
Start: 2021-12-29 | End: 2022-03-29

## 2022-01-02 RX ORDER — GLIMEPIRIDE 4 MG/1
TABLET ORAL
Qty: 90 TABLET | Refills: 0 | Status: SHIPPED | OUTPATIENT
Start: 2022-01-02 | End: 2022-01-04

## 2022-01-04 ENCOUNTER — OFFICE VISIT (OUTPATIENT)
Dept: INTERNAL MEDICINE CLINIC | Age: 72
End: 2022-01-04
Payer: MEDICARE

## 2022-01-04 VITALS
SYSTOLIC BLOOD PRESSURE: 136 MMHG | WEIGHT: 260 LBS | OXYGEN SATURATION: 99 % | HEART RATE: 75 BPM | TEMPERATURE: 97 F | BODY MASS INDEX: 43.27 KG/M2 | DIASTOLIC BLOOD PRESSURE: 80 MMHG

## 2022-01-04 DIAGNOSIS — C61 PROSTATE CANCER (HCC): ICD-10-CM

## 2022-01-04 DIAGNOSIS — I50.32 CHRONIC DIASTOLIC HEART FAILURE (HCC): ICD-10-CM

## 2022-01-04 DIAGNOSIS — N30.01 ACUTE CYSTITIS WITH HEMATURIA: Primary | ICD-10-CM

## 2022-01-04 DIAGNOSIS — E66.01 OBESITY, CLASS III, BMI 40-49.9 (MORBID OBESITY) (HCC): ICD-10-CM

## 2022-01-04 DIAGNOSIS — N18.30 TYPE 2 DIABETES MELLITUS WITH STAGE 3 CHRONIC KIDNEY DISEASE, WITHOUT LONG-TERM CURRENT USE OF INSULIN, UNSPECIFIED WHETHER STAGE 3A OR 3B CKD (HCC): ICD-10-CM

## 2022-01-04 DIAGNOSIS — E11.22 TYPE 2 DIABETES MELLITUS WITH STAGE 3 CHRONIC KIDNEY DISEASE, WITHOUT LONG-TERM CURRENT USE OF INSULIN, UNSPECIFIED WHETHER STAGE 3A OR 3B CKD (HCC): ICD-10-CM

## 2022-01-04 LAB
BILIRUBIN, POC: ABNORMAL
BLOOD URINE, POC: ABNORMAL
CLARITY, POC: ABNORMAL
COLOR, POC: ABNORMAL
GLUCOSE URINE, POC: ABNORMAL
KETONES, POC: ABNORMAL
LEUKOCYTE EST, POC: ABNORMAL
NITRITE, POC: ABNORMAL
PH, POC: 6.5
PROTEIN, POC: ABNORMAL
SPECIFIC GRAVITY, POC: 1.01
UROBILINOGEN, POC: 0.2

## 2022-01-04 PROCEDURE — 99214 OFFICE O/P EST MOD 30 MIN: CPT | Performed by: NURSE PRACTITIONER

## 2022-01-04 PROCEDURE — 81002 URINALYSIS NONAUTO W/O SCOPE: CPT | Performed by: NURSE PRACTITIONER

## 2022-01-04 RX ORDER — GLIMEPIRIDE 4 MG/1
TABLET ORAL
Qty: 90 TABLET | Refills: 0 | Status: SHIPPED | OUTPATIENT
Start: 2022-01-04 | End: 2022-05-06

## 2022-01-04 RX ORDER — SULFAMETHOXAZOLE AND TRIMETHOPRIM 800; 160 MG/1; MG/1
1 TABLET ORAL 2 TIMES DAILY
Qty: 10 TABLET | Refills: 0 | Status: SHIPPED | OUTPATIENT
Start: 2022-01-04 | End: 2022-01-09

## 2022-01-04 SDOH — ECONOMIC STABILITY: FOOD INSECURITY: WITHIN THE PAST 12 MONTHS, THE FOOD YOU BOUGHT JUST DIDN'T LAST AND YOU DIDN'T HAVE MONEY TO GET MORE.: NEVER TRUE

## 2022-01-04 SDOH — ECONOMIC STABILITY: FOOD INSECURITY: WITHIN THE PAST 12 MONTHS, YOU WORRIED THAT YOUR FOOD WOULD RUN OUT BEFORE YOU GOT MONEY TO BUY MORE.: NEVER TRUE

## 2022-01-04 ASSESSMENT — PATIENT HEALTH QUESTIONNAIRE - PHQ9
2. FEELING DOWN, DEPRESSED OR HOPELESS: 0
SUM OF ALL RESPONSES TO PHQ QUESTIONS 1-9: 0
SUM OF ALL RESPONSES TO PHQ QUESTIONS 1-9: 0
SUM OF ALL RESPONSES TO PHQ9 QUESTIONS 1 & 2: 0
SUM OF ALL RESPONSES TO PHQ QUESTIONS 1-9: 0
1. LITTLE INTEREST OR PLEASURE IN DOING THINGS: 0
SUM OF ALL RESPONSES TO PHQ QUESTIONS 1-9: 0

## 2022-01-04 ASSESSMENT — ENCOUNTER SYMPTOMS
GASTROINTESTINAL NEGATIVE: 1
EYES NEGATIVE: 1
ALLERGIC/IMMUNOLOGIC NEGATIVE: 1
RESPIRATORY NEGATIVE: 1

## 2022-01-04 NOTE — PATIENT INSTRUCTIONS
Hold amaryl until Monday  Call Monday and relay blood glucose readings  Take Bactrim with food  Increase water intake and decrease tea intake

## 2022-01-04 NOTE — PROGRESS NOTES
Marta Timmons (:  1950) is a 80 y.o. male,Established patient, here for evaluation of the following chief complaint(s):  Post-Op Check         ASSESSMENT/PLAN:    Arsh Landin was seen today for post-op check. Diagnoses and all orders for this visit:    Acute cystitis with hematuria  -     sulfamethoxazole-trimethoprim (BACTRIM DS;SEPTRA DS) 800-160 MG per tablet; Take 1 tablet by mouth 2 times daily for 5 days  -     POCT Urinalysis no Micro  -     Culture, Urine    Chronic diastolic heart failure (HCC)    Prostate cancer (HCC)    Type 2 diabetes mellitus with stage 3 chronic kidney disease, without long-term current use of insulin, unspecified whether stage 3a or 3b CKD (HCC)    Obesity, Class III, BMI 40-49.9 (morbid obesity) ([facility] [address]      Hold amaryl until Monday, related to hypoglycemic episodes  Call Monday and relay blood glucose readings    Acute cystitis    Take Bactrim with food  Increase water intake and decrease tea intake        Subjective   SUBJECTIVE/OBJECTIVE:  HPI Presents today having urine going through penis and it is stinging and severe pain. Urine is clear for past 2 days, leaking at intervals. 2021, suprapubic catheter was placed due to inability to void. Review of Systems   Constitutional: Negative. HENT: Negative. Eyes: Negative. Respiratory: Negative. Gastrointestinal: Negative. Endocrine: Negative. Genitourinary: Positive for dysuria. Musculoskeletal: Negative. Allergic/Immunologic: Negative. Neurological: Negative. Hematological: Negative. Psychiatric/Behavioral: Negative.           Vitals:    22 0900   BP: 136/80   Pulse: 75   Temp: 97 °F (36.1 °C)   SpO2: 99%     BP Readings from Last 3 Encounters:   22 136/80   21 (!) 157/100   12/10/21 124/80     Wt Readings from Last 3 Encounters:   22 260 lb (117.9 kg)   21 275 lb (124.7 kg)   12/10/21 262 lb (118.8 kg)     Diabetes Mellitus Type 2: Current symptoms/problems include none. Medication compliance:  compliant all of the time  Diabetic diet compliance:  compliant most of the time,  Weight trend: decreasing  Current exercise: exercise for 20 minutes three times a wee  Barriers: none    Home blood sugar records: 60-80 last 4-5 days  Any episodes of hypoglycemia? yes - almost daily  Eye exam current (within one year): no   reports that he quit smoking about 21 years ago. His smoking use included cigars. He started smoking about 42 years ago. He has a 5.25 pack-year smoking history. He has quit using smokeless tobacco.   Daily Aspirin? Yes    Lab Results   Component Value Date    LABA1C 5.7 02/10/2021    LABA1C 6.6 09/29/2020     Lab Results   Component Value Date    LABMICR 110.00 (H) 09/10/2021    CREATININE 2.2 (H) 12/23/2021     Lab Results   Component Value Date    ALT 56 (H) 08/08/2021    AST 65 (H) 08/08/2021     Lab Results   Component Value Date    CHOL 201 (H) 11/14/2011    TRIG 123 11/14/2011    HDL 55 11/14/2011    LDLCALC 122 (H) 11/14/2011        Objective   Physical Exam  Constitutional:       Appearance: Normal appearance. He is obese. Cardiovascular:      Rate and Rhythm: Normal rate and regular rhythm. Pulmonary:      Effort: Pulmonary effort is normal.      Breath sounds: Normal breath sounds. Abdominal:      General: Abdomen is protuberant. Bowel sounds are normal.      Palpations: Abdomen is soft. Skin:     General: Skin is warm and dry. Neurological:      Mental Status: He is alert. An electronic signature was used to authenticate this note.     --OSMAN Presley - CNP

## 2022-01-05 LAB — URINE CULTURE, ROUTINE: NORMAL

## 2022-01-10 ENCOUNTER — NURSE ONLY (OUTPATIENT)
Dept: INTERNAL MEDICINE CLINIC | Age: 72
End: 2022-01-10
Payer: MEDICARE

## 2022-01-10 DIAGNOSIS — E11.22 TYPE 2 DIABETES MELLITUS WITH STAGE 3 CHRONIC KIDNEY DISEASE, WITHOUT LONG-TERM CURRENT USE OF INSULIN, UNSPECIFIED WHETHER STAGE 3A OR 3B CKD (HCC): Primary | ICD-10-CM

## 2022-01-10 DIAGNOSIS — N18.30 TYPE 2 DIABETES MELLITUS WITH STAGE 3 CHRONIC KIDNEY DISEASE, WITHOUT LONG-TERM CURRENT USE OF INSULIN, UNSPECIFIED WHETHER STAGE 3A OR 3B CKD (HCC): Primary | ICD-10-CM

## 2022-01-10 LAB
CHP ED QC CHECK: NORMAL
GLUCOSE BLD-MCNC: 122 MG/DL

## 2022-01-10 PROCEDURE — 99999 PR OFFICE/OUTPT VISIT,PROCEDURE ONLY: CPT | Performed by: INTERNAL MEDICINE

## 2022-01-10 PROCEDURE — 82962 GLUCOSE BLOOD TEST: CPT | Performed by: INTERNAL MEDICINE

## 2022-01-28 ENCOUNTER — HOSPITAL ENCOUNTER (OUTPATIENT)
Dept: INTERVENTIONAL RADIOLOGY/VASCULAR | Age: 72
Discharge: HOME | End: 2022-01-28

## 2022-01-31 ENCOUNTER — HOSPITAL ENCOUNTER (OUTPATIENT)
Age: 72
Discharge: HOME | End: 2022-01-31
Payer: MEDICARE

## 2022-01-31 LAB
ALBUMIN SERPL-MCNC: 4 G/DL (ref 3.4–5)
ANION GAP SERPL CALCULATED.3IONS-SCNC: 18 MMOL/L (ref 3–16)
BUN BLDV-MCNC: 27 MG/DL (ref 7–20)
CALCIUM SERPL-MCNC: 9.8 MG/DL (ref 8.3–10.6)
CHLORIDE BLD-SCNC: 103 MMOL/L (ref 99–110)
CO2: 24 MMOL/L (ref 21–32)
CREAT SERPL-MCNC: 2.2 MG/DL (ref 0.8–1.3)
CREATININE URINE: 100.1 MG/DL (ref 39–259)
GFR AFRICAN AMERICAN: 36
GFR NON-AFRICAN AMERICAN: 30
GLUCOSE BLD-MCNC: 95 MG/DL (ref 70–99)
HCT VFR BLD CALC: 45.5 % (ref 40.5–52.5)
HEMOGLOBIN: 14.9 G/DL (ref 13.5–17.5)
MCH RBC QN AUTO: 30.7 PG (ref 26–34)
MCHC RBC AUTO-ENTMCNC: 32.6 G/DL (ref 31–36)
MCV RBC AUTO: 94.3 FL (ref 80–100)
MICROALBUMIN UR-MCNC: 111.4 MG/DL
MICROALBUMIN/CREAT UR-RTO: 1112.9 MG/G (ref 0–30)
PARATHYROID HORMONE INTACT: 137.1 PG/ML (ref 14–72)
PDW BLD-RTO: 15.3 % (ref 12.4–15.4)
PHOSPHORUS: 3.7 MG/DL (ref 2.5–4.9)
PLATELET # BLD: 307 K/UL (ref 135–450)
PMV BLD AUTO: 7.3 FL (ref 5–10.5)
POTASSIUM SERPL-SCNC: 4.1 MMOL/L (ref 3.5–5.1)
RBC # BLD: 4.83 M/UL (ref 4.2–5.9)
SODIUM BLD-SCNC: 145 MMOL/L (ref 136–145)
WBC # BLD: 6.7 K/UL (ref 4–11)

## 2022-01-31 PROCEDURE — 85027 COMPLETE CBC AUTOMATED: CPT

## 2022-01-31 PROCEDURE — 80069 RENAL FUNCTION PANEL: CPT

## 2022-01-31 PROCEDURE — 82306 VITAMIN D 25 HYDROXY: CPT

## 2022-01-31 PROCEDURE — 82043 UR ALBUMIN QUANTITATIVE: CPT

## 2022-01-31 PROCEDURE — 82570 ASSAY OF URINE CREATININE: CPT

## 2022-01-31 PROCEDURE — 83970 ASSAY OF PARATHORMONE: CPT

## 2022-02-01 LAB — VITAMIN D 25-HYDROXY: 45.7 NG/ML

## 2022-02-11 ENCOUNTER — HOSPITAL ENCOUNTER (OUTPATIENT)
Dept: INTERVENTIONAL RADIOLOGY/VASCULAR | Age: 72
Discharge: HOME | End: 2022-02-11
Payer: MEDICARE

## 2022-02-11 VITALS
DIASTOLIC BLOOD PRESSURE: 96 MMHG | HEART RATE: 86 BPM | WEIGHT: 249 LBS | SYSTOLIC BLOOD PRESSURE: 158 MMHG | OXYGEN SATURATION: 94 % | HEIGHT: 65 IN | BODY MASS INDEX: 41.48 KG/M2 | TEMPERATURE: 98 F | RESPIRATION RATE: 20 BRPM

## 2022-02-11 DIAGNOSIS — N32.81 OVERACTIVE BLADDER: ICD-10-CM

## 2022-02-11 LAB
ANION GAP SERPL CALCULATED.3IONS-SCNC: 13 MMOL/L (ref 3–16)
APTT: 41.5 SEC (ref 26.2–38.6)
BUN BLDV-MCNC: 16 MG/DL (ref 7–20)
CALCIUM SERPL-MCNC: 9.7 MG/DL (ref 8.3–10.6)
CHLORIDE BLD-SCNC: 108 MMOL/L (ref 99–110)
CO2: 22 MMOL/L (ref 21–32)
CREAT SERPL-MCNC: 1.8 MG/DL (ref 0.8–1.3)
GFR AFRICAN AMERICAN: 45
GFR NON-AFRICAN AMERICAN: 37
GLUCOSE BLD-MCNC: 117 MG/DL (ref 70–99)
HCT VFR BLD CALC: 43 % (ref 40.5–52.5)
HEMOGLOBIN: 13.9 G/DL (ref 13.5–17.5)
INR BLD: 1.03 (ref 0.88–1.12)
MCH RBC QN AUTO: 30.6 PG (ref 26–34)
MCHC RBC AUTO-ENTMCNC: 32.3 G/DL (ref 31–36)
MCV RBC AUTO: 94.9 FL (ref 80–100)
PDW BLD-RTO: 14.9 % (ref 12.4–15.4)
PLATELET # BLD: 378 K/UL (ref 135–450)
PMV BLD AUTO: 7.2 FL (ref 5–10.5)
POTASSIUM SERPL-SCNC: 4.2 MMOL/L (ref 3.5–5.1)
PROTHROMBIN TIME: 11.7 SEC (ref 9.9–12.7)
RBC # BLD: 4.53 M/UL (ref 4.2–5.9)
SODIUM BLD-SCNC: 143 MMOL/L (ref 136–145)
WBC # BLD: 8.6 K/UL (ref 4–11)

## 2022-02-11 PROCEDURE — 80048 BASIC METABOLIC PNL TOTAL CA: CPT

## 2022-02-11 PROCEDURE — 7100000011 HC PHASE II RECOVERY - ADDTL 15 MIN

## 2022-02-11 PROCEDURE — 85730 THROMBOPLASTIN TIME PARTIAL: CPT

## 2022-02-11 PROCEDURE — 85027 COMPLETE CBC AUTOMATED: CPT

## 2022-02-11 PROCEDURE — 99152 MOD SED SAME PHYS/QHP 5/>YRS: CPT

## 2022-02-11 PROCEDURE — 6360000004 HC RX CONTRAST MEDICATION: Performed by: STUDENT IN AN ORGANIZED HEALTH CARE EDUCATION/TRAINING PROGRAM

## 2022-02-11 PROCEDURE — 7100000010 HC PHASE II RECOVERY - FIRST 15 MIN

## 2022-02-11 PROCEDURE — 2709999900 IR CHANGE DRAINAGE CATH

## 2022-02-11 PROCEDURE — 75984 XRAY CONTROL CATHETER CHANGE: CPT

## 2022-02-11 PROCEDURE — 85610 PROTHROMBIN TIME: CPT

## 2022-02-11 PROCEDURE — 6360000002 HC RX W HCPCS: Performed by: STUDENT IN AN ORGANIZED HEALTH CARE EDUCATION/TRAINING PROGRAM

## 2022-02-11 PROCEDURE — 51705 CHANGE OF BLADDER TUBE: CPT

## 2022-02-11 PROCEDURE — 36415 COLL VENOUS BLD VENIPUNCTURE: CPT

## 2022-02-11 RX ORDER — FENTANYL CITRATE 50 UG/ML
INJECTION, SOLUTION INTRAMUSCULAR; INTRAVENOUS
Status: COMPLETED | OUTPATIENT
Start: 2022-02-11 | End: 2022-02-11

## 2022-02-11 RX ORDER — MIDAZOLAM HYDROCHLORIDE 1 MG/ML
INJECTION INTRAMUSCULAR; INTRAVENOUS
Status: COMPLETED | OUTPATIENT
Start: 2022-02-11 | End: 2022-02-11

## 2022-02-11 RX ADMIN — MIDAZOLAM 1 MG: 1 INJECTION INTRAMUSCULAR; INTRAVENOUS at 11:51

## 2022-02-11 RX ADMIN — FENTANYL CITRATE 50 MCG: 50 INJECTION, SOLUTION INTRAMUSCULAR; INTRAVENOUS at 11:51

## 2022-02-11 RX ADMIN — FENTANYL CITRATE 50 MCG: 50 INJECTION, SOLUTION INTRAMUSCULAR; INTRAVENOUS at 11:54

## 2022-02-11 RX ADMIN — MIDAZOLAM 1 MG: 1 INJECTION INTRAMUSCULAR; INTRAVENOUS at 11:54

## 2022-02-11 RX ADMIN — IOPAMIDOL 5 ML: 755 INJECTION, SOLUTION INTRAVENOUS at 12:09

## 2022-02-11 ASSESSMENT — PAIN - FUNCTIONAL ASSESSMENT: PAIN_FUNCTIONAL_ASSESSMENT: 0-10

## 2022-02-11 ASSESSMENT — PAIN SCALES - GENERAL: PAINLEVEL_OUTOF10: 0

## 2022-02-11 NOTE — PRE SEDATION
Sedation Pre-Procedure Note    Patient Name: Valerie Mcallister   YOB: 1950  Room/Bed: Room/bed info not found  Medical Record Number: [id number]  Date: 2/11/2022   Time: 11:32 AM       Indication:  Pt is a 79 y/o M with a suprepubic catheter here for exchange and upsizing. Consent: I have discussed with the patient and/or the patient representative the indication, alternatives, and the possible risks and/or complications of the planned procedure and the anesthesia methods. The patient and/or patient representative appear to understand and agree to proceed. Vital Signs: There were no vitals filed for this visit. Past Medical History:   has a past medical history of Anesthesia, Cancer ([facility] [address] Hyperlipidemia, Hypertension, Obesity, KAMRAN treated with BiPAP, Osteoarthritis, Type 2 diabetes mellitus without complication ([address] Type 2 diabetes mellitus without complication, without long-term current use of insulin ([facility] [address] Urinary incontinence, stress, male, and Wears dentures. Past Surgical History:   has a past surgical history that includes Total hip arthroplasty; joint replacement (Left); shoulder surgery (Bilateral); Knee arthroscopy (Right); Cystoscopy (N/A, 6/21/2021); and CT PERITONEAL/RETROPERITONEAL PERC DRAIN (12/23/2021). Medications:   Scheduled Meds:   Continuous Infusions:   PRN Meds:   Home Meds:   Prior to Admission medications    Medication Sig Start Date End Date Taking?  Authorizing Provider   glimepiride (AMARYL) 4 MG tablet TAKE 1 TABLET BY MOUTH EVERY MORNING BEFORE BREAKFAST 1/4/22   James Arteaga MD   metoprolol succinate (TOPROL XL) 100 MG extended release tablet TAKE 1 TABLET DAILY 12/29/21   James Arteaga MD   TRULICITY 3 MZ/5.9XL SOPN INJECT 3 MG INTO THE SKIN ONCE A WEEK 12/27/21   James Arteaga MD   famotidine (PEPCID) 20 MG tablet TAKE 1 TABLET DAILY  Patient not taking: Reported on 1/4/2022 12/26/21   James Arteaga MD Cholecalciferol (VITAMIN D) 50 MCG (2000 UT) CAPS capsule Take by mouth Once week    Historical Provider, MD   NIFEdipine (ADALAT CC) 30 MG extended release tablet Take 30 mg by mouth daily    Historical Provider, MD   fluticasone (FLONASE) 50 MCG/ACT nasal spray USE 2 SPRAYS IN EACH NOSTRIL DAILY 11/22/21   Neal Kunz MD   alendronate (FOSAMAX) 70 MG tablet TAKE 1 TABLET BY MOUTH EVERY 7 DAYS 10/20/21   Neal Kunz MD   atorvastatin (LIPITOR) 40 MG tablet TAKE 1 TABLET BY MOUTH EVERY NIGHT 10/15/21   Neal Kunz MD   brimonidine (ALPHAGAN) 0.2 % ophthalmic solution Apply to eye 2 times daily    Historical Provider, MD   latanoprost (XALATAN) 0.005 % ophthalmic solution 1 drop nightly    Historical Provider, MD   torsemide (DEMADEX) 20 MG tablet Take 20 mg by mouth daily    Historical Provider, MD   finasteride (PROSCAR) 5 MG tablet Take 1 tablet by mouth daily 8/13/21   Marla Layne PA-C   cetirizine (ZYRTEC) 5 MG tablet Take 1 tablet by mouth daily 7/14/21   Neal Kunz MD   Meals on Wheels MISC by Does not apply route Patient is to receive 2-3 diabetic meals only 4/30/21   Neal Kunz MD   aspirin 81 MG EC tablet Take 81 mg by mouth daily    Historical Provider, MD   Acetaminophen (TYLENOL ARTHRITIS PAIN PO) Take 1 tablet by mouth daily OTC     Historical Provider, MD   HYDROcodone-acetaminophen (NORCO)  MG per tablet Take 1 tablet by mouth 3 times daily as needed (Pain - back & hips).  Pain specialist     Historical Provider, MD   Insulin Pen Needle ([employer] PEN NEEDLES) 31G X 6 MM MISC 1 each by Does not apply route daily 9/29/20   Neal Kunz MD   ammonium lactate (AMLACTIN) 12 % cream Apply topically as needed     Historical Provider, MD   salsalate (DISALCID) 750 MG tablet Take 750 mg by mouth 2 times daily as needed  7/1/20   Historical Provider, MD   vitamin B-6 (PYRIDOXINE) 50 MG tablet Take 50 mg by mouth daily

## 2022-02-11 NOTE — PROGRESS NOTES
Pt received from radiology post supra pubic catheter change. Pt is awake alert and oriented, vss, resp even and easy. Dressing to cath insertion site is cdi. Light punch color urine is noted in drainage bag. Family present at bedside. Report received from [facility].

## 2022-02-11 NOTE — BRIEF OP NOTE
Brief Postoperative Note    Edmund Orr  YOB: 1950  [id number]    Pre-operative Diagnosis: bladder dysfunction    Post-operative Diagnosis: Same    Procedure: exchange and upsize of suprapubic catheter    Anesthesia: Local and Moderate Sedation    Surgeons/Assistants: No Cevallos MD    Estimated Blood Loss: less than 5    Complications: None    Specimens: Was Not Obtained    Findings: Successful upsize and exchange of suprapubic catheter with placement of 16F catheter.     Electronically signed by No Cevallos MD on 2/11/2022 at 12:04 PM

## 2022-03-01 RX ORDER — NIFEDIPINE 30 MG/1
TABLET, FILM COATED, EXTENDED RELEASE ORAL
Qty: 90 TABLET | Refills: 1 | Status: SHIPPED | OUTPATIENT
Start: 2022-03-01 | End: 2022-08-28

## 2022-03-01 NOTE — TELEPHONE ENCOUNTER
Requested Prescriptions     Pending Prescriptions Disp Refills    NIFEdipine (ADALAT CC) 30 MG extended release tablet [Pharmacy Med Name: NIFEDIPINE 30MG ER (CC) TABLETS] 90 tablet      Sig: TAKE 1 TABLET BY MOUTH DAILY     Please review the pending medication refill.     Patient was last seen 01/04/2022    Next office visit is not scheduled

## 2022-03-04 RX ORDER — ALENDRONATE SODIUM 70 MG/1
TABLET ORAL
Qty: 12 TABLET | Refills: 3 | Status: SHIPPED | OUTPATIENT
Start: 2022-03-04

## 2022-03-15 ENCOUNTER — TELEPHONE (OUTPATIENT)
Dept: INTERNAL MEDICINE CLINIC | Age: 72
End: 2022-03-15

## 2022-03-15 NOTE — TELEPHONE ENCOUNTER
Called patient and spoke with him. Explained that I was trying to schedule his annual medicare wellness physical, patient was very confused and said that his job takes care of his [employer] and hung up on me.  I called his daughter and left a voicemail for her explaining the situation and that he needs to be schedule for AWV physical

## 2022-03-15 NOTE — TELEPHONE ENCOUNTER
Patients daughter, Amy Blair, returned the call. She said her father thought the call was from a . He will call back to schedule his AWV.

## 2022-03-16 DIAGNOSIS — R09.82 POST-NASAL DRIP: ICD-10-CM

## 2022-03-16 RX ORDER — FLUTICASONE PROPIONATE 50 MCG
SPRAY, SUSPENSION (ML) NASAL
Qty: 32 G | Refills: 0 | Status: SHIPPED | OUTPATIENT
Start: 2022-03-16 | End: 2022-05-06

## 2022-03-21 DIAGNOSIS — E11.22 TYPE 2 DIABETES MELLITUS WITH STAGE 3 CHRONIC KIDNEY DISEASE, WITHOUT LONG-TERM CURRENT USE OF INSULIN, UNSPECIFIED WHETHER STAGE 3A OR 3B CKD (HCC): ICD-10-CM

## 2022-03-21 DIAGNOSIS — E66.01 MORBID OBESITY (HCC): ICD-10-CM

## 2022-03-21 DIAGNOSIS — N18.30 TYPE 2 DIABETES MELLITUS WITH STAGE 3 CHRONIC KIDNEY DISEASE, WITHOUT LONG-TERM CURRENT USE OF INSULIN, UNSPECIFIED WHETHER STAGE 3A OR 3B CKD (HCC): ICD-10-CM

## 2022-03-21 RX ORDER — DULAGLUTIDE 3 MG/.5ML
3 INJECTION, SOLUTION SUBCUTANEOUS WEEKLY
Qty: 6 ML | Refills: 1 | Status: SHIPPED | OUTPATIENT
Start: 2022-03-21 | End: 2022-09-05

## 2022-03-21 RX ORDER — ATORVASTATIN CALCIUM 40 MG/1
TABLET, FILM COATED ORAL
Qty: 90 TABLET | Refills: 1 | Status: SHIPPED | OUTPATIENT
Start: 2022-03-21 | End: 2022-09-21

## 2022-03-21 NOTE — TELEPHONE ENCOUNTER
Requested Prescriptions     Pending Prescriptions Disp Refills    TRULICITY 3 GH/9.9CZ SOPN [Pharmacy Med Name: Wilian Guillen SD PEN 8.6EF 4'S 3MG] 6 mL 3     Sig: INJECT 3 MG INTO THE SKIN ONCE A WEEK    atorvastatin (LIPITOR) 40 MG tablet [Pharmacy Med Name: ATORVASTATIN TABS 40MG] 90 tablet 3     Sig: TAKE 1 TABLET EVERY NIGHT     Please review the pending medication refill.     Patient was last seen 01/04/2022    Next office visit is 03/31/2022

## 2022-03-29 RX ORDER — METOPROLOL SUCCINATE 100 MG/1
TABLET, EXTENDED RELEASE ORAL
Qty: 90 TABLET | Refills: 1 | Status: SHIPPED | OUTPATIENT
Start: 2022-03-29 | End: 2022-09-26

## 2022-04-08 ENCOUNTER — HOSPITAL ENCOUNTER (OUTPATIENT)
Dept: INTERVENTIONAL RADIOLOGY/VASCULAR | Age: 72
Discharge: HOME | End: 2022-04-08
Payer: MEDICARE

## 2022-04-08 VITALS
HEIGHT: 65 IN | SYSTOLIC BLOOD PRESSURE: 132 MMHG | WEIGHT: 248 LBS | HEART RATE: 85 BPM | BODY MASS INDEX: 41.32 KG/M2 | RESPIRATION RATE: 25 BRPM | OXYGEN SATURATION: 94 % | TEMPERATURE: 97.4 F | DIASTOLIC BLOOD PRESSURE: 84 MMHG

## 2022-04-08 DIAGNOSIS — C61 PROSTATE CANCER (HCC): ICD-10-CM

## 2022-04-08 PROCEDURE — 99152 MOD SED SAME PHYS/QHP 5/>YRS: CPT

## 2022-04-08 PROCEDURE — 7100000010 HC PHASE II RECOVERY - FIRST 15 MIN

## 2022-04-08 PROCEDURE — 6360000002 HC RX W HCPCS: Performed by: RADIOLOGY

## 2022-04-08 PROCEDURE — C1729 CATH, DRAINAGE: HCPCS

## 2022-04-08 PROCEDURE — 51705 CHANGE OF BLADDER TUBE: CPT

## 2022-04-08 PROCEDURE — 6360000004 HC RX CONTRAST MEDICATION: Performed by: RADIOLOGY

## 2022-04-08 PROCEDURE — 75984 XRAY CONTROL CATHETER CHANGE: CPT

## 2022-04-08 PROCEDURE — 2500000003 HC RX 250 WO HCPCS: Performed by: RADIOLOGY

## 2022-04-08 PROCEDURE — 7100000011 HC PHASE II RECOVERY - ADDTL 15 MIN

## 2022-04-08 RX ORDER — FENTANYL CITRATE 50 UG/ML
INJECTION, SOLUTION INTRAMUSCULAR; INTRAVENOUS
Status: COMPLETED | OUTPATIENT
Start: 2022-04-08 | End: 2022-04-08

## 2022-04-08 RX ORDER — MIDAZOLAM HYDROCHLORIDE 5 MG/ML
INJECTION, SOLUTION INTRAMUSCULAR; INTRAVENOUS
Status: COMPLETED | OUTPATIENT
Start: 2022-04-08 | End: 2022-04-08

## 2022-04-08 RX ORDER — BUPIVACAINE HYDROCHLORIDE 5 MG/ML
10 INJECTION, SOLUTION EPIDURAL; INTRACAUDAL
Status: COMPLETED | OUTPATIENT
Start: 2022-04-08 | End: 2022-04-08

## 2022-04-08 RX ORDER — ACETAMINOPHEN 325 MG/1
650 TABLET ORAL EVERY 4 HOURS PRN
Status: DISCONTINUED | OUTPATIENT
Start: 2022-04-08 | End: 2022-04-09 | Stop reason: HOSPADM

## 2022-04-08 RX ADMIN — FENTANYL CITRATE 50 MCG: 50 INJECTION, SOLUTION INTRAMUSCULAR; INTRAVENOUS at 11:06

## 2022-04-08 RX ADMIN — FENTANYL CITRATE 50 MCG: 50 INJECTION, SOLUTION INTRAMUSCULAR; INTRAVENOUS at 11:09

## 2022-04-08 RX ADMIN — MIDAZOLAM HYDROCHLORIDE 1 MG: 5 INJECTION, SOLUTION INTRAMUSCULAR; INTRAVENOUS at 11:09

## 2022-04-08 RX ADMIN — IOPAMIDOL 10 ML: 755 INJECTION, SOLUTION INTRAVENOUS at 11:30

## 2022-04-08 RX ADMIN — BUPIVACAINE HYDROCHLORIDE 10 MG: 5 INJECTION, SOLUTION EPIDURAL; INTRACAUDAL at 11:29

## 2022-04-08 RX ADMIN — MIDAZOLAM HYDROCHLORIDE 1 MG: 5 INJECTION, SOLUTION INTRAMUSCULAR; INTRAVENOUS at 11:06

## 2022-04-08 NOTE — PRE SEDATION
Sedation Pre-Procedure Note    Patient Name: Tariq Mariee   YOB: 1950  Room/Bed: Room/bed info not found  Medical Record Number: [id number]  Date: 4/8/2022   Time: 10:56 AM       Indication:  Suprapubic catheter exchange/upsize    Consent: I have discussed with the patient and/or the patient representative the indication, alternatives, and the possible risks and/or complications of the planned procedure and the anesthesia methods. The patient and/or patient representative appear to understand and agree to proceed. Vital Signs:   Vitals:    04/08/22 1023   Pulse: 72   Resp: 16   Temp: 98.8 °F (37.1 °C)   SpO2: 97%       Past Medical History:   has a past medical history of Anesthesia, Cancer ([address] Hyperlipidemia, Hypertension, Obesity, KAMRAN treated with BiPAP, Osteoarthritis, Type 2 diabetes mellitus without complication ([address] Type 2 diabetes mellitus without complication, without long-term current use of insulin ([address] Urinary incontinence, stress, male, and Wears dentures. Past Surgical History:   has a past surgical history that includes Total hip arthroplasty; joint replacement (Left); shoulder surgery (Bilateral); Knee arthroscopy (Right); Cystoscopy (N/A, 6/21/2021); and CT PERITONEAL/RETROPERITONEAL PERC DRAIN (12/23/2021). Medications:   Scheduled Meds:   Continuous Infusions:   PRN Meds:   Home Meds:   Prior to Admission medications    Medication Sig Start Date End Date Taking?  Authorizing Provider   metoprolol succinate (TOPROL XL) 100 MG extended release tablet TAKE 1 TABLET DAILY 3/29/22   Aracelis Draper MD   [facility] SOPN INJECT 3 MG INTO THE SKIN ONCE A WEEK 3/21/22   Aracelis Draper MD   atorvastatin (LIPITOR) 40 MG tablet TAKE 1 TABLET EVERY NIGHT 3/21/22   Aracelis Draper MD   fluticasone Mary Jaspreet) 50 MCG/ACT nasal spray USE 2 SPRAYS IN [facility] NOSTRIL DAILY 3/16/22   Clemente Larsen, APRN - CNP   alendronate (FOSAMAX) 70 MG tablet TAKE 1 TABLET EVERY 7 DAYS 3/4/22   Michelle Constantino MD   NIFEdipine (ADALAT CC) 30 MG extended release tablet TAKE 1 TABLET BY MOUTH DAILY 3/1/22   Michelle Constantino MD   glimepiride (AMARYL) 4 MG tablet TAKE 1 TABLET BY MOUTH EVERY MORNING BEFORE BREAKFAST 1/4/22   Michelle Constantino MD   famotidine (PEPCID) 20 MG tablet TAKE 1 TABLET DAILY 12/26/21   Michelle Constantino MD   Cholecalciferol (VITAMIN D) 50 MCG (2000 UT) CAPS capsule Take by mouth Once week    Historical Provider, MD   brimonidine (ALPHAGAN) 0.2 % ophthalmic solution Apply to eye 2 times daily    Historical Provider, MD   latanoprost (XALATAN) 0.005 % ophthalmic solution 1 drop nightly    Historical Provider, MD   torsemide (DEMADEX) 20 MG tablet Take 20 mg by mouth daily    Historical Provider, MD   finasteride (PROSCAR) 5 MG tablet Take 1 tablet by mouth daily 8/13/21   Bianka Pritchett PA-C   cetirizine (ZYRTEC) 5 MG tablet Take 1 tablet by mouth daily 7/14/21   Michelle Constantino MD   Meals on Wheels MISC by Does not apply route Patient is to receive 2-3 diabetic meals only 4/30/21   Michelle Constantino MD   aspirin 81 MG EC tablet Take 81 mg by mouth daily    Historical Provider, MD   Acetaminophen (TYLENOL ARTHRITIS PAIN PO) Take 1 tablet by mouth daily OTC     Historical Provider, MD   HYDROcodone-acetaminophen (NORCO)  MG per tablet Take 1 tablet by mouth 3 times daily as needed (Pain - back & hips).  Pain specialist     Historical Provider, MD   Insulin Pen Needle ([employer] PEN NEEDLES) 31G X 6 MM MISC 1 each by Does not apply route daily 9/29/20   Michelle Constantino MD   ammonium lactate (AMLACTIN) 12 % cream Apply topically as needed     Historical Provider, MD   salsalate (DISALCID) 750 MG tablet Take 750 mg by mouth 2 times daily as needed  7/1/20   Historical Provider, MD   vitamin B-6 (PYRIDOXINE) 50 MG tablet Take 50 mg by mouth daily    Historical Provider, MD   ergocalciferol (ERGOCALCIFEROL) 96448 UNITS capsule Take 50,000 Units by mouth once a week. Historical Provider, MD     Coumadin Use Last 7 Days:  no  Antiplatelet drug therapy use last 7 days: no  Other anticoagulant use last 7 days: no  Additional Medication Information:  na      Pre-Sedation Documentation and Exam:   I have reviewed the patient's history and review of systems.     Mallampati Airway Assessment:  Mallampati Class III - (soft palate & base of uvula are visible)    Prior History of Anesthesia Complications:   none    ASA Classification:  Class 2 - A normal healthy patient with mild systemic disease    Sedation/ Anesthesia Plan:   intravenous sedation    Medications Planned:   midazolam (Versed) intravenously and fentanyl intravenously    Patient is an appropriate candidate for plan of sedation: yes    Electronically signed by Anjana Bartholomew MD on 4/8/2022 at 10:56 AM

## 2022-04-08 NOTE — BRIEF OP NOTE
Brief Operative Note      Patient: Chilango Hardin MRN: [id number]     YOB: 1950  Age: 80 y.o. Sex: male        DATE OF PROCEDURE: 4/8/2022     PROCEDURE PERFORMED: Suprapubic catheter exchange and upsize    SURGEON: Elana Christopher MD, MD    ANESTHESIA: Fentanyl, Versed    Estimated Blood Loss:none    Complications: None. Device implanted: 20 fr locked pigtail catheter.            Specimen: none    Electronically signed by Elana Christopher MD on 4/8/2022 at 11:29 AM

## 2022-04-08 NOTE — PROGRESS NOTES
1130 Arrives from [facility], AAOx4, no anesthesia. See VS and flowsheets. [address] [facility],[address] instructions reviewed, understood and signed. Pt dressed.

## 2022-05-06 ENCOUNTER — OFFICE VISIT (OUTPATIENT)
Dept: INTERNAL MEDICINE CLINIC | Age: 72
End: 2022-05-06
Payer: MEDICARE

## 2022-05-06 VITALS
WEIGHT: 254.2 LBS | TEMPERATURE: 97.3 F | OXYGEN SATURATION: 97 % | DIASTOLIC BLOOD PRESSURE: 70 MMHG | SYSTOLIC BLOOD PRESSURE: 134 MMHG | HEIGHT: 65 IN | HEART RATE: 88 BPM | BODY MASS INDEX: 42.35 KG/M2

## 2022-05-06 DIAGNOSIS — N17.9 ACUTE KIDNEY INJURY SUPERIMPOSED ON CKD (HCC): ICD-10-CM

## 2022-05-06 DIAGNOSIS — N18.30 CHRONIC RENAL IMPAIRMENT, STAGE 3 (MODERATE), UNSPECIFIED WHETHER STAGE 3A OR 3B CKD (HCC): ICD-10-CM

## 2022-05-06 DIAGNOSIS — E66.01 MORBID OBESITY (HCC): ICD-10-CM

## 2022-05-06 DIAGNOSIS — F11.20 OPIOID TYPE DEPENDENCE, CONTINUOUS (HCC): ICD-10-CM

## 2022-05-06 DIAGNOSIS — N18.9 ACUTE KIDNEY INJURY SUPERIMPOSED ON CKD (HCC): ICD-10-CM

## 2022-05-06 DIAGNOSIS — C61 PROSTATE CANCER (HCC): ICD-10-CM

## 2022-05-06 DIAGNOSIS — Z00.00 INITIAL MEDICARE ANNUAL WELLNESS VISIT: Primary | ICD-10-CM

## 2022-05-06 DIAGNOSIS — N18.30 TYPE 2 DIABETES MELLITUS WITH STAGE 3 CHRONIC KIDNEY DISEASE, WITHOUT LONG-TERM CURRENT USE OF INSULIN, UNSPECIFIED WHETHER STAGE 3A OR 3B CKD (HCC): ICD-10-CM

## 2022-05-06 DIAGNOSIS — E11.22 TYPE 2 DIABETES MELLITUS WITH STAGE 3 CHRONIC KIDNEY DISEASE, WITHOUT LONG-TERM CURRENT USE OF INSULIN, UNSPECIFIED WHETHER STAGE 3A OR 3B CKD (HCC): ICD-10-CM

## 2022-05-06 DIAGNOSIS — C61 MALIGNANT NEOPLASM OF PROSTATE (HCC): ICD-10-CM

## 2022-05-06 DIAGNOSIS — H91.93 BILATERAL HEARING LOSS, UNSPECIFIED HEARING LOSS TYPE: ICD-10-CM

## 2022-05-06 PROBLEM — J96.01 ACUTE RESPIRATORY FAILURE WITH HYPOXIA (HCC): Status: RESOLVED | Noted: 2021-04-05 | Resolved: 2022-05-06

## 2022-05-06 LAB
A/G RATIO: 1.2 (ref 1.1–2.2)
ALBUMIN SERPL-MCNC: 4.1 G/DL (ref 3.4–5)
ALP BLD-CCNC: 117 U/L (ref 40–129)
ALT SERPL-CCNC: 24 U/L (ref 10–40)
ANION GAP SERPL CALCULATED.3IONS-SCNC: 17 MMOL/L (ref 3–16)
AST SERPL-CCNC: 20 U/L (ref 15–37)
BACTERIA: ABNORMAL /HPF
BILIRUB SERPL-MCNC: 0.5 MG/DL (ref 0–1)
BILIRUBIN URINE: NEGATIVE
BLOOD, URINE: ABNORMAL
BUN BLDV-MCNC: 26 MG/DL (ref 7–20)
CALCIUM SERPL-MCNC: 9.7 MG/DL (ref 8.3–10.6)
CHLORIDE BLD-SCNC: 104 MMOL/L (ref 99–110)
CHOLESTEROL, TOTAL: 165 MG/DL (ref 0–199)
CLARITY: ABNORMAL
CO2: 21 MMOL/L (ref 21–32)
COLOR: YELLOW
CREAT SERPL-MCNC: 1.8 MG/DL (ref 0.8–1.3)
EPITHELIAL CELLS, UA: 2 /HPF (ref 0–5)
GFR AFRICAN AMERICAN: 45
GFR NON-AFRICAN AMERICAN: 37
GLUCOSE BLD-MCNC: 111 MG/DL (ref 70–99)
GLUCOSE URINE: NEGATIVE MG/DL
HDLC SERPL-MCNC: 57 MG/DL (ref 40–60)
HYALINE CASTS: 1 /LPF (ref 0–8)
KETONES, URINE: NEGATIVE MG/DL
LDL CHOLESTEROL CALCULATED: 86 MG/DL
LEUKOCYTE ESTERASE, URINE: ABNORMAL
MICROSCOPIC EXAMINATION: YES
NITRITE, URINE: NEGATIVE
PH UA: 6.5 (ref 5–8)
POTASSIUM SERPL-SCNC: 4.5 MMOL/L (ref 3.5–5.1)
PROTEIN UA: 100 MG/DL
RBC UA: 31 /HPF (ref 0–4)
SODIUM BLD-SCNC: 142 MMOL/L (ref 136–145)
SPECIFIC GRAVITY UA: 1.02 (ref 1–1.03)
TOTAL PROTEIN: 7.4 G/DL (ref 6.4–8.2)
TRIGL SERPL-MCNC: 108 MG/DL (ref 0–150)
URINE TYPE: ABNORMAL
UROBILINOGEN, URINE: 0.2 E.U./DL
VLDLC SERPL CALC-MCNC: 22 MG/DL
WBC UA: 35 /HPF (ref 0–5)

## 2022-05-06 PROCEDURE — 3044F HG A1C LEVEL LT 7.0%: CPT | Performed by: INTERNAL MEDICINE

## 2022-05-06 PROCEDURE — G0438 PPPS, INITIAL VISIT: HCPCS | Performed by: INTERNAL MEDICINE

## 2022-05-06 RX ORDER — AMLODIPINE BESYLATE 10 MG/1
10 TABLET ORAL DAILY
COMMUNITY

## 2022-05-06 ASSESSMENT — PATIENT HEALTH QUESTIONNAIRE - PHQ9
SUM OF ALL RESPONSES TO PHQ QUESTIONS 1-9: 0
SUM OF ALL RESPONSES TO PHQ9 QUESTIONS 1 & 2: 0
SUM OF ALL RESPONSES TO PHQ QUESTIONS 1-9: 0
2. FEELING DOWN, DEPRESSED OR HOPELESS: 0
SUM OF ALL RESPONSES TO PHQ QUESTIONS 1-9: 0
SUM OF ALL RESPONSES TO PHQ QUESTIONS 1-9: 0
1. LITTLE INTEREST OR PLEASURE IN DOING THINGS: 0

## 2022-05-06 ASSESSMENT — SOCIAL DETERMINANTS OF HEALTH (SDOH): HOW HARD IS IT FOR YOU TO PAY FOR THE VERY BASICS LIKE FOOD, HOUSING, MEDICAL CARE, AND HEATING?: SOMEWHAT HARD

## 2022-05-06 ASSESSMENT — LIFESTYLE VARIABLES
HOW OFTEN DO YOU HAVE A DRINK CONTAINING ALCOHOL: MONTHLY OR LESS
HOW MANY STANDARD DRINKS CONTAINING ALCOHOL DO YOU HAVE ON A TYPICAL DAY: 1 OR 2

## 2022-05-06 NOTE — PROGRESS NOTES
Medicare Annual Wellness Visit    Chilango Hardin is here for Medicare AWV (no concerns)    Assessment & Plan   Initial Medicare annual wellness visit  Opio[facility] type dependence, continuous ([facility]  Bilateral hearing loss, unspecified hearing loss type  -     Janette Fair, VNG Testing, [facility]    Morbid obesity [facility])  - continues to work on change in behavior    Prostate cancer ([facility]  - stable and followed by urology, has bag in place    Acute kidney injury superimposed on CKD ([facility]  -     Comprehensive Metabolic Panel; Future  -     Lipid Panel; Future  -     Hemoglobin A1C; Future  -     Janette Fair, VNG Testing, [facility]  - This has been fully explained to the patient, who indicates understanding. Chronic renal impairment, stage 3 (moderate), unspecified whether stage 3a or 3b CKD (HCC)  - continue BP control and diabetes control    Type 2 diabetes mellitus with stage 3 chronic kidney disease, without long-term current use of insulin, unspecified whether stage 3a or 3b CKD ([facility]  -     Comprehensive Metabolic Panel; Future  -     Lipid Panel; Future  -     Hemoglobin A1C; Future  -     Urinalysis with Microscopic      Recommendations for Preventive Services Due: see orders and patient instructions/AVS.  Recommended screening schedule for the next 5-10 years is provided to the patient in written form: see Patient Instructions/AVS.     Return for Medicare Annual Wellness Visit in 1 year. Subjective   The following acute and/or chronic problems were also addressed today:  Treatment Adherence:   Medication compliance:  compliant most of the time  Diet compliance:  compliant most of the time  Weight trend: fluctuating  Current exercise: no regular exercise  Barriers: none    Diabetes Mellitus Type 2: Current symptoms/problems include none.     Home blood sugar records: patient does not test  Any episodes of hypoglycemia? no  Eye exam current (within one year): yes  Tobacco history: He  reports that he quit smoking about 21 years ago. His smoking use included cigars. He started smoking about 42 years ago. He has a 5.25 pack-year smoking history. He has quit using smokeless tobacco.   Daily Aspirin? No  Hypertension:  Home blood pressure monitoring: No.  He is adherent to a low sodium diet. Patient denies lightheadedness, blurred vision, palpitations and dry cough. Antihypertensive medication side effects: no medication side effects noted. Use of agents associated with hypertension: none. Hyperlipidemia:  No new myalgias or GI upset on atorvastatin (Lipitor). Lab Results   Component Value Date    LABA1C 5.7 02/10/2021    LABA1C 6.6 09/29/2020     Lab Results   Component Value Date    LABMICR 111.40 (H) 01/31/2022    CREATININE 1.8 (H) 02/11/2022     Lab Results   Component Value Date    ALT 56 (H) 08/08/2021    AST 65 (H) 08/08/2021     Lab Results   Component Value Date    CHOL 201 (H) 11/14/2011    TRIG 123 11/14/2011    HDL 55 11/14/2011    LDLCALC 122 (H) 11/14/2011          Patient's complete Health Risk Assessment and screening values have been reviewed and are found in Flowsheets. The following problems were reviewed today and where indicated follow up appointments were made and/or referrals ordered.     Positive Risk Factor Screenings with Interventions:     Cognitive:  Clock Drawing Test (CDT): (!) Abnormal  Total Score Interpretation: Abnormal Mini-Cog    Cognitive Impairment Interventions:  · Patient declines any further evaluation/treatment for cognitive impairment           General Health and ACP:  General  In general, how would you say your health is?: Fair  In the past 7 days, have you experienced any of the following: New or Increased Pain, New or Increased Fatigue, Loneliness, Social Isolation, Stress or Anger?: (!) Yes  Select all that apply: (!) New or Increased Pain  Do you get the social and emotional support that you need?: Yes  Do you have a Living Will?: Yes    Advance Directives     Power of Asya Cortez Will ACP-Advance Directive ACP-Power of     Not on File Not on File Not on File [facility] Interventions:  · Pain issues: patient is followed by pain specialist    Health Habits/Nutrition:     Physical Activity: Inactive    Days of Exercise per Week: 0 days    Minutes of Exercise per Session: 0 min     Have you lost any weight without trying in the past 3 months?: No  Body mass index: (!) 42.3  Have you seen the dentist within the past year?: (!) No    Health Habits/Nutrition Interventions:  · Inadequate physical activity:  patient agrees to exercise for at least 150 minutes/week    Hearing/Vision:  Do you or your family notice any trouble with your hearing that hasn't been managed with hearing aids?: No (PT WEARS HEARING AIDS)  Do you have difficulty driving, watching TV, or doing any of your daily activities because of your eyesight?: No  Have you had an eye exam within the past year?: (!) No  No exam data present    Hearing/Vision Interventions:  · Hearing concerns:  audiology referral provided, ENT referral provided            Objective   Vitals:    05/06/22 0942   BP: 134/70   Site: Right Upper Arm   Position: Sitting   Pulse: 88   Temp: 97.3 °F (36.3 °C)   TempSrc: Temporal   SpO2: 97%   Weight: 254 lb 3.2 oz (115.3 kg)   Height: 5' 5\" (1.651 m)      Body mass index is 42.3 kg/m².          Wt Readings from Last 3 Encounters:   05/06/22 254 lb 3.2 oz (115.3 kg)   04/08/22 248 lb (112.5 kg)   02/11/22 249 lb (112.9 kg)       General Appearance: alert and oriented to person, place and time, well developed and well- nourished, in no acute distress  Skin: warm and dry, no rash or erythema  Head: normocephalic and atraumatic  Eyes: pupils equal, round, and reactive to light, extraocular eye movements intact, conjunctivae normal  ENT: tympanic membrane, external ear and ear canal normal bilaterally, nose without deformity, nasal mucosa and turbinates normal without polyps  Neck: supple and non-tender without mass, no thyromegaly or thyroid nodules, no cervical lymphadenopathy  Pulmonary/Chest: clear to auscultation bilaterally- no wheezes, rales or rhonchi, normal air movement, no respiratory distress  Cardiovascular: normal rate, regular rhythm, normal S1 and S2, no murmurs, rubs, clicks, or gallops, distal pulses intact, no carotid bruits  Abdomen: soft, non-tender, non-distended, normal bowel sounds, no masses or organomegaly  Extremities: no cyanosis, clubbing or edema  Musculoskeletal: normal range of motion, no joint swelling, deformity or tenderness  Neurologic: reflexes normal and symmetric, no cranial nerve deficit, gait, coordination and speech normal       Allergies   Allergen Reactions    Indomethacin Other (See Comments)     Dr. Malathi Raygoza told him not to take because affects the bladder.  Statins Other (See Comments)    Succinylcholine      ? ? Succinylcholine \"allergy\"  Pt. Should be worked  Up for pseudocholinesterase deficiency. Pt. States he qwas told in the past that he had trouble with an anesthesia drug that begins with an \"s\" and that it took him awhile to breathe in PACU, but could provide no further details.  Unable To Assess       Anesthesia that begins with S- stops my breathing- daughter reports succinylcholine   Questionable pseudocholinesterase deficiency    Actos [Pioglitazone] Rash     Prior to Visit Medications    Medication Sig Taking?  Authorizing Provider   amLODIPine (NORVASC) 10 MG tablet Take 10 mg by mouth daily Yes Historical Provider, MD   Semaglutide, 1 MG/DOSE, 2 MG/1.5ML SOPN Inject into the skin Yes Historical Provider, MD   metoprolol succinate (TOPROL XL) 100 MG extended release tablet TAKE 1 TABLET DAILY Yes Logan Perrin MD   [facility] RA/7.8YV SOPN INJECT 3 MG INTO THE SKIN ONCE A WEEK Yes Logan Perrin MD   atorvastatin (LIPITOR) 40 MG tablet TAKE 1 TABLET EVERY NIGHT Yes Sushma Cordoba MD   alendronate (FOSAMAX) 70 MG tablet TAKE 1 TABLET EVERY 7 DAYS Yes Sushma Cordoba MD   Meals on Wheels MISC by Does not apply route Patient is to receive 2-3 diabetic meals only Yes Sushma Cordoba MD   HYDROcodone-acetaminophen (NORCO)  MG per tablet Take 1 tablet by mouth 3 times daily as needed (Pain - back & hips).  Pain specialist  Yes Historical Provider, MD   ammonium lactate (AMLACTIN) 12 % cream Apply topically as needed  Yes Historical Provider, MD   salsalate (DISALCID) 750 MG tablet Take 750 mg by mouth 2 times daily as needed  Yes Historical Provider, MD   vitamin B-6 (PYRIDOXINE) 50 MG tablet Take 50 mg by mouth daily Yes Historical Provider, MD   fluticasone (FLONASE) 50 MCG/ACT nasal spray USE 2 SPRAYS IN EACH NOSTRIL DAILY  Patient not taking: Reported on 5/6/2022  Kelley Munoz, APRN - CNP   NIFEdipine (ADALAT CC) 30 MG extended release tablet TAKE 1 TABLET BY MOUTH DAILY  Patient not taking: Reported on 5/6/2022  Sushma Cordoba MD   glimepiride (AMARYL) 4 MG tablet TAKE 1 TABLET BY MOUTH EVERY MORNING BEFORE BREAKFAST  Patient not taking: Reported on 5/6/2022  Sushma Cordoba MD   famotidine (PEPCID) 20 MG tablet TAKE 1 TABLET DAILY  Patient not taking: Reported on 5/6/2022  Sushma Cordoba MD   Cholecalciferol (VITAMIN D) 50 MCG (2000 UT) CAPS capsule Take by mouth Once week  Patient not taking: Reported on 5/6/2022  Historical Provider, MD   brimonidine (ALPHAGAN) 0.2 % ophthalmic solution Apply to eye 2 times daily  Patient not taking: Reported on 5/6/2022  Historical Provider, MD   latanoprost (XALATAN) 0.005 % ophthalmic solution 1 drop nightly  Patient not taking: Reported on 5/6/2022  Historical Provider, MD   torsemide (DEMADEX) 20 MG tablet Take 20 mg by mouth daily  Patient not taking: Reported on 5/6/2022  Historical Provider, MD   finasteride (PROSCAR) 5 MG tablet Take 1 tablet by mouth daily  Patient not taking: Reported on 5/6/2022  Maurice Wiggins PA-C   cetirizine (ZYRTEC) 5 MG tablet Take 1 tablet by mouth daily  Patient not taking: Reported on 5/6/2022  Aracelis Draper MD   aspirin 81 MG EC tablet Take 81 mg by mouth daily  Patient not taking: Reported on 5/6/2022  Historical Provider, MD   Acetaminophen (TYLENOL ARTHRITIS PAIN PO) Take 1 tablet by mouth daily OTC   Patient not taking: Reported on 5/6/2022  Historical Provider, MD   Insulin Pen Needle ([employer] PEN NEEDLES) 31G X 6 MM MISC 1 each by Does not apply route daily  Aracelis Draper MD   ergocalciferol (ERGOCALCIFEROL) 22008 UNITS capsule Take 50,000 Units by mouth once a week. Patient not taking: Reported on 5/6/2022  Historical Provider, MD [place] (Including outside providers/suppliers regularly involved in providing care):   Patient Care Team:  Aracelis Draper MD as PCP - General (Internal Medicine/Pediatrics)  Aracelis Draper MD as PCP - [facility] Empaneled Provider    Reviewed and updated this visit:  Tobacco  Allergies  Meds  Problems  Med Hx  Surg Hx  Soc Hx  Fam Hx                   Cardiovascular Disease Risk Counseling: Assessed the patient's risk to develop cardiovascular disease and reviewed main risk factors. Reviewed steps to reduce disease risk including:   · Quitting tobacco use, reducing amount smoked, or not starting the habit  · Making healthy food choices  · Being physically active and gradualy increasing activity levels   · Reduce weight and determine a healthy BMI goal  · Monitor blood pressure and treat if higher than 140/90 mmHg  · Maintain blood total cholesterol levels under 5 mmol/l or 190 mg/dl  · Maintain LDL cholesterol levels under 3.0 mmol/l or 115 mg/dl   · Control blood glucose levels  · Consider taking aspirin (75 mg daily), once blood pressure is controlled   Provided a follow up plan.   Time spent (minutes): 5

## 2022-05-06 NOTE — PATIENT INSTRUCTIONS
Advance Directives: Care Instructions  Overview  An advance directive is a legal way to state your wishes at the end of your life. It tells your family and your doctor what to do if you can't say what youwant. There are two main types of advance directives. You can change them any timeyour wishes change. Living will. This form tells your family and your doctor your wishes about life support and other treatment. The form is also called a declaration. Medical power of . This form lets you name a person to make treatment decisions for you when you can't speak for yourself. This person is called a health care agent (health care proxy, health care surrogate). The form is also called a durable power of  for health care. If you do not have an advance directive, decisions about your medical care maybe made by a family member, or by a doctor or a  who doesn't know you. It may help to think of an advance directive as a gift to the people who carefor you. If you have one, they won't have to make tough decisions by themselves. Follow-up care is a key part of your treatment and safety. Be sure to make and go to all appointments, and call your doctor if you are having problems. It's also a good idea to know your test results and keep alist of the medicines you take. What should you include in an advance directive? Many states have a unique advance directive form. (It may ask you to address specific issues.) Or you might use a universal form that's approved by manystates. If your form doesn't tell you what to address, it may be hard to know what to include in your advance directive. Use the questions below to help you getstarted.  Who do you want to make decisions about your medical care if you are not able to?  What life-support measures do you want if you have a serious illness that gets worse over time or can't be cured?  What are you most afraid of that might happen?  (Maybe you're afraid of having pain, losing your independence, or being kept alive by machines.)   Where would you prefer to die? (Your home? A hospital? A nursing home?)   Do you want to donate your organs when you die?  Do you want certain Latter-day practices performed before you die? When should you call for help? Be sure to contact your doctor if you have any questions. Where can you learn more? Go to [email or web address] and sign in to your [employer] account. Enter R264 in the [facility] box to learn more about \"Advance Directives: Care Instructions. \"     If you do not have an account, please click on the \"Sign Up Now\" link. Current as of: October 18, 2021               Content Version: 13.2  © 2006-2022 Healthwise, [employer]. Care instructions adapted under license by [facility] ([facility]). If you have questions about a medical condition or this instruction, always ask your healthcare professional. [address] any warranty or liability for your use of this information. Body Mass Index: Care Instructions  Your Care Instructions     Body mass index (BMI) can help you see if your weight is raising your risk for health problems. It uses a formula to compare how much you weigh with how tallyou are.  A BMI lower than 18.5 is considered underweight.  A BMI between 18.5 and 24.9 is considered healthy.  A BMI between 25 and 29.9 is considered overweight. A BMI of 30 or higher is considered obese. If your BMI is in the normal range, it means that you have a lower risk for weight-related health problems. If your BMI is in the overweight or obese range, you may be at increased risk for weight-related health problems, such as high blood pressure, heart disease, stroke, arthritis or joint pain, and diabetes.  If your BMI is in the underweight range, you may be at increased risk for health problems such as fatigue, lower protection (immunity) againstillness, muscle loss, bone loss, hair loss, and hormone problems. BMI is just one measure of your risk for weight-related health problems. You may be at higher risk for health problems if you are not active, you eat anunhealthy diet, or you drink too much alcohol or use tobacco products. Follow-up care is a key part of your treatment and safety. Be sure to make and go to all appointments, and call your doctor if you are having problems. It's also a good idea to know your test results and keep alist of the medicines you take. How can you care for yourself at home?  Practice healthy eating habits. This includes eating plenty of fruits, vegetables, whole grains, lean protein, and low-fat dairy.  If your doctor recommends it, get more exercise. Walking is a good choice. Bit by bit, increase the amount you walk every day. Try for at least 30 minutes on most days of the week.  Do not smoke. Smoking can increase your risk for health problems. If you need help quitting, talk to your doctor about stop-smoking programs and medicines. These can increase your chances of quitting for good.  Limit alcohol to 2 drinks a day for men and 1 drink a day for women. Too much alcohol can cause health problems. If you have a BMI higher than 25   Your doctor may do other tests to check your risk for weight-related health problems. This may include measuring the distance around your waist. A waist measurement of more than 40 inches in men or 35 inches in women can increase the risk of weight-related health problems.  Talk with your doctor about steps you can take to stay healthy or improve your health. You may need to make lifestyle changes to lose weight and stay healthy, such as changing your diet and getting regular exercise. If you have a BMI lower than 18.5   Your doctor may do other tests to check your risk for health problems.  Talk with your doctor about steps you can take to stay healthy or improve your health.  You may need to make lifestyle changes to gain or maintain weight and stay healthy, such as getting more healthy foods in your diet and doing exercises to build muscle. Where can you learn more? Go to [email or web address] and sign in to your [email or web address] account. Enter S176 in the [facility] box to learn more about \"Body Mass Index: Care Instructions. \"     If you do not have an account, please click on the \"Sign Up Now\" link. Current as of: December 27, 2021               Content Version: 13.2  © 1099-7017 Healthwise, [employer]. Care instructions adapted under license by [facility] ([facility]). If you have questions about a medical condition or this instruction, always ask your healthcare professional. [address] any warranty or liability for your use of this information. Personalized Preventive Plan for Rebecca Alfaro - 5/6/2022  Medicare offers a range of preventive health benefits. Some of the tests and screenings are paid in full while other may be subject to a deductible, co-insurance, and/or copay. Some of these benefits include a comprehensive review of your medical history including lifestyle, illnesses that may run in your family, and various assessments and screenings as appropriate. After reviewing your medical record and screening and assessments performed today your provider may have ordered immunizations, labs, imaging, and/or referrals for you. A list of these orders (if applicable) as well as your Preventive Care list are included within your After Visit Summary for your review. Other Preventive Recommendations:    · A preventive eye exam performed by an eye specialist is recommended every 1-2 years to screen for glaucoma; cataracts, macular degeneration, and other eye disorders. · A preventive dental visit is recommended every 6 months.   · Try to get at least 150 minutes of exercise per week or 10,000 steps per day on a pedometer .  · Order or download the FREE \"Exercise & Physical Activity: Your Everyday Guide\" from [employer]. Call 0-442.214.7334 or search [employer] online. · You need 3646-9153 mg of calcium and 7347-2855 IU of vitamin D per day. It is possible to meet your calcium requirement with diet alone, but a vitamin D supplement is usually necessary to meet this goal.  · When exposed to the sun, use a sunscreen that protects against both UVA and UVB radiation with an SPF of 30 or greater. Reapply every 2 to 3 hours or after sweating, drying off with a towel, or swimming. · Always wear a seat belt when traveling in a car. Always wear a helmet when riding a bicycle or motorcycle.

## 2022-05-07 LAB
ESTIMATED AVERAGE GLUCOSE: 134.1 MG/DL
HBA1C MFR BLD: 6.3 %

## 2022-05-17 ENCOUNTER — TELEPHONE (OUTPATIENT)
Dept: INTERNAL MEDICINE CLINIC | Age: 72
End: 2022-05-17

## 2022-05-20 NOTE — TELEPHONE ENCOUNTER
Patient's daughter called again about the urinalysis results. She would like to receive a call as soon as possible.

## 2022-05-23 NOTE — TELEPHONE ENCOUNTER
Called and left voicemail for patient to call back regarding results.  Please give message to patient

## 2022-05-23 NOTE — TELEPHONE ENCOUNTER
THe urine from that sample was >1days old. He was not symptomatic at the time. We can recheck if he is symptomatic but he does not need an antibiotic.

## 2022-05-27 ENCOUNTER — HOSPITAL ENCOUNTER (OUTPATIENT)
Dept: INTERVENTIONAL RADIOLOGY/VASCULAR | Age: 72
Discharge: HOME | End: 2022-05-27
Payer: MEDICARE

## 2022-05-27 DIAGNOSIS — C61 PROSTATE CANCER (HCC): ICD-10-CM

## 2022-05-27 LAB
ANION GAP SERPL CALCULATED.3IONS-SCNC: 13 MMOL/L (ref 3–16)
APTT: 31.1 SEC (ref 23–34.3)
BUN BLDV-MCNC: 20 MG/DL (ref 7–20)
CALCIUM SERPL-MCNC: 9.5 MG/DL (ref 8.3–10.6)
CHLORIDE BLD-SCNC: 105 MMOL/L (ref 99–110)
CO2: 23 MMOL/L (ref 21–32)
CREAT SERPL-MCNC: 1.8 MG/DL (ref 0.8–1.3)
GFR AFRICAN AMERICAN: 45
GFR NON-AFRICAN AMERICAN: 37
GLUCOSE BLD-MCNC: 109 MG/DL (ref 70–99)
HCT VFR BLD CALC: 43.7 % (ref 40.5–52.5)
HEMOGLOBIN: 14.4 G/DL (ref 13.5–17.5)
INR BLD: 1.07 (ref 0.87–1.14)
MCH RBC QN AUTO: 30.3 PG (ref 26–34)
MCHC RBC AUTO-ENTMCNC: 33 G/DL (ref 31–36)
MCV RBC AUTO: 91.8 FL (ref 80–100)
PDW BLD-RTO: 14.6 % (ref 12.4–15.4)
PLATELET # BLD: 384 K/UL (ref 135–450)
PMV BLD AUTO: 6.7 FL (ref 5–10.5)
POTASSIUM SERPL-SCNC: 4.5 MMOL/L (ref 3.5–5.1)
PROTHROMBIN TIME: 13.8 SEC (ref 11.7–14.5)
RBC # BLD: 4.76 M/UL (ref 4.2–5.9)
SODIUM BLD-SCNC: 141 MMOL/L (ref 136–145)
WBC # BLD: 6.2 K/UL (ref 4–11)

## 2022-05-27 PROCEDURE — 80048 BASIC METABOLIC PNL TOTAL CA: CPT

## 2022-05-27 PROCEDURE — 75984 XRAY CONTROL CATHETER CHANGE: CPT

## 2022-05-27 PROCEDURE — 51705 CHANGE OF BLADDER TUBE: CPT

## 2022-05-27 PROCEDURE — 85610 PROTHROMBIN TIME: CPT

## 2022-05-27 PROCEDURE — C1729 CATH, DRAINAGE: HCPCS

## 2022-05-27 PROCEDURE — 85027 COMPLETE CBC AUTOMATED: CPT

## 2022-05-27 PROCEDURE — 85730 THROMBOPLASTIN TIME PARTIAL: CPT

## 2022-05-27 PROCEDURE — 2500000003 HC RX 250 WO HCPCS: Performed by: RADIOLOGY

## 2022-05-27 PROCEDURE — 6360000004 HC RX CONTRAST MEDICATION: Performed by: RADIOLOGY

## 2022-05-27 PROCEDURE — 36415 COLL VENOUS BLD VENIPUNCTURE: CPT

## 2022-05-27 RX ORDER — SODIUM CHLORIDE 9 MG/ML
INJECTION, SOLUTION INTRAVENOUS CONTINUOUS
Status: DISCONTINUED | OUTPATIENT
Start: 2022-05-27 | End: 2022-05-28 | Stop reason: HOSPADM

## 2022-05-27 RX ORDER — LIDOCAINE HYDROCHLORIDE 10 MG/ML
10 INJECTION, SOLUTION EPIDURAL; INFILTRATION; INTRACAUDAL; PERINEURAL ONCE
Status: COMPLETED | OUTPATIENT
Start: 2022-05-27 | End: 2022-05-27

## 2022-05-27 RX ADMIN — IOPAMIDOL 10 ML: 755 INJECTION, SOLUTION INTRAVENOUS at 11:16

## 2022-05-27 RX ADMIN — LIDOCAINE HYDROCHLORIDE 10 ML: 10 INJECTION, SOLUTION EPIDURAL; INFILTRATION; INTRACAUDAL; PERINEURAL at 11:17

## 2022-05-31 RX ORDER — GLIMEPIRIDE 4 MG/1
TABLET ORAL
Qty: 90 TABLET | Refills: 0 | Status: SHIPPED | OUTPATIENT
Start: 2022-05-31 | End: 2022-08-28

## 2022-06-10 ENCOUNTER — OFFICE VISIT (OUTPATIENT)
Dept: CARDIOLOGY CLINIC | Age: 72
End: 2022-06-10
Payer: MEDICARE

## 2022-06-10 VITALS
HEIGHT: 65 IN | BODY MASS INDEX: 41.34 KG/M2 | WEIGHT: 248.1 LBS | DIASTOLIC BLOOD PRESSURE: 78 MMHG | HEART RATE: 86 BPM | OXYGEN SATURATION: 95 % | SYSTOLIC BLOOD PRESSURE: 126 MMHG

## 2022-06-10 DIAGNOSIS — N18.30 STAGE 3 CHRONIC KIDNEY DISEASE, UNSPECIFIED WHETHER STAGE 3A OR 3B CKD (HCC): ICD-10-CM

## 2022-06-10 DIAGNOSIS — E66.01 CLASS 3 SEVERE OBESITY DUE TO EXCESS CALORIES WITH SERIOUS COMORBIDITY AND BODY MASS INDEX (BMI) OF 45.0 TO 49.9 IN ADULT (HCC): ICD-10-CM

## 2022-06-10 DIAGNOSIS — I50.32 CHRONIC DIASTOLIC HEART FAILURE (HCC): Primary | ICD-10-CM

## 2022-06-10 DIAGNOSIS — G47.33 OSA (OBSTRUCTIVE SLEEP APNEA): ICD-10-CM

## 2022-06-10 PROCEDURE — 99214 OFFICE O/P EST MOD 30 MIN: CPT | Performed by: CLINICAL NURSE SPECIALIST

## 2022-06-10 PROCEDURE — 1123F ACP DISCUSS/DSCN MKR DOCD: CPT | Performed by: CLINICAL NURSE SPECIALIST

## 2022-06-10 NOTE — PROGRESS NOTES
[address]  Progress Note    Primary Care Doctor:  Matt Zuniga MD    Chief Complaint   Patient presents with    Hypertension    Hyperlipidemia        History of Present Illness:  80 y.o. male with history of ckd (Dr Jose Ken), kamran. Dm, morbid obesity,  4/5-14/2021 for chest discomfort and SOB for months, diuresed [phone]. I had the pleasure of seeing Marcelino Plata in follow up for dHF. He is ambulatory and by his self. He continues to loose weight [phone]. He has a urostomy tube which has a dressing and foul smell coming from it. He has a home care nurse visit today and is suppose to have the dressing addressed today. His daughter is in [place]. He feels great, no shortness of breath, palpitations, lightheadedness or edema. 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 He is wearing his cpap every night    Past Medical History:   has a past medical history of Acute on chronic diastolic heart failure ([address] Acute respiratory failure with hypoxia ([address] Anesthesia, Cancer ([address] Hyperlipidemia, Hypertension, Obesity, KAMRAN treated with BiPAP, Osteoarthritis, Type 2 diabetes mellitus without complication ([address] Type 2 diabetes mellitus without complication, without long-term current use of insulin ([address] Urinary incontinence, stress, male, and Wears dentures. Surgical History:   has a past surgical history that includes Total hip arthroplasty; joint replacement (Left); shoulder surgery (Bilateral); Knee arthroscopy (Right); Cystoscopy (N/A, 6/21/2021); and CT PERITONEAL/RETROPERITONEAL PERC DRAIN (12/23/2021). Social History:   reports that he quit smoking about 21 years ago. His smoking use included cigars. He started smoking about 42 years ago. He has a 5.25 pack-year smoking history. He has quit using smokeless tobacco. He reports current alcohol use. He reports that he does not use drugs.    Family History:   Family History   Problem Relation Age of Onset    Hypertension Mother     Diabetes Mother     Hypertension Father        Home Medications:  Prior to Admission medications    Medication Sig Start Date End Date Taking? Authorizing Provider   amLODIPine (NORVASC) 10 MG tablet Take 10 mg by mouth daily   Yes Historical Provider, MD   Semaglutide, 1 MG/DOSE, 2 MG/1.5ML SOPN Inject into the skin   Yes Historical Provider, MD   metoprolol succinate (TOPROL XL) 100 MG extended release tablet TAKE 1 TABLET DAILY 3/29/22  Yes Flakita Roland MD   TRULICITY 3 SS/1.4XQ SOPN INJECT 3 MG INTO THE SKIN ONCE A WEEK 3/21/22  Yes Flakita Roland MD   atorvastatin (LIPITOR) 40 MG tablet TAKE 1 TABLET EVERY NIGHT 3/21/22  Yes Flakita Roland MD   alendronate (FOSAMAX) 70 MG tablet TAKE 1 TABLET EVERY 7 DAYS 3/4/22  Yes Flakita Roland MD   NIFEdipine (ADALAT CC) 30 MG extended release tablet TAKE 1 TABLET BY MOUTH DAILY 3/1/22  Yes Flakita Roland MD   Cholecalciferol (VITAMIN D) 50 MCG (2000 UT) CAPS capsule Take by mouth Once week    Yes Historical Provider, MD   brimonidine (ALPHAGAN) 0.2 % ophthalmic solution Apply to eye 2 times daily    Yes Historical Provider, MD   torsemide (DEMADEX) 20 MG tablet Take 20 mg by mouth daily    Yes Historical Provider, MD   finasteride (PROSCAR) 5 MG tablet Take 1 tablet by mouth daily 8/13/21  Yes Tracy Rubi PA-C   cetirizine (ZYRTEC) 5 MG tablet Take 1 tablet by mouth daily 7/14/21  Yes Flakita Roland MD   Meals on Wheels MISC by Does not apply route Patient is to receive 2-3 diabetic meals only 4/30/21  Yes Flakita Roland MD   aspirin 81 MG EC tablet Take 81 mg by mouth daily    Yes Historical Provider, MD   Acetaminophen (TYLENOL ARTHRITIS PAIN PO) Take 1 tablet by mouth daily OTC    Yes Historical Provider, MD   HYDROcodone-acetaminophen (NORCO)  MG per tablet Take 1 tablet by mouth 3 times daily as needed (Pain - back & hips).  Pain specialist    Yes Historical Provider, MD   Insulin Pen Needle ([employer] PEN NEEDLES) 31G X 6 MM MISC 1 each by Does not apply route daily 9/29/20  Yes Ada Alvarez MD   ammonium lactate (AMLACTIN) 12 % cream Apply topically as needed    Yes Historical Provider, MD   salsalate (DISALCID) 750 MG tablet Take 750 mg by mouth 2 times daily as needed  7/1/20  Yes Historical Provider, MD   vitamin B-6 (PYRIDOXINE) 50 MG tablet Take 50 mg by mouth daily   Yes Historical Provider, MD   glimepiride (AMARYL) 4 MG tablet TAKE 1 TABLET BY MOUTH EVERY MORNING BEFORE BREAKFAST  Patient not taking: Reported on 6/10/2022 5/31/22   Ada Alvarez MD   latanoprost (XALATAN) 0.005 % ophthalmic solution 1 drop nightly  Patient not taking: Reported on 5/6/2022    Historical Provider, MD   ergocalciferol (ERGOCALCIFEROL) 05536 UNITS capsule Take 50,000 Units by mouth once a week. Patient not taking: Reported on 5/6/2022    Historical Provider, MD        Allergies: Indomethacin, Statins, Succinylcholine, Unable to assess, and Actos [pioglitazone]     Review of Systems:   · Constitutional: there has been no unanticipated weight loss. There's been no change in energy level, sleep pattern, or activity level. · Eyes: No visual changes or diplopia. No scleral icterus. · ENT: No Headaches, hearing loss or vertigo. No mouth sores or sore throat. · Cardiovascular: Reviewed in HPI  · Respiratory: No cough or wheezing, no sputum production. No hematemesis. · Gastrointestinal: No abdominal pain, appetite loss, blood in stools. No change in bowel or bladder habits. · Genitourinary: No dysuria, trouble voiding, or hematuria. · Musculoskeletal:  No gait disturbance, weakness or joint complaints. · Integumentary: No rash or pruritis. · Neurological: No headache, diplopia, change in muscle strength, numbness or tingling. No change in gait, balance, coordination, mood, affect, memory, mentation, behavior. · Psychiatric: No anxiety, no depression.   · Endocrine: No malaise, fatigue or temperature intolerance. No excessive thirst, fluid intake, or urination. No tremor. · Hematologic/Lymphatic: No abnormal bruising or bleeding, blood clots or swollen lymph nodes. · Allergic/Immunologic: No nasal congestion or hives. Physical Examination:    Vitals:    06/10/22 1042   BP: 126/78   Pulse: 86   SpO2: 95%   Weight: 248 lb 1.6 oz (112.5 kg)   Height: 5' 5\" (1.651 m)        Constitutional and General Appearance: Warm and dry, no apparent distress, normal coloration right urostomy tube with foul smelling  HEENT:  Normocephalic, atraumatic  Respiratory:  · Normal excursion and expansion without use of accessory muscles  · Resp Auscultation: Normal breath sounds without dullness  Cardiovascular:  · The apical impulses not displaced  · Heart tones are crisp and normal  · JVP normal cm H2O  · Regular rate and rhythm  · Peripheral pulses are symmetrical and full  · There is no clubbing, cyanosis of the extremities.   · No ankle edema  · Pedal Pulses: 2+ and equal   Abdomen:obese  · No masses or tenderness  · Liver/Spleen: No Abnormalities Noted  Neurological/Psychiatric:  · Alert and oriented in all spheres  · Moves all extremities well  · Exhibits normal gait balance and coordination  · No abnormalities of mood, affect, memory, mentation, or behavior are noted    Lab Data:    CBC:   Lab Results   Component Value Date    WBC 6.2 05/27/2022    WBC 8.6 02/11/2022    WBC 6.7 01/31/2022    RBC 4.76 05/27/2022    RBC 4.53 02/11/2022    RBC 4.83 01/31/2022    HGB 14.4 05/27/2022    HGB 13.9 02/11/2022    HGB 14.9 01/31/2022    HCT 43.7 05/27/2022    HCT 43.0 02/11/2022    HCT 45.5 01/31/2022    MCV 91.8 05/27/2022    MCV 94.9 02/11/2022    MCV 94.3 01/31/2022    RDW 14.6 05/27/2022    RDW 14.9 02/11/2022    RDW 15.3 01/31/2022     05/27/2022     02/11/2022     01/31/2022     BMP:  Lab Results   Component Value Date     05/27/2022     05/06/2022     02/11/2022    K 4.5 05/27/2022    K 4.5 05/06/2022    K 4.2 02/11/2022    K 4.2 06/21/2021     05/27/2022     05/06/2022     02/11/2022    CO2 23 05/27/2022    CO2 21 05/06/2022    CO2 22 02/11/2022    PHOS 3.7 01/31/2022    PHOS 2.3 09/10/2021    PHOS 3.0 08/12/2021    BUN 20 05/27/2022    BUN 26 05/06/2022    BUN 16 02/11/2022    CREATININE 1.8 05/27/2022    CREATININE 1.8 05/06/2022    CREATININE 1.8 02/11/2022     BNP:   Lab Results   Component Value Date    PROBNP 244 12/13/2021    PROBNP 418 08/13/2021    PROBNP 194 05/11/2021     Cardiac Imaging:  Echo 4/7/2021   Summary   Overall left ventricular function is normal.   Ejection fraction is visually estimated to be 55 %. E/e'= 9.3 .   There is reversal of E/A inflow velocities across the mitral valve.   There is mild concentric left ventricular hypertrophy.   No definitive regional wall motion abnormalities are noted.   Grade II diastolic dysfunction with elevated LV filling pressures.   Mild to moderate tricuspid regurgitation.   Estimated pulmonary artery systolic pressure is moderately elevated at 65   mmHg assuming a right atrial pressure of 15 mmHg.   The right ventricle is mildly enlarged. TAPSE= 2.72 RV S'= 17.2   IVC size is dilated (>2.1 cm) and collapses < 50% with respiration   consistent with elevated RA pressure (15 mmHg    Stress test 8/12/2019  The LV EF is 55%  Normal global and regional wall motion in all territories. There is a medium sized, partially reversible defect in the anterior  wall consistent with moderate verena-infarct ischemia. 1.Non-diagnostic ECG for ischemia with pharmocologic stress. 2.Normal left ventriular systolic function. 3.Positive nuclear stress imaging suggestive of scar plus verena      infarct ischemia in the anterior wall.    4.Nuclear stress image findings indicate intermediate risk for      future ischemic event .     Echo 12/12/2019  Summary:  The left ventricular wall motion is normal.  Overall left ventricular ejection fraction is estimated to be 60-65%. There is mild concentric left ventricular hypertrophy. There is mild mitral regurgitation. Assessment:    1. Chronic diastolic heart failure ([address]    2. KAMRAN (obstructive sleep apnea) on cpap   3. Stage 3 chronic kidney disease, unspecified whether stage 3a or 3b CKD    4. Class 3 severe obesity due to excess calories with serious comorbidity and body mass index (BMI) of 45.0 to 49.9 in adult [facility]) Dr Celeste Jackson:   Patient Instructions   1. Continue current medications  2. Make sure home care comes out to take care of your drainage tube today  3.   RTO 6 months      I appreciate the opportunity of cooperating in the care of this individual.    Toby Carrel, APRN - CNS, CNS, 6/10/2022, 1:04 PM

## 2022-06-10 NOTE — PATIENT INSTRUCTIONS
1.  Continue current medications  2. Make sure home care comes out to take care of your drainage tube today  3.   RTO 6 months

## 2022-06-14 ENCOUNTER — OFFICE VISIT (OUTPATIENT)
Dept: ENT CLINIC | Age: 72
End: 2022-06-14
Payer: MEDICARE

## 2022-06-14 ENCOUNTER — PROCEDURE VISIT (OUTPATIENT)
Dept: AUDIOLOGY | Age: 72
End: 2022-06-14
Payer: MEDICARE

## 2022-06-14 VITALS — TEMPERATURE: 97.1 F | DIASTOLIC BLOOD PRESSURE: 83 MMHG | SYSTOLIC BLOOD PRESSURE: 136 MMHG | HEART RATE: 58 BPM

## 2022-06-14 DIAGNOSIS — H90.3 SENSORINEURAL HEARING LOSS (SNHL) OF BOTH EARS: Primary | ICD-10-CM

## 2022-06-14 PROCEDURE — 99203 OFFICE O/P NEW LOW 30 MIN: CPT | Performed by: STUDENT IN AN ORGANIZED HEALTH CARE EDUCATION/TRAINING PROGRAM

## 2022-06-14 PROCEDURE — 92557 COMPREHENSIVE HEARING TEST: CPT | Performed by: AUDIOLOGIST

## 2022-06-14 PROCEDURE — 1123F ACP DISCUSS/DSCN MKR DOCD: CPT | Performed by: STUDENT IN AN ORGANIZED HEALTH CARE EDUCATION/TRAINING PROGRAM

## 2022-06-14 PROCEDURE — 92567 TYMPANOMETRY: CPT | Performed by: AUDIOLOGIST

## 2022-06-14 NOTE — PROGRESS NOTES
[facility]  Division of Audiology/Otolaryngology    6/14/2022     Patient name: Ruth Jung  Primary Care Physician: Isacc Gómez MD   Medical Record Number: [id number]     Ruth Jung   1950, 80 y.o. male   [id number]       Referring Provider: Richie Petty DO  Referral Type: In an order in [address] [place] [address]    Reason for Visit: Evaluation of the cause of disorders of hearing, tinnitus, or balance. ADULT AUDIOLOGIC EVALUATION                    Ruth Jung is a 80 y.o. male seen today, 6/14/2022 , for a same-day add-on comprehensive audiologic evaluation. Patient was seen by Richie Petty DO following today's evaluation. AUDIOLOGIC AND OTHER PERTINENT MEDICAL HISTORY:      Ruth Jung presents with longstanding history of hearing loss. Left ear is worse. Wears amplification bilaterally with minimal success. [facility]. Lost 2 court cases because he could not hear the . Worked in [employer] in the past,  He notes much difficulty with clarity of speech while conversing with others. Medical history is reportedly significant for cardiovascular problems, type 2 diabetes. IMPRESSIONS:      Today's results are consistent with bilateral severe sensorineural hearing loss with poor word recognition for both ears, and normal middle ear function. Hearing loss is significant enough to result in difficulty understanding speech in most listening environments. Patient to follow medical recommendations per  Richie Petty DO.    ASSESSMENT AND FINDINGS:     Otoscopy revealed: Visible tympanic membrane bilaterally    Reliability: Good   Transducer:  Inserts    RIGHT EAR:  Hearing Sensitivity: Moderate to profound sensorineural  hearing loss  Speech Recognition Threshold: 75 dB HL  Word Recognition: Very Poor (0-59%), based on NU-6   Tympanometry: Normal peak pressure and compliance, Type A tympanogram, consistent with normal middle ear function. Acoustic Reflexes: Ipsilateral: Present at elevated sensation levels and Absent at isolated frequencies. LEFT EAR:  Hearing Sensitivity: Moderate to profound sensorineural  hearing loss  Speech Recognition Threshold: 50 dB HL  Word Recognition: Very Poor (0-59%), based on NU-6   Tympanometry: Normal peak pressure and compliance, Type A tympanogram, consistent with normal middle ear function. Acoustic Reflexes: Ipsilateral: Present at elevated sensation levels and Absent at isolated frequencies. COUNSELING:      Reviewed purpose of testing completed today, general auditory system function, and results obtained today. The following items are recommended based on patient report and results from today's appointment:   - Continue medical follow-up with Isa Peck DO.   - Retest hearing as medically indicated and/or sooner if a change in hearing is noted. Chart CC'd to: Isa Peck DO      Degree of   Hearing Sensitivity dB Range   Within Normal Limits (WNL) 0 - 20   Mild 20 - 40   Moderate 40 - 55   Moderately-Severe 55 - 70   Severe 70 - 90   Profound 90 +        RECOMMENDATIONS:        Isa Peck DO to medically advise.     Jaguar Guerrero  Audiologist    Electronically signed by Jaguar Guerrero on 6/14/22 at 9:19 AM.

## 2022-06-14 NOTE — PROGRESS NOTES
[address] [facility] SURGERY  NEW PATIENT HISTORY AND PHYSICAL NOTE      Patient Name: Awilda Abebe Record Number:  [id number]  Primary Care Physician:  Koby Davey MD    ChiefComplaint     Chief Complaint   Patient presents with    Hearing Problem     had a hearing test  , trouble hearing in both ears       History of Present Illness     Justine Mckeon is an 80 y.o. male presenting with hearing loss. Decreased hearing over gradually over the past 4 years. Has follows with [address] where he purchased hearing aids a couple years ago. Lost 2 court cases because he could not hear the . Worked in loud [employer] in the past, worse hearing protection. Feels like he does not know how to work his hearing aids, batteries are going bad. Has inquired with [address] but not getting good service there. Denies tinnitus. No otalgia. No otorrhea. No history of chronic ear infections. No history of otologic surgery. No family history of early onset hearing loss. Denies exposure to chemotherapy. Denies vertigo or dizziness.        Past Medical History     Past Medical History:   Diagnosis Date    Acute on chronic diastolic heart failure (HCC)     Acute respiratory failure with hypoxia ([address] 4/5/2021    Anesthesia     anxiety regarding ETT    Cancer ([address]     prostate    Hyperlipidemia     Hypertension     Obesity     KAMRAN treated with BiPAP     nasal    Osteoarthritis     Type 2 diabetes mellitus without complication (HCC)     Type 2 diabetes mellitus without complication, without long-term current use of insulin ([address] 2/7/2018    Urinary incontinence, stress, male     Wears dentures        Past Surgical History     Past Surgical History:   Procedure Laterality Date    CT RETROPERITONEAL PERC DRAIN  12/23/2021    CT RETROPERITONEAL PERC DRAIN 12/23/2021 [facility] CT SCAN    CYSTOSCOPY N/A 6/21/2021    CYSTOSCOPY, TRANSURETHRAL INCISION OF BLADDER NECK CONTRACTURE WITH COLD KNIFE AND COMPLEX URETHRAL CATHETER PLACEMENT performed by Destini Beyer MD at [address] Left     THR    KNEE ARTHROSCOPY Right     SHOULDER SURGERY Bilateral     TOTAL HIP ARTHROPLASTY         Family History     Family History   Problem Relation Age of Onset    Hypertension Mother     Diabetes Mother     Hypertension Father        Social History     Social History     Tobacco Use    Smoking status: Former Smoker     Packs/day: 0.25     Years: 21.00     Pack years: 5.25     Types: Cigars     Start date: 37     Quit date: 2001     Years since quittin.4    Smokeless tobacco: Former User   Vaping Use    Vaping Use: Never used   Substance Use Topics    Alcohol use: Yes     Comment: occ    Drug use: Never        Allergies     Allergies   Allergen Reactions    Indomethacin Other (See Comments)     Dr. Ashanti Palacios told him not to take because affects the bladder.  Statins Other (See Comments)    Succinylcholine      ? ? Succinylcholine \"allergy\"  Pt. Should be worked  Up for pseudocholinesterase deficiency. Pt. States he qwas told in the past that he had trouble with an anesthesia drug that begins with an \"s\" and that it took him awhile to breathe in PACU, but could provide no further details.      Unable To Assess       Anesthesia that begins with S- stops my breathing- daughter reports succinylcholine   Questionable pseudocholinesterase deficiency    Actos [Pioglitazone] Rash       Medications     Current Outpatient Medications   Medication Sig Dispense Refill    glimepiride (AMARYL) 4 MG tablet TAKE 1 TABLET BY MOUTH EVERY MORNING BEFORE BREAKFAST 90 tablet 0    amLODIPine (NORVASC) 10 MG tablet Take 10 mg by mouth daily      Semaglutide, 1 MG/DOSE, 2 MG/1.5ML SOPN Inject into the skin      metoprolol succinate (TOPROL XL) 100 MG extended release tablet TAKE 1 TABLET DAILY 90 tablet 1    TRULICITY 3 SV/4.3HY SOPN INJECT 3 MG INTO THE SKIN ONCE A WEEK 6 mL 1    atorvastatin (LIPITOR) 40 MG tablet TAKE 1 TABLET EVERY NIGHT 90 tablet 1    alendronate (FOSAMAX) 70 MG tablet TAKE 1 TABLET EVERY 7 DAYS 12 tablet 3    NIFEdipine (ADALAT CC) 30 MG extended release tablet TAKE 1 TABLET BY MOUTH DAILY 90 tablet 1    Cholecalciferol (VITAMIN D) 50 MCG (2000 UT) CAPS capsule Take by mouth Once week       brimonidine (ALPHAGAN) 0.2 % ophthalmic solution Apply to eye 2 times daily       latanoprost (XALATAN) 0.005 % ophthalmic solution 1 drop nightly       torsemide (DEMADEX) 20 MG tablet Take 20 mg by mouth daily       finasteride (PROSCAR) 5 MG tablet Take 1 tablet by mouth daily 30 tablet 1    cetirizine (ZYRTEC) 5 MG tablet Take 1 tablet by mouth daily 90 tablet 1    Meals on Wheels MISC by Does not apply route Patient is to receive 2-3 diabetic meals only 1 each 0    aspirin 81 MG EC tablet Take 81 mg by mouth daily       Acetaminophen (TYLENOL ARTHRITIS PAIN PO) Take 1 tablet by mouth daily OTC       HYDROcodone-acetaminophen (NORCO)  MG per tablet Take 1 tablet by mouth 3 times daily as needed (Pain - back & hips). Pain specialist       Insulin Pen Needle ([employer] PEN NEEDLES) 31G X 6 MM MISC 1 each by Does not apply route daily 100 each 3    ammonium lactate (AMLACTIN) 12 % cream Apply topically as needed       salsalate (DISALCID) 750 MG tablet Take 750 mg by mouth 2 times daily as needed       vitamin B-6 (PYRIDOXINE) 50 MG tablet Take 50 mg by mouth daily      ergocalciferol (ERGOCALCIFEROL) 59450 UNITS capsule Take 50,000 Units by mouth once a week        No current facility-administered medications for this visit.        Review of Systems     REVIEW OF SYSTEMS    See HPI Above    PhysicalExam     Vitals:    06/14/22 0946   BP: 136/83   Site: Right Upper Arm   Position: Sitting   Cuff Size: Medium Adult   Pulse: 58   Temp: 97.1 °F (36.2 °C)   TempSrc: Temporal       PHYSICAL EXAM  /83 (Site: Right Upper Arm, Position: Sitting, Cuff Size: Medium Adult)   Pulse 58   Temp 97.1 °F (36.2 °C) (Temporal)     GENERAL: No acute distress, alert and oriented  EYES: EOMI, Anti-icteric  NOSE: On anterior rhinoscopy there is no epistaxis, nasal mucosa moist and normal appearing, no purulent drainage. EARS: Normal external appearance; on portable otomicroscopy:     -Ad: External auditory canal without stenosis, tympanic membrane clear, no middle ear effusions or retractions.      -As: External auditory canal without stenosis, tympanic membrane clear, no middle ear effusions or retractions. Pneumatic otoscopy: Bilateral tympanic membranes mobile pneumatic otoscopy  FACE: HB 1/6 bilaterally, symmetric appearing, sensation equal bilaterally  ORAL CAVITY: No masses or lesions visualized or palpated, uvula is midline, moist mucous membranes, no oropharyngeal masses or oropharyngeal obstruction  NECK: Large neck circumference. Normal range of motion, no thyromegaly, trachea is midline, no palpable lymphadenopathy or neck masses, no crepitus  NEURO: Cranial Nerves 2, 3, 4, 5, 6, 7, 11, 12 grossly intact bilaterally     I have performed a head and neck physical exam personally or was physically present during the key or critical portions of the service. Data/Imaging Review     Comprehensive audiological evaluation performed in the office today by Nathaniel POP was independently reviewed by myself which demonstrates: Au: Moderate to profound SNHL    WRS 25% right, WRS 48% left. Type A tympanogram right. Type A tympanogram left. Assessment and Plan     1. Sensorineural hearing loss (SNHL) of both ears  -She has hearing aids that approximately 3to 1years old obtained from [employer]. He feels like these are not giving him the improvement that he would like, especially since he feels like they are not working as they should and he does not know how to use them properly.   He has tried to go back to the audiology office he obtained them from but states that their customer service is quite terrible. He can consider purchasing a new set of hearing aids from another audiology office. We do distribute hearing aids through our office or he can consider going elsewhere, especially if pricing is better. Although he has quite diminished word recognition scores, worse on the right, I would attempt to trial amplification with a new set of hearing aids first before consideration of cochlear implantation as he may not qualify for cochlear implantation and given his age and comorbidities would be a poor surgical candidate. Patient protection and loud noise environments  - Repeat audiogram in 1 year      Follow Up     Return in about 1 year (around 6/14/2023). Liban Saravia   Department of Otolaryngology/Head & Neck Surgery  6/14/22    Medical Decision Making: The following items were considered in medical decision making:  Independent review of images  Review / order clinical lab tests  Review / order radiology tests  Decision to obtain old records    This note was generated completely or in part utilizing Dragon dictation speech recognition software. Occasionally, words are mistranscribed and despite editing, the text may contain inaccuracies due to incorrect word recognition. If further clarification is needed please contact the office at [phone].

## 2022-06-21 ENCOUNTER — HOSPITAL ENCOUNTER (OUTPATIENT)
Age: 72
Discharge: HOME | End: 2022-06-21
Payer: MEDICARE

## 2022-06-21 LAB
ALBUMIN SERPL-MCNC: 4.2 G/DL (ref 3.4–5)
ANION GAP SERPL CALCULATED.3IONS-SCNC: 20 MMOL/L (ref 3–16)
BUN BLDV-MCNC: 24 MG/DL (ref 7–20)
CALCIUM SERPL-MCNC: 9.8 MG/DL (ref 8.3–10.6)
CHLORIDE BLD-SCNC: 98 MMOL/L (ref 99–110)
CO2: 22 MMOL/L (ref 21–32)
CREAT SERPL-MCNC: 2.2 MG/DL (ref 0.8–1.3)
CREATININE URINE: 66.5 MG/DL (ref 39–259)
FERRITIN: 263.9 NG/ML (ref 30–400)
GFR AFRICAN AMERICAN: 36
GFR NON-AFRICAN AMERICAN: 30
GLUCOSE BLD-MCNC: 94 MG/DL (ref 70–99)
HCT VFR BLD CALC: 43.5 % (ref 40.5–52.5)
HEMOGLOBIN: 14.4 G/DL (ref 13.5–17.5)
MAGNESIUM: 2.1 MG/DL (ref 1.8–2.4)
MCH RBC QN AUTO: 30.5 PG (ref 26–34)
MCHC RBC AUTO-ENTMCNC: 33.1 G/DL (ref 31–36)
MCV RBC AUTO: 92.3 FL (ref 80–100)
MICROALBUMIN UR-MCNC: 113.6 MG/DL
MICROALBUMIN/CREAT UR-RTO: 1708.3 MG/G (ref 0–30)
PARATHYROID HORMONE INTACT: 177.6 PG/ML (ref 14–72)
PDW BLD-RTO: 14.7 % (ref 12.4–15.4)
PHOSPHORUS: 2.9 MG/DL (ref 2.5–4.9)
PLATELET # BLD: 388 K/UL (ref 135–450)
PMV BLD AUTO: 7.2 FL (ref 5–10.5)
POTASSIUM SERPL-SCNC: 3.8 MMOL/L (ref 3.5–5.1)
RBC # BLD: 4.71 M/UL (ref 4.2–5.9)
SODIUM BLD-SCNC: 140 MMOL/L (ref 136–145)
TRANSFERRIN: 227 MG/DL (ref 200–360)
URIC ACID, SERUM: 7.6 MG/DL (ref 3.5–7.2)
VITAMIN D 25-HYDROXY: 51.5 NG/ML
WBC # BLD: 6.2 K/UL (ref 4–11)

## 2022-06-21 PROCEDURE — 82306 VITAMIN D 25 HYDROXY: CPT

## 2022-06-21 PROCEDURE — 82728 ASSAY OF FERRITIN: CPT

## 2022-06-21 PROCEDURE — 84466 ASSAY OF TRANSFERRIN: CPT

## 2022-06-21 PROCEDURE — 83735 ASSAY OF MAGNESIUM: CPT

## 2022-06-21 PROCEDURE — 80069 RENAL FUNCTION PANEL: CPT

## 2022-06-21 PROCEDURE — 82570 ASSAY OF URINE CREATININE: CPT

## 2022-06-21 PROCEDURE — 82043 UR ALBUMIN QUANTITATIVE: CPT

## 2022-06-21 PROCEDURE — 84550 ASSAY OF BLOOD/URIC ACID: CPT

## 2022-06-21 PROCEDURE — 36415 COLL VENOUS BLD VENIPUNCTURE: CPT

## 2022-06-21 PROCEDURE — 85027 COMPLETE CBC AUTOMATED: CPT

## 2022-06-21 PROCEDURE — 83970 ASSAY OF PARATHORMONE: CPT

## 2022-07-13 ENCOUNTER — TELEPHONE (OUTPATIENT)
Dept: INTERNAL MEDICINE CLINIC | Age: 72
End: 2022-07-13

## 2022-07-13 NOTE — TELEPHONE ENCOUNTER
----- Message from [facility] sent at 7/13/2022 10:54 AM EDT -----  Subject: Message to Provider    QUESTIONS  Information for Provider? Pt daughter called is trying to find out if   orders have been faxed. Hippa needs to be updated I did speak to Dad and   he did give permission to speak to Vernahayden Blanca. ---------------------------------------------------------------------------  --------------  Mindi OROZCO  [phone]; OK to leave message on voicemail  ---------------------------------------------------------------------------  --------------  SCRIPT ANSWERS  Relationship to Patient?  Self

## 2022-07-13 NOTE — TELEPHONE ENCOUNTER
Pt daughter called is trying to find out if   orders have been faxed. Hippa needs to be updated I did speak to Dad and   he did give permission to speak to Zaid Weinberg. Called and spoke with patients daughter. She stated patients urologist was trying to fax a order for patient to have a urine test done. Called and spoke with patients urologist, Dr Gregg Bryant, his nurse stated she does not see anything in the chart about them sending a order for a urine test to be done by PCP. Called patients nephrologist, Dr Janina Orozco, the nurse there stated she does not have any notes in the chart and the patient is not due to even seen them until October. Attempted to reach back out to patients daughter. Unable to leave voicemail, mailbox is full.  Please inform patient of the above explanation

## 2022-07-14 ENCOUNTER — TELEPHONE (OUTPATIENT)
Dept: INTERNAL MEDICINE CLINIC | Age: 72
End: 2022-07-14

## 2022-07-14 NOTE — TELEPHONE ENCOUNTER
Pavithra Orantes called inquiring about orders. Left contact information to reach out to the surgery scheduler at the [place] office. Please contact Fausto Maurer [phone].

## 2022-07-15 NOTE — TELEPHONE ENCOUNTER
Called and spoke with Kyler Justice. Our office does not draw from caths for urine samples.  Kyler Justice will call and talk with patients daughter about other options

## 2022-08-09 ENCOUNTER — TELEPHONE (OUTPATIENT)
Dept: INTERNAL MEDICINE CLINIC | Age: 72
End: 2022-08-09

## 2022-08-09 NOTE — TELEPHONE ENCOUNTER
----- Message from [address] sent at 8/8/2022  9:24 AM EDT -----  Subject: Medication Problem    Medication: salsalate (DISALCID) 750 MG tablet  Dosage: 2/day  Ordering Provider: shawn    Question/Problem: Patient's kidney doctor would like something similar   prescribed that isn't so hard on his kidneys    Pharmacy: [place] [phone]    ---------------------------------------------------------------------------  --------------  Zina OROZCO  [phone]; OK to leave message on voicemail  ---------------------------------------------------------------------------  --------------    SCRIPT ANSWERS  Relationship to Patient: Self

## 2022-08-10 ENCOUNTER — OFFICE VISIT (OUTPATIENT)
Dept: INTERNAL MEDICINE CLINIC | Age: 72
End: 2022-08-10
Payer: MEDICARE

## 2022-08-10 VITALS
OXYGEN SATURATION: 99 % | WEIGHT: 241.4 LBS | DIASTOLIC BLOOD PRESSURE: 70 MMHG | BODY MASS INDEX: 40.17 KG/M2 | HEART RATE: 85 BPM | SYSTOLIC BLOOD PRESSURE: 120 MMHG

## 2022-08-10 DIAGNOSIS — M54.32 LEFT SCIATIC NERVE PAIN: Primary | ICD-10-CM

## 2022-08-10 DIAGNOSIS — N18.30 TYPE 2 DIABETES MELLITUS WITH STAGE 3 CHRONIC KIDNEY DISEASE, WITHOUT LONG-TERM CURRENT USE OF INSULIN, UNSPECIFIED WHETHER STAGE 3A OR 3B CKD (HCC): ICD-10-CM

## 2022-08-10 DIAGNOSIS — E11.22 TYPE 2 DIABETES MELLITUS WITH STAGE 3 CHRONIC KIDNEY DISEASE, WITHOUT LONG-TERM CURRENT USE OF INSULIN, UNSPECIFIED WHETHER STAGE 3A OR 3B CKD (HCC): ICD-10-CM

## 2022-08-10 DIAGNOSIS — L85.3 DRY SKIN DERMATITIS: ICD-10-CM

## 2022-08-10 PROCEDURE — 1123F ACP DISCUSS/DSCN MKR DOCD: CPT | Performed by: NURSE PRACTITIONER

## 2022-08-10 PROCEDURE — 3044F HG A1C LEVEL LT 7.0%: CPT | Performed by: NURSE PRACTITIONER

## 2022-08-10 PROCEDURE — 99213 OFFICE O/P EST LOW 20 MIN: CPT | Performed by: NURSE PRACTITIONER

## 2022-08-10 RX ORDER — PREDNISONE 10 MG/1
10 TABLET ORAL 2 TIMES DAILY
Qty: 10 TABLET | Refills: 0 | Status: SHIPPED | OUTPATIENT
Start: 2022-08-10 | End: 2022-08-15

## 2022-08-10 ASSESSMENT — ENCOUNTER SYMPTOMS
ALLERGIC/IMMUNOLOGIC NEGATIVE: 1
BACK PAIN: 1
RESPIRATORY NEGATIVE: 1
EYES NEGATIVE: 1
GASTROINTESTINAL NEGATIVE: 1

## 2022-08-10 NOTE — PATIENT INSTRUCTIONS
Place plywood between mattress and box springs  Continue to drink plenty of water  Do back exercises at least once a day  Take tylenol extra every 8 hour  Heating pad to low back while driving and as needed for pain  Apply aquaphor to lower extremity    Get hand held shower head

## 2022-08-10 NOTE — PROGRESS NOTES
Quinn Mcdonald (:  1950) is a 80 y.o. male,Established patient, here for evaluation of the following chief complaint(s):  Back Pain and Neck Pain         ASSESSMENT/PLAN:    Wilton Yoder was seen today for back pain and neck pain. Diagnoses and all orders for this visit:    Left sciatic nerve pain  -     [facility]  -     predniSONE (DELTASONE) 10 MG tablet; Take 1 tablet by mouth in the morning and 1 tablet before bedtime. Do all this for 5 days. Dry skin dermatitis  -     mineral oil-hydrophilic petrolatum (AQUAPHOR) ointment; Apply topically as needed for Dry Skin (2-4 times a day) Apply topically as needed. Place plywood between mattress and box springs  Continue to drink plenty of water  Do back exercises at least once a day  Take tylenol extra every 8 hour  Heating pad to low back while driving and as needed for pain  Apply aquaphor to lower extremity    Get hand held shower head      Subjective   SUBJECTIVE/OBJECTIVE:  HPI Presents with pain in left hip, requires him to lie on his stomach to get relief. History of prostate cancer. Labs reviewed, A1c discussed with client is in normal range at last check. Review of Systems   Constitutional: Negative. HENT: Negative. Eyes: Negative. Respiratory: Negative. Cardiovascular: Negative. Gastrointestinal: Negative. Endocrine: Negative. Genitourinary:         Suprapubic tube in place   Musculoskeletal:  Positive for back pain and gait problem. Skin: Negative. Allergic/Immunologic: Negative. Hematological: Negative. Psychiatric/Behavioral: Negative.         Vitals:    08/10/22 1009   BP: 120/70   Pulse: 85   SpO2: 99%      BP Readings from Last 3 Encounters:   08/10/22 120/70   22 136/83   06/10/22 126/78      Wt Readings from Last 3 Encounters:   08/10/22 241 lb 6.4 oz (109.5 kg)   06/10/22 248 lb 1.6 oz (112.5 kg)   22 254 lb 3.2 oz (115.3 kg)      Objective Physical Exam  Constitutional:       Appearance: Normal appearance. He is obese. Cardiovascular:      Rate and Rhythm: Normal rate and regular rhythm. Heart sounds: Normal heart sounds. Pulmonary:      Effort: Pulmonary effort is normal.      Breath sounds: Normal breath sounds and air entry. Musculoskeletal:      Cervical back: Normal.        Back:       Comments: Lumbar area is tender. Leg knots extremely painful with light palpation. Intermittently has pain radiating down his left thigh area. Skin:     Findings: Rash present. Rash is crusting and scaling. Comments: Large hyperpigmented plaques noted on skin, scaly in nature dry nonpruritic. States he has been unable to take a bath since his surgery, and the rash is becoming worse   Neurological:      Mental Status: He is alert. An electronic signature was used to authenticate this note.     --OSMAN Valenzuela - CNP

## 2022-08-15 NOTE — TELEPHONE ENCOUNTER
Per Dr. Vladimir Butler: Please let patient know, the nephrologist can recommend a different NSAID. This was originally ordered by orthopedics. LVM for patient with that information.

## 2022-08-21 ENCOUNTER — HOSPITAL ENCOUNTER (EMERGENCY)
Age: 72
Discharge: HOME | End: 2022-08-21
Payer: MEDICARE

## 2022-08-21 VITALS
HEIGHT: 65 IN | HEART RATE: 91 BPM | RESPIRATION RATE: 20 BRPM | BODY MASS INDEX: 40.15 KG/M2 | SYSTOLIC BLOOD PRESSURE: 159 MMHG | WEIGHT: 241 LBS | OXYGEN SATURATION: 95 % | TEMPERATURE: 98.5 F | DIASTOLIC BLOOD PRESSURE: 85 MMHG

## 2022-08-21 DIAGNOSIS — S39.012A STRAIN OF LUMBAR REGION, INITIAL ENCOUNTER: Primary | ICD-10-CM

## 2022-08-21 LAB
A/G RATIO: 0.8 (ref 1.1–2.2)
ALBUMIN SERPL-MCNC: 3.7 G/DL (ref 3.4–5)
ALP BLD-CCNC: 122 U/L (ref 40–129)
ALT SERPL-CCNC: 18 U/L (ref 10–40)
ANION GAP SERPL CALCULATED.3IONS-SCNC: 12 MMOL/L (ref 3–16)
AST SERPL-CCNC: 17 U/L (ref 15–37)
BASOPHILS ABSOLUTE: 0 K/UL (ref 0–0.2)
BASOPHILS RELATIVE PERCENT: 0.6 %
BILIRUB SERPL-MCNC: 0.5 MG/DL (ref 0–1)
BUN BLDV-MCNC: 15 MG/DL (ref 7–20)
CALCIUM SERPL-MCNC: 9.8 MG/DL (ref 8.3–10.6)
CHLORIDE BLD-SCNC: 105 MMOL/L (ref 99–110)
CO2: 22 MMOL/L (ref 21–32)
CREAT SERPL-MCNC: 1.8 MG/DL (ref 0.8–1.3)
EOSINOPHILS ABSOLUTE: 0.4 K/UL (ref 0–0.6)
EOSINOPHILS RELATIVE PERCENT: 5.6 %
GFR AFRICAN AMERICAN: 45
GFR NON-AFRICAN AMERICAN: 37
GLUCOSE BLD-MCNC: 103 MG/DL (ref 70–99)
HCT VFR BLD CALC: 45.7 % (ref 40.5–52.5)
HEMOGLOBIN: 14.6 G/DL (ref 13.5–17.5)
LYMPHOCYTES ABSOLUTE: 2.4 K/UL (ref 1–5.1)
LYMPHOCYTES RELATIVE PERCENT: 33.3 %
MCH RBC QN AUTO: 29.5 PG (ref 26–34)
MCHC RBC AUTO-ENTMCNC: 32 G/DL (ref 31–36)
MCV RBC AUTO: 92.4 FL (ref 80–100)
MONOCYTES ABSOLUTE: 0.8 K/UL (ref 0–1.3)
MONOCYTES RELATIVE PERCENT: 11.2 %
NEUTROPHILS ABSOLUTE: 3.6 K/UL (ref 1.7–7.7)
NEUTROPHILS RELATIVE PERCENT: 49.3 %
PDW BLD-RTO: 15.1 % (ref 12.4–15.4)
PLATELET # BLD: 444 K/UL (ref 135–450)
PMV BLD AUTO: 6.8 FL (ref 5–10.5)
POTASSIUM SERPL-SCNC: 4.8 MMOL/L (ref 3.5–5.1)
RBC # BLD: 4.94 M/UL (ref 4.2–5.9)
SODIUM BLD-SCNC: 139 MMOL/L (ref 136–145)
TOTAL PROTEIN: 8.2 G/DL (ref 6.4–8.2)
WBC # BLD: 7.2 K/UL (ref 4–11)

## 2022-08-21 PROCEDURE — 85025 COMPLETE CBC W/AUTO DIFF WBC: CPT

## 2022-08-21 PROCEDURE — 99283 EMERGENCY DEPT VISIT LOW MDM: CPT

## 2022-08-21 PROCEDURE — 80053 COMPREHEN METABOLIC PANEL: CPT

## 2022-08-21 RX ORDER — PREDNISONE 20 MG/1
20 TABLET ORAL 2 TIMES DAILY
Qty: 10 TABLET | Refills: 0 | Status: SHIPPED | OUTPATIENT
Start: 2022-08-21 | End: 2022-08-26

## 2022-08-21 ASSESSMENT — PAIN - FUNCTIONAL ASSESSMENT: PAIN_FUNCTIONAL_ASSESSMENT: 0-10

## 2022-08-21 ASSESSMENT — ENCOUNTER SYMPTOMS
ABDOMINAL PAIN: 0
BACK PAIN: 1
RHINORRHEA: 0
DIARRHEA: 0
SORE THROAT: 0
COUGH: 0
VOMITING: 0
NAUSEA: 0
EYE PAIN: 0
CONSTIPATION: 0
SHORTNESS OF BREATH: 0

## 2022-08-21 ASSESSMENT — PAIN DESCRIPTION - PAIN TYPE: TYPE: ACUTE PAIN

## 2022-08-21 ASSESSMENT — PAIN DESCRIPTION - DESCRIPTORS: DESCRIPTORS: NUMBNESS

## 2022-08-21 ASSESSMENT — PAIN DESCRIPTION - LOCATION: LOCATION: BACK

## 2022-08-21 ASSESSMENT — PAIN SCALES - GENERAL: PAINLEVEL_OUTOF10: 10

## 2022-08-21 NOTE — ED PROVIDER NOTES
[address] [facility]        Pt Name: Zee Reilly  MRN: [id number]  [place] 1950  Date of evaluation: 8/21/2022  Provider: KYLIE Thornton  PCP: Billie Vinson MD  Note Started: 2:44 PM EDT       ELIAS. I have evaluated this patient. My supervising physician was available for consultation. CHIEF COMPLAINT       Chief Complaint   Patient presents with    Back Pain     Arrived per ambulance d/t lower back pain was seen at [id number] [facility] for same issue on 8/10/2022       HISTORY OF PRESENT ILLNESS   (Location, Timing/Onset, Context/Setting, Quality, Duration, Modifying Factors, Severity, Associated Signs and Symptoms)  Note limiting factors. Chief Complaint: Left lower back pain    Zee Reilly is a 80 y.o. male who presents to the emergency department due to left lower back pain for the past 10 days. Patient states he has a history of chronic lower back pain but states that has been flared up over the last 10 days. Patient states that he was given steroids in the past which have been very helpful for his pain. Patient states that his pain also radiates down his leg which is common for him. Patient states that he has had an MRI done in the past year that showed that he had degenerative disc disease and sciatica  \"pinched nerves\". Patient denies IV drug use, spinal injections, fevers, chills, nausea or vomiting. Patient denies any abdominal pain. Patient has any chest pain or shortness of breath. Patient is stating that he be is wanting to be put back on steroids as it was very helpful for him in the past.  Patient also sees a pain specialist who gives him Vicodin for his pain but is not very helpful. Patient states that he has an appointment with his pain specialist on Thursday. Patient denies any falls or traumas recently. Patient denies any saddle paresthesias or bowel bladder incontinence.     Nursing Notes were all reviewed and agreed with or any disagreements were addressed in the HPI. REVIEW OF SYSTEMS    (2-9 systems for level 4, 10 or more for level 5)     Review of Systems   Constitutional:  Negative for chills, diaphoresis and fever. HENT:  Negative for congestion, rhinorrhea and sore throat. Eyes:  Negative for pain and visual disturbance. Respiratory:  Negative for cough and shortness of breath. Cardiovascular:  Negative for chest pain and leg swelling. Gastrointestinal:  Negative for abdominal pain, constipation, diarrhea, nausea and vomiting. Genitourinary:  Negative for difficulty urinating, dysuria and frequency. Musculoskeletal:  Positive for back pain. Negative for neck pain. Skin:  Negative for rash and wound. Neurological:  Negative for dizziness and light-headedness. Positives and Pertinent negatives as per HPI. Except as noted above in the ROS, all other systems were reviewed and negative.        PAST MEDICAL HISTORY     Past Medical History:   Diagnosis Date    Acute on chronic diastolic heart failure (HCC)     Acute respiratory failure with hypoxia ([address] 4/5/2021    Anesthesia     anxiety regarding ETT    Cancer ([address]     prostate    Hyperlipidemia     Hypertension     Obesity     KAMRAN treated with BiPAP     nasal    Osteoarthritis     Type 2 diabetes mellitus without complication ([address]     Type 2 diabetes mellitus without complication, without long-term current use of insulin ([address] 2/7/2018    Urinary incontinence, stress, male     Wears dentures          SURGICAL HISTORY     Past Surgical History:   Procedure Laterality Date    CT RETROPERITONEAL PERC DRAIN  12/23/2021    CT RETROPERITONEAL PERC DRAIN 12/23/2021 [facility] CT SCAN    CYSTOSCOPY N/A 6/21/2021    CYSTOSCOPY, TRANSURETHRAL INCISION OF BLADDER NECK CONTRACTURE WITH COLD KNIFE AND COMPLEX URETHRAL CATHETER PLACEMENT performed by Juan Henry MD at [facility] Left     THR    KNEE ARTHROSCOPY Right SHOULDER SURGERY Bilateral     TOTAL HIP ARTHROPLASTY           CURRENTMEDICATIONS       Previous Medications    ACETAMINOPHEN (TYLENOL ARTHRITIS PAIN PO)    Take 1 tablet by mouth daily OTC     ALENDRONATE (FOSAMAX) 70 MG TABLET    TAKE 1 TABLET EVERY 7 DAYS    AMLODIPINE (NORVASC) 10 MG TABLET    Take 10 mg by mouth daily    AMMONIUM LACTATE (AMLACTIN) 12 % CREAM    Apply topically as needed     ASPIRIN 81 MG EC TABLET    Take 81 mg by mouth daily     ATORVASTATIN (LIPITOR) 40 MG TABLET    TAKE 1 TABLET EVERY NIGHT    BRIMONIDINE (ALPHAGAN) 0.2 % OPHTHALMIC SOLUTION    Apply to eye 2 times daily     CETIRIZINE (ZYRTEC) 5 MG TABLET    Take 1 tablet by mouth daily    CHOLECALCIFEROL (VITAMIN D) 50 MCG (2000 UT) CAPS CAPSULE    Take by mouth Once week     ERGOCALCIFEROL (ERGOCALCIFEROL) 65574 UNITS CAPSULE    Take 50,000 Units by mouth once a week     FINASTERIDE (PROSCAR) 5 MG TABLET    Take 1 tablet by mouth daily    GLIMEPIRIDE (AMARYL) 4 MG TABLET    TAKE 1 TABLET BY MOUTH EVERY MORNING BEFORE BREAKFAST    HYDROCODONE-ACETAMINOPHEN (NORCO)  MG PER TABLET    Take 1 tablet by mouth 3 times daily as needed (Pain - back & hips). Pain specialist     INSULIN PEN NEEDLE ([employer] PEN NEEDLES) 31G X 6 MM MISC    1 each by Does not apply route daily    LATANOPROST (XALATAN) 0.005 % OPHTHALMIC SOLUTION    1 drop nightly     MEALS ON WHEELS MISC    by Does not apply route Patient is to receive 2-3 diabetic meals only    METOPROLOL SUCCINATE (TOPROL XL) 100 MG EXTENDED RELEASE TABLET    TAKE 1 TABLET DAILY    MINERAL OIL-HYDROPHILIC PETROLATUM (AQUAPHOR) OINTMENT    Apply topically as needed for Dry Skin (2-4 times a day) Apply topically as needed.     NIFEDIPINE (ADALAT CC) 30 MG EXTENDED RELEASE TABLET    TAKE 1 TABLET BY MOUTH DAILY    SALSALATE (DISALCID) 750 MG TABLET    Take 750 mg by mouth 2 times daily as needed     SEMAGLUTIDE, 1 MG/DOSE, 2 MG/1.5ML SOPN    Inject into the skin    TORSEMIDE (DEMADEX) 20 MG TABLET Take 20 mg by mouth daily     TRULICITY 3 RX/5.3MJ SOPN    INJECT 3 MG INTO THE SKIN ONCE A WEEK    VITAMIN B-6 (PYRIDOXINE) 50 MG TABLET    Take 50 mg by mouth daily         ALLERGIES     Indomethacin, Statins, Succinylcholine, Unable to assess, and Actos [pioglitazone]    FAMILYHISTORY       Family History   Problem Relation Age of Onset    Hypertension Mother     Diabetes Mother     Hypertension Father           SOCIAL HISTORY       Social History     Tobacco Use    Smoking status: Former     Packs/day: 0.25     Years: 21.00     Pack years: 5.25     Types: Cigars, Cigarettes     Start date: 37     Quit date: 2001     Years since quittin.6    Smokeless tobacco: Former   Vaping Use    Vaping Use: Never used   Substance Use Topics    Alcohol use: Yes     Comment: occ    Drug use: Never       SCREENINGS    [place] Coma Scale  Eye Opening: Spontaneous  Best Verbal Response: Oriented  Best Motor Response: Obeys commands  [place] Coma Scale Score: 15        PHYSICAL EXAM    (up to 7 for level 4, 8 or more for level 5)     ED Triage Vitals [22 1343]   BP Temp Temp Source Heart Rate Resp SpO2 Height Weight   (!) 159/85 98.5 °F (36.9 °C) Oral 91 20 95 % 5' 5\" (1.651 m) 241 lb (109.3 kg)       Physical Exam  Vitals and nursing note reviewed. Constitutional:       General: He is not in acute distress. Appearance: He is obese. He is not ill-appearing. Cardiovascular:      Rate and Rhythm: Normal rate and regular rhythm. Pulses: Normal pulses. Heart sounds: Normal heart sounds. Pulmonary:      Effort: Pulmonary effort is normal. No respiratory distress. Breath sounds: Normal breath sounds. No stridor. Abdominal:      General: Bowel sounds are normal. There is no distension. Palpations: There is no mass. Tenderness: There is no abdominal tenderness. Musculoskeletal:      Lumbar back: Tenderness present. No swelling, edema or bony tenderness.  Negative right straight leg raise test and negative left straight leg raise test.      Comments: Normal neuro exam to include:   L1-L2 inner thigh sensation  · L2 Adduct Thigh (cross legs)  · L3 Extend Knee  · L4 Dorsiflex Ankle (Up)  · L5 Point Great Toe Up  · L2 L3-L4 Knee Reflex  · S1 Flex Knee  · S2 Plantarflex Toes       With lower paraspinal tenderness of the lumbar spine. Neurological:      Mental Status: He is alert. Psychiatric:         Mood and Affect: Mood normal.         Behavior: Behavior normal.       DIAGNOSTIC RESULTS   LABS:    Labs Reviewed   COMPREHENSIVE METABOLIC PANEL - Abnormal; Notable for the following components:       Result Value    Glucose 103 (*)     Creatinine 1.8 (*)     GFR Non- 37 (*)     GFR African American 45 (*)     Albumin/Globulin Ratio 0.8 (*)     All other components within normal limits   CBC WITH AUTO DIFFERENTIAL       When ordered only abnormal lab results are displayed. All other labs were within normal range or not returned as of this dictation. EKG: When ordered, EKG's are interpreted by the Emergency Department Physician in the absence of a cardiologist.  Please see their note for interpretation of EKG. RADIOLOGY:   Non-plain film images such as CT, Ultrasound and MRI are read by the radiologist. Plain radiographic images are visualized and preliminarily interpreted by the ED Provider with the below findings:        Interpretation per the Radiologist below, if available at the time of this note:    No orders to display     No results found.         PROCEDURES   Unless otherwise noted below, none     Procedures    CRITICAL CARE TIME       CONSULTS:  None      EMERGENCY DEPARTMENT COURSE and DIFFERENTIAL DIAGNOSIS/MDM:   Vitals:    Vitals:    08/21/22 1343   BP: (!) 159/85   Pulse: 91   Resp: 20   Temp: 98.5 °F (36.9 °C)   TempSrc: Oral   SpO2: 95%   Weight: 241 lb (109.3 kg)   Height: 5' 5\" (1.651 m)       Patient was given the following medications:  Medications - No risk for an acute spinal emergency and this is consistent with my clinical intuition. The risk of further workup is higher than the likelihood of the patient having a spinal epidural abscess or other dangerous emergency spinal condition. It is, therefore, in the patients best interest not to do additional emergent testing at this time. FINAL IMPRESSION      1.  Strain of lumbar region, initial encounter          DISPOSITION/PLAN   DISPOSITION Decision To Discharge 08/21/2022 02:44:24 PM      PATIENT REFERRED TO:  David Briggs, [address] [facility] [address] [address]  [phone]    Schedule an appointment as soon as possible for a visit in 3 days  [facility] Emergency Department  [address] [facility]  [phone]    As needed, If symptoms worsen    DISCHARGE MEDICATIONS:  New Prescriptions    PREDNISONE (DELTASONE) 20 MG TABLET    Take 1 tablet by mouth 2 times daily for 5 days       DISCONTINUED MEDICATIONS:  Discontinued Medications    No medications on file              (Please note that portions of this note were completed with a voice recognition program.  Efforts were made to edit the dictations but occasionally words are mis-transcribed.)    KYLIE Lawrence (electronically signed)            KYLIE Lawrence  08/21/22 25 [id number]

## 2022-08-21 NOTE — Clinical Note
Radha Shearer was seen and treated in our emergency department on 8/21/2022. He may return to work on 08/22/2022. If you have any questions or concerns, please don't hesitate to call.       KYLIE Javier

## 2022-08-21 NOTE — DISCHARGE INSTRUCTIONS
Medication today as prescribed. Make appointment with physical therapy as was ordered by your primary care doctor. Return to the emergency department develop fevers, chills, worsening pain, numbness or tingling to the legs or loss of bowel or bladder control.

## 2022-08-22 ENCOUNTER — CARE COORDINATION (OUTPATIENT)
Dept: CARE COORDINATION | Age: 72
End: 2022-08-22

## 2022-08-28 RX ORDER — NIFEDIPINE 30 MG/1
TABLET, FILM COATED, EXTENDED RELEASE ORAL
Qty: 90 TABLET | Refills: 1 | Status: SHIPPED | OUTPATIENT
Start: 2022-08-28

## 2022-08-28 RX ORDER — GLIMEPIRIDE 4 MG/1
TABLET ORAL
Qty: 90 TABLET | Refills: 0 | Status: SHIPPED | OUTPATIENT
Start: 2022-08-28

## 2022-09-01 ENCOUNTER — HOSPITAL ENCOUNTER (OUTPATIENT)
Dept: PHYSICAL THERAPY | Age: 72
Setting detail: THERAPIES SERIES
Discharge: HOME | End: 2022-09-01

## 2022-09-01 NOTE — FLOWSHEET NOTE
Physical Therapy  Cancellation/No-show Note  Patient Name:  Magen Maddox  :  1950   Date:  2022  Cancelled visits to date: 0  No-shows to date: 1    Patient status for today's appointment patient:  []  Cancelled  []  Rescheduled appointment  [x]  No-show     Reason given by patient:  []  Patient ill  []  Conflicting appointment  []  No transportation    []  Conflict with work  []  No reason given  []  Other:     Comments:      Phone call information:   [x]  Phone call made today to patient at 10:05am time at number provided:      []  Patient answered, conversation as follows:    []  Patient did not answer, message left as follows: Left message stating patient missed appt at 9:45am. Provided phone number to call back in order to reschedule. []  Phone call not made today  []  Phone call not needed - pt contacted us to cancel and provided reason for cancellation.      Electronically signed by:  Ruthy Feliciano, PT DPT, MS

## 2022-09-02 ENCOUNTER — OFFICE VISIT (OUTPATIENT)
Dept: INTERNAL MEDICINE CLINIC | Age: 72
End: 2022-09-02
Payer: MEDICARE

## 2022-09-02 VITALS
RESPIRATION RATE: 20 BRPM | BODY MASS INDEX: 41.27 KG/M2 | SYSTOLIC BLOOD PRESSURE: 136 MMHG | TEMPERATURE: 97.8 F | OXYGEN SATURATION: 98 % | DIASTOLIC BLOOD PRESSURE: 76 MMHG | WEIGHT: 248 LBS | HEART RATE: 88 BPM

## 2022-09-02 DIAGNOSIS — N32.0 ACQUIRED BLADDER NECK STENOSIS: ICD-10-CM

## 2022-09-02 DIAGNOSIS — Z01.818 PRE-OP TESTING: ICD-10-CM

## 2022-09-02 DIAGNOSIS — Z79.4 CONTROLLED TYPE 2 DIABETES MELLITUS WITH DIABETIC NEUROPATHY, WITH LONG-TERM CURRENT USE OF INSULIN (HCC): ICD-10-CM

## 2022-09-02 DIAGNOSIS — E11.40 CONTROLLED TYPE 2 DIABETES MELLITUS WITH DIABETIC NEUROPATHY, WITH LONG-TERM CURRENT USE OF INSULIN (HCC): ICD-10-CM

## 2022-09-02 DIAGNOSIS — Z01.818 PRE-OP EXAM: Primary | ICD-10-CM

## 2022-09-02 PROCEDURE — 1123F ACP DISCUSS/DSCN MKR DOCD: CPT | Performed by: INTERNAL MEDICINE

## 2022-09-02 PROCEDURE — 3044F HG A1C LEVEL LT 7.0%: CPT | Performed by: INTERNAL MEDICINE

## 2022-09-02 PROCEDURE — 93000 ELECTROCARDIOGRAM COMPLETE: CPT | Performed by: INTERNAL MEDICINE

## 2022-09-02 PROCEDURE — 99214 OFFICE O/P EST MOD 30 MIN: CPT | Performed by: INTERNAL MEDICINE

## 2022-09-02 RX ORDER — PREGABALIN 50 MG/1
50 CAPSULE ORAL 3 TIMES DAILY
COMMUNITY

## 2022-09-02 ASSESSMENT — PATIENT HEALTH QUESTIONNAIRE - PHQ9
9. THOUGHTS THAT YOU WOULD BE BETTER OFF DEAD, OR OF HURTING YOURSELF: 0
3. TROUBLE FALLING OR STAYING ASLEEP: 0
8. MOVING OR SPEAKING SO SLOWLY THAT OTHER PEOPLE COULD HAVE NOTICED. OR THE OPPOSITE, BEING SO FIGETY OR RESTLESS THAT YOU HAVE BEEN MOVING AROUND A LOT MORE THAN USUAL: 0
SUM OF ALL RESPONSES TO PHQ9 QUESTIONS 1 & 2: 0
5. POOR APPETITE OR OVEREATING: 0
6. FEELING BAD ABOUT YOURSELF - OR THAT YOU ARE A FAILURE OR HAVE LET YOURSELF OR YOUR FAMILY DOWN: 0
SUM OF ALL RESPONSES TO PHQ QUESTIONS 1-9: 0
4. FEELING TIRED OR HAVING LITTLE ENERGY: 0
SUM OF ALL RESPONSES TO PHQ QUESTIONS 1-9: 0
1. LITTLE INTEREST OR PLEASURE IN DOING THINGS: 0
2. FEELING DOWN, DEPRESSED OR HOPELESS: 0
SUM OF ALL RESPONSES TO PHQ QUESTIONS 1-9: 0
10. IF YOU CHECKED OFF ANY PROBLEMS, HOW DIFFICULT HAVE THESE PROBLEMS MADE IT FOR YOU TO DO YOUR WORK, TAKE CARE OF THINGS AT HOME, OR GET ALONG WITH OTHER PEOPLE: 0
7. TROUBLE CONCENTRATING ON THINGS, SUCH AS READING THE NEWSPAPER OR WATCHING TELEVISION: 0
SUM OF ALL RESPONSES TO PHQ QUESTIONS 1-9: 0

## 2022-09-05 DIAGNOSIS — E11.22 TYPE 2 DIABETES MELLITUS WITH STAGE 3 CHRONIC KIDNEY DISEASE, WITHOUT LONG-TERM CURRENT USE OF INSULIN, UNSPECIFIED WHETHER STAGE 3A OR 3B CKD (HCC): ICD-10-CM

## 2022-09-05 DIAGNOSIS — N18.30 TYPE 2 DIABETES MELLITUS WITH STAGE 3 CHRONIC KIDNEY DISEASE, WITHOUT LONG-TERM CURRENT USE OF INSULIN, UNSPECIFIED WHETHER STAGE 3A OR 3B CKD (HCC): ICD-10-CM

## 2022-09-05 DIAGNOSIS — E66.01 MORBID OBESITY (HCC): ICD-10-CM

## 2022-09-05 RX ORDER — DULAGLUTIDE 3 MG/.5ML
3 INJECTION, SOLUTION SUBCUTANEOUS WEEKLY
Qty: 6 ML | Refills: 3 | Status: SHIPPED | OUTPATIENT
Start: 2022-09-05

## 2022-09-19 NOTE — TELEPHONE ENCOUNTER
Recent Visits  Date Type Provider Dept   09/02/22 Office Visit Whitney Bettencourt MD [facility]   08/10/22 Office Visit OSMAN Mcfarlane CNP [facility]   05/06/22 Office Visit Whitney Bettencourt MD [facility]   01/04/22 Office Visit OSMAN Mcfarlane CNP [facility]   08/27/21 Office Visit Whitney Bettencourt MD [facility]   07/14/21 Office Visit Whitney Bettencourt MD [facility]   06/18/21 Office Visit OSMAN Mcfarlane CNP [facility]   06/01/21 Office Visit Whitney Bettencourt MD [facility]   05/04/21 Office Visit Whitney Bettencourt MD [facility]   Showing recent visits within past 540 days with a meds authorizing provider and meeting all other requirements  Future Appointments  Date Type Provider Dept   12/08/22 Appointment Whitney Bettencourt MD [facility]   Showing future appointments within next 150 days with a meds authorizing provider and meeting all other requirements     9/2/2022

## 2022-09-21 RX ORDER — ATORVASTATIN CALCIUM 40 MG/1
TABLET, FILM COATED ORAL
Qty: 90 TABLET | Refills: 3 | Status: SHIPPED | OUTPATIENT
Start: 2022-09-21

## 2022-09-26 RX ORDER — METOPROLOL SUCCINATE 100 MG/1
TABLET, EXTENDED RELEASE ORAL
Qty: 90 TABLET | Refills: 3 | Status: SHIPPED | OUTPATIENT
Start: 2022-09-26

## 2022-10-26 ENCOUNTER — TELEPHONE (OUTPATIENT)
Dept: INTERNAL MEDICINE CLINIC | Age: 72
End: 2022-10-26

## 2022-10-26 NOTE — TELEPHONE ENCOUNTER
Pt called stating he recently had bladder reconstructive surgery on Friday 10/21 in [place]. They advised pt to follow up with Dr. Paty Bolanos, no appts available until January. Please advise.

## 2022-11-02 ENCOUNTER — HOSPITAL ENCOUNTER (OUTPATIENT)
Dept: GENERAL RADIOLOGY | Age: 72
Discharge: HOME | End: 2022-11-02
Payer: MEDICARE

## 2022-11-02 DIAGNOSIS — R33.9 BLADDER RETENTION OF URINE: ICD-10-CM

## 2022-11-02 DIAGNOSIS — C61 MALIGNANT NEOPLASM OF PROSTATE (HCC): ICD-10-CM

## 2022-11-02 PROCEDURE — 51600 INJECTION FOR BLADDER X-RAY: CPT

## 2022-11-02 PROCEDURE — 74455 X-RAY URETHRA/BLADDER: CPT

## 2022-11-02 PROCEDURE — 6360000004 HC RX CONTRAST MEDICATION: Performed by: UROLOGY

## 2022-11-02 RX ADMIN — IOTHALAMATE MEGLUMINE 500 ML: 172 INJECTION URETERAL at 11:51

## 2022-11-08 ENCOUNTER — HOSPITAL ENCOUNTER (OUTPATIENT)
Age: 72
Discharge: HOME | End: 2022-11-08
Payer: MEDICARE

## 2022-11-08 LAB
ALBUMIN SERPL-MCNC: 4 G/DL (ref 3.4–5)
ANION GAP SERPL CALCULATED.3IONS-SCNC: 14 MMOL/L (ref 3–16)
BUN BLDV-MCNC: 20 MG/DL (ref 7–20)
CALCIUM SERPL-MCNC: 9.6 MG/DL (ref 8.3–10.6)
CHLORIDE BLD-SCNC: 107 MMOL/L (ref 99–110)
CO2: 21 MMOL/L (ref 21–32)
CREAT SERPL-MCNC: 2 MG/DL (ref 0.8–1.3)
CREATININE URINE: 129.5 MG/DL (ref 39–259)
FERRITIN: 104.8 NG/ML (ref 30–400)
GFR SERPL CREATININE-BSD FRML MDRD: 35 ML/MIN/{1.73_M2}
GLUCOSE BLD-MCNC: 105 MG/DL (ref 70–99)
HCT VFR BLD CALC: 39.4 % (ref 40.5–52.5)
HEMOGLOBIN: 13.3 G/DL (ref 13.5–17.5)
IRON SATURATION: 16 % (ref 20–50)
IRON: 40 UG/DL (ref 59–158)
MCH RBC QN AUTO: 30.3 PG (ref 26–34)
MCHC RBC AUTO-ENTMCNC: 33.7 G/DL (ref 31–36)
MCV RBC AUTO: 89.9 FL (ref 80–100)
MICROALBUMIN UR-MCNC: 412.4 MG/DL
MICROALBUMIN/CREAT UR-RTO: 3184.6 MG/G (ref 0–30)
PARATHYROID HORMONE INTACT: 84.3 PG/ML (ref 14–72)
PDW BLD-RTO: 15.2 % (ref 12.4–15.4)
PHOSPHORUS: 3 MG/DL (ref 2.5–4.9)
PLATELET # BLD: 326 K/UL (ref 135–450)
PMV BLD AUTO: 7.6 FL (ref 5–10.5)
POTASSIUM SERPL-SCNC: 4.4 MMOL/L (ref 3.5–5.1)
RBC # BLD: 4.38 M/UL (ref 4.2–5.9)
SODIUM BLD-SCNC: 142 MMOL/L (ref 136–145)
TOTAL IRON BINDING CAPACITY: 247 UG/DL (ref 260–445)
TRANSFERRIN: 232 MG/DL (ref 200–360)
URIC ACID, SERUM: 5.6 MG/DL (ref 3.5–7.2)
WBC # BLD: 7.3 K/UL (ref 4–11)

## 2022-11-08 PROCEDURE — 83540 ASSAY OF IRON: CPT

## 2022-11-08 PROCEDURE — 82043 UR ALBUMIN QUANTITATIVE: CPT

## 2022-11-08 PROCEDURE — 85027 COMPLETE CBC AUTOMATED: CPT

## 2022-11-08 PROCEDURE — 82306 VITAMIN D 25 HYDROXY: CPT

## 2022-11-08 PROCEDURE — 82728 ASSAY OF FERRITIN: CPT

## 2022-11-08 PROCEDURE — 82570 ASSAY OF URINE CREATININE: CPT

## 2022-11-08 PROCEDURE — 84550 ASSAY OF BLOOD/URIC ACID: CPT

## 2022-11-08 PROCEDURE — 84466 ASSAY OF TRANSFERRIN: CPT

## 2022-11-08 PROCEDURE — 80069 RENAL FUNCTION PANEL: CPT

## 2022-11-08 PROCEDURE — 83970 ASSAY OF PARATHORMONE: CPT

## 2022-11-09 LAB — VITAMIN D 25-HYDROXY: 41.2 NG/ML

## 2022-11-11 ENCOUNTER — OFFICE VISIT (OUTPATIENT)
Dept: INTERNAL MEDICINE CLINIC | Age: 72
End: 2022-11-11
Payer: MEDICARE

## 2022-11-11 VITALS
BODY MASS INDEX: 42.43 KG/M2 | SYSTOLIC BLOOD PRESSURE: 134 MMHG | OXYGEN SATURATION: 97 % | WEIGHT: 255 LBS | HEART RATE: 88 BPM | DIASTOLIC BLOOD PRESSURE: 84 MMHG

## 2022-11-11 DIAGNOSIS — N32.0 ACQUIRED BLADDER NECK STENOSIS: Primary | ICD-10-CM

## 2022-11-11 DIAGNOSIS — N18.30 TYPE 2 DIABETES MELLITUS WITH STAGE 3 CHRONIC KIDNEY DISEASE, WITHOUT LONG-TERM CURRENT USE OF INSULIN, UNSPECIFIED WHETHER STAGE 3A OR 3B CKD (HCC): ICD-10-CM

## 2022-11-11 DIAGNOSIS — Z23 FLU VACCINE NEED: ICD-10-CM

## 2022-11-11 DIAGNOSIS — E11.22 TYPE 2 DIABETES MELLITUS WITH STAGE 3 CHRONIC KIDNEY DISEASE, WITHOUT LONG-TERM CURRENT USE OF INSULIN, UNSPECIFIED WHETHER STAGE 3A OR 3B CKD (HCC): ICD-10-CM

## 2022-11-11 PROCEDURE — 1123F ACP DISCUSS/DSCN MKR DOCD: CPT | Performed by: INTERNAL MEDICINE

## 2022-11-11 PROCEDURE — G0008 ADMIN INFLUENZA VIRUS VAC: HCPCS | Performed by: INTERNAL MEDICINE

## 2022-11-11 PROCEDURE — 3074F SYST BP LT 130 MM HG: CPT | Performed by: INTERNAL MEDICINE

## 2022-11-11 PROCEDURE — 3044F HG A1C LEVEL LT 7.0%: CPT | Performed by: INTERNAL MEDICINE

## 2022-11-11 PROCEDURE — 99214 OFFICE O/P EST MOD 30 MIN: CPT | Performed by: INTERNAL MEDICINE

## 2022-11-11 PROCEDURE — 90694 VACC AIIV4 NO PRSRV 0.5ML IM: CPT | Performed by: INTERNAL MEDICINE

## 2022-11-11 PROCEDURE — 3078F DIAST BP <80 MM HG: CPT | Performed by: INTERNAL MEDICINE

## 2022-11-11 NOTE — PROGRESS NOTES
Subjective:      Patient ID: Elkin Henry is a 71 y.o. male. HPI  Present in follow up of surgery. He is feeling better. He does report weight gain which he attributes to his sedentary time after the procedure. His diabetes is well controlled. He is tolerating trulicity 3mg. Hemoglobin A1C   Date Value Ref Range Status   09/28/2022 5.8 See comment % Final     Comment:     Comment:  Diagnosis of Diabetes: > or = 6.5%  Increased risk of diabetes (Prediabetes): 5.7-6.4%  Glycemic Control: Nonpregnant Adults: <7.0%                    Pregnant: <6.0%           Review of Systems      Allergies   Allergen Reactions    Indomethacin Other (See Comments)     Dr. Javier Holman told him not to take because affects the bladder. Statins Other (See Comments)    Succinylcholine      ? ? Succinylcholine \"allergy\"  Pt. Should be worked  Up for pseudocholinesterase deficiency. Pt. States he qwas told in the past that he had trouble with an anesthesia drug that begins with an \"s\" and that it took him awhile to breathe in PACU, but could provide no further details. Unable To Assess       Anesthesia that begins with S- stops my breathing- daughter reports succinylcholine   Questionable pseudocholinesterase deficiency    Actos [Pioglitazone] Rash       Current Outpatient Medications   Medication Sig Dispense Refill    metoprolol succinate (TOPROL XL) 100 MG extended release tablet TAKE 1 TABLET DAILY 90 tablet 3    atorvastatin (LIPITOR) 40 MG tablet TAKE 1 TABLET EVERY NIGHT 90 tablet 3    TRULICITY 3 OV/6.2SJ SOPN INJECT 3 MG INTO THE SKIN ONCE A WEEK 6 mL 3    pregabalin (LYRICA) 50 MG capsule Take 50 mg by mouth 3 times daily.  2 capsules AM   2 capsules PM      glimepiride (AMARYL) 4 MG tablet TAKE 1 TABLET BY MOUTH EVERY MORNING BEFORE BREAKFAST 90 tablet 0    NIFEdipine (ADALAT CC) 30 MG extended release tablet TAKE 1 TABLET BY MOUTH DAILY 90 tablet 1    mineral oil-hydrophilic petrolatum (AQUAPHOR) ointment Apply topically as needed for Dry Skin (2-4 times a day) Apply topically as needed. 396 g 1    amLODIPine (NORVASC) 10 MG tablet Take 10 mg by mouth daily      Semaglutide, 1 MG/DOSE, 2 MG/1.5ML SOPN Inject into the skin      alendronate (FOSAMAX) 70 MG tablet TAKE 1 TABLET EVERY 7 DAYS 12 tablet 3    Cholecalciferol (VITAMIN D) 50 MCG (2000 UT) CAPS capsule Take by mouth Once week       brimonidine (ALPHAGAN) 0.2 % ophthalmic solution Apply to eye 2 times daily       latanoprost (XALATAN) 0.005 % ophthalmic solution 1 drop nightly       torsemide (DEMADEX) 20 MG tablet Take 20 mg by mouth daily       finasteride (PROSCAR) 5 MG tablet Take 1 tablet by mouth daily 30 tablet 1    cetirizine (ZYRTEC) 5 MG tablet Take 1 tablet by mouth daily 90 tablet 1    Meals on Wheels MISC by Does not apply route Patient is to receive 2-3 diabetic meals only 1 each 0    aspirin 81 MG EC tablet Take 81 mg by mouth daily       Acetaminophen (TYLENOL ARTHRITIS PAIN PO) Take 1 tablet by mouth daily OTC       HYDROcodone-acetaminophen (NORCO)  MG per tablet Take 1 tablet by mouth 3 times daily as needed (Pain - back & hips). Pain specialist       Insulin Pen Needle ([employer] PEN NEEDLES) 31G X 6 MM MISC 1 each by Does not apply route daily 100 each 3    ammonium lactate (AMLACTIN) 12 % cream Apply topically as needed       salsalate (DISALCID) 750 MG tablet Take 750 mg by mouth 2 times daily as needed      vitamin B-6 (PYRIDOXINE) 50 MG tablet Take 50 mg by mouth daily      ergocalciferol (ERGOCALCIFEROL) 27219 UNITS capsule Take 50,000 Units by mouth once a week        No current facility-administered medications for this visit. Vitals:    11/11/22 1601   BP: (!) 142/78   Pulse: 88   SpO2: 97%   Weight: 255 lb (115.7 kg)     Body mass index is 42.43 kg/m².      Wt Readings from Last 3 Encounters:   11/11/22 255 lb (115.7 kg)   09/02/22 248 lb (112.5 kg)   08/21/22 241 lb (109.3 kg)     BP Readings from Last 3 Encounters:   11/11/22 134/84   09/02/22 136/76   08/21/22 (!) 159/85       Objective:   Physical Exam  Vitals and nursing note reviewed. Constitutional:       Appearance: Normal appearance. He is well-developed. He is obese. HENT:      Head: Normocephalic and atraumatic. Nose: Nose normal. No congestion or rhinorrhea. Mouth/Throat:      Mouth: Mucous membranes are moist.   Eyes:      General: No scleral icterus. Conjunctiva/sclera: Conjunctivae normal.      Pupils: Pupils are equal, round, and reactive to light. Cardiovascular:      Rate and Rhythm: Normal rate and regular rhythm. Pulses: Normal pulses. Heart sounds: Normal heart sounds. Pulmonary:      Effort: Pulmonary effort is normal.      Breath sounds: Normal breath sounds. Skin:     General: Skin is warm. Capillary Refill: Capillary refill takes less than 2 seconds. Neurological:      General: No focal deficit present. Mental Status: He is alert and oriented to person, place, and time. Mental status is at baseline. Psychiatric:         Mood and Affect: Mood normal.         Behavior: Behavior normal.         Thought Content: Thought content normal.         Judgment: Judgment normal.     Assessment/Plan:  Brigette Camara was seen today for surgery.     Diagnoses and all orders for this visit:    Acquired bladder neck stenosis: stable doing well    Flu vaccine need  -     Cancel: Influenza, FLUAD, (age 72 y+), IM, Preservative Free, 0.5 mL  -     Influenza, FLUAD, (age 72 y+), IM, Preservative Free, 0.5 mL    Type 2 diabetes mellitus with stage 3 chronic kidney disease, without long-term current use of insulin, unspecified whether stage 3a or 3b CKD ([facility] [address] follow closely, discussed weight loss          Antonio De Los Santos MD

## 2022-11-25 NOTE — TELEPHONE ENCOUNTER
Recent Visits  Date Type Provider Dept   11/11/22 Office Visit Demetrius Lou MD [facility]   09/02/22 Office Visit Demetrius Lou MD [facility]   08/10/22 Office Visit OSMNA Castelan CNP [facility]   05/06/22 Office Visit Demetrius Lou MD [facility]   01/04/22 Office Visit OSMAN Castelan CNP [facility]   08/27/21 Office Visit Demetrius Lou MD [facility]   07/14/21 Office Visit Demetrius Lou MD [facility]   06/18/21 Office Visit OSMAN Castelan CNP [facility]   Showing recent visits within past 540 days with a meds authorizing provider and meeting all other requirements  Future Appointments  Date Type Provider Dept   12/08/22 Appointment Demetrius Lou MD [facility]   03/13/23 Appointment Demetrius Lou MD [facility]   04/13/23 Appointment Demetrius Lou MD [facility]   Showing future appointments within next 150 days with a meds authorizing provider and meeting all other requirements     11/11/2022

## 2022-11-28 RX ORDER — GLIMEPIRIDE 4 MG/1
TABLET ORAL
Qty: 90 TABLET | Refills: 0 | Status: SHIPPED | OUTPATIENT
Start: 2022-11-28

## 2022-12-06 ENCOUNTER — OFFICE VISIT (OUTPATIENT)
Dept: CARDIOLOGY CLINIC | Age: 72
End: 2022-12-06
Payer: MEDICARE

## 2022-12-06 VITALS
WEIGHT: 254 LBS | OXYGEN SATURATION: 96 % | HEIGHT: 65 IN | DIASTOLIC BLOOD PRESSURE: 70 MMHG | SYSTOLIC BLOOD PRESSURE: 130 MMHG | HEART RATE: 76 BPM | BODY MASS INDEX: 42.32 KG/M2

## 2022-12-06 DIAGNOSIS — D64.9 ANEMIA, UNSPECIFIED TYPE: ICD-10-CM

## 2022-12-06 DIAGNOSIS — I50.32 CHRONIC DIASTOLIC HEART FAILURE (HCC): Primary | ICD-10-CM

## 2022-12-06 DIAGNOSIS — E66.01 CLASS 3 SEVERE OBESITY DUE TO EXCESS CALORIES WITH SERIOUS COMORBIDITY AND BODY MASS INDEX (BMI) OF 45.0 TO 49.9 IN ADULT (HCC): ICD-10-CM

## 2022-12-06 DIAGNOSIS — D50.9 IRON DEFICIENCY ANEMIA, UNSPECIFIED IRON DEFICIENCY ANEMIA TYPE: ICD-10-CM

## 2022-12-06 DIAGNOSIS — N18.30 STAGE 3 CHRONIC KIDNEY DISEASE, UNSPECIFIED WHETHER STAGE 3A OR 3B CKD (HCC): ICD-10-CM

## 2022-12-06 DIAGNOSIS — G47.33 OSA (OBSTRUCTIVE SLEEP APNEA): ICD-10-CM

## 2022-12-06 PROCEDURE — 99214 OFFICE O/P EST MOD 30 MIN: CPT | Performed by: CLINICAL NURSE SPECIALIST

## 2022-12-06 PROCEDURE — 1123F ACP DISCUSS/DSCN MKR DOCD: CPT | Performed by: CLINICAL NURSE SPECIALIST

## 2022-12-06 PROCEDURE — 3074F SYST BP LT 130 MM HG: CPT | Performed by: CLINICAL NURSE SPECIALIST

## 2022-12-06 PROCEDURE — 3078F DIAST BP <80 MM HG: CPT | Performed by: CLINICAL NURSE SPECIALIST

## 2022-12-06 RX ORDER — TORSEMIDE 20 MG/1
20 TABLET ORAL DAILY
Qty: 90 TABLET | Refills: 1 | Status: SHIPPED | OUTPATIENT
Start: 2022-12-06

## 2022-12-06 RX ORDER — SODIUM CHLORIDE 9 MG/ML
INJECTION, SOLUTION INTRAVENOUS CONTINUOUS
Status: CANCELLED | OUTPATIENT
Start: 2022-12-06

## 2022-12-06 RX ORDER — SODIUM CHLORIDE 0.9 % (FLUSH) 0.9 %
5-40 SYRINGE (ML) INJECTION PRN
Status: CANCELLED | OUTPATIENT
Start: 2022-12-06

## 2022-12-06 NOTE — PROGRESS NOTES
[facility]  Progress Note    Primary Care Doctor:  Marla Little MD    Chief Complaint   Patient presents with    6 Month Follow-Up    Hypertension        History of Present Illness:  71 y.o. male with history of ckd (Dr Jessica Hamm), kamran. Dm, morbid obesity,  4/5-14/2021 for chest discomfort and SOB for months, diuresed [phone]->[phone]. I had the pleasure of seeing Lukasz Loyd in follow up for F. He is ambulatory and by his self. His weight was down but now up about 10 pounds. He has increased lower leg edema. He had to go to [facility] for urostomy surgery and has been drinking more fluids. Labs reviewed from 11/8/2022, iron deficient. His biggest complaint is fatigue. He is taking all his medications and denies any chest pain, palpitations, lightheadedness. Past Medical History:   has a past medical history of Acute on chronic diastolic heart failure ([address] Acute respiratory failure with hypoxia ([address] Anesthesia, Cancer ([address] Hyperlipidemia, Hypertension, Obesity, KAMRAN treated with BiPAP, Osteoarthritis, Type 2 diabetes mellitus without complication ([address] Type 2 diabetes mellitus without complication, without long-term current use of insulin ([address] Urinary incontinence, stress, male, and Wears dentures. Surgical History:   has a past surgical history that includes Total hip arthroplasty; joint replacement (Left); shoulder surgery (Bilateral); Knee arthroscopy (Right); Cystoscopy (N/A, 6/21/2021); and CT PERITONEAL/RETROPERITONEAL PERC DRAIN (12/23/2021). Social History:   reports that he quit smoking about 21 years ago. His smoking use included cigars and cigarettes. He started smoking about 42 years ago. He has a 5.25 pack-year smoking history. He has quit using smokeless tobacco. He reports current alcohol use. He reports that he does not use drugs.    Family History:   Family History   Problem Relation Age of Onset    Hypertension Mother     Diabetes Mother     Hypertension Father        Home Medications:  Prior to Admission medications    Medication Sig Start Date End Date Taking? Authorizing Provider   torsemide (DEMADEX) 20 MG tablet Take 1 tablet by mouth daily 12/6/22  Yes OSMAN Harper - CNS   glimepiride (AMARYL) 4 MG tablet TAKE 1 TABLET BY MOUTH EVERY MORNING BEFORE BREAKFAST 11/28/22  Yes Rizwana Elam MD   metoprolol succinate (TOPROL XL) 100 MG extended release tablet TAKE 1 TABLET DAILY 9/26/22  Yes Rizwana Elam MD   atorvastatin (LIPITOR) 40 MG tablet TAKE 1 TABLET EVERY NIGHT 9/21/22  Yes Rizwana Elam MD   TRULICITY 3 VY/5.4ZD SOPN INJECT 3 MG INTO THE SKIN ONCE A WEEK 9/5/22  Yes Rizwana Elam MD   pregabalin (LYRICA) 50 MG capsule Take 50 mg by mouth 3 times daily. 2 capsules AM   2 capsules PM   Yes Historical Provider, MD   NIFEdipine (ADALAT CC) 30 MG extended release tablet TAKE 1 TABLET BY MOUTH DAILY 8/28/22  Yes Rizwana Elam MD   mineral oil-hydrophilic petrolatum (AQUAPHOR) ointment Apply topically as needed for Dry Skin (2-4 times a day) Apply topically as needed.  8/10/22  Yes OSMAN Otoole - CNP   amLODIPine (NORVASC) 10 MG tablet Take 10 mg by mouth daily   Yes Historical Provider, MD   Semaglutide, 1 MG/DOSE, 2 MG/1.5ML SOPN Inject into the skin   Yes Historical Provider, MD   alendronate (FOSAMAX) 70 MG tablet TAKE 1 TABLET EVERY 7 DAYS 3/4/22  Yes Rizwana Elam MD   brimonidine (ALPHAGAN) 0.2 % ophthalmic solution Apply to eye 2 times daily    Yes Historical Provider, MD   latanoprost (XALATAN) 0.005 % ophthalmic solution 1 drop nightly    Yes Historical Provider, MD   finasteride (PROSCAR) 5 MG tablet Take 1 tablet by mouth daily 8/13/21  Yes Alie Mcgill PA-C   cetirizine (ZYRTEC) 5 MG tablet Take 1 tablet by mouth daily 7/14/21  Yes Rizwana Elam MD   Meals on Wheels MISC by Does not apply route Patient is to receive 2-3 diabetic meals only 4/30/21  Yes Rizwana Elam MD   aspirin 81 MG EC tablet Take 81 mg by mouth daily    Yes Historical Provider, MD   Acetaminophen (TYLENOL ARTHRITIS PAIN PO) Take 1 tablet by mouth daily OTC    Yes Historical Provider, MD   HYDROcodone-acetaminophen (NORCO)  MG per tablet Take 1 tablet by mouth 3 times daily as needed (Pain - back & hips). Pain specialist    Yes Historical Provider, MD   Insulin Pen Needle ([employer] PEN NEEDLES) 31G X 6 MM MISC 1 each by Does not apply route daily 9/29/20  Yes Rizwana Elam MD   ammonium lactate (AMLACTIN) 12 % cream Apply topically as needed    Yes Historical Provider, MD   salsalate (DISALCID) 750 MG tablet Take 750 mg by mouth 2 times daily as needed 7/1/20  Yes Historical Provider, MD   vitamin B-6 (PYRIDOXINE) 50 MG tablet Take 50 mg by mouth daily   Yes Historical Provider, MD   ergocalciferol (ERGOCALCIFEROL) 27773 UNITS capsule Take 50,000 Units by mouth once a week    Yes Historical Provider, MD   Cholecalciferol (VITAMIN D) 50 MCG (2000 UT) CAPS capsule Take by mouth Once week   Patient not taking: Reported on 12/6/2022    Historical Provider, MD        Allergies: Indomethacin, Statins, Succinylcholine, Unable to assess, and Actos [pioglitazone]     Review of Systems:   Constitutional: there has been no unanticipated weight loss. There's been no change in energy level, sleep pattern, or activity level. Eyes: No visual changes or diplopia. No scleral icterus. ENT: No Headaches, hearing loss or vertigo. No mouth sores or sore throat. Cardiovascular: Reviewed in HPI  Respiratory: No cough or wheezing, no sputum production. No hematemesis. Gastrointestinal: No abdominal pain, appetite loss, blood in stools. No change in bowel or bladder habits. Genitourinary: No dysuria, trouble voiding, or hematuria. Musculoskeletal:  No gait disturbance, weakness or joint complaints. Integumentary: No rash or pruritis.   Neurological: No headache, diplopia, change in muscle strength, numbness or tingling. No change in gait, balance, coordination, mood, affect, memory, mentation, behavior. Psychiatric: No anxiety, no depression. Endocrine: No malaise, fatigue or temperature intolerance. No excessive thirst, fluid intake, or urination. No tremor. Hematologic/Lymphatic: No abnormal bruising or bleeding, blood clots or swollen lymph nodes. Allergic/Immunologic: No nasal congestion or hives. Physical Examination:    Vitals:    12/06/22 0929   BP: 130/70   Site: Right Upper Arm   Position: Sitting   Cuff Size: Large Adult   Pulse: 76   SpO2: 96%   Weight: 254 lb (115.2 kg)   Height: 5' 5\" (1.651 m)        Constitutional and General Appearance: Warm and dry, no apparent distress, normal coloration mid pubic urostomy tube  HEENT:  Normocephalic, atraumatic  Respiratory:  Normal excursion and expansion without use of accessory muscles  Resp Auscultation: Normal breath sounds without dullness  Cardiovascular: The apical impulses not displaced  Heart tones are crisp and normal  JVP normal cm H2O  Regular rate and rhythm  Peripheral pulses are symmetrical and full  There is no clubbing, cyanosis of the extremities.   Bilateral lower ankle to mid calf edema  Pedal Pulses: 2+ and equal   Abdomen:obese  No masses or tenderness  Liver/Spleen: No Abnormalities Noted  Neurological/Psychiatric:  Alert and oriented in all spheres  Moves all extremities well  Exhibits normal gait balance and coordination  No abnormalities of mood, affect, memory, mentation, or behavior are noted    Lab Data:    CBC:   Lab Results   Component Value Date/Time    WBC 7.3 11/08/2022 10:15 AM    WBC 5.6 09/28/2022 05:00 PM    WBC 7.2 08/21/2022 02:13 PM    RBC 4.38 11/08/2022 10:15 AM    RBC 4.54 09/28/2022 05:00 PM    RBC 4.94 08/21/2022 02:13 PM    HGB 13.3 11/08/2022 10:15 AM    HGB 13.7 09/28/2022 05:00 PM    HGB 14.6 08/21/2022 02:13 PM    HCT 39.4 11/08/2022 10:15 AM    HCT 42.5 09/28/2022 05:00 PM    HCT 45.7 08/21/2022 02:13 PM    MCV 89.9 11/08/2022 10:15 AM    MCV 93.6 09/28/2022 05:00 PM    MCV 92.4 08/21/2022 02:13 PM    RDW 15.2 11/08/2022 10:15 AM    RDW 16.6 09/28/2022 05:00 PM    RDW 15.1 08/21/2022 02:13 PM     11/08/2022 10:15 AM     09/28/2022 05:00 PM     08/21/2022 02:13 PM     BMP:  Lab Results   Component Value Date/Time     11/08/2022 10:15 AM     09/28/2022 05:00 PM     08/21/2022 02:13 PM    K 4.4 11/08/2022 10:15 AM    K 4.6 09/28/2022 05:00 PM    K 4.8 08/21/2022 02:13 PM    K 4.2 06/21/2021 06:51 AM     11/08/2022 10:15 AM     09/28/2022 05:00 PM     08/21/2022 02:13 PM    CO2 21 11/08/2022 10:15 AM    CO2 19 09/28/2022 05:00 PM    CO2 22 08/21/2022 02:13 PM    PHOS 3.0 11/08/2022 10:15 AM    PHOS 2.9 06/21/2022 11:24 AM    PHOS 3.7 01/31/2022 04:10 PM    BUN 20 11/08/2022 10:15 AM    BUN 20 09/28/2022 05:00 PM    BUN 15 08/21/2022 02:13 PM    CREATININE 2.0 11/08/2022 10:15 AM    CREATININE 1.9 09/28/2022 05:00 PM    CREATININE 1.8 08/21/2022 02:13 PM     BNP:   Lab Results   Component Value Date/Time    PROBNP 244 12/13/2021 09:40 AM    PROBNP 418 08/13/2021 05:02 PM    PROBNP 194 05/11/2021 01:00 PM     Cardiac Imaging:  Echo 4/7/2021   Summary   Overall left ventricular function is normal.   Ejection fraction is visually estimated to be 55 %. E/e'= 9.3 . There is reversal of E/A inflow velocities across the mitral valve. There is mild concentric left ventricular hypertrophy. No definitive regional wall motion abnormalities are noted. Grade II diastolic dysfunction with elevated LV filling pressures. Mild to moderate tricuspid regurgitation. Estimated pulmonary artery systolic pressure is moderately elevated at 65   mmHg assuming a right atrial pressure of 15 mmHg. The right ventricle is mildly enlarged.  TAPSE= 2.72 RV S'= 17.2   IVC size is dilated (>2.1 cm) and collapses < 50% with respiration   consistent with elevated RA pressure (15 mmHg    Stress test 8/12/2019  The LV EF is 55%  Normal global and regional wall motion in all territories. There is a medium sized, partially reversible defect in the anterior  wall consistent with moderate verena-infarct ischemia. 1.Non-diagnostic ECG for ischemia with pharmocologic stress. 2.Normal left ventriular systolic function. 3.Positive nuclear stress imaging suggestive of scar plus verena      infarct ischemia in the anterior wall. 4.Nuclear stress image findings indicate intermediate risk for      future ischemic event . Echo 12/12/2019  Summary:  The left ventricular wall motion is normal.  Overall left ventricular ejection fraction is estimated to be 60-65%. There is mild concentric left ventricular hypertrophy. There is mild mitral regurgitation. Assessment:    1. Chronic diastolic heart failure ([address]    2. KAMRAN (obstructive sleep apnea) on cpap   3. Stage 3 chronic kidney disease, unspecified whether stage 3a or 3b CKD    4.  Class 3 severe obesity due to excess calories with serious comorbidity and body mass index (BMI) of 45.0 to 49.9 in adult [facility]) Dr Deyvi Ny:   Patient Instructions   Take 20 mg extra of torsemide for 3 days and then go back to 20 mg daily  Continue all other medications  I will order 2 dose of iron and the infusion center will call you to schedule  Remember only 64 ounces of fluid a day and <9477-1571 mg of sodium a day  RTO in 2 months    I appreciate the opportunity of cooperating in the care of this individual.    OSMAN Gabriel - CNS, CNS, 12/6/2022, 3:56 PM

## 2022-12-06 NOTE — PATIENT INSTRUCTIONS
Take 20 mg extra of torsemide for 3 days and then go back to 20 mg daily  Continue all other medications  I will order 2 dose of iron and the infusion center will call you to schedule  Remember only 64 ounces of fluid a day and <2610-0703 mg of sodium a day  RTO in 2 months

## 2022-12-08 ENCOUNTER — OFFICE VISIT (OUTPATIENT)
Dept: INTERNAL MEDICINE CLINIC | Age: 72
End: 2022-12-08
Payer: MEDICARE

## 2022-12-08 VITALS
BODY MASS INDEX: 42.65 KG/M2 | OXYGEN SATURATION: 97 % | DIASTOLIC BLOOD PRESSURE: 72 MMHG | SYSTOLIC BLOOD PRESSURE: 128 MMHG | HEIGHT: 65 IN | HEART RATE: 92 BPM | WEIGHT: 256 LBS

## 2022-12-08 DIAGNOSIS — N18.30 CHRONIC RENAL IMPAIRMENT, STAGE 3 (MODERATE), UNSPECIFIED WHETHER STAGE 3A OR 3B CKD (HCC): ICD-10-CM

## 2022-12-08 DIAGNOSIS — E53.8 FOLATE DEFICIENCY: Primary | ICD-10-CM

## 2022-12-08 DIAGNOSIS — E11.22 TYPE 2 DIABETES MELLITUS WITH STAGE 3 CHRONIC KIDNEY DISEASE, WITHOUT LONG-TERM CURRENT USE OF INSULIN, UNSPECIFIED WHETHER STAGE 3A OR 3B CKD (HCC): ICD-10-CM

## 2022-12-08 DIAGNOSIS — N18.30 TYPE 2 DIABETES MELLITUS WITH STAGE 3 CHRONIC KIDNEY DISEASE, WITHOUT LONG-TERM CURRENT USE OF INSULIN, UNSPECIFIED WHETHER STAGE 3A OR 3B CKD (HCC): ICD-10-CM

## 2022-12-08 DIAGNOSIS — E66.01 MORBID OBESITY (HCC): ICD-10-CM

## 2022-12-08 DIAGNOSIS — Z12.5 ENCOUNTER FOR SCREENING FOR MALIGNANT NEOPLASM OF PROSTATE: ICD-10-CM

## 2022-12-08 PROBLEM — I50.32 CHRONIC DIASTOLIC CONGESTIVE HEART FAILURE, NYHA CLASS 2 (HCC): Status: ACTIVE | Noted: 2022-12-08

## 2022-12-08 LAB — HBA1C MFR BLD: 6.1 %

## 2022-12-08 PROCEDURE — 83036 HEMOGLOBIN GLYCOSYLATED A1C: CPT | Performed by: INTERNAL MEDICINE

## 2022-12-08 PROCEDURE — 3044F HG A1C LEVEL LT 7.0%: CPT | Performed by: INTERNAL MEDICINE

## 2022-12-08 PROCEDURE — 1123F ACP DISCUSS/DSCN MKR DOCD: CPT | Performed by: INTERNAL MEDICINE

## 2022-12-08 PROCEDURE — 3074F SYST BP LT 130 MM HG: CPT | Performed by: INTERNAL MEDICINE

## 2022-12-08 PROCEDURE — 99214 OFFICE O/P EST MOD 30 MIN: CPT | Performed by: INTERNAL MEDICINE

## 2022-12-08 PROCEDURE — 3078F DIAST BP <80 MM HG: CPT | Performed by: INTERNAL MEDICINE

## 2022-12-08 RX ORDER — AMMONIUM LACTATE 12 G/100G
CREAM TOPICAL 2 TIMES DAILY
Qty: 385 G | Refills: 5 | Status: SHIPPED | OUTPATIENT
Start: 2022-12-08

## 2022-12-08 RX ORDER — DULAGLUTIDE 3 MG/.5ML
3 INJECTION, SOLUTION SUBCUTANEOUS WEEKLY
Qty: 6 ML | Refills: 3 | Status: SHIPPED | OUTPATIENT
Start: 2022-12-08

## 2022-12-08 ASSESSMENT — ENCOUNTER SYMPTOMS: GASTROINTESTINAL NEGATIVE: 1

## 2022-12-08 NOTE — PROGRESS NOTES
Madalyn Rubi (:  1950) is a 71 y.o. male,Established patient, here for evaluation of the following chief complaint(s):  Diabetes (Follow up) and Hypertension (Follow up )         ASSESSMENT/PLAN:  1. Folate deficiency  -     Vitamin B12 & Folate; Future  2. Morbid obesity (HCC)  -     Dulaglutide (TRULICITY) 3 SQ/8.3NS SOPN; Inject 3 mg into the skin once a week, Disp-6 mL, R-3Normal  3. Type 2 diabetes mellitus with stage 3 chronic kidney disease, without long-term current use of insulin, unspecified whether stage 3a or 3b CKD (HCC)  -     PSA Screening; Future  -     Dulaglutide (TRULICITY) 3 LP/2.9NK SOPN; Inject 3 mg into the skin once a week, Disp-6 mL, R-3Normal  - STOP GLIMEPERIDE DUE TO KIDNEY DISEASE  - RECHECK A1C IN 3 MONTHS   4. Controlled type 2 diabetes mellitus with diabetic neuropathy, with long-term current use of insulin (HCC)  - CONTINUE CURRENT MEDICATIONS    5. Chronic renal impairment, stage 3 (moderate), unspecified whether stage 3a or 3b CKD (HCC)  - FOLLOWED BY NEPHROLOGY  - STABLE DISEASE    6. Encounter for screening for malignant neoplasm of prostate   -     PSA Screening; Future    Return for Hypertension/bmi/FOLATE/dm. Subjective   SUBJECTIVE/OBJECTIVE:  Diabetes  He presents for his follow-up diabetic visit. He has type 2 diabetes mellitus. His disease course has been stable. Hypoglycemia symptoms include hunger. Pertinent negatives for hypoglycemia complications include no blackouts and no hospitalization. Symptoms are stable. There are no diabetic complications. Risk factors for coronary artery disease include diabetes mellitus, dyslipidemia, male sex, obesity, hypertension and sedentary lifestyle. Current diabetic treatment includes insulin injections. He is compliant with treatment all of the time. There is no change in his home blood glucose trend. Hypertension    Mr. Katlin Conley is happy with his bag flowing. Review of Systems   Constitutional: Negative. HENT: Negative. Cardiovascular: Negative. Gastrointestinal: Negative. All other systems reviewed and are negative. Allergies   Allergen Reactions    Indomethacin Other (See Comments)     Dr. Med Chilel told him not to take because affects the bladder. Statins Other (See Comments)    Succinylcholine      ? ? Succinylcholine \"allergy\"  Pt. Should be worked  Up for pseudocholinesterase deficiency. Pt. States he qwas told in the past that he had trouble with an anesthesia drug that begins with an \"s\" and that it took him awhile to breathe in PACU, but could provide no further details. Unable To Assess       Anesthesia that begins with S- stops my breathing- daughter reports succinylcholine   Questionable pseudocholinesterase deficiency    Actos [Pioglitazone] Rash       Current Outpatient Medications   Medication Sig Dispense Refill    torsemide (DEMADEX) 20 MG tablet Take 1 tablet by mouth daily 90 tablet 1    glimepiride (AMARYL) 4 MG tablet TAKE 1 TABLET BY MOUTH EVERY MORNING BEFORE BREAKFAST 90 tablet 0    metoprolol succinate (TOPROL XL) 100 MG extended release tablet TAKE 1 TABLET DAILY 90 tablet 3    atorvastatin (LIPITOR) 40 MG tablet TAKE 1 TABLET EVERY NIGHT 90 tablet 3    TRULICITY 3 JK/5.2FE SOPN INJECT 3 MG INTO THE SKIN ONCE A WEEK 6 mL 3    pregabalin (LYRICA) 50 MG capsule Take 50 mg by mouth 3 times daily. 2 capsules AM   2 capsules PM      NIFEdipine (ADALAT CC) 30 MG extended release tablet TAKE 1 TABLET BY MOUTH DAILY 90 tablet 1    mineral oil-hydrophilic petrolatum (AQUAPHOR) ointment Apply topically as needed for Dry Skin (2-4 times a day) Apply topically as needed.  396 g 1    amLODIPine (NORVASC) 10 MG tablet Take 10 mg by mouth daily      Semaglutide, 1 MG/DOSE, 2 MG/1.5ML SOPN Inject into the skin      alendronate (FOSAMAX) 70 MG tablet TAKE 1 TABLET EVERY 7 DAYS 12 tablet 3    brimonidine (ALPHAGAN) 0.2 % ophthalmic solution Apply to eye 2 times daily       latanoprost (XALATAN) 0.005 % ophthalmic solution 1 drop nightly       finasteride (PROSCAR) 5 MG tablet Take 1 tablet by mouth daily 30 tablet 1    cetirizine (ZYRTEC) 5 MG tablet Take 1 tablet by mouth daily 90 tablet 1    Meals on Wheels MISC by Does not apply route Patient is to receive 2-3 diabetic meals only 1 each 0    aspirin 81 MG EC tablet Take 81 mg by mouth daily       Acetaminophen (TYLENOL ARTHRITIS PAIN PO) Take 1 tablet by mouth daily OTC       HYDROcodone-acetaminophen (NORCO)  MG per tablet Take 1 tablet by mouth 3 times daily as needed (Pain - back & hips). Pain specialist       Insulin Pen Needle ([employer] PEN NEEDLES) 31G X 6 MM MISC 1 each by Does not apply route daily 100 each 3    ammonium lactate (AMLACTIN) 12 % cream Apply topically as needed       vitamin B-6 (PYRIDOXINE) 50 MG tablet Take 50 mg by mouth daily      ergocalciferol (ERGOCALCIFEROL) 58877 UNITS capsule Take 50,000 Units by mouth once a week       Cholecalciferol (VITAMIN D) 50 MCG (2000 UT) CAPS capsule Take by mouth Once week  (Patient not taking: Reported on 12/8/2022)      salsalate (DISALCID) 750 MG tablet Take 750 mg by mouth 2 times daily as needed (Patient not taking: Reported on 12/8/2022)       No current facility-administered medications for this visit. Vitals:    12/08/22 1038   BP: 128/72   Pulse: 92   SpO2: 97%   Weight: 256 lb (116.1 kg)   Height: 5' 5\" (1.651 m)     Body mass index is 42.6 kg/m². Wt Readings from Last 3 Encounters:   12/08/22 256 lb (116.1 kg)   12/06/22 254 lb (115.2 kg)   11/11/22 255 lb (115.7 kg)     BP Readings from Last 3 Encounters:   12/08/22 128/72   12/06/22 130/70   11/11/22 134/84       Objective   Physical Exam  Vitals and nursing note reviewed. Constitutional:       Appearance: Normal appearance. He is well-developed. He is obese. HENT:      Head: Normocephalic and atraumatic. Nose: Nose normal.   Eyes:      General: No scleral icterus.      Conjunctiva/sclera: Conjunctivae normal.      Pupils: Pupils are equal, round, and reactive to light. Cardiovascular:      Rate and Rhythm: Normal rate. Pulses: Normal pulses. Pulmonary:      Effort: Pulmonary effort is normal.      Breath sounds: Normal breath sounds. Musculoskeletal:         General: Swelling present. Cervical back: Normal range of motion. Right lower leg: Edema present. Left lower leg: Edema present. Skin:     General: Skin is warm. Capillary Refill: Capillary refill takes less than 2 seconds. Comments: DRY FLAKY SKIN   Neurological:      General: No focal deficit present. Mental Status: He is alert and oriented to person, place, and time. Psychiatric:         Behavior: Behavior normal.         Thought Content: Thought content normal.         Judgment: Judgment normal.        An electronic signature was used to authenticate this note.     --Marylee Pollock, MD

## 2022-12-09 ENCOUNTER — HOSPITAL ENCOUNTER (OUTPATIENT)
Dept: ONCOLOGY | Age: 72
Setting detail: INFUSION SERIES
Discharge: HOME | End: 2022-12-09
Payer: MEDICARE

## 2022-12-09 VITALS
DIASTOLIC BLOOD PRESSURE: 88 MMHG | RESPIRATION RATE: 20 BRPM | HEART RATE: 68 BPM | OXYGEN SATURATION: 99 % | TEMPERATURE: 97.8 F | SYSTOLIC BLOOD PRESSURE: 167 MMHG

## 2022-12-09 DIAGNOSIS — D64.9 ANEMIA, UNSPECIFIED TYPE: Primary | ICD-10-CM

## 2022-12-09 DIAGNOSIS — D50.9 IRON DEFICIENCY ANEMIA, UNSPECIFIED IRON DEFICIENCY ANEMIA TYPE: ICD-10-CM

## 2022-12-09 PROCEDURE — 96365 THER/PROPH/DIAG IV INF INIT: CPT

## 2022-12-09 PROCEDURE — 99211 OFF/OP EST MAY X REQ PHY/QHP: CPT

## 2022-12-09 PROCEDURE — 6360000002 HC RX W HCPCS: Performed by: CLINICAL NURSE SPECIALIST

## 2022-12-09 PROCEDURE — 2580000003 HC RX 258: Performed by: CLINICAL NURSE SPECIALIST

## 2022-12-09 RX ORDER — SODIUM CHLORIDE 9 MG/ML
INJECTION, SOLUTION INTRAVENOUS CONTINUOUS
Status: ACTIVE | OUTPATIENT
Start: 2022-12-09 | End: 2022-12-09

## 2022-12-09 RX ORDER — SODIUM CHLORIDE 9 MG/ML
INJECTION, SOLUTION INTRAVENOUS CONTINUOUS
Status: CANCELLED | OUTPATIENT
Start: 2022-12-16

## 2022-12-09 RX ORDER — SODIUM CHLORIDE 0.9 % (FLUSH) 0.9 %
5-40 SYRINGE (ML) INJECTION PRN
Status: DISCONTINUED | OUTPATIENT
Start: 2022-12-09 | End: 2022-12-10 | Stop reason: HOSPADM

## 2022-12-09 RX ORDER — SODIUM CHLORIDE 0.9 % (FLUSH) 0.9 %
5-40 SYRINGE (ML) INJECTION PRN
Status: CANCELLED | OUTPATIENT
Start: 2022-12-16

## 2022-12-09 RX ADMIN — Medication 10 ML: at 09:47

## 2022-12-09 RX ADMIN — Medication 200 MG: at 09:50

## 2022-12-09 RX ADMIN — SODIUM CHLORIDE: 9 INJECTION, SOLUTION INTRAVENOUS at 09:49

## 2022-12-09 NOTE — PROGRESS NOTES
Patient ambulatory to department for first of two doses of Venofer infusion. IV started. Reviewed AVS with information of Venofer. Verbally told about most common possible side effects. Comprehension noted. Tolerated Venofer infusion with no adverse rx noted. To return next week for same infusion.

## 2022-12-16 ENCOUNTER — HOSPITAL ENCOUNTER (OUTPATIENT)
Dept: ONCOLOGY | Age: 72
Setting detail: INFUSION SERIES
Discharge: HOME | End: 2022-12-16
Payer: MEDICARE

## 2022-12-16 VITALS
RESPIRATION RATE: 20 BRPM | HEART RATE: 72 BPM | DIASTOLIC BLOOD PRESSURE: 82 MMHG | SYSTOLIC BLOOD PRESSURE: 140 MMHG | TEMPERATURE: 97.4 F

## 2022-12-16 DIAGNOSIS — D50.9 IRON DEFICIENCY ANEMIA, UNSPECIFIED IRON DEFICIENCY ANEMIA TYPE: ICD-10-CM

## 2022-12-16 DIAGNOSIS — D64.9 ANEMIA, UNSPECIFIED TYPE: Primary | ICD-10-CM

## 2022-12-16 PROCEDURE — 2580000003 HC RX 258: Performed by: CLINICAL NURSE SPECIALIST

## 2022-12-16 PROCEDURE — 6360000002 HC RX W HCPCS: Performed by: CLINICAL NURSE SPECIALIST

## 2022-12-16 PROCEDURE — 99211 OFF/OP EST MAY X REQ PHY/QHP: CPT

## 2022-12-16 PROCEDURE — 96365 THER/PROPH/DIAG IV INF INIT: CPT

## 2022-12-16 RX ORDER — SODIUM CHLORIDE 0.9 % (FLUSH) 0.9 %
5-40 SYRINGE (ML) INJECTION PRN
Status: DISCONTINUED | OUTPATIENT
Start: 2022-12-16 | End: 2022-12-16

## 2022-12-16 RX ORDER — SODIUM CHLORIDE 9 MG/ML
INJECTION, SOLUTION INTRAVENOUS CONTINUOUS
Status: CANCELLED | OUTPATIENT
Start: 2022-12-16

## 2022-12-16 RX ORDER — SODIUM CHLORIDE 9 MG/ML
INJECTION, SOLUTION INTRAVENOUS CONTINUOUS
Status: DISCONTINUED | OUTPATIENT
Start: 2022-12-16 | End: 2022-12-16

## 2022-12-16 RX ORDER — SODIUM CHLORIDE 0.9 % (FLUSH) 0.9 %
5-40 SYRINGE (ML) INJECTION PRN
Status: CANCELLED | OUTPATIENT
Start: 2022-12-16

## 2022-12-16 RX ADMIN — Medication 200 MG: at 09:55

## 2022-12-16 RX ADMIN — Medication 10 ML: at 09:52

## 2022-12-16 RX ADMIN — SODIUM CHLORIDE: 9 INJECTION, SOLUTION INTRAVENOUS at 09:53

## 2022-12-16 NOTE — PROGRESS NOTES
Patient ambulatory to department for second and last of two doses of Venofer infusion. Pt expressed that his breathing is better since last infusion and dizziness has subsided. IV started. Reviewed AVS with information of Venofer. Verbally told about most common possible side effects. Comprehension noted. Tolerated Venofer infusion with no adverse rx noted. To follow up with ordering physician.

## 2023-01-30 NOTE — TELEPHONE ENCOUNTER
Yes he can increase the dose to 0.5 mg weekly for 2 weeks, then 1 Mg weekly. It should be a slow titration.  Please call patient Therapist

## 2023-02-14 ENCOUNTER — HOSPITAL ENCOUNTER (OUTPATIENT)
Age: 73
Discharge: HOME | End: 2023-02-14
Payer: MEDICARE

## 2023-02-14 ENCOUNTER — OFFICE VISIT (OUTPATIENT)
Dept: CARDIOLOGY CLINIC | Age: 73
End: 2023-02-14
Payer: MEDICARE

## 2023-02-14 VITALS
DIASTOLIC BLOOD PRESSURE: 60 MMHG | BODY MASS INDEX: 43.99 KG/M2 | OXYGEN SATURATION: 95 % | SYSTOLIC BLOOD PRESSURE: 120 MMHG | HEIGHT: 65 IN | HEART RATE: 68 BPM | WEIGHT: 264 LBS

## 2023-02-14 DIAGNOSIS — N18.30 STAGE 3 CHRONIC KIDNEY DISEASE, UNSPECIFIED WHETHER STAGE 3A OR 3B CKD (HCC): ICD-10-CM

## 2023-02-14 DIAGNOSIS — G47.33 OSA (OBSTRUCTIVE SLEEP APNEA): ICD-10-CM

## 2023-02-14 DIAGNOSIS — I50.32 CHRONIC DIASTOLIC HEART FAILURE (HCC): ICD-10-CM

## 2023-02-14 DIAGNOSIS — I50.32 CHRONIC DIASTOLIC HEART FAILURE (HCC): Primary | ICD-10-CM

## 2023-02-14 DIAGNOSIS — E66.01 OBESITY, CLASS III, BMI 40-49.9 (MORBID OBESITY) (HCC): ICD-10-CM

## 2023-02-14 LAB
ANION GAP SERPL CALCULATED.3IONS-SCNC: 15 MMOL/L (ref 3–16)
BUN BLDV-MCNC: 24 MG/DL (ref 7–20)
CALCIUM SERPL-MCNC: 9.4 MG/DL (ref 8.3–10.6)
CHLORIDE BLD-SCNC: 102 MMOL/L (ref 99–110)
CO2: 25 MMOL/L (ref 21–32)
CREAT SERPL-MCNC: 2.4 MG/DL (ref 0.8–1.3)
GFR SERPL CREATININE-BSD FRML MDRD: 28 ML/MIN/{1.73_M2}
GLUCOSE BLD-MCNC: 141 MG/DL (ref 70–99)
HCT VFR BLD CALC: 44.6 % (ref 40.5–52.5)
HEMOGLOBIN: 14.2 G/DL (ref 13.5–17.5)
MCH RBC QN AUTO: 29.7 PG (ref 26–34)
MCHC RBC AUTO-ENTMCNC: 31.9 G/DL (ref 31–36)
MCV RBC AUTO: 93 FL (ref 80–100)
PDW BLD-RTO: 16.1 % (ref 12.4–15.4)
PLATELET # BLD: 396 K/UL (ref 135–450)
PMV BLD AUTO: 7.7 FL (ref 5–10.5)
POTASSIUM SERPL-SCNC: 4.4 MMOL/L (ref 3.5–5.1)
PRO-BNP: 136 PG/ML (ref 0–124)
RBC # BLD: 4.79 M/UL (ref 4.2–5.9)
SODIUM BLD-SCNC: 142 MMOL/L (ref 136–145)
WBC # BLD: 7.1 K/UL (ref 4–11)

## 2023-02-14 PROCEDURE — 85027 COMPLETE CBC AUTOMATED: CPT

## 2023-02-14 PROCEDURE — 83880 ASSAY OF NATRIURETIC PEPTIDE: CPT

## 2023-02-14 PROCEDURE — 3078F DIAST BP <80 MM HG: CPT | Performed by: CLINICAL NURSE SPECIALIST

## 2023-02-14 PROCEDURE — 1123F ACP DISCUSS/DSCN MKR DOCD: CPT | Performed by: CLINICAL NURSE SPECIALIST

## 2023-02-14 PROCEDURE — 99214 OFFICE O/P EST MOD 30 MIN: CPT | Performed by: CLINICAL NURSE SPECIALIST

## 2023-02-14 PROCEDURE — G8417 CALC BMI ABV UP PARAM F/U: HCPCS | Performed by: CLINICAL NURSE SPECIALIST

## 2023-02-14 PROCEDURE — G8484 FLU IMMUNIZE NO ADMIN: HCPCS | Performed by: CLINICAL NURSE SPECIALIST

## 2023-02-14 PROCEDURE — 36415 COLL VENOUS BLD VENIPUNCTURE: CPT

## 2023-02-14 PROCEDURE — 80048 BASIC METABOLIC PNL TOTAL CA: CPT

## 2023-02-14 PROCEDURE — G8427 DOCREV CUR MEDS BY ELIG CLIN: HCPCS | Performed by: CLINICAL NURSE SPECIALIST

## 2023-02-14 PROCEDURE — 3017F COLORECTAL CA SCREEN DOC REV: CPT | Performed by: CLINICAL NURSE SPECIALIST

## 2023-02-14 PROCEDURE — 3074F SYST BP LT 130 MM HG: CPT | Performed by: CLINICAL NURSE SPECIALIST

## 2023-02-14 PROCEDURE — 1036F TOBACCO NON-USER: CPT | Performed by: CLINICAL NURSE SPECIALIST

## 2023-02-14 NOTE — PATIENT INSTRUCTIONS
Recommend cutting back on lyrica (this makes you hold water)  I will let you know when I hear from Dr Amy Hutchins office  Blood work today  Cut back on your fluids.   Drink only 2 of your 24 ounce bottles a day  RTO in 1 month and echo

## 2023-02-14 NOTE — PROGRESS NOTES
[address]  Progress Note    Primary Care Doctor:  Antwan Draper MD    Chief Complaint   Patient presents with    Follow-up    Congestive Heart Failure        History of Present Illness:  71 y.o. male with history of ckd (Dr Opal Reyes), kamran. Dm, morbid obesity,  4/5-14/2021 for chest discomfort and SOB for months, diuresed 314->293. Obstructive uropathy with suprapubic catheter    I had the pleasure of seeing Zhang Pino in follow up for dHF. He is ambulatory and by his self. His weight is up from [phone]. He admits to over doing the fluids. He follows with Dr Opal Reyes for his kidneys. His last 2 creats 1.9-2.0. He is taking lyrica twice a day for neuropathy. He is drinking over 72 ounces or more a day. No chest pain, palpitations, shortness of breath or lightheadedness. He has edema ankles to mid calf area. Past Medical History:   has a past medical history of Acute on chronic diastolic heart failure ([address] Acute respiratory failure with hypoxia ([address] Anesthesia, Cancer ([address] Hyperlipidemia, Hypertension, Obesity, KAMRAN treated with BiPAP, Osteoarthritis, Type 2 diabetes mellitus without complication ([address] Type 2 diabetes mellitus without complication, without long-term current use of insulin ([address] Urinary incontinence, stress, male, and Wears dentures. Surgical History:   has a past surgical history that includes Total hip arthroplasty; joint replacement (Left); shoulder surgery (Bilateral); Knee arthroscopy (Right); Cystoscopy (N/A, 6/21/2021); and CT PERITONEAL/RETROPERITONEAL PERC DRAIN (12/23/2021). Social History:   reports that he quit smoking about 22 years ago. His smoking use included cigars and cigarettes. He started smoking about 43 years ago. He has a 5.25 pack-year smoking history. He has quit using smokeless tobacco. He reports current alcohol use. He reports that he does not use drugs.    Family History:   Family History   Problem Relation Age of Onset Hypertension Mother     Diabetes Mother     Hypertension Father        Home Medications:  Prior to Admission medications    Medication Sig Start Date End Date Taking? Authorizing Provider   CPAP Machine MISC by Does not apply route   Yes Historical Provider, MD   Dulaglutide (TRULICITY) 3 WR/3.5JE SOPN Inject 3 mg into the skin once a week 12/8/22  Yes Scott Otoole MD   ammonium lactate (AMLACTIN) 12 % cream Apply topically in the morning and at bedtime 12/8/22  Yes Scott Otoole MD   torsemide (DEMADEX) 20 MG tablet Take 1 tablet by mouth daily 12/6/22  Yes Marietta Stevens APRN - CNS   metoprolol succinate (TOPROL XL) 100 MG extended release tablet TAKE 1 TABLET DAILY 9/26/22  Yes Scott Otoole MD   atorvastatin (LIPITOR) 40 MG tablet TAKE 1 TABLET EVERY NIGHT 9/21/22  Yes Scott Otoole MD   pregabalin (LYRICA) 50 MG capsule Take 50 mg by mouth 3 times daily. 2 capsules AM   2 capsules PM   Yes Historical Provider, MD   NIFEdipine (ADALAT CC) 30 MG extended release tablet TAKE 1 TABLET BY MOUTH DAILY 8/28/22  Yes Scott Otoole MD   mineral oil-hydrophilic petrolatum (AQUAPHOR) ointment Apply topically as needed for Dry Skin (2-4 times a day) Apply topically as needed.  8/10/22  Yes OSMAN Pemberton - CNP   amLODIPine (NORVASC) 10 MG tablet Take 10 mg by mouth daily   Yes Historical Provider, MD   alendronate (FOSAMAX) 70 MG tablet TAKE 1 TABLET EVERY 7 DAYS 3/4/22  Yes Scott Otoole MD   brimonidine (ALPHAGAN) 0.2 % ophthalmic solution Apply to eye 2 times daily    Yes Historical Provider, MD   latanoprost (XALATAN) 0.005 % ophthalmic solution 1 drop nightly    Yes Historical Provider, MD   finasteride (PROSCAR) 5 MG tablet Take 1 tablet by mouth daily 8/13/21  Yes Ben Dunn PA-C   cetirizine (ZYRTEC) 5 MG tablet Take 1 tablet by mouth daily 7/14/21  Yes Scott Otoole MD   Meals on Wheels MISC by Does not apply route Patient is to receive 2-3 diabetic meals only 4/30/21  Yes Gerry Avila MD   aspirin 81 MG EC tablet Take 81 mg by mouth daily    Yes Historical Provider, MD   Acetaminophen (TYLENOL ARTHRITIS PAIN PO) Take 1 tablet by mouth daily OTC    Yes Historical Provider, MD   HYDROcodone-acetaminophen (NORCO)  MG per tablet Take 1 tablet by mouth 3 times daily as needed (Pain - back & hips). Pain specialist    Yes Historical Provider, MD   Insulin Pen Needle ([employer] PEN NEEDLES) 31G X 6 MM MISC 1 each by Does not apply route daily 9/29/20  Yes Gerry Avila MD   ergocalciferol (ERGOCALCIFEROL) 60452 UNITS capsule Take 50,000 Units by mouth once a week    Yes Historical Provider, MD        Allergies: Indomethacin, Statins, Succinylcholine, Unable to assess, and Actos [pioglitazone]     Review of Systems:   Constitutional: there has been no unanticipated weight loss. There's been no change in energy level, sleep pattern, or activity level. Eyes: No visual changes or diplopia. No scleral icterus. ENT: No Headaches, hearing loss or vertigo. No mouth sores or sore throat. Cardiovascular: Reviewed in HPI  Respiratory: No cough or wheezing, no sputum production. No hematemesis. Gastrointestinal: No abdominal pain, appetite loss, blood in stools. No change in bowel or bladder habits. Genitourinary: No dysuria, trouble voiding, or hematuria. Musculoskeletal:  No gait disturbance, weakness or joint complaints. Integumentary: No rash or pruritis. Neurological: No headache, diplopia, change in muscle strength, numbness or tingling. No change in gait, balance, coordination, mood, affect, memory, mentation, behavior. Psychiatric: No anxiety, no depression. Endocrine: No malaise, fatigue or temperature intolerance. No excessive thirst, fluid intake, or urination. No tremor. Hematologic/Lymphatic: No abnormal bruising or bleeding, blood clots or swollen lymph nodes.   Allergic/Immunologic: No nasal congestion or hives. Physical Examination:    Vitals:    02/14/23 0955   BP: 120/60   Site: Right Upper Arm   Position: Sitting   Cuff Size: Large Adult   Pulse: 68   SpO2: 95%   Weight: 264 lb (119.7 kg)   Height: 5' 5\" (1.651 m)        Constitutional and General Appearance: Warm and dry, no apparent distress, normal coloration mid pubic urostomy tube  HEENT:  Normocephalic, atraumatic  Respiratory:  Normal excursion and expansion without use of accessory muscles  Resp Auscultation: Normal breath sounds without dullness  Cardiovascular: The apical impulses not displaced  Heart tones are crisp and normal  JVP normal cm H2O  Regular rate and rhythm  Peripheral pulses are symmetrical and full  There is no clubbing, cyanosis of the extremities.   Bilateral lower ankle to mid calf edema  Pedal Pulses: 2+ and equal   Abdomen:obese  No masses or tenderness  Liver/Spleen: No Abnormalities Noted  Neurological/Psychiatric:  Alert and oriented in all spheres  Moves all extremities well  Exhibits normal gait balance and coordination  No abnormalities of mood, affect, memory, mentation, or behavior are noted    Lab Data:    CBC:   Lab Results   Component Value Date/Time    WBC 7.3 11/08/2022 10:15 AM    WBC 5.6 09/28/2022 05:00 PM    WBC 7.2 08/21/2022 02:13 PM    RBC 4.38 11/08/2022 10:15 AM    RBC 4.54 09/28/2022 05:00 PM    RBC 4.94 08/21/2022 02:13 PM    HGB 13.3 11/08/2022 10:15 AM    HGB 13.7 09/28/2022 05:00 PM    HGB 14.6 08/21/2022 02:13 PM    HCT 39.4 11/08/2022 10:15 AM    HCT 42.5 09/28/2022 05:00 PM    HCT 45.7 08/21/2022 02:13 PM    MCV 89.9 11/08/2022 10:15 AM    MCV 93.6 09/28/2022 05:00 PM    MCV 92.4 08/21/2022 02:13 PM    RDW 15.2 11/08/2022 10:15 AM    RDW 16.6 09/28/2022 05:00 PM    RDW 15.1 08/21/2022 02:13 PM     11/08/2022 10:15 AM     09/28/2022 05:00 PM     08/21/2022 02:13 PM     BMP:  Lab Results   Component Value Date/Time     11/08/2022 10:15 AM     09/28/2022 05:00 PM     08/21/2022 02:13 PM    K 4.4 11/08/2022 10:15 AM    K 4.6 09/28/2022 05:00 PM    K 4.8 08/21/2022 02:13 PM    K 4.2 06/21/2021 06:51 AM     11/08/2022 10:15 AM     09/28/2022 05:00 PM     08/21/2022 02:13 PM    CO2 21 11/08/2022 10:15 AM    CO2 19 09/28/2022 05:00 PM    CO2 22 08/21/2022 02:13 PM    PHOS 3.0 11/08/2022 10:15 AM    PHOS 2.9 06/21/2022 11:24 AM    PHOS 3.7 01/31/2022 04:10 PM    BUN 20 11/08/2022 10:15 AM    BUN 20 09/28/2022 05:00 PM    BUN 15 08/21/2022 02:13 PM    CREATININE 2.0 11/08/2022 10:15 AM    CREATININE 1.9 09/28/2022 05:00 PM    CREATININE 1.8 08/21/2022 02:13 PM     BNP:   Lab Results   Component Value Date/Time    PROBNP 244 12/13/2021 09:40 AM    PROBNP 418 08/13/2021 05:02 PM    PROBNP 194 05/11/2021 01:00 PM     Cardiac Imaging:  Echo 4/7/2021   Summary   Overall left ventricular function is normal.   Ejection fraction is visually estimated to be 55 %. E/e'= 9.3 . There is reversal of E/A inflow velocities across the mitral valve. There is mild concentric left ventricular hypertrophy. No definitive regional wall motion abnormalities are noted. Grade II diastolic dysfunction with elevated LV filling pressures. Mild to moderate tricuspid regurgitation. Estimated pulmonary artery systolic pressure is moderately elevated at 65   mmHg assuming a right atrial pressure of 15 mmHg. The right ventricle is mildly enlarged. TAPSE= 2.72 RV S'= 17.2   IVC size is dilated (>2.1 cm) and collapses < 50% with respiration   consistent with elevated RA pressure (15 mmHg    Stress test 8/12/2019  The LV EF is 55%  Normal global and regional wall motion in all territories. There is a medium sized, partially reversible defect in the anterior  wall consistent with moderate verena-infarct ischemia. 1.Non-diagnostic ECG for ischemia with pharmocologic stress. 2.Normal left ventriular systolic function.    3.Positive nuclear stress imaging suggestive of scar plus verena      infarct ischemia in the anterior wall. 4.Nuclear stress image findings indicate intermediate risk for      future ischemic event . Echo 12/12/2019  Summary:  The left ventricular wall motion is normal.  Overall left ventricular ejection fraction is estimated to be 60-65%. There is mild concentric left ventricular hypertrophy. There is mild mitral regurgitation. Assessment:    1. Chronic diastolic heart failure ([address]    2. KAMRAN (obstructive sleep apnea) on cpap   3. Stage 3 chronic kidney disease, unspecified whether stage 3a or 3b CKD    4. Class 3 severe obesity due to excess calories with serious comorbidity and body mass index (BMI) of 45.0 to 49.9 in [facility]) Dr Cathy Caputo:   Patient Instructions   Recommend cutting back on lyrica (this makes you hold water)  I will let you know when I hear from Dr Abad Mclaincel office  Blood work today  Cut back on your fluids.   Drink only 2 of your 24 ounce bottles a day  RTO in 1 month and echo    I appreciate the opportunity of cooperating in the care of this individual.    OSMAN Acosta - CNS, CNS, 2/14/2023, 12:21 PM

## 2023-02-15 ENCOUNTER — TELEPHONE (OUTPATIENT)
Dept: CARDIOLOGY CLINIC | Age: 73
End: 2023-02-15

## 2023-02-15 DIAGNOSIS — I50.32 CHRONIC DIASTOLIC HEART FAILURE (HCC): Primary | ICD-10-CM

## 2023-02-15 RX ORDER — TORSEMIDE 20 MG/1
40 TABLET ORAL DAILY
Qty: 90 TABLET | Refills: 1
Start: 2023-02-15 | End: 2023-02-17 | Stop reason: SDUPTHER

## 2023-02-15 NOTE — TELEPHONE ENCOUNTER
Tried to reach patient, [facility] for patient to return our call so we can discuss medication changes and repeat lab results.

## 2023-02-15 NOTE — TELEPHONE ENCOUNTER
----- Message from OSMAN Schroeder - CNS sent at 2/15/2023  8:42 AM EST -----  Let him know that I spoke to his Dr Nic Viramontes and he agrees you need to cut the lyrica to 50 mg twice a day and increase the torsemide to 40 mg daily  Recheck blood work in 2 weeks  Cut back on his fluid intake  If leg edema does not get better to call me  thanks

## 2023-02-17 RX ORDER — TORSEMIDE 20 MG/1
40 TABLET ORAL DAILY
Qty: 180 TABLET | Refills: 0 | Status: SHIPPED | OUTPATIENT
Start: 2023-02-17

## 2023-02-17 NOTE — TELEPHONE ENCOUNTER
Spoke to patient he has lost 5 pds his current weight is 260 was 265. He stated he has cut back on fluids and his swelling has went down. He feels so much better. He would like us to call his daughter about his medication changes. He will get labs march 1st 2023. Spoke to jose daniel his daughter she verbalized understanding. She would like a new Rx sent in with the new dose of torsemide.

## 2023-02-27 RX ORDER — NIFEDIPINE 30 MG/1
TABLET, FILM COATED, EXTENDED RELEASE ORAL
Qty: 90 TABLET | Refills: 0 | Status: SHIPPED | OUTPATIENT
Start: 2023-02-27 | End: 2023-03-03 | Stop reason: SDUPTHER

## 2023-02-28 ENCOUNTER — HOSPITAL ENCOUNTER (OUTPATIENT)
Age: 73
Discharge: HOME | End: 2023-02-28
Payer: MEDICARE

## 2023-02-28 DIAGNOSIS — N18.30 TYPE 2 DIABETES MELLITUS WITH STAGE 3 CHRONIC KIDNEY DISEASE, WITHOUT LONG-TERM CURRENT USE OF INSULIN, UNSPECIFIED WHETHER STAGE 3A OR 3B CKD (HCC): ICD-10-CM

## 2023-02-28 DIAGNOSIS — E53.8 FOLATE DEFICIENCY: Primary | ICD-10-CM

## 2023-02-28 DIAGNOSIS — E53.8 FOLATE DEFICIENCY: ICD-10-CM

## 2023-02-28 DIAGNOSIS — E11.22 TYPE 2 DIABETES MELLITUS WITH STAGE 3 CHRONIC KIDNEY DISEASE, WITHOUT LONG-TERM CURRENT USE OF INSULIN, UNSPECIFIED WHETHER STAGE 3A OR 3B CKD (HCC): ICD-10-CM

## 2023-02-28 DIAGNOSIS — Z12.5 ENCOUNTER FOR SCREENING FOR MALIGNANT NEOPLASM OF PROSTATE: ICD-10-CM

## 2023-02-28 DIAGNOSIS — I50.32 CHRONIC DIASTOLIC HEART FAILURE (HCC): Primary | ICD-10-CM

## 2023-02-28 LAB
ANION GAP SERPL CALCULATED.3IONS-SCNC: 14 MMOL/L (ref 3–16)
BUN BLDV-MCNC: 35 MG/DL (ref 7–20)
CALCIUM SERPL-MCNC: 9.7 MG/DL (ref 8.3–10.6)
CHLORIDE BLD-SCNC: 105 MMOL/L (ref 99–110)
CO2: 25 MMOL/L (ref 21–32)
CREAT SERPL-MCNC: 2.5 MG/DL (ref 0.8–1.3)
FOLATE: 3.2 NG/ML (ref 4.78–24.2)
GFR SERPL CREATININE-BSD FRML MDRD: 27 ML/MIN/{1.73_M2}
GLUCOSE BLD-MCNC: 122 MG/DL (ref 70–99)
POTASSIUM SERPL-SCNC: 4.3 MMOL/L (ref 3.5–5.1)
PRO-BNP: 90 PG/ML (ref 0–124)
PROSTATE SPECIFIC ANTIGEN: <0.01 NG/ML (ref 0–4)
SODIUM BLD-SCNC: 144 MMOL/L (ref 136–145)
VITAMIN B-12: 624 PG/ML (ref 211–911)

## 2023-02-28 PROCEDURE — 84153 ASSAY OF PSA TOTAL: CPT

## 2023-02-28 PROCEDURE — 82607 VITAMIN B-12: CPT

## 2023-02-28 PROCEDURE — 80048 BASIC METABOLIC PNL TOTAL CA: CPT

## 2023-02-28 PROCEDURE — 36415 COLL VENOUS BLD VENIPUNCTURE: CPT

## 2023-02-28 PROCEDURE — 83880 ASSAY OF NATRIURETIC PEPTIDE: CPT

## 2023-02-28 PROCEDURE — 82746 ASSAY OF FOLIC ACID SERUM: CPT

## 2023-02-28 RX ORDER — FOLIC ACID 1 MG/1
1 TABLET ORAL DAILY
Qty: 90 TABLET | Refills: 1 | Status: SHIPPED | OUTPATIENT
Start: 2023-02-28

## 2023-02-28 RX ORDER — TORSEMIDE 20 MG/1
30 TABLET ORAL DAILY
Qty: 180 TABLET | Refills: 0
Start: 2023-02-28

## 2023-03-01 ENCOUNTER — TELEPHONE (OUTPATIENT)
Dept: CARDIOLOGY CLINIC | Age: 73
End: 2023-03-01

## 2023-03-01 NOTE — TELEPHONE ENCOUNTER
Spoke to patient his current weight 260 and his swelling is down. He would like us to speak with his daughter about medication changes. Spoke to patient's daughter she verbalized understanding.

## 2023-03-01 NOTE — TELEPHONE ENCOUNTER
----- Message from OSMAN Cárdenas - CNS sent at 2/28/2023  7:50 PM EST -----  He is on the dry side on his blood work  Find out if his weight is down along with swelling  We need to cut torsemide to 30 mg daily  Recheck blood work in 2-3 weeks  thanks

## 2023-03-03 RX ORDER — NIFEDIPINE 30 MG/1
TABLET, FILM COATED, EXTENDED RELEASE ORAL
Qty: 90 TABLET | Refills: 0 | Status: SHIPPED | OUTPATIENT
Start: 2023-03-03

## 2023-03-03 NOTE — TELEPHONE ENCOUNTER
Recent Visits  Date Type Provider Dept   12/08/22 Office Visit Edward Mcdonald MD [facility]   11/11/22 Office Visit Edward Mcdonald MD [facility]   09/02/22 Office Visit Edward Mcdonald MD [facility]   08/10/22 Office Visit Urmila Hager, APRN - CNP [facility]   05/06/22 Office Visit Edward Mcdonald MD [facility]   01/04/22 Office Visit Urmila Hager, APRN - CNP [facility]   Showing recent visits within past 540 days with a meds authorizing provider and meeting all other requirements  Future Appointments  Date Type Provider Dept   03/13/23 Appointment Edward Mcdonald MD [facility]   04/13/23 Appointment Edward Mcdonald MD [facility]   05/08/23 Appointment Edward Mcdonald MD [facility]   Showing future appointments within next 150 days with a meds authorizing provider and meeting all other requirements     12/8/2022

## 2023-03-13 ENCOUNTER — OFFICE VISIT (OUTPATIENT)
Dept: INTERNAL MEDICINE CLINIC | Age: 73
End: 2023-03-13
Payer: MEDICARE

## 2023-03-13 VITALS
WEIGHT: 267.8 LBS | SYSTOLIC BLOOD PRESSURE: 130 MMHG | DIASTOLIC BLOOD PRESSURE: 76 MMHG | BODY MASS INDEX: 44.56 KG/M2 | OXYGEN SATURATION: 94 % | TEMPERATURE: 97.4 F | HEART RATE: 68 BPM

## 2023-03-13 DIAGNOSIS — E11.22 TYPE 2 DIABETES MELLITUS WITH STAGE 3 CHRONIC KIDNEY DISEASE, WITHOUT LONG-TERM CURRENT USE OF INSULIN, UNSPECIFIED WHETHER STAGE 3A OR 3B CKD (HCC): Primary | ICD-10-CM

## 2023-03-13 DIAGNOSIS — Z12.11 SCREEN FOR COLON CANCER: ICD-10-CM

## 2023-03-13 DIAGNOSIS — N18.30 TYPE 2 DIABETES MELLITUS WITH STAGE 3 CHRONIC KIDNEY DISEASE, WITHOUT LONG-TERM CURRENT USE OF INSULIN, UNSPECIFIED WHETHER STAGE 3A OR 3B CKD (HCC): Primary | ICD-10-CM

## 2023-03-13 DIAGNOSIS — E66.01 MORBID OBESITY (HCC): ICD-10-CM

## 2023-03-13 DIAGNOSIS — E53.8 FOLATE DEFICIENCY: ICD-10-CM

## 2023-03-13 LAB — HBA1C MFR BLD: 6.4 %

## 2023-03-13 PROCEDURE — 3075F SYST BP GE 130 - 139MM HG: CPT | Performed by: INTERNAL MEDICINE

## 2023-03-13 PROCEDURE — 99214 OFFICE O/P EST MOD 30 MIN: CPT | Performed by: INTERNAL MEDICINE

## 2023-03-13 PROCEDURE — 1036F TOBACCO NON-USER: CPT | Performed by: INTERNAL MEDICINE

## 2023-03-13 PROCEDURE — 3078F DIAST BP <80 MM HG: CPT | Performed by: INTERNAL MEDICINE

## 2023-03-13 PROCEDURE — G8417 CALC BMI ABV UP PARAM F/U: HCPCS | Performed by: INTERNAL MEDICINE

## 2023-03-13 PROCEDURE — G8427 DOCREV CUR MEDS BY ELIG CLIN: HCPCS | Performed by: INTERNAL MEDICINE

## 2023-03-13 PROCEDURE — 1123F ACP DISCUSS/DSCN MKR DOCD: CPT | Performed by: INTERNAL MEDICINE

## 2023-03-13 PROCEDURE — G8484 FLU IMMUNIZE NO ADMIN: HCPCS | Performed by: INTERNAL MEDICINE

## 2023-03-13 PROCEDURE — 83036 HEMOGLOBIN GLYCOSYLATED A1C: CPT | Performed by: INTERNAL MEDICINE

## 2023-03-13 PROCEDURE — 3044F HG A1C LEVEL LT 7.0%: CPT | Performed by: INTERNAL MEDICINE

## 2023-03-13 PROCEDURE — 3017F COLORECTAL CA SCREEN DOC REV: CPT | Performed by: INTERNAL MEDICINE

## 2023-03-13 PROCEDURE — 2022F DILAT RTA XM EVC RTNOPTHY: CPT | Performed by: INTERNAL MEDICINE

## 2023-03-13 RX ORDER — TIRZEPATIDE 10 MG/.5ML
10 INJECTION, SOLUTION SUBCUTANEOUS WEEKLY
Qty: 2 ML | Refills: 0 | Status: SHIPPED | OUTPATIENT
Start: 2023-06-11 | End: 2023-07-11

## 2023-03-13 RX ORDER — TIRZEPATIDE 2.5 MG/.5ML
2.5 INJECTION, SOLUTION SUBCUTANEOUS WEEKLY
Qty: 2 ML | Refills: 0 | Status: SHIPPED | OUTPATIENT
Start: 2023-03-13 | End: 2023-04-12

## 2023-03-13 RX ORDER — TIRZEPATIDE 7.5 MG/.5ML
7.5 INJECTION, SOLUTION SUBCUTANEOUS WEEKLY
Qty: 2 ML | Refills: 0 | Status: SHIPPED | OUTPATIENT
Start: 2023-05-12 | End: 2023-06-11

## 2023-03-13 RX ORDER — TIRZEPATIDE 5 MG/.5ML
5 INJECTION, SOLUTION SUBCUTANEOUS WEEKLY
Qty: 2 ML | Refills: 0 | Status: SHIPPED | OUTPATIENT
Start: 2023-04-12 | End: 2023-05-12

## 2023-03-13 SDOH — ECONOMIC STABILITY: HOUSING INSECURITY
IN THE LAST 12 MONTHS, WAS THERE A TIME WHEN YOU DID NOT HAVE A STEADY PLACE TO SLEEP OR SLEPT IN A SHELTER (INCLUDING NOW)?: NO

## 2023-03-13 SDOH — ECONOMIC STABILITY: FOOD INSECURITY: WITHIN THE PAST 12 MONTHS, THE FOOD YOU BOUGHT JUST DIDN'T LAST AND YOU DIDN'T HAVE MONEY TO GET MORE.: SOMETIMES TRUE

## 2023-03-13 SDOH — ECONOMIC STABILITY: INCOME INSECURITY: HOW HARD IS IT FOR YOU TO PAY FOR THE VERY BASICS LIKE FOOD, HOUSING, MEDICAL CARE, AND HEATING?: NOT VERY HARD

## 2023-03-13 SDOH — ECONOMIC STABILITY: FOOD INSECURITY: WITHIN THE PAST 12 MONTHS, YOU WORRIED THAT YOUR FOOD WOULD RUN OUT BEFORE YOU GOT MONEY TO BUY MORE.: SOMETIMES TRUE

## 2023-03-13 ASSESSMENT — PATIENT HEALTH QUESTIONNAIRE - PHQ9
1. LITTLE INTEREST OR PLEASURE IN DOING THINGS: 3
3. TROUBLE FALLING OR STAYING ASLEEP: 1
2. FEELING DOWN, DEPRESSED OR HOPELESS: 3
SUM OF ALL RESPONSES TO PHQ9 QUESTIONS 1 & 2: 6
9. THOUGHTS THAT YOU WOULD BE BETTER OFF DEAD, OR OF HURTING YOURSELF: 0
5. POOR APPETITE OR OVEREATING: 2
4. FEELING TIRED OR HAVING LITTLE ENERGY: 0
SUM OF ALL RESPONSES TO PHQ QUESTIONS 1-9: 11
SUM OF ALL RESPONSES TO PHQ QUESTIONS 1-9: 11
7. TROUBLE CONCENTRATING ON THINGS, SUCH AS READING THE NEWSPAPER OR WATCHING TELEVISION: 2
SUM OF ALL RESPONSES TO PHQ QUESTIONS 1-9: 11
8. MOVING OR SPEAKING SO SLOWLY THAT OTHER PEOPLE COULD HAVE NOTICED. OR THE OPPOSITE, BEING SO FIGETY OR RESTLESS THAT YOU HAVE BEEN MOVING AROUND A LOT MORE THAN USUAL: 0
SUM OF ALL RESPONSES TO PHQ QUESTIONS 1-9: 11
6. FEELING BAD ABOUT YOURSELF - OR THAT YOU ARE A FAILURE OR HAVE LET YOURSELF OR YOUR FAMILY DOWN: 0

## 2023-03-13 ASSESSMENT — ANXIETY QUESTIONNAIRES
5. BEING SO RESTLESS THAT IT IS HARD TO SIT STILL: 0
7. FEELING AFRAID AS IF SOMETHING AWFUL MIGHT HAPPEN: 0
3. WORRYING TOO MUCH ABOUT DIFFERENT THINGS: 0
1. FEELING NERVOUS, ANXIOUS, OR ON EDGE: 0
2. NOT BEING ABLE TO STOP OR CONTROL WORRYING: 0
IF YOU CHECKED OFF ANY PROBLEMS ON THIS QUESTIONNAIRE, HOW DIFFICULT HAVE THESE PROBLEMS MADE IT FOR YOU TO DO YOUR WORK, TAKE CARE OF THINGS AT HOME, OR GET ALONG WITH OTHER PEOPLE: NOT DIFFICULT AT ALL
6. BECOMING EASILY ANNOYED OR IRRITABLE: 0

## 2023-03-13 NOTE — PROGRESS NOTES
Subjective:      Patient ID: Chilango Zimmerman is a 71 y.o. male. Diabetes  He presents for his follow-up diabetic visit. He has type 2 diabetes mellitus. No MedicAlert identification noted. His disease course has been stable. There are no hypoglycemic associated symptoms. There are no diabetic associated symptoms. There are no hypoglycemic complications. Symptoms are stable. There are no diabetic complications. Risk factors for coronary artery disease include diabetes mellitus, dyslipidemia, obesity, sedentary lifestyle, family history and male sex. When asked about current treatments, none were reported. His weight is increasing steadily. He is following a diabetic and generally healthy diet. When asked about meal planning, he reported none. He has not had a previous visit with a dietitian. He participates in exercise daily. There is no change in his home blood glucose trend. An ACE inhibitor/angiotensin II receptor blocker is being taken. Eye exam is current. Present in follow up of surgery. He is feeling better. He does report weight gain which he attributes to his sedentary time after the procedure. His diabetes is well controlled. He is tolerating trulicity 3mg. Hypertension:  Home blood pressure monitoring: No.  He is adherent to a low sodium diet. Patient denies shortness of breath, headache, and lightheadedness. Antihypertensive medication side effects: no medication side effects noted. Use of agents associated with hypertension: none. Sodium (mmol/L)   Date Value   02/28/2023 144    BUN (mg/dL)   Date Value   02/28/2023 35 (H)    Glucose (mg/dL)   Date Value   02/28/2023 122 (H)      Potassium (mmol/L)   Date Value   02/28/2023 4.3     Potassium reflex Magnesium (mmol/L)   Date Value   06/21/2021 4.2    Creatinine (mg/dL)   Date Value   02/28/2023 2.5 (H)         Patient complains of chills.  Normal CBC    Hemoglobin A1C   Date Value Ref Range Status 03/13/2023 6.4 % Final       Review of Systems   All other systems reviewed and are negative. Allergies   Allergen Reactions    Indomethacin Other (See Comments)     Dr. Pa Tee told him not to take because affects the bladder. Statins Other (See Comments)    Succinylcholine      ? ? Succinylcholine \"allergy\"  Pt. Should be worked  Up for pseudocholinesterase deficiency. Pt. States he qwas told in the past that he had trouble with an anesthesia drug that begins with an \"s\" and that it took him awhile to breathe in PACU, but could provide no further details. Unable To Assess       Anesthesia that begins with S- stops my breathing- daughter reports succinylcholine   Questionable pseudocholinesterase deficiency    Actos [Pioglitazone] Rash       Current Outpatient Medications   Medication Sig Dispense Refill    NIFEdipine (ADALAT CC) 30 MG extended release tablet TAKE 1 TABLET BY MOUTH DAILY 90 tablet 0    folic acid (FOLVITE) 1 MG tablet Take 1 tablet by mouth daily 90 tablet 1    torsemide (DEMADEX) 20 MG tablet Take 1.5 tablets by mouth daily 180 tablet 0    CPAP Machine MISC by Does not apply route      Dulaglutide (TRULICITY) 3 MM/4.1XQ SOPN Inject 3 mg into the skin once a week 6 mL 3    ammonium lactate (AMLACTIN) 12 % cream Apply topically in the morning and at bedtime 385 g 5    metoprolol succinate (TOPROL XL) 100 MG extended release tablet TAKE 1 TABLET DAILY 90 tablet 3    atorvastatin (LIPITOR) 40 MG tablet TAKE 1 TABLET EVERY NIGHT 90 tablet 3    pregabalin (LYRICA) 50 MG capsule Take 50 mg by mouth 3 times daily. 2 capsules AM   2 capsules PM      mineral oil-hydrophilic petrolatum (AQUAPHOR) ointment Apply topically as needed for Dry Skin (2-4 times a day) Apply topically as needed.  396 g 1    amLODIPine (NORVASC) 10 MG tablet Take 10 mg by mouth daily      alendronate (FOSAMAX) 70 MG tablet TAKE 1 TABLET EVERY 7 DAYS 12 tablet 3    brimonidine (ALPHAGAN) 0.2 % ophthalmic solution Apply to eye 2 times daily       latanoprost (XALATAN) 0.005 % ophthalmic solution 1 drop nightly       finasteride (PROSCAR) 5 MG tablet Take 1 tablet by mouth daily 30 tablet 1    cetirizine (ZYRTEC) 5 MG tablet Take 1 tablet by mouth daily 90 tablet 1    Meals on Wheels MISC by Does not apply route Patient is to receive 2-3 diabetic meals only 1 each 0    aspirin 81 MG EC tablet Take 81 mg by mouth daily       Acetaminophen (TYLENOL ARTHRITIS PAIN PO) Take 1 tablet by mouth daily OTC       HYDROcodone-acetaminophen (NORCO)  MG per tablet Take 1 tablet by mouth 3 times daily as needed (Pain - back & hips). Pain specialist       Insulin Pen Needle ([employer] PEN NEEDLES) 31G X 6 MM MISC 1 each by Does not apply route daily 100 each 3    ergocalciferol (ERGOCALCIFEROL) 74526 UNITS capsule Take 50,000 Units by mouth once a week        No current facility-administered medications for this visit. Vitals:    03/13/23 0931   BP: 130/76   Pulse: 68   Temp: 97.4 °F (36.3 °C)   SpO2: 94%   Weight: 267 lb 12.8 oz (121.5 kg)     Body mass index is 44.56 kg/m². Wt Readings from Last 3 Encounters:   03/13/23 267 lb 12.8 oz (121.5 kg)   02/14/23 264 lb (119.7 kg)   12/08/22 256 lb (116.1 kg)     BP Readings from Last 3 Encounters:   03/13/23 130/76   02/14/23 120/60   12/16/22 (!) 140/82       Objective:   Physical Exam  Vitals and nursing note reviewed. Constitutional:       Appearance: Normal appearance. He is well-developed. He is obese. HENT:      Head: Normocephalic and atraumatic. Nose: Nose normal. No congestion or rhinorrhea. Mouth/Throat:      Mouth: Mucous membranes are moist.   Eyes:      General: No scleral icterus. Conjunctiva/sclera: Conjunctivae normal.      Pupils: Pupils are equal, round, and reactive to light. Cardiovascular:      Rate and Rhythm: Normal rate and regular rhythm. Pulses: Normal pulses. Heart sounds: Murmur heard.    Pulmonary:      Effort: Pulmonary effort is normal.      Breath sounds: Normal breath sounds. Musculoskeletal:      Cervical back: Normal range of motion. Rigidity present. Skin:     General: Skin is warm. Capillary Refill: Capillary refill takes less than 2 seconds. Comments: Visual inspection:  Deformity/amputation: absent  Skin lesions/pre-ulcerative calluses: present - dry flaky skin  Edema: right- 2+, left- 2+    Sensory exam:  Monofilament sensation: normal  (minimum of 5 random plantar locations tested, avoiding callused areas - > 1 area with absence of sensation is + for neuropathy)    Plus at least one of the following:  Pulses: normal,   Pinprick: Intact  Proprioception: Impaired  Vibration (128 Hz): Impaired    Neurological:      General: No focal deficit present. Mental Status: He is alert and oriented to person, place, and time. Mental status is at baseline. Psychiatric:         Mood and Affect: Mood normal.         Behavior: Behavior normal.         Thought Content: Thought content normal.         Judgment: Judgment normal.         Assessment/Plan:  Lakia Ramey was seen today for diabetes. Diagnoses and all orders for this visit:    Type 2 diabetes mellitus with stage 3 chronic kidney disease, without long-term current use of insulin, unspecified whether stage 3a or 3b CKD ([facility] [address]  -     POCT glycosylated hemoglobin (Hb A1C)  -     Diabetic Foot Exam  -     External Referral to [facility] ([facility]) - [facility]    Morbid obesity [facility]): patient struggling with diet   -     Tirzepatide (MOUNJARO) 2.5 MG/0.5ML SOPN SC injection; Inject 0.5 mLs into the skin once a week  -     Tirzepatide (MOUNJARO) 5 MG/0.5ML SOPN SC injection; Inject 0.5 mLs into the skin once a week  -     Tirzepatide (MOUNJARO) 7.5 MG/0.5ML SOPN SC injection; Inject 0.5 mLs into the skin once a week  -     Tirzepatide (MOUNJARO) 10 MG/0.5ML SOPN SC injection;  Inject 0.5 mLs into the skin once a week    Screen for colon cancer  -     COLONOSCOPY (Screening);  Future    Folate deficiency  - refill folate      Antonio De Los Santos MD

## 2023-03-16 ENCOUNTER — OFFICE VISIT (OUTPATIENT)
Dept: CARDIOLOGY CLINIC | Age: 73
End: 2023-03-16
Payer: MEDICARE

## 2023-03-16 VITALS
DIASTOLIC BLOOD PRESSURE: 62 MMHG | BODY MASS INDEX: 44.15 KG/M2 | WEIGHT: 265 LBS | HEART RATE: 82 BPM | SYSTOLIC BLOOD PRESSURE: 130 MMHG | HEIGHT: 65 IN | OXYGEN SATURATION: 95 %

## 2023-03-16 DIAGNOSIS — I50.32 CHRONIC DIASTOLIC HEART FAILURE (HCC): Primary | ICD-10-CM

## 2023-03-16 DIAGNOSIS — G62.9 NEUROPATHY: ICD-10-CM

## 2023-03-16 DIAGNOSIS — E66.01 OBESITY, CLASS III, BMI 40-49.9 (MORBID OBESITY) (HCC): ICD-10-CM

## 2023-03-16 DIAGNOSIS — G47.33 OSA (OBSTRUCTIVE SLEEP APNEA): ICD-10-CM

## 2023-03-16 DIAGNOSIS — N18.30 STAGE 3 CHRONIC KIDNEY DISEASE, UNSPECIFIED WHETHER STAGE 3A OR 3B CKD (HCC): ICD-10-CM

## 2023-03-16 PROCEDURE — 3075F SYST BP GE 130 - 139MM HG: CPT | Performed by: CLINICAL NURSE SPECIALIST

## 2023-03-16 PROCEDURE — 3078F DIAST BP <80 MM HG: CPT | Performed by: CLINICAL NURSE SPECIALIST

## 2023-03-16 PROCEDURE — G8427 DOCREV CUR MEDS BY ELIG CLIN: HCPCS | Performed by: CLINICAL NURSE SPECIALIST

## 2023-03-16 PROCEDURE — G8484 FLU IMMUNIZE NO ADMIN: HCPCS | Performed by: CLINICAL NURSE SPECIALIST

## 2023-03-16 PROCEDURE — 3017F COLORECTAL CA SCREEN DOC REV: CPT | Performed by: CLINICAL NURSE SPECIALIST

## 2023-03-16 PROCEDURE — 1123F ACP DISCUSS/DSCN MKR DOCD: CPT | Performed by: CLINICAL NURSE SPECIALIST

## 2023-03-16 PROCEDURE — 99214 OFFICE O/P EST MOD 30 MIN: CPT | Performed by: CLINICAL NURSE SPECIALIST

## 2023-03-16 PROCEDURE — 1036F TOBACCO NON-USER: CPT | Performed by: CLINICAL NURSE SPECIALIST

## 2023-03-16 PROCEDURE — G8417 CALC BMI ABV UP PARAM F/U: HCPCS | Performed by: CLINICAL NURSE SPECIALIST

## 2023-03-16 RX ORDER — AMLODIPINE BESYLATE 5 MG/1
5 TABLET ORAL DAILY
Qty: 90 TABLET | Refills: 0 | Status: SHIPPED | COMMUNITY
Start: 2023-03-16

## 2023-03-16 RX ORDER — PREGABALIN 50 MG/1
50 CAPSULE ORAL 2 TIMES DAILY
Qty: 270 CAPSULE | Refills: 0
Start: 2023-03-16 | End: 2023-06-14

## 2023-03-16 RX ORDER — NIFEDIPINE 60 MG/1
60 TABLET, EXTENDED RELEASE ORAL DAILY
Qty: 90 TABLET | Refills: 0 | Status: SHIPPED | OUTPATIENT
Start: 2023-03-16

## 2023-03-16 NOTE — PROGRESS NOTES
[address]  Progress Note    Primary Care Doctor:  Dino Garcia MD    Chief Complaint   Patient presents with    1 Month Follow-Up    Congestive Heart Failure        History of Present Illness:  71 y.o. male with history of ckd (Dr Wero Gutierrez), kamran. Dm, morbid obesity,  4/5-14/2021 for chest discomfort and SOB for months, diuresed [phone]->293. Obstructive uropathy with suprapubic catheter    I had the pleasure of seeing Elkin Henry in follow up for dHF. He is ambulatory and by his self. He states he has cut back on the fluids (hardest thing he has to do). He also cut back on the lyrica and has not noticed any increased pain in his legs but continues with some pain around his suprapubic catheter. No increased shortness of breath, palpitations, lightheadedness or increased edema. His edema is mainly in lower calf and ankles. Bnp is normal    Past Medical History:   has a past medical history of Acute on chronic diastolic heart failure ([address] Acute respiratory failure with hypoxia ([address] Anesthesia, Cancer ([address] Hyperlipidemia, Hypertension, Obesity, KAMRAN treated with BiPAP, Osteoarthritis, Type 2 diabetes mellitus without complication ([address] Type 2 diabetes mellitus without complication, without long-term current use of insulin ([address] Urinary incontinence, stress, male, and Wears dentures. Surgical History:   has a past surgical history that includes Total hip arthroplasty; joint replacement (Left); shoulder surgery (Bilateral); Knee arthroscopy (Right); Cystoscopy (N/A, 6/21/2021); and CT PERITONEAL/RETROPERITONEAL PERC DRAIN (12/23/2021). Social History:   reports that he quit smoking about 22 years ago. His smoking use included cigars and cigarettes. He started smoking about 43 years ago. He has a 5.25 pack-year smoking history. He has quit using smokeless tobacco. He reports current alcohol use. He reports that he does not use drugs.    Family History:   Family History   Problem Relation Age of Onset    Hypertension Mother     Diabetes Mother     Hypertension Father        Home Medications:  Prior to Admission medications    Medication Sig Start Date End Date Taking? Authorizing Provider   pregabalin (LYRICA) 50 MG capsule Take 1 capsule by mouth 2 times daily for 90 days. Max Daily Amount: 100 mg 3/16/23 6/14/23 Yes OSMAN Rodrigues   amLODIPine (NORVASC) 5 MG tablet Take 1 tablet by mouth daily 3/16/23  Yes OSMAN Rodrigues - CNS   NIFEdipine (PROCARDIA XL) 60 MG extended release tablet Take 1 tablet by mouth daily 3/16/23  Yes OSMAN Rodrigues - CNS   folic acid (FOLVITE) 1 MG tablet Take 1 tablet by mouth daily 2/28/23  Yes Homer Sanchez MD   torsemide (DEMADEX) 20 MG tablet Take 1.5 tablets by mouth daily 2/28/23  Yes OSMAN Jean   CPAP Machine MISC by Does not apply route   Yes Historical Provider, MD   Dulaglutide (TRULICITY) 3 MU/9.0HH SOPN Inject 3 mg into the skin once a week 12/8/22  Yes Homer Sanchez MD   ammonium lactate (AMLACTIN) 12 % cream Apply topically in the morning and at bedtime 12/8/22  Yes Homer Sanchez MD   metoprolol succinate (TOPROL XL) 100 MG extended release tablet TAKE 1 TABLET DAILY 9/26/22  Yes Homer Sanchez MD   atorvastatin (LIPITOR) 40 MG tablet TAKE 1 TABLET EVERY NIGHT 9/21/22  Yes Homer Sanchez MD   mineral oil-hydrophilic petrolatum (AQUAPHOR) ointment Apply topically as needed for Dry Skin (2-4 times a day) Apply topically as needed.  8/10/22  Yes OSMAN Gooden - CNP   alendronate (FOSAMAX) 70 MG tablet TAKE 1 TABLET EVERY 7 DAYS 3/4/22  Yes Homer Sanchez MD   brimonidine (ALPHAGAN) 0.2 % ophthalmic solution Apply to eye 2 times daily    Yes Historical Provider, MD   latanoprost (XALATAN) 0.005 % ophthalmic solution 1 drop nightly    Yes Historical Provider, MD   finasteride (PROSCAR) 5 MG tablet Take 1 tablet by mouth daily 8/13/21 Yes Tariq Browne PA-C   cetirizine (ZYRTEC) 5 MG tablet Take 1 tablet by mouth daily 7/14/21  Yes Yessica Moser MD   Meals on Wheels MISC by Does not apply route Patient is to receive 2-3 diabetic meals only 4/30/21  Yes Yessica Moser MD   aspirin 81 MG EC tablet Take 81 mg by mouth daily    Yes Historical Provider, MD   Acetaminophen (TYLENOL ARTHRITIS PAIN PO) Take 1 tablet by mouth daily OTC    Yes Historical Provider, MD   HYDROcodone-acetaminophen (NORCO)  MG per tablet Take 1 tablet by mouth 3 times daily as needed (Pain - back & hips). Pain specialist    Yes Historical Provider, MD   Insulin Pen Needle ([employer] PEN NEEDLES) 31G X 6 MM MISC 1 each by Does not apply route daily 9/29/20  Yes Yessica Moser MD   ergocalciferol (ERGOCALCIFEROL) 17129 UNITS capsule Take 50,000 Units by mouth once a week    Yes Historical Provider, MD   Tirzepatide [facility]) 2.5 MG/0.5ML SOPN SC injection Inject 0.5 mLs into the skin once a week  Patient not taking: Reported on 3/16/2023 3/13/23 4/12/23  MD Ember Ramirez [facility]) 5 MG/0.5ML SOPN SC injection Inject 0.5 mLs into the skin once a week  Patient not taking: Reported on 3/16/2023 4/12/23 5/12/23  MD Ember Ramirez [facility]) 7.5 MG/0.5ML SOPN SC injection Inject 0.5 mLs into the skin once a week  Patient not taking: Reported on 3/16/2023 5/12/23 6/11/23  MD Ember Ramirez [facility]) 10 MG/0.5ML SOPN SC injection Inject 0.5 mLs into the skin once a week  Patient not taking: Reported on 3/16/2023 6/11/23 7/11/23  Yessica Moser MD        Allergies: Indomethacin, Statins, Succinylcholine, Unable to assess, and Actos [pioglitazone]     Review of Systems:   Constitutional: there has been no unanticipated weight loss. There's been no change in energy level, sleep pattern, or activity level. Eyes: No visual changes or diplopia. No scleral icterus.   ENT: No Headaches, hearing loss or vertigo. No mouth sores or sore throat. Cardiovascular: Reviewed in HPI  Respiratory: No cough or wheezing, no sputum production. No hematemesis. Gastrointestinal: No abdominal pain, appetite loss, blood in stools. No change in bowel or bladder habits. Genitourinary: No dysuria, trouble voiding, or hematuria. Musculoskeletal:  No gait disturbance, weakness or joint complaints. Integumentary: No rash or pruritis. Neurological: No headache, diplopia, change in muscle strength, numbness or tingling. No change in gait, balance, coordination, mood, affect, memory, mentation, behavior. Psychiatric: No anxiety, no depression. Endocrine: No malaise, fatigue or temperature intolerance. No excessive thirst, fluid intake, or urination. No tremor. Hematologic/Lymphatic: No abnormal bruising or bleeding, blood clots or swollen lymph nodes. Allergic/Immunologic: No nasal congestion or hives. Physical Examination:    Vitals:    03/16/23 1015   BP: 130/62   Site: Right Upper Arm   Position: Sitting   Cuff Size: Large Adult   Pulse: 82   SpO2: 95%   Weight: 265 lb (120.2 kg)   Height: 5' 5\" (1.651 m)        Constitutional and General Appearance: Warm and dry, no apparent distress, normal coloration mid pubic urostomy tube  HEENT:  Normocephalic, atraumatic  Respiratory:  Normal excursion and expansion without use of accessory muscles  Resp Auscultation: Normal breath sounds without dullness  Cardiovascular: The apical impulses not displaced  Heart tones are crisp and normal  JVP normal cm H2O  Regular rate and rhythm  Peripheral pulses are symmetrical and full  There is no clubbing, cyanosis of the extremities.   Bilateral ankle and lower calf areas  Pedal Pulses: 2+ and equal   Abdomen:obese  No masses or tenderness  Liver/Spleen: No Abnormalities Noted  Neurological/Psychiatric:  Alert and oriented in all spheres  Moves all extremities well  Exhibits normal gait balance and coordination  No abnormalities of mood, affect, memory, mentation, or behavior are noted    Lab Data:    CBC:   Lab Results   Component Value Date/Time    WBC 7.1 02/14/2023 10:56 AM    WBC 7.3 11/08/2022 10:15 AM    WBC 5.6 09/28/2022 05:00 PM    RBC 4.79 02/14/2023 10:56 AM    RBC 4.38 11/08/2022 10:15 AM    RBC 4.54 09/28/2022 05:00 PM    HGB 14.2 02/14/2023 10:56 AM    HGB 13.3 11/08/2022 10:15 AM    HGB 13.7 09/28/2022 05:00 PM    HCT 44.6 02/14/2023 10:56 AM    HCT 39.4 11/08/2022 10:15 AM    HCT 42.5 09/28/2022 05:00 PM    MCV 93.0 02/14/2023 10:56 AM    MCV 89.9 11/08/2022 10:15 AM    MCV 93.6 09/28/2022 05:00 PM    RDW 16.1 02/14/2023 10:56 AM    RDW 15.2 11/08/2022 10:15 AM    RDW 16.6 09/28/2022 05:00 PM     02/14/2023 10:56 AM     11/08/2022 10:15 AM     09/28/2022 05:00 PM     BMP:  Lab Results   Component Value Date/Time     02/28/2023 09:26 AM     02/14/2023 10:56 AM     11/08/2022 10:15 AM    K 4.3 02/28/2023 09:26 AM    K 4.4 02/14/2023 10:56 AM    K 4.4 11/08/2022 10:15 AM    K 4.2 06/21/2021 06:51 AM     02/28/2023 09:26 AM     02/14/2023 10:56 AM     11/08/2022 10:15 AM    CO2 25 02/28/2023 09:26 AM    CO2 25 02/14/2023 10:56 AM    CO2 21 11/08/2022 10:15 AM    PHOS 3.0 11/08/2022 10:15 AM    PHOS 2.9 06/21/2022 11:24 AM    PHOS 3.7 01/31/2022 04:10 PM    BUN 35 02/28/2023 09:26 AM    BUN 24 02/14/2023 10:56 AM    BUN 20 11/08/2022 10:15 AM    CREATININE 2.5 02/28/2023 09:26 AM    CREATININE 2.4 02/14/2023 10:56 AM    CREATININE 2.0 11/08/2022 10:15 AM     BNP:   Lab Results   Component Value Date/Time    PROBNP 90 02/28/2023 09:26 AM    PROBNP 136 02/14/2023 10:56 AM    PROBNP 244 12/13/2021 09:40 AM     Cardiac Imaging:  Echo 4/7/2021   Summary   Overall left ventricular function is normal.   Ejection fraction is visually estimated to be 55 %. E/e'= 9.3 . There is reversal of E/A inflow velocities across the mitral valve.    There is mild concentric left ventricular hypertrophy. No definitive regional wall motion abnormalities are noted. Grade II diastolic dysfunction with elevated LV filling pressures. Mild to moderate tricuspid regurgitation. Estimated pulmonary artery systolic pressure is moderately elevated at 65   mmHg assuming a right atrial pressure of 15 mmHg. The right ventricle is mildly enlarged. TAPSE= 2.72 RV S'= 17.2   IVC size is dilated (>2.1 cm) and collapses < 50% with respiration   consistent with elevated RA pressure (15 mmHg    Stress test 8/12/2019  The LV EF is 55%  Normal global and regional wall motion in all territories. There is a medium sized, partially reversible defect in the anterior  wall consistent with moderate verena-infarct ischemia. 1.Non-diagnostic ECG for ischemia with pharmocologic stress. 2.Normal left ventriular systolic function. 3.Positive nuclear stress imaging suggestive of scar plus verena      infarct ischemia in the anterior wall. 4.Nuclear stress image findings indicate intermediate risk for      future ischemic event . Echo 12/12/2019  Summary:  The left ventricular wall motion is normal.  Overall left ventricular ejection fraction is estimated to be 60-65%. There is mild concentric left ventricular hypertrophy. There is mild mitral regurgitation. Assessment:    1. Chronic diastolic heart failure ([address]    2. KAMRAN (obstructive sleep apnea) on cpap   3. Stage 3 chronic kidney disease, unspecified whether stage 3a or 3b CKD    4. Class 3 severe obesity due to excess calories with serious comorbidity and body mass index (BMI) of 45.0 to 49.9 in adult [facility]) Dr Connor Gallagher   5.       Neuropathy    Plan:   Patient Instructions   Cut the norvasc to 5 mg daily  Increase nifedipine 60 mg daily  Continue all other medications  Blood work in 1 month  RTO in 3 months    He has an echo scheduled for April 14th    I appreciate the opportunity of cooperating in the care of this individual.    Ritesh Rod, APRN - CNS, CNS, 3/16/2023, 10:58 AM

## 2023-03-16 NOTE — PATIENT INSTRUCTIONS
Cut the norvasc to 5 mg daily  Increase nifedipine 60 mg daily  Continue all other medications  Blood work in 1 month  RTO in 3 months

## 2023-03-20 ENCOUNTER — TELEPHONE (OUTPATIENT)
Dept: CARDIOLOGY CLINIC | Age: 73
End: 2023-03-20

## 2023-03-20 NOTE — TELEPHONE ENCOUNTER
Was he taking the norvasc 10 mg prior   What is his bp and hr  I was cutting the norvasc to 5 mg to help with his edema  Go over all his meds with his daughter

## 2023-03-20 NOTE — TELEPHONE ENCOUNTER
Spoke to patient's daughter she stated he is not taking the amlodipine he does not  have any at home. She did increase the nifedipine as instructed. Since patient is not on the norvasc can the metoprolol be decreased?   Please advise

## 2023-03-20 NOTE — TELEPHONE ENCOUNTER
Daughter is asking if you want to start reducing the Toprol. Also asking about Procardia if that needs changed. She also advises that pt is not taking Norvasc. Daughter asking for call back to clarify meds.

## 2023-03-20 NOTE — TELEPHONE ENCOUNTER
Spoke with patient's daughter amlodipine was stopped in 08/2021 patient has not been taking the medication. Not taking finasteride. Has not started the nifedipine 60 mg daily yet. She want s to know if you want to change the metoprolol. Patient does not keep track of  Hr rate or BP .  Went over medication list and Updated MAR

## 2023-04-17 ENCOUNTER — TELEPHONE (OUTPATIENT)
Dept: CARDIOLOGY CLINIC | Age: 73
End: 2023-04-17

## 2023-04-17 NOTE — TELEPHONE ENCOUNTER
----- Message from OSMAN De La Cruz - CNS sent at 4/14/2023  1:11 PM EDT -----  His echo is normal  This is great

## 2023-04-22 NOTE — TELEPHONE ENCOUNTER
[facility]  Obstetrics  Postpartum Progress Note    Patient Name: Orville Gr  MRN: [id number]  Admission Date: 2023  Hospital Length of Stay: 1 days  Attending Physician: Jimi Francis MD  Primary Care Provider: Primary Doctor No    Subjective:     Principal Problem:S/P  section    Hospital Course:  2023 - S/p uncomplicated primary low transverse  delivery secondary to breech presentation    2023 routine post operative care.      She is doing well this morning. She is tolerating a regular diet without nausea or vomiting. She is voiding spontaneously. She is ambulating. She has not passed flatus, and has not a BM. Vaginal bleeding is mild. She denies fever or chills. Abdominal pain is mild and controlled with oral medications. She Is breastfeeding. She desires circumcision for her male baby: not applicable.    Objective:     Vital Signs (Most Recent):  Temp: 98 °F (36.7 °C) (23)  Pulse: 63 (23)  Resp: 18 (23)  BP: 123/63 (23)  SpO2: 97 % (23) Vital Signs (24h Range):  Temp:  [97.7 °F (36.5 °C)-98.4 °F (36.9 °C)] 98 °F (36.7 °C)  Pulse:  [57-92] 63  Resp:  [16-18] 18  SpO2:  [94 %-100 %] 97 %  BP: ()/(53-68) 123/63     Weight: 66.7 kg (147 lb)  Body mass index is 23.73 kg/m².      Intake/Output Summary (Last 24 hours) at 2023 0905  Last data filed at 2023 0500  Gross per 24 hour   Intake 3451.71 ml   Output 5004 ml   Net -1552.29 ml         Significant Labs:  Lab Results   Component Value Date    GROUPTRH A POS 2023    STREPBCULT (A) 2023     STREPTOCOCCUS AGALACTIAE (GROUP B)  In case of Penicillin allergy, call lab for further testing.  Beta-hemolytic streptococci are routinely susceptible to   penicillins,cephalosporins and carbapenems.       Recent Labs   Lab 23  0601   HGB 10.4*   HCT 32.2*       I have personallly reviewed all pertinent lab results from the last 24  Recent Visits  Date Type Provider Dept   12/08/22 Office Visit Alyce Ramsay MD [facility]   11/11/22 Office Visit Alyce Ramsay MD [facility]   09/02/22 Office Visit Alyce Ramsay MD [facility]   08/10/22 Office Visit Lindsey Peguero, APRN - CNP [facility]   05/06/22 Office Visit Alyce Ramsay MD [facility]   01/04/22 Office Visit Lindsey Peguero APRN - CNP [facility]   Showing recent visits within past 540 days with a meds authorizing provider and meeting all other requirements  Future Appointments  Date Type Provider Dept   03/13/23 Appointment Alyce Ramsay MD [facility]   04/13/23 Appointment Alyce Ramsay MD [facility]   05/08/23 Appointment Alyce Ramsay MD [facility]   Showing future appointments within next 150 days with a meds authorizing provider and meeting all other requirements     12/8/2022 hours.    Physical Exam:   Constitutional: She is oriented to person, place, and time. She appears well-developed.    HENT:   Head: Normocephalic and atraumatic.    Eyes: EOM are normal.     Cardiovascular:  Normal rate.             Pulmonary/Chest: Effort normal.        Abdominal: Soft. She exhibits no distension and no mass (fundus firm and below the umbilicus). There is no abdominal tenderness.             Musculoskeletal: Normal range of motion.       Neurological: She is oriented to person, place, and time.    Skin: Skin is warm. Lesion (Pfannenstiel incision is clean with dressing in place) noted.    Psychiatric: She has a normal mood and affect.     Review of Systems   Constitutional: Negative.    HENT: Negative.     Eyes: Negative.    Respiratory: Negative.     Cardiovascular: Negative.    Gastrointestinal: Negative.    Endocrine: Negative.    Genitourinary: Negative.    Musculoskeletal: Negative.    Integumentary:  Negative.   Neurological: Negative.    Hematological: Negative.    Psychiatric/Behavioral: Negative.     Breast: negative.      Assessment/Plan:     23 y.o. female  for:    * S/P  section  - routine post operative care.        Disposition: As patient meets milestones, will plan to discharge on POD #3.    Hailey Rutledge MD  Obstetrics  [facility]

## 2023-04-24 ENCOUNTER — TELEPHONE (OUTPATIENT)
Dept: PHARMACY | Facility: CLINIC | Age: 73
End: 2023-04-24

## 2023-04-25 ENCOUNTER — HOSPITAL ENCOUNTER (OUTPATIENT)
Age: 73
Discharge: HOME | End: 2023-04-25
Payer: MEDICARE

## 2023-04-25 LAB
DEPRECATED RDW RBC AUTO: 15.1 % (ref 12.4–15.4)
HCT VFR BLD AUTO: 43.9 % (ref 40.5–52.5)
HGB BLD-MCNC: 14.5 G/DL (ref 13.5–17.5)
MCH RBC QN AUTO: 30.8 PG (ref 26–34)
MCHC RBC AUTO-ENTMCNC: 32.9 G/DL (ref 31–36)
MCV RBC AUTO: 93.7 FL (ref 80–100)
PLATELET # BLD AUTO: 354 K/UL (ref 135–450)
PMV BLD AUTO: 8.1 FL (ref 5–10.5)
PTH-INTACT SERPL-MCNC: 193.4 PG/ML (ref 14–72)
RBC # BLD AUTO: 4.69 M/UL (ref 4.2–5.9)
WBC # BLD AUTO: 7.6 K/UL (ref 4–11)

## 2023-04-25 PROCEDURE — 85027 COMPLETE CBC AUTOMATED: CPT

## 2023-04-25 PROCEDURE — 83540 ASSAY OF IRON: CPT

## 2023-04-25 PROCEDURE — 82306 VITAMIN D 25 HYDROXY: CPT

## 2023-04-25 PROCEDURE — 80069 RENAL FUNCTION PANEL: CPT

## 2023-04-25 PROCEDURE — 84466 ASSAY OF TRANSFERRIN: CPT

## 2023-04-25 PROCEDURE — 82728 ASSAY OF FERRITIN: CPT

## 2023-04-25 PROCEDURE — 36415 COLL VENOUS BLD VENIPUNCTURE: CPT

## 2023-04-25 PROCEDURE — 82043 UR ALBUMIN QUANTITATIVE: CPT

## 2023-04-25 PROCEDURE — 82570 ASSAY OF URINE CREATININE: CPT

## 2023-04-25 PROCEDURE — 83970 ASSAY OF PARATHORMONE: CPT

## 2023-04-26 LAB
25(OH)D3 SERPL-MCNC: 53.1 NG/ML
ALBUMIN SERPL-MCNC: 4.3 G/DL (ref 3.4–5)
ANION GAP SERPL CALCULATED.3IONS-SCNC: 15 MMOL/L (ref 3–16)
BUN SERPL-MCNC: 34 MG/DL (ref 7–20)
CALCIUM SERPL-MCNC: 9.2 MG/DL (ref 8.3–10.6)
CHLORIDE SERPL-SCNC: 104 MMOL/L (ref 99–110)
CO2 SERPL-SCNC: 25 MMOL/L (ref 21–32)
CREAT SERPL-MCNC: 2.7 MG/DL (ref 0.8–1.3)
CREAT UR-MCNC: 106.1 MG/DL (ref 39–259)
FERRITIN SERPL IA-MCNC: 286.2 NG/ML (ref 30–400)
GFR SERPLBLD CREATININE-BSD FMLA CKD-EPI: 24 ML/MIN/{1.73_M2}
GLUCOSE SERPL-MCNC: 104 MG/DL (ref 70–99)
IRON SATN MFR SERPL: 26 % (ref 20–50)
IRON SERPL-MCNC: 67 UG/DL (ref 59–158)
MICROALBUMIN UR DL<=1MG/L-MCNC: 230.3 MG/DL
MICROALBUMIN/CREAT UR: 2170.6 MG/G (ref 0–30)
PHOSPHATE SERPL-MCNC: 3.4 MG/DL (ref 2.5–4.9)
POTASSIUM SERPL-SCNC: 4 MMOL/L (ref 3.5–5.1)
SODIUM SERPL-SCNC: 144 MMOL/L (ref 136–145)
TIBC SERPL-MCNC: 262 UG/DL (ref 260–445)
TRANSFERRIN SERPL-MCNC: 242 MG/DL (ref 200–360)

## 2023-04-26 NOTE — TELEPHONE ENCOUNTER
[facility] [place] [facility] REVIEW: [facility]    CSS routed encounter to writer as Trulicity (dulaglutide) still on patient's medication list. Writer removed from home medication list now that patient is on Mounjaro (tirzepatide).      Reid Carter, PharmD, [address] [facility], [address]  Department, toll free: [phone], option 1    =======================================================    For Pharmacy Admin Tracking Only  Program: [address] in place:  No  Recommendation Provided To: Patient/Caregiver: 2 via Telephone  Intervention Detail: Adherence Monitorin  Intervention Accepted By: Patient/Caregiver: 0  Gap Closed?: No   Time Spent (min): 45
CLINICAL PHARMACY: [facility] REVIEW    2nd Attempt Documentation:   Attempting to reach patient to review; left message asking for return call.  and letter mailed to home address        ===================================================================    For Pharmacy Admin Tracking Only    Program: [address] in place:  No  Gap Closed?: No   Time Spent (min): 15
Pt daughter returned called and said he is taking Mounjaro in place of Trulicity but not sure why its overdue. She said she will talk to her dad to get everything situated.  I let her know about the 90ds options and she said she'll bring that up to him as well    Resending back to CSS
05/06/2022    [address] 05/06/2022     ALT   Date Value Ref Range Status   08/21/2022 18 10 - 40 U/L Final     AST   Date Value Ref Range Status   08/21/2022 17 15 - 37 U/L Final     The 10-year ASCVD risk score (Greta [place], et al., 2019) is: 32.2%    Values used to calculate the score:      Age: 71 years      Sex: Male      Is Non- : Yes      Diabetic: Yes      Tobacco smoker: No      Systolic Blood Pressure: 091 mmHg      Is BP treated: Yes      HDL Cholesterol: 57 mg/dL      Total Cholesterol: 165 mg/dL     PLAN  The following are interventions that have been identified:   Patient overdue refilling Trulicity & Atorvastatin and active on home medication list.   90 day supply of Atorvastatin for $15 copay  90 day supply of Trulicity for $183 or 30 day supply for $40 copay              (per [employer]). Appears last time either of the above medications were filled was through [address]. Outreach:  Attempting to reach patient to review. - Left message on daughters VM asking for return call.          Alexander Haywood, [address] [facility]  Phone: toll free [phone]

## 2023-05-04 ENCOUNTER — HOSPITAL ENCOUNTER (OUTPATIENT)
Age: 73
Discharge: HOME | End: 2023-05-04
Payer: MEDICARE

## 2023-05-04 LAB
ALBUMIN SERPL-MCNC: 4 G/DL (ref 3.4–5)
ANION GAP SERPL CALCULATED.3IONS-SCNC: 12 MMOL/L (ref 3–16)
BUN SERPL-MCNC: 32 MG/DL (ref 7–20)
CALCIUM SERPL-MCNC: 9.2 MG/DL (ref 8.3–10.6)
CHLORIDE SERPL-SCNC: 107 MMOL/L (ref 99–110)
CO2 SERPL-SCNC: 23 MMOL/L (ref 21–32)
CREAT SERPL-MCNC: 2.6 MG/DL (ref 0.8–1.3)
GFR SERPLBLD CREATININE-BSD FMLA CKD-EPI: 25 ML/MIN/{1.73_M2}
GLUCOSE SERPL-MCNC: 92 MG/DL (ref 70–99)
PHOSPHATE SERPL-MCNC: 3.3 MG/DL (ref 2.5–4.9)
POTASSIUM SERPL-SCNC: 4.5 MMOL/L (ref 3.5–5.1)
SODIUM SERPL-SCNC: 142 MMOL/L (ref 136–145)

## 2023-05-04 PROCEDURE — 36415 COLL VENOUS BLD VENIPUNCTURE: CPT

## 2023-05-04 PROCEDURE — 80069 RENAL FUNCTION PANEL: CPT

## 2023-05-10 ENCOUNTER — OFFICE VISIT (OUTPATIENT)
Dept: INTERNAL MEDICINE CLINIC | Age: 73
End: 2023-05-10
Payer: MEDICARE

## 2023-05-10 VITALS
TEMPERATURE: 97.6 F | SYSTOLIC BLOOD PRESSURE: 128 MMHG | HEART RATE: 62 BPM | OXYGEN SATURATION: 98 % | WEIGHT: 268 LBS | HEIGHT: 65 IN | DIASTOLIC BLOOD PRESSURE: 74 MMHG | BODY MASS INDEX: 44.65 KG/M2

## 2023-05-10 DIAGNOSIS — Z00.00 MEDICARE ANNUAL WELLNESS VISIT, SUBSEQUENT: Primary | ICD-10-CM

## 2023-05-10 DIAGNOSIS — Z71.89 ACP (ADVANCE CARE PLANNING): ICD-10-CM

## 2023-05-10 DIAGNOSIS — E11.22 TYPE 2 DIABETES MELLITUS WITH STAGE 3 CHRONIC KIDNEY DISEASE, WITHOUT LONG-TERM CURRENT USE OF INSULIN, UNSPECIFIED WHETHER STAGE 3A OR 3B CKD (HCC): ICD-10-CM

## 2023-05-10 DIAGNOSIS — Z11.59 NEED FOR HEPATITIS C SCREENING TEST: ICD-10-CM

## 2023-05-10 DIAGNOSIS — I50.32 CHRONIC DIASTOLIC HEART FAILURE (HCC): ICD-10-CM

## 2023-05-10 DIAGNOSIS — N18.30 CHRONIC RENAL IMPAIRMENT, STAGE 3 (MODERATE), UNSPECIFIED WHETHER STAGE 3A OR 3B CKD (HCC): ICD-10-CM

## 2023-05-10 DIAGNOSIS — N18.30 TYPE 2 DIABETES MELLITUS WITH STAGE 3 CHRONIC KIDNEY DISEASE, WITHOUT LONG-TERM CURRENT USE OF INSULIN, UNSPECIFIED WHETHER STAGE 3A OR 3B CKD (HCC): ICD-10-CM

## 2023-05-10 DIAGNOSIS — Z72.89 OTHER PROBLEMS RELATED TO LIFESTYLE: ICD-10-CM

## 2023-05-10 PROCEDURE — 3074F SYST BP LT 130 MM HG: CPT | Performed by: INTERNAL MEDICINE

## 2023-05-10 PROCEDURE — 3078F DIAST BP <80 MM HG: CPT | Performed by: INTERNAL MEDICINE

## 2023-05-10 PROCEDURE — G8427 DOCREV CUR MEDS BY ELIG CLIN: HCPCS | Performed by: INTERNAL MEDICINE

## 2023-05-10 PROCEDURE — 99213 OFFICE O/P EST LOW 20 MIN: CPT | Performed by: INTERNAL MEDICINE

## 2023-05-10 PROCEDURE — 2022F DILAT RTA XM EVC RTNOPTHY: CPT | Performed by: INTERNAL MEDICINE

## 2023-05-10 PROCEDURE — 3044F HG A1C LEVEL LT 7.0%: CPT | Performed by: INTERNAL MEDICINE

## 2023-05-10 PROCEDURE — 3017F COLORECTAL CA SCREEN DOC REV: CPT | Performed by: INTERNAL MEDICINE

## 2023-05-10 PROCEDURE — 1123F ACP DISCUSS/DSCN MKR DOCD: CPT | Performed by: INTERNAL MEDICINE

## 2023-05-10 PROCEDURE — 1036F TOBACCO NON-USER: CPT | Performed by: INTERNAL MEDICINE

## 2023-05-10 PROCEDURE — G8417 CALC BMI ABV UP PARAM F/U: HCPCS | Performed by: INTERNAL MEDICINE

## 2023-05-10 PROCEDURE — G0439 PPPS, SUBSEQ VISIT: HCPCS | Performed by: INTERNAL MEDICINE

## 2023-05-10 SDOH — ECONOMIC STABILITY: INCOME INSECURITY: HOW HARD IS IT FOR YOU TO PAY FOR THE VERY BASICS LIKE FOOD, HOUSING, MEDICAL CARE, AND HEATING?: NOT HARD AT ALL

## 2023-05-10 SDOH — ECONOMIC STABILITY: FOOD INSECURITY: WITHIN THE PAST 12 MONTHS, YOU WORRIED THAT YOUR FOOD WOULD RUN OUT BEFORE YOU GOT MONEY TO BUY MORE.: NEVER TRUE

## 2023-05-10 SDOH — ECONOMIC STABILITY: FOOD INSECURITY: WITHIN THE PAST 12 MONTHS, THE FOOD YOU BOUGHT JUST DIDN'T LAST AND YOU DIDN'T HAVE MONEY TO GET MORE.: NEVER TRUE

## 2023-05-10 ASSESSMENT — PATIENT HEALTH QUESTIONNAIRE - PHQ9
8. MOVING OR SPEAKING SO SLOWLY THAT OTHER PEOPLE COULD HAVE NOTICED. OR THE OPPOSITE, BEING SO FIGETY OR RESTLESS THAT YOU HAVE BEEN MOVING AROUND A LOT MORE THAN USUAL: 0
SUM OF ALL RESPONSES TO PHQ QUESTIONS 1-9: 1
SUM OF ALL RESPONSES TO PHQ QUESTIONS 1-9: 1
3. TROUBLE FALLING OR STAYING ASLEEP: 0
SUM OF ALL RESPONSES TO PHQ QUESTIONS 1-9: 1
2. FEELING DOWN, DEPRESSED OR HOPELESS: 0
7. TROUBLE CONCENTRATING ON THINGS, SUCH AS READING THE NEWSPAPER OR WATCHING TELEVISION: 1
SUM OF ALL RESPONSES TO PHQ QUESTIONS 1-9: 1
9. THOUGHTS THAT YOU WOULD BE BETTER OFF DEAD, OR OF HURTING YOURSELF: 0
SUM OF ALL RESPONSES TO PHQ9 QUESTIONS 1 & 2: 0
4. FEELING TIRED OR HAVING LITTLE ENERGY: 0
6. FEELING BAD ABOUT YOURSELF - OR THAT YOU ARE A FAILURE OR HAVE LET YOURSELF OR YOUR FAMILY DOWN: 0
1. LITTLE INTEREST OR PLEASURE IN DOING THINGS: 0
10. IF YOU CHECKED OFF ANY PROBLEMS, HOW DIFFICULT HAVE THESE PROBLEMS MADE IT FOR YOU TO DO YOUR WORK, TAKE CARE OF THINGS AT HOME, OR GET ALONG WITH OTHER PEOPLE: 0
5. POOR APPETITE OR OVEREATING: 0

## 2023-05-10 ASSESSMENT — ANXIETY QUESTIONNAIRES
5. BEING SO RESTLESS THAT IT IS HARD TO SIT STILL: 0
GAD7 TOTAL SCORE: 0
IF YOU CHECKED OFF ANY PROBLEMS ON THIS QUESTIONNAIRE, HOW DIFFICULT HAVE THESE PROBLEMS MADE IT FOR YOU TO DO YOUR WORK, TAKE CARE OF THINGS AT HOME, OR GET ALONG WITH OTHER PEOPLE: NOT DIFFICULT AT ALL
4. TROUBLE RELAXING: 0
6. BECOMING EASILY ANNOYED OR IRRITABLE: 0
2. NOT BEING ABLE TO STOP OR CONTROL WORRYING: 0
3. WORRYING TOO MUCH ABOUT DIFFERENT THINGS: 0
1. FEELING NERVOUS, ANXIOUS, OR ON EDGE: 0
7. FEELING AFRAID AS IF SOMETHING AWFUL MIGHT HAPPEN: 0

## 2023-05-10 ASSESSMENT — LIFESTYLE VARIABLES
HOW MANY STANDARD DRINKS CONTAINING ALCOHOL DO YOU HAVE ON A TYPICAL DAY: PATIENT DOES NOT DRINK
HOW OFTEN DO YOU HAVE A DRINK CONTAINING ALCOHOL: NEVER

## 2023-05-10 NOTE — PROGRESS NOTES
Subjective:      Patient ID: Andrew Cazares is a 71 y.o. male. Chief Complaint   Patient presents with    Diabetes    Hypertension    Medicare AWV       Diabetes  He presents for his follow-up diabetic visit. He has type 2 diabetes mellitus. No MedicAlert identification noted. His disease course has been stable. There are no hypoglycemic associated symptoms. There are no diabetic associated symptoms. There are no hypoglycemic complications. Symptoms are stable. There are no diabetic complications. Risk factors for coronary artery disease include diabetes mellitus, dyslipidemia, obesity, sedentary lifestyle, family history and male sex. When asked about current treatments, none were reported. His weight is increasing steadily. He is following a diabetic and generally healthy diet. When asked about meal planning, he reported none. He has not had a previous visit with a dietitian. He participates in exercise daily. There is no change in his home blood glucose trend. An ACE inhibitor/angiotensin II receptor blocker is being taken. Eye exam is current. Hypertension:  Home blood pressure monitoring: No.  He is adherent to a low sodium diet. Patient denies shortness of breath, headache, and lightheadedness. Antihypertensive medication side effects: no medication side effects noted. Use of agents associated with hypertension: none. Sodium (mmol/L)   Date Value   05/04/2023 142    BUN (mg/dL)   Date Value   05/04/2023 32 (H)    Glucose (mg/dL)   Date Value   05/04/2023 92      Potassium (mmol/L)   Date Value   05/04/2023 4.5     Potassium reflex Magnesium (mmol/L)   Date Value   06/21/2021 4.2    Creatinine (mg/dL)   Date Value   05/04/2023 2.6 (H)         Patient complains of chills. Normal CBC    Hemoglobin A1C   Date Value Ref Range Status   03/13/2023 6.4 % Final       Review of Systems   All other systems reviewed and are negative.       Allergies   Allergen Reactions

## 2023-05-10 NOTE — PATIENT INSTRUCTIONS
30 minutes on most days of the week. You also may want to swim, bike, or do other activities. Do not smoke. If you need help quitting, talk to your doctor about stop-smoking programs and medicines. These can increase your chances of quitting for good. Quitting smoking may be the most important step you can take to protect your heart. It is never too late to quit. Limit alcohol to 2 drinks a day for men and 1 drink a day for women. Too much alcohol can cause health problems. Manage other health problems such as diabetes, high blood pressure, and high cholesterol. If you think you may have a problem with alcohol or drug use, talk to your doctor. Medicines    Take your medicines exactly as prescribed. Call your doctor if you think you are having a problem with your medicine. If your doctor recommends aspirin, take the amount directed each day. Make sure you take aspirin and not another kind of pain reliever, such as acetaminophen (Tylenol). When should you call for help? Call 911 if you have symptoms of a heart attack. These may include:    Chest pain or pressure, or a strange feeling in the chest.     Sweating. Shortness of breath. Pain, pressure, or a strange feeling in the back, neck, jaw, or upper belly or in one or both shoulders or arms. Lightheadedness or sudden weakness. A fast or irregular heartbeat. After you call 911, the  may tell you to chew 1 adult-strength or 2 to 4 low-dose aspirin. Wait for an ambulance. Do not try to drive yourself. Watch closely for changes in your health, and be sure to contact your doctor if you have any problems. Where can you learn more? Go to http://www.alvarez.com/ and enter F075 to learn more about \"A Healthy Heart: Care Instructions. \"  Current as of: September 7, 2022               Content Version: 13.6  © 5994-2762 [employer]. Care instructions adapted under license by [facility] ([facility]).  If you have

## 2023-05-12 DIAGNOSIS — E66.01 MORBID OBESITY (HCC): ICD-10-CM

## 2023-05-12 RX ORDER — TIRZEPATIDE 5 MG/.5ML
INJECTION, SOLUTION SUBCUTANEOUS
Qty: 2 ML | Refills: 0 | Status: SHIPPED | OUTPATIENT
Start: 2023-05-12

## 2023-05-12 NOTE — TELEPHONE ENCOUNTER
Recent Visits  Date Type Provider Dept   05/10/23 Office Visit Vivian Cintron MD [facility]   03/13/23 Office Visit Vivian Cintron MD [facility]   12/08/22 Office Visit Vivian Cintron MD [facility]   11/11/22 Office Visit Vivian Cintron MD [facility]   09/02/22 Office Visit Vivian Cintron MD [facility]   08/10/22 Office Visit OSMAN Bridges CNP [facility]   05/06/22 Office Visit Vivian Cintron MD [facility]   01/04/22 Office Visit OSMAN Bridges CNP [facility]   Showing recent visits within past 540 days with a meds authorizing provider and meeting all other requirements  Future Appointments  No visits were found meeting these conditions.   Showing future appointments within next 150 days with a meds authorizing provider and meeting all other requirements     5/10/2023

## 2023-05-17 RX ORDER — TORSEMIDE 20 MG/1
TABLET ORAL
Qty: 180 TABLET | Refills: 1 | Status: SHIPPED | OUTPATIENT
Start: 2023-05-17

## 2023-06-05 NOTE — TELEPHONE ENCOUNTER
Patient requesting an urgent refill on his metoprolol succinate. The pervious script has  and he now uses a local pharmacy instead of mail order.     Last Fill   2022  Last OV  05/10/2023 AWV  Next OV  2024 AWV

## 2023-06-06 RX ORDER — METOPROLOL SUCCINATE 100 MG/1
100 TABLET, EXTENDED RELEASE ORAL DAILY
Qty: 90 TABLET | Refills: 1 | Status: SHIPPED | OUTPATIENT
Start: 2023-06-06

## 2023-06-12 DIAGNOSIS — E66.01 MORBID OBESITY (HCC): ICD-10-CM

## 2023-06-12 RX ORDER — TIRZEPATIDE 7.5 MG/.5ML
INJECTION, SOLUTION SUBCUTANEOUS
Qty: 2 ML | Refills: 0 | OUTPATIENT
Start: 2023-06-12

## 2023-06-12 NOTE — TELEPHONE ENCOUNTER
Recent Visits  Date Type Provider Dept   05/10/23 Office Visit Anand Lee MD [facility]   03/13/23 Office Visit Anand Lee MD [facility]   12/08/22 Office Visit Anand Lee MD [facility]   11/11/22 Office Visit Anand Lee MD [facility]   09/02/22 Office Visit Anand Lee MD [facility]   08/10/22 Office Visit OSMAN Lyles CNP [facility]   05/06/22 Office Visit Anand Lee MD [facility]   01/04/22 Office Visit OSMAN Lyles - CNP [facility]   Showing recent visits within past 540 days with a meds authorizing provider and meeting all other requirements  Future Appointments  No visits were found meeting these conditions.   Showing future appointments within next 150 days with a meds authorizing provider and meeting all other requirements     5/10/2023

## 2023-06-20 ENCOUNTER — OFFICE VISIT (OUTPATIENT)
Dept: CARDIOLOGY CLINIC | Age: 73
End: 2023-06-20
Payer: MEDICARE

## 2023-06-20 ENCOUNTER — HOSPITAL ENCOUNTER (OUTPATIENT)
Age: 73
Discharge: HOME | End: 2023-06-20
Payer: MEDICARE

## 2023-06-20 VITALS
HEIGHT: 65 IN | HEART RATE: 63 BPM | OXYGEN SATURATION: 97 % | WEIGHT: 262 LBS | DIASTOLIC BLOOD PRESSURE: 72 MMHG | SYSTOLIC BLOOD PRESSURE: 124 MMHG | BODY MASS INDEX: 43.65 KG/M2

## 2023-06-20 DIAGNOSIS — I50.32 CHRONIC DIASTOLIC HEART FAILURE (HCC): Primary | ICD-10-CM

## 2023-06-20 DIAGNOSIS — E66.01 MORBID OBESITY (HCC): ICD-10-CM

## 2023-06-20 DIAGNOSIS — N18.30 STAGE 3 CHRONIC KIDNEY DISEASE, UNSPECIFIED WHETHER STAGE 3A OR 3B CKD (HCC): ICD-10-CM

## 2023-06-20 DIAGNOSIS — G47.33 OSA (OBSTRUCTIVE SLEEP APNEA): ICD-10-CM

## 2023-06-20 LAB
25(OH)D3 SERPL-MCNC: 55.4 NG/ML
ALBUMIN SERPL-MCNC: 4.1 G/DL (ref 3.4–5)
ALBUMIN/GLOB SERPL: 1.1 {RATIO} (ref 1.1–2.2)
ALP SERPL-CCNC: 102 U/L (ref 40–129)
ALT SERPL-CCNC: 23 U/L (ref 10–40)
ANION GAP SERPL CALCULATED.3IONS-SCNC: 12 MMOL/L (ref 3–16)
AST SERPL-CCNC: 26 U/L (ref 15–37)
BILIRUB SERPL-MCNC: 0.4 MG/DL (ref 0–1)
BUN SERPL-MCNC: 27 MG/DL (ref 7–20)
CALCIUM SERPL-MCNC: 9.7 MG/DL (ref 8.3–10.6)
CHLORIDE SERPL-SCNC: 107 MMOL/L (ref 99–110)
CHOLEST SERPL-MCNC: 129 MG/DL (ref 0–199)
CO2 SERPL-SCNC: 25 MMOL/L (ref 21–32)
CREAT SERPL-MCNC: 2.5 MG/DL (ref 0.8–1.3)
DEPRECATED RDW RBC AUTO: 14.9 % (ref 12.4–15.4)
FERRITIN SERPL IA-MCNC: 237.1 NG/ML (ref 30–400)
GFR SERPLBLD CREATININE-BSD FMLA CKD-EPI: 27 ML/MIN/{1.73_M2}
GLUCOSE SERPL-MCNC: 98 MG/DL (ref 70–99)
HCT VFR BLD AUTO: 41.8 % (ref 40.5–52.5)
HCV AB SERPL QL IA: NORMAL
HDLC SERPL-MCNC: 53 MG/DL (ref 40–60)
HGB BLD-MCNC: 13.9 G/DL (ref 13.5–17.5)
IRON SATN MFR SERPL: 27 % (ref 20–50)
IRON SERPL-MCNC: 73 UG/DL (ref 59–158)
LDLC SERPL CALC-MCNC: 57 MG/DL
MAGNESIUM SERPL-MCNC: 2.1 MG/DL (ref 1.8–2.4)
MCH RBC QN AUTO: 30.6 PG (ref 26–34)
MCHC RBC AUTO-ENTMCNC: 33.2 G/DL (ref 31–36)
MCV RBC AUTO: 92.2 FL (ref 80–100)
NT-PROBNP SERPL-MCNC: 138 PG/ML (ref 0–124)
PHOSPHATE SERPL-MCNC: 3.6 MG/DL (ref 2.5–4.9)
PLATELET # BLD AUTO: 329 K/UL (ref 135–450)
PMV BLD AUTO: 7.5 FL (ref 5–10.5)
POTASSIUM SERPL-SCNC: 4.7 MMOL/L (ref 3.5–5.1)
PROT SERPL-MCNC: 7.7 G/DL (ref 6.4–8.2)
PTH-INTACT SERPL-MCNC: 97.6 PG/ML (ref 14–72)
RBC # BLD AUTO: 4.53 M/UL (ref 4.2–5.9)
SODIUM SERPL-SCNC: 144 MMOL/L (ref 136–145)
T4 FREE SERPL-MCNC: 1.2 NG/DL (ref 0.9–1.8)
TIBC SERPL-MCNC: 270 UG/DL (ref 260–445)
TRANSFERRIN SERPL-MCNC: 219 MG/DL (ref 200–360)
TRIGL SERPL-MCNC: 93 MG/DL (ref 0–150)
TSH SERPL DL<=0.005 MIU/L-ACNC: 4.45 UIU/ML (ref 0.27–4.2)
URATE SERPL-MCNC: 6.3 MG/DL (ref 3.5–7.2)
VLDLC SERPL CALC-MCNC: 19 MG/DL
WBC # BLD AUTO: 5 K/UL (ref 4–11)

## 2023-06-20 PROCEDURE — G8427 DOCREV CUR MEDS BY ELIG CLIN: HCPCS | Performed by: CLINICAL NURSE SPECIALIST

## 2023-06-20 PROCEDURE — 83880 ASSAY OF NATRIURETIC PEPTIDE: CPT

## 2023-06-20 PROCEDURE — 3017F COLORECTAL CA SCREEN DOC REV: CPT | Performed by: CLINICAL NURSE SPECIALIST

## 2023-06-20 PROCEDURE — 1123F ACP DISCUSS/DSCN MKR DOCD: CPT | Performed by: CLINICAL NURSE SPECIALIST

## 2023-06-20 PROCEDURE — 99214 OFFICE O/P EST MOD 30 MIN: CPT | Performed by: CLINICAL NURSE SPECIALIST

## 2023-06-20 PROCEDURE — 84439 ASSAY OF FREE THYROXINE: CPT

## 2023-06-20 PROCEDURE — 86803 HEPATITIS C AB TEST: CPT

## 2023-06-20 PROCEDURE — 84443 ASSAY THYROID STIM HORMONE: CPT

## 2023-06-20 PROCEDURE — 82728 ASSAY OF FERRITIN: CPT

## 2023-06-20 PROCEDURE — 84100 ASSAY OF PHOSPHORUS: CPT

## 2023-06-20 PROCEDURE — 83970 ASSAY OF PARATHORMONE: CPT

## 2023-06-20 PROCEDURE — G8417 CALC BMI ABV UP PARAM F/U: HCPCS | Performed by: CLINICAL NURSE SPECIALIST

## 2023-06-20 PROCEDURE — 80061 LIPID PANEL: CPT

## 2023-06-20 PROCEDURE — 82306 VITAMIN D 25 HYDROXY: CPT

## 2023-06-20 PROCEDURE — 3078F DIAST BP <80 MM HG: CPT | Performed by: CLINICAL NURSE SPECIALIST

## 2023-06-20 PROCEDURE — 83735 ASSAY OF MAGNESIUM: CPT

## 2023-06-20 PROCEDURE — 80053 COMPREHEN METABOLIC PANEL: CPT

## 2023-06-20 PROCEDURE — 84550 ASSAY OF BLOOD/URIC ACID: CPT

## 2023-06-20 PROCEDURE — 83540 ASSAY OF IRON: CPT

## 2023-06-20 PROCEDURE — 1036F TOBACCO NON-USER: CPT | Performed by: CLINICAL NURSE SPECIALIST

## 2023-06-20 PROCEDURE — 3074F SYST BP LT 130 MM HG: CPT | Performed by: CLINICAL NURSE SPECIALIST

## 2023-06-20 PROCEDURE — 36415 COLL VENOUS BLD VENIPUNCTURE: CPT

## 2023-06-20 PROCEDURE — 84466 ASSAY OF TRANSFERRIN: CPT

## 2023-06-20 PROCEDURE — 85027 COMPLETE CBC AUTOMATED: CPT

## 2023-06-20 RX ORDER — ALLOPURINOL 100 MG/1
100 TABLET ORAL DAILY
COMMUNITY
Start: 2023-05-01

## 2023-06-20 NOTE — PROGRESS NOTES
(ERGOCALCIFEROL) 74467 UNITS capsule Take 1 capsule by mouth once a week   Yes Historical Provider, MD   Tirzepatide [facility]) 2.5 MG/0.5ML SOPN SC injection Inject 0.5 mLs into the skin once a week  Patient not taking: Reported on 3/16/2023 3/13/23 4/12/23  Jairo Simmons MD   [facility]) 7.5 MG/0.5ML SOPN SC injection Inject 0.5 mLs into the skin once a week 5/12/23 6/11/23  MD Ember Gore [facility]) 10 MG/0.5ML SOPN SC injection Inject 0.5 mLs into the skin once a week 6/11/23 7/11/23  Jairo Simmons MD   ammonium lactate (AMLACTIN) 12 % cream Apply topically in the morning and at bedtime 12/8/22   Jairo Simmons MD   mineral oil-hydrophilic petrolatum (AQUAPHOR) ointment Apply topically as needed for Dry Skin (2-4 times a day) Apply topically as needed. 8/10/22   Vertis Inch, APRN - CNP   Meals on Wheels MISC by Does not apply route Patient is to receive 2-3 diabetic meals only 4/30/21   Jairo Simmons MD   Insulin Pen Needle ([employer] PEN NEEDLES) 31G X 6 MM MISC 1 each by Does not apply route daily 9/29/20   Jairo Simmons MD        Allergies: Indomethacin, Statins, Succinylcholine, Unable to assess, and Actos [pioglitazone]     Review of Systems:   Constitutional: there has been no unanticipated weight loss. There's been no change in energy level, sleep pattern, or activity level. Eyes: No visual changes or diplopia. No scleral icterus. ENT: No Headaches, hearing loss or vertigo. No mouth sores or sore throat. Cardiovascular: Reviewed in HPI  Respiratory: No cough or wheezing, no sputum production. No hematemesis. Gastrointestinal: No abdominal pain, appetite loss, blood in stools. No change in bowel or bladder habits. Genitourinary: No dysuria, trouble voiding, or hematuria. Musculoskeletal:  No gait disturbance, weakness or joint complaints. Integumentary: No rash or pruritis.   Neurological: No headache,

## 2023-06-21 ENCOUNTER — TELEPHONE (OUTPATIENT)
Dept: CARDIOLOGY CLINIC | Age: 73
End: 2023-06-21

## 2023-06-21 NOTE — TELEPHONE ENCOUNTER
----- Message from OSMAN Durham - CNS sent at 6/21/2023 12:43 PM EDT -----  Blood work is great  Continue current medications  thanks

## 2023-06-28 DIAGNOSIS — E66.01 MORBID OBESITY (HCC): ICD-10-CM

## 2023-06-28 RX ORDER — TIRZEPATIDE 5 MG/.5ML
INJECTION, SOLUTION SUBCUTANEOUS
Qty: 2 ML | Refills: 0 | Status: SHIPPED | OUTPATIENT
Start: 2023-06-28

## 2023-06-29 RX ORDER — TIRZEPATIDE 7.5 MG/.5ML
INJECTION, SOLUTION SUBCUTANEOUS
Qty: 2 ML | Refills: 0 | OUTPATIENT
Start: 2023-06-29

## 2023-06-30 DIAGNOSIS — E66.01 MORBID OBESITY (HCC): ICD-10-CM

## 2023-06-30 RX ORDER — TIRZEPATIDE 7.5 MG/.5ML
7.5 INJECTION, SOLUTION SUBCUTANEOUS WEEKLY
Qty: 2 ML | Refills: 0 | Status: SHIPPED | OUTPATIENT
Start: 2023-06-30 | End: 2023-07-30

## 2023-07-10 ENCOUNTER — OFFICE VISIT (OUTPATIENT)
Dept: INTERNAL MEDICINE CLINIC | Age: 73
End: 2023-07-10
Payer: MEDICARE

## 2023-07-10 VITALS
HEART RATE: 77 BPM | DIASTOLIC BLOOD PRESSURE: 86 MMHG | SYSTOLIC BLOOD PRESSURE: 130 MMHG | WEIGHT: 264 LBS | BODY MASS INDEX: 43.99 KG/M2 | HEIGHT: 65 IN | OXYGEN SATURATION: 94 %

## 2023-07-10 DIAGNOSIS — N18.30 CHRONIC RENAL IMPAIRMENT, STAGE 3 (MODERATE), UNSPECIFIED WHETHER STAGE 3A OR 3B CKD (HCC): ICD-10-CM

## 2023-07-10 DIAGNOSIS — M10.00 IDIOPATHIC GOUT, UNSPECIFIED CHRONICITY, UNSPECIFIED SITE: ICD-10-CM

## 2023-07-10 DIAGNOSIS — N32.0 ACQUIRED BLADDER NECK STENOSIS: Primary | ICD-10-CM

## 2023-07-10 PROCEDURE — 3074F SYST BP LT 130 MM HG: CPT | Performed by: INTERNAL MEDICINE

## 2023-07-10 PROCEDURE — 99214 OFFICE O/P EST MOD 30 MIN: CPT | Performed by: INTERNAL MEDICINE

## 2023-07-10 PROCEDURE — 1036F TOBACCO NON-USER: CPT | Performed by: INTERNAL MEDICINE

## 2023-07-10 PROCEDURE — G8427 DOCREV CUR MEDS BY ELIG CLIN: HCPCS | Performed by: INTERNAL MEDICINE

## 2023-07-10 PROCEDURE — 1123F ACP DISCUSS/DSCN MKR DOCD: CPT | Performed by: INTERNAL MEDICINE

## 2023-07-10 PROCEDURE — 3017F COLORECTAL CA SCREEN DOC REV: CPT | Performed by: INTERNAL MEDICINE

## 2023-07-10 PROCEDURE — 3078F DIAST BP <80 MM HG: CPT | Performed by: INTERNAL MEDICINE

## 2023-07-10 PROCEDURE — G8417 CALC BMI ABV UP PARAM F/U: HCPCS | Performed by: INTERNAL MEDICINE

## 2023-07-10 ASSESSMENT — ANXIETY QUESTIONNAIRES
4. TROUBLE RELAXING: 0
3. WORRYING TOO MUCH ABOUT DIFFERENT THINGS: 0
1. FEELING NERVOUS, ANXIOUS, OR ON EDGE: 0
GAD7 TOTAL SCORE: 0
2. NOT BEING ABLE TO STOP OR CONTROL WORRYING: 0
IF YOU CHECKED OFF ANY PROBLEMS ON THIS QUESTIONNAIRE, HOW DIFFICULT HAVE THESE PROBLEMS MADE IT FOR YOU TO DO YOUR WORK, TAKE CARE OF THINGS AT HOME, OR GET ALONG WITH OTHER PEOPLE: NOT DIFFICULT AT ALL
5. BEING SO RESTLESS THAT IT IS HARD TO SIT STILL: 0
7. FEELING AFRAID AS IF SOMETHING AWFUL MIGHT HAPPEN: 0
6. BECOMING EASILY ANNOYED OR IRRITABLE: 0

## 2023-07-10 ASSESSMENT — PATIENT HEALTH QUESTIONNAIRE - PHQ9
8. MOVING OR SPEAKING SO SLOWLY THAT OTHER PEOPLE COULD HAVE NOTICED. OR THE OPPOSITE, BEING SO FIGETY OR RESTLESS THAT YOU HAVE BEEN MOVING AROUND A LOT MORE THAN USUAL: 0
5. POOR APPETITE OR OVEREATING: 0
4. FEELING TIRED OR HAVING LITTLE ENERGY: 1
3. TROUBLE FALLING OR STAYING ASLEEP: 0
SUM OF ALL RESPONSES TO PHQ QUESTIONS 1-9: 3
10. IF YOU CHECKED OFF ANY PROBLEMS, HOW DIFFICULT HAVE THESE PROBLEMS MADE IT FOR YOU TO DO YOUR WORK, TAKE CARE OF THINGS AT HOME, OR GET ALONG WITH OTHER PEOPLE: 0
SUM OF ALL RESPONSES TO PHQ QUESTIONS 1-9: 3
7. TROUBLE CONCENTRATING ON THINGS, SUCH AS READING THE NEWSPAPER OR WATCHING TELEVISION: 2
SUM OF ALL RESPONSES TO PHQ9 QUESTIONS 1 & 2: 0
SUM OF ALL RESPONSES TO PHQ QUESTIONS 1-9: 3
2. FEELING DOWN, DEPRESSED OR HOPELESS: 0
9. THOUGHTS THAT YOU WOULD BE BETTER OFF DEAD, OR OF HURTING YOURSELF: 0
SUM OF ALL RESPONSES TO PHQ QUESTIONS 1-9: 3
1. LITTLE INTEREST OR PLEASURE IN DOING THINGS: 0
6. FEELING BAD ABOUT YOURSELF - OR THAT YOU ARE A FAILURE OR HAVE LET YOURSELF OR YOUR FAMILY DOWN: 0

## 2023-07-10 ASSESSMENT — ENCOUNTER SYMPTOMS: ALLERGIC/IMMUNOLOGIC NEGATIVE: 1

## 2023-07-19 ASSESSMENT — ENCOUNTER SYMPTOMS
SHORTNESS OF BREATH: 1
ORTHOPNEA: 0
BLURRED VISION: 0

## 2023-07-24 DIAGNOSIS — E66.01 MORBID OBESITY (HCC): ICD-10-CM

## 2023-07-24 RX ORDER — TIRZEPATIDE 7.5 MG/.5ML
INJECTION, SOLUTION SUBCUTANEOUS
Qty: 2 ML | Refills: 0 | OUTPATIENT
Start: 2023-07-24

## 2023-07-24 NOTE — TELEPHONE ENCOUNTER
Recent Visits  Date Type Provider Dept   07/10/23 Office Visit Maureen Patel MD [facility]   05/10/23 Office Visit Maureen Patel MD [facility]   03/13/23 Office Visit Maureen Patel MD [facility]   12/08/22 Office Visit Maureen Patel MD [facility]   11/11/22 Office Visit Maureen Patel MD [facility]   09/02/22 Office Visit Maureen Patel MD [facility]   08/10/22 Office Visit OSMAN Craig - CNP [facility]   05/06/22 Office Visit Maureen Patel MD [facility]   Showing recent visits within past 540 days with a meds authorizing provider and meeting all other requirements  Future Appointments  Date Type Provider Dept   10/10/23 Appointment Maureen Patel MD [facility]   Showing future appointments within next 150 days with a meds authorizing provider and meeting all other requirements     7/10/2023

## 2023-07-25 RX ORDER — NIFEDIPINE 60 MG/1
60 TABLET, EXTENDED RELEASE ORAL DAILY
Qty: 90 TABLET | Refills: 1 | Status: SHIPPED | OUTPATIENT
Start: 2023-07-25 | End: 2023-09-19 | Stop reason: SDUPTHER

## 2023-08-08 DIAGNOSIS — E66.01 MORBID OBESITY (HCC): ICD-10-CM

## 2023-08-08 RX ORDER — TIRZEPATIDE 7.5 MG/.5ML
INJECTION, SOLUTION SUBCUTANEOUS
Qty: 2 ML | Refills: 2 | Status: SHIPPED | OUTPATIENT
Start: 2023-08-08

## 2023-08-08 NOTE — TELEPHONE ENCOUNTER
Recent Visits  Date Type Provider Dept   07/10/23 Office Visit Ryan Burns MD [facility]   05/10/23 Office Visit Ryan Burns MD [facility]   03/13/23 Office Visit Ryan Burns MD [facility]   12/08/22 Office Visit Ryan Burns MD [facility]   11/11/22 Office Visit Ryan Burns MD [facility]   09/02/22 Office Visit Ryan Burns MD [facility]   08/10/22 Office Visit Remy Harrington, APRN - CNP [facility]   05/06/22 Office Visit Ryan Burns MD [facility]   Showing recent visits within past 540 days with a meds authorizing provider and meeting all other requirements  Future Appointments  Date Type Provider Dept   10/10/23 Appointment Ryan Burns MD [facility]   Showing future appointments within next 150 days with a meds authorizing provider and meeting all other requirements     7/10/2023

## 2023-08-14 DIAGNOSIS — E11.9 DM (DIABETES MELLITUS) (HCC): ICD-10-CM

## 2023-08-18 ENCOUNTER — HOSPITAL ENCOUNTER (OUTPATIENT)
Age: 73
Discharge: HOME | End: 2023-08-18
Payer: MEDICARE

## 2023-08-18 DIAGNOSIS — N18.30 CHRONIC RENAL IMPAIRMENT, STAGE 3 (MODERATE), UNSPECIFIED WHETHER STAGE 3A OR 3B CKD (HCC): ICD-10-CM

## 2023-08-18 DIAGNOSIS — E11.9 DM (DIABETES MELLITUS) (HCC): ICD-10-CM

## 2023-08-18 LAB
25(OH)D3 SERPL-MCNC: 54.6 NG/ML
ALBUMIN SERPL-MCNC: 3.9 G/DL (ref 3.4–5)
ANION GAP SERPL CALCULATED.3IONS-SCNC: 11 MMOL/L (ref 3–16)
BUN SERPL-MCNC: 26 MG/DL (ref 7–20)
CALCIUM SERPL-MCNC: 9.3 MG/DL (ref 8.3–10.6)
CHLORIDE SERPL-SCNC: 107 MMOL/L (ref 99–110)
CO2 SERPL-SCNC: 25 MMOL/L (ref 21–32)
CREAT SERPL-MCNC: 2.6 MG/DL (ref 0.8–1.3)
DEPRECATED RDW RBC AUTO: 14.3 % (ref 12.4–15.4)
FERRITIN SERPL IA-MCNC: 199.3 NG/ML (ref 30–400)
GFR SERPLBLD CREATININE-BSD FMLA CKD-EPI: 25 ML/MIN/{1.73_M2}
GLUCOSE SERPL-MCNC: 96 MG/DL (ref 70–99)
HCT VFR BLD AUTO: 40.9 % (ref 40.5–52.5)
HGB BLD-MCNC: 13.8 G/DL (ref 13.5–17.5)
IRON SATN MFR SERPL: 23 % (ref 20–50)
IRON SERPL-MCNC: 61 UG/DL (ref 59–158)
MCH RBC QN AUTO: 31.2 PG (ref 26–34)
MCHC RBC AUTO-ENTMCNC: 33.7 G/DL (ref 31–36)
MCV RBC AUTO: 92.5 FL (ref 80–100)
PHOSPHATE SERPL-MCNC: 3.4 MG/DL (ref 2.5–4.9)
PLATELET # BLD AUTO: 330 K/UL (ref 135–450)
PMV BLD AUTO: 7.7 FL (ref 5–10.5)
POTASSIUM SERPL-SCNC: 5.1 MMOL/L (ref 3.5–5.1)
PTH-INTACT SERPL-MCNC: 169.9 PG/ML (ref 14–72)
RBC # BLD AUTO: 4.42 M/UL (ref 4.2–5.9)
SODIUM SERPL-SCNC: 143 MMOL/L (ref 136–145)
TIBC SERPL-MCNC: 265 UG/DL (ref 260–445)
TRANSFERRIN SERPL-MCNC: 226 MG/DL (ref 200–360)
WBC # BLD AUTO: 5.4 K/UL (ref 4–11)

## 2023-08-18 PROCEDURE — 82306 VITAMIN D 25 HYDROXY: CPT

## 2023-08-18 PROCEDURE — 83540 ASSAY OF IRON: CPT

## 2023-08-18 PROCEDURE — 83036 HEMOGLOBIN GLYCOSYLATED A1C: CPT

## 2023-08-18 PROCEDURE — 82728 ASSAY OF FERRITIN: CPT

## 2023-08-18 PROCEDURE — 84466 ASSAY OF TRANSFERRIN: CPT

## 2023-08-18 PROCEDURE — 82570 ASSAY OF URINE CREATININE: CPT

## 2023-08-18 PROCEDURE — 83970 ASSAY OF PARATHORMONE: CPT

## 2023-08-18 PROCEDURE — 82043 UR ALBUMIN QUANTITATIVE: CPT

## 2023-08-18 PROCEDURE — 36415 COLL VENOUS BLD VENIPUNCTURE: CPT

## 2023-08-18 PROCEDURE — 85027 COMPLETE CBC AUTOMATED: CPT

## 2023-08-18 PROCEDURE — 80069 RENAL FUNCTION PANEL: CPT

## 2023-08-19 LAB
CREAT UR-MCNC: 113.4 MG/DL (ref 39–259)
EST. AVERAGE GLUCOSE BLD GHB EST-MCNC: 131.2 MG/DL
HBA1C MFR BLD: 6.2 %
MICROALBUMIN UR DL<=1MG/L-MCNC: 241 MG/DL
MICROALBUMIN/CREAT UR: 2125.2 MG/G (ref 0–30)

## 2023-08-23 ENCOUNTER — TELEPHONE (OUTPATIENT)
Dept: INTERNAL MEDICINE CLINIC | Age: 73
End: 2023-08-23

## 2023-08-23 NOTE — TELEPHONE ENCOUNTER
Patient came to the office requesting the following refill  -fluocinolone acetonide topical oil    Patient is stating that he received medication from a specialist    Medication is used for patient scalp    Patient has been advised an appointment may be needed

## 2023-08-29 RX ORDER — NIFEDIPINE 30 MG/1
TABLET, FILM COATED, EXTENDED RELEASE ORAL
Qty: 90 TABLET | Refills: 0 | Status: SHIPPED | OUTPATIENT
Start: 2023-08-29

## 2023-08-29 NOTE — TELEPHONE ENCOUNTER
Recent Visits  Date Type Provider Dept   07/10/23 Office Visit Lytle Oppenheim, MD [facility]   05/10/23 Office Visit Lytle Oppenheim, MD [facility]   03/13/23 Office Visit Lytle Oppenheim, MD [facility]   12/08/22 Office Visit Lytle Oppenheim, MD [facility]   11/11/22 Office Visit Lytle Oppenheim, MD [facility]   09/02/22 Office Visit Lytle Oppenheim, MD [facility]   08/10/22 Office Visit OSMAN Null - CNP [facility]   05/06/22 Office Visit Lytle Oppenheim, MD [facility]   Showing recent visits within past 540 days with a meds authorizing provider and meeting all other requirements  Future Appointments  Date Type Provider Dept   10/10/23 Appointment Lytle Oppenheim, MD [facility]   Showing future appointments within next 150 days with a meds authorizing provider and meeting all other requirements     7/10/2023

## 2023-09-11 RX ORDER — METOPROLOL SUCCINATE 100 MG/1
100 TABLET, EXTENDED RELEASE ORAL DAILY
Qty: 90 TABLET | Refills: 1 | OUTPATIENT
Start: 2023-09-11

## 2023-09-19 RX ORDER — NIFEDIPINE 60 MG/1
60 TABLET, EXTENDED RELEASE ORAL DAILY
Qty: 90 TABLET | Refills: 1 | Status: SHIPPED | OUTPATIENT
Start: 2023-09-19

## 2023-09-19 NOTE — TELEPHONE ENCOUNTER
Recent Visits  Date Type Provider Dept   07/10/23 Office Visit Deuce Darden MD [facility]   05/10/23 Office Visit Deuce Darden MD [facility]   03/13/23 Office Visit Deuce Darden MD [facility]   12/08/22 Office Visit Deuce Darden MD [facility]   11/11/22 Office Visit Deuce Darden MD [facility]   09/02/22 Office Visit Deuce Darden MD [facility]   08/10/22 Office Visit Anna Oreilly, APRN - CNP [facility]   05/06/22 Office Visit Deuce Darden MD [facility]   Showing recent visits within past 540 days with a meds authorizing provider and meeting all other requirements  Future Appointments  Date Type Provider Dept   10/10/23 Appointment Deuce Darden MD [facility]   Showing future appointments within next 150 days with a meds authorizing provider and meeting all other requirements     7/10/2023

## 2023-09-22 ENCOUNTER — TELEPHONE (OUTPATIENT)
Dept: INTERNAL MEDICINE CLINIC | Age: 73
End: 2023-09-22

## 2023-09-22 NOTE — TELEPHONE ENCOUNTER
----- Message from Lexie Cary sent at 9/19/2023  1:12 PM EDT -----  Subject: Message to Provider    QUESTIONS  Information for Provider? Patient states that Dr. Yomi Steele called in   and he was just trying to return the call.  Please call patient back as he   is not sure what the call was regarding  ---------------------------------------------------------------------------  --------------  Rosalino [place] Taryn  [phone]; OK to leave message on voicemail  ---------------------------------------------------------------------------  --------------  SCRIPT ANSWERS  undefined

## 2023-09-26 DIAGNOSIS — E53.8 FOLATE DEFICIENCY: ICD-10-CM

## 2023-09-26 RX ORDER — FOLIC ACID 1 MG/1
1 TABLET ORAL DAILY
Qty: 90 TABLET | Refills: 1 | Status: SHIPPED | OUTPATIENT
Start: 2023-09-26

## 2023-09-26 NOTE — TELEPHONE ENCOUNTER
Recent Visits  Date Type Provider Dept   07/10/23 Office Visit Marietta Phillips MD [facility]   05/10/23 Office Visit Marietta Phillips MD [facility]   03/13/23 Office Visit Marietta Phillips MD [facility]   12/08/22 Office Visit Marietta Phillips MD [facility]   11/11/22 Office Visit Marietta Phillips MD [facility]   09/02/22 Office Visit Marietta Phillips MD [facility]   08/10/22 Office Visit OSMAN Jalloh - CNP [facility]   05/06/22 Office Visit Marietta Phillips MD [facility]   Showing recent visits within past 540 days with a meds authorizing provider and meeting all other requirements  Future Appointments  Date Type Provider Dept   10/24/23 Appointment Marietta Phillips MD [facility]   Showing future appointments within next 150 days with a meds authorizing provider and meeting all other requirements     7/10/2023

## 2023-11-13 RX ORDER — TORSEMIDE 20 MG/1
TABLET ORAL
Qty: 180 TABLET | Refills: 1 | Status: SHIPPED | OUTPATIENT
Start: 2023-11-13

## 2023-11-20 ENCOUNTER — OFFICE VISIT (OUTPATIENT)
Dept: CARDIOLOGY CLINIC | Age: 73
End: 2023-11-20
Payer: MEDICARE

## 2023-11-20 VITALS
OXYGEN SATURATION: 98 % | BODY MASS INDEX: 44.65 KG/M2 | HEART RATE: 57 BPM | DIASTOLIC BLOOD PRESSURE: 70 MMHG | HEIGHT: 65 IN | SYSTOLIC BLOOD PRESSURE: 148 MMHG | WEIGHT: 268 LBS

## 2023-11-20 DIAGNOSIS — E66.01 MORBID OBESITY (HCC): ICD-10-CM

## 2023-11-20 DIAGNOSIS — N18.30 STAGE 3 CHRONIC KIDNEY DISEASE, UNSPECIFIED WHETHER STAGE 3A OR 3B CKD (HCC): ICD-10-CM

## 2023-11-20 DIAGNOSIS — I50.32 CHRONIC DIASTOLIC HEART FAILURE (HCC): Primary | ICD-10-CM

## 2023-11-20 DIAGNOSIS — G47.33 OSA (OBSTRUCTIVE SLEEP APNEA): ICD-10-CM

## 2023-11-20 PROCEDURE — 1123F ACP DISCUSS/DSCN MKR DOCD: CPT | Performed by: CLINICAL NURSE SPECIALIST

## 2023-11-20 PROCEDURE — 3078F DIAST BP <80 MM HG: CPT | Performed by: CLINICAL NURSE SPECIALIST

## 2023-11-20 PROCEDURE — G8417 CALC BMI ABV UP PARAM F/U: HCPCS | Performed by: CLINICAL NURSE SPECIALIST

## 2023-11-20 PROCEDURE — G8427 DOCREV CUR MEDS BY ELIG CLIN: HCPCS | Performed by: CLINICAL NURSE SPECIALIST

## 2023-11-20 PROCEDURE — 99214 OFFICE O/P EST MOD 30 MIN: CPT | Performed by: CLINICAL NURSE SPECIALIST

## 2023-11-20 PROCEDURE — G8484 FLU IMMUNIZE NO ADMIN: HCPCS | Performed by: CLINICAL NURSE SPECIALIST

## 2023-11-20 PROCEDURE — 3077F SYST BP >= 140 MM HG: CPT | Performed by: CLINICAL NURSE SPECIALIST

## 2023-11-20 PROCEDURE — 1036F TOBACCO NON-USER: CPT | Performed by: CLINICAL NURSE SPECIALIST

## 2023-11-20 PROCEDURE — 3017F COLORECTAL CA SCREEN DOC REV: CPT | Performed by: CLINICAL NURSE SPECIALIST

## 2023-11-20 NOTE — PROGRESS NOTES
AM    HGB 14.5 04/25/2023 04:40 PM    HCT 40.9 08/18/2023 10:46 AM    HCT 41.8 06/20/2023 10:50 AM    HCT 43.9 04/25/2023 04:40 PM    MCV 92.5 08/18/2023 10:46 AM    MCV 92.2 06/20/2023 10:50 AM    MCV 93.7 04/25/2023 04:40 PM    RDW 14.3 08/18/2023 10:46 AM    RDW 14.9 06/20/2023 10:50 AM    RDW 15.1 04/25/2023 04:40 PM     08/18/2023 10:46 AM     06/20/2023 10:50 AM     04/25/2023 04:40 PM     BMP:  Lab Results   Component Value Date/Time     08/18/2023 10:46 AM     06/20/2023 10:50 AM     05/04/2023 09:22 AM    K 5.1 08/18/2023 10:46 AM    K 4.7 06/20/2023 10:50 AM    K 4.5 05/04/2023 09:22 AM    K 4.2 06/21/2021 06:51 AM     08/18/2023 10:46 AM     06/20/2023 10:50 AM     05/04/2023 09:22 AM    CO2 25 08/18/2023 10:46 AM    CO2 25 06/20/2023 10:50 AM    CO2 23 05/04/2023 09:22 AM    PHOS 3.4 08/18/2023 10:46 AM    PHOS 3.6 06/20/2023 10:50 AM    PHOS 3.3 05/04/2023 09:22 AM    BUN 26 08/18/2023 10:46 AM    BUN 27 06/20/2023 10:50 AM    BUN 32 05/04/2023 09:22 AM    CREATININE 2.6 08/18/2023 10:46 AM    CREATININE 2.5 06/20/2023 10:50 AM    CREATININE 2.6 05/04/2023 09:22 AM     BNP:   Lab Results   Component Value Date/Time    PROBNP 138 06/20/2023 10:50 AM    PROBNP 90 02/28/2023 09:26 AM    PROBNP 136 02/14/2023 10:56 AM     Cardiac Imaging:  Echo 4/14/2023   Summary   *Left ventricle - normal size, moderate concentric LVH, normal function EF   60%   *Tricuspid valve - mild regurgitation with RVSP of 32mmHg    Echo 4/7/2021   Summary   Overall left ventricular function is normal.   Ejection fraction is visually estimated to be 55 %. E/e'= 9.3 . There is reversal of E/A inflow velocities across the mitral valve. There is mild concentric left ventricular hypertrophy. No definitive regional wall motion abnormalities are noted. Grade II diastolic dysfunction with elevated LV filling pressures. Mild to moderate tricuspid regurgitation.    Estimated

## 2023-11-21 ENCOUNTER — OFFICE VISIT (OUTPATIENT)
Dept: INTERNAL MEDICINE CLINIC | Age: 73
End: 2023-11-21
Payer: MEDICARE

## 2023-11-21 VITALS
DIASTOLIC BLOOD PRESSURE: 66 MMHG | HEART RATE: 58 BPM | WEIGHT: 269 LBS | BODY MASS INDEX: 44.76 KG/M2 | OXYGEN SATURATION: 94 % | SYSTOLIC BLOOD PRESSURE: 132 MMHG

## 2023-11-21 DIAGNOSIS — E66.01 MORBID OBESITY (HCC): ICD-10-CM

## 2023-11-21 DIAGNOSIS — E11.22 TYPE 2 DIABETES MELLITUS WITH STAGE 3 CHRONIC KIDNEY DISEASE, WITHOUT LONG-TERM CURRENT USE OF INSULIN, UNSPECIFIED WHETHER STAGE 3A OR 3B CKD (HCC): ICD-10-CM

## 2023-11-21 DIAGNOSIS — Z79.4 CONTROLLED TYPE 2 DIABETES MELLITUS WITH DIABETIC NEUROPATHY, WITH LONG-TERM CURRENT USE OF INSULIN (HCC): ICD-10-CM

## 2023-11-21 DIAGNOSIS — E11.40 CONTROLLED TYPE 2 DIABETES MELLITUS WITH DIABETIC NEUROPATHY, WITH LONG-TERM CURRENT USE OF INSULIN (HCC): ICD-10-CM

## 2023-11-21 DIAGNOSIS — N18.30 TYPE 2 DIABETES MELLITUS WITH STAGE 3 CHRONIC KIDNEY DISEASE, WITHOUT LONG-TERM CURRENT USE OF INSULIN, UNSPECIFIED WHETHER STAGE 3A OR 3B CKD (HCC): ICD-10-CM

## 2023-11-21 DIAGNOSIS — E11.69 DYSLIPIDEMIA ASSOCIATED WITH TYPE 2 DIABETES MELLITUS (HCC): ICD-10-CM

## 2023-11-21 DIAGNOSIS — N18.30 TYPE 2 DIABETES MELLITUS WITH STAGE 3 CHRONIC KIDNEY DISEASE, WITHOUT LONG-TERM CURRENT USE OF INSULIN, UNSPECIFIED WHETHER STAGE 3A OR 3B CKD (HCC): Primary | ICD-10-CM

## 2023-11-21 DIAGNOSIS — N18.30 CHRONIC RENAL IMPAIRMENT, STAGE 3 (MODERATE), UNSPECIFIED WHETHER STAGE 3A OR 3B CKD (HCC): ICD-10-CM

## 2023-11-21 DIAGNOSIS — E11.22 TYPE 2 DIABETES MELLITUS WITH STAGE 3 CHRONIC KIDNEY DISEASE, WITHOUT LONG-TERM CURRENT USE OF INSULIN, UNSPECIFIED WHETHER STAGE 3A OR 3B CKD (HCC): Primary | ICD-10-CM

## 2023-11-21 DIAGNOSIS — E78.5 DYSLIPIDEMIA ASSOCIATED WITH TYPE 2 DIABETES MELLITUS (HCC): ICD-10-CM

## 2023-11-21 LAB — HBA1C MFR BLD: 6.1 %

## 2023-11-21 PROCEDURE — G8427 DOCREV CUR MEDS BY ELIG CLIN: HCPCS | Performed by: INTERNAL MEDICINE

## 2023-11-21 PROCEDURE — 3044F HG A1C LEVEL LT 7.0%: CPT | Performed by: INTERNAL MEDICINE

## 2023-11-21 PROCEDURE — 1123F ACP DISCUSS/DSCN MKR DOCD: CPT | Performed by: INTERNAL MEDICINE

## 2023-11-21 PROCEDURE — 1036F TOBACCO NON-USER: CPT | Performed by: INTERNAL MEDICINE

## 2023-11-21 PROCEDURE — G8417 CALC BMI ABV UP PARAM F/U: HCPCS | Performed by: INTERNAL MEDICINE

## 2023-11-21 PROCEDURE — 99214 OFFICE O/P EST MOD 30 MIN: CPT | Performed by: INTERNAL MEDICINE

## 2023-11-21 PROCEDURE — 3017F COLORECTAL CA SCREEN DOC REV: CPT | Performed by: INTERNAL MEDICINE

## 2023-11-21 PROCEDURE — 2022F DILAT RTA XM EVC RTNOPTHY: CPT | Performed by: INTERNAL MEDICINE

## 2023-11-21 PROCEDURE — 83036 HEMOGLOBIN GLYCOSYLATED A1C: CPT | Performed by: INTERNAL MEDICINE

## 2023-11-21 PROCEDURE — G8484 FLU IMMUNIZE NO ADMIN: HCPCS | Performed by: INTERNAL MEDICINE

## 2023-11-21 PROCEDURE — 3074F SYST BP LT 130 MM HG: CPT | Performed by: INTERNAL MEDICINE

## 2023-11-21 PROCEDURE — 3078F DIAST BP <80 MM HG: CPT | Performed by: INTERNAL MEDICINE

## 2023-11-21 RX ORDER — TIRZEPATIDE 10 MG/.5ML
10 INJECTION, SOLUTION SUBCUTANEOUS WEEKLY
Qty: 6 ML | Refills: 0 | Status: SHIPPED | OUTPATIENT
Start: 2023-11-21 | End: 2024-02-19

## 2023-11-21 RX ORDER — ATORVASTATIN CALCIUM 40 MG/1
40 TABLET, FILM COATED ORAL NIGHTLY
Qty: 90 TABLET | Refills: 3 | Status: SHIPPED | OUTPATIENT
Start: 2023-11-21

## 2023-11-21 SDOH — HEALTH STABILITY: MENTAL HEALTH: HOW OFTEN DO YOU HAVE A DRINK CONTAINING ALCOHOL?: NEVER

## 2023-11-21 SDOH — SOCIAL STABILITY: SOCIAL INSECURITY: WITHIN THE LAST YEAR, HAVE YOU BEEN AFRAID OF YOUR PARTNER OR EX-PARTNER?: NO

## 2023-11-21 SDOH — SOCIAL STABILITY: SOCIAL NETWORK: HOW OFTEN DO YOU GET TOGETHER WITH FRIENDS OR RELATIVES?: MORE THAN THREE TIMES A WEEK

## 2023-11-21 SDOH — SOCIAL STABILITY: SOCIAL INSECURITY: WITHIN THE LAST YEAR, HAVE YOU BEEN HUMILIATED OR EMOTIONALLY ABUSED IN OTHER WAYS BY YOUR PARTNER OR EX-PARTNER?: NO

## 2023-11-21 SDOH — ECONOMIC STABILITY: FOOD INSECURITY: WITHIN THE PAST 12 MONTHS, YOU WORRIED THAT YOUR FOOD WOULD RUN OUT BEFORE YOU GOT MONEY TO BUY MORE.: NEVER TRUE

## 2023-11-21 SDOH — ECONOMIC STABILITY: INCOME INSECURITY: IN THE LAST 12 MONTHS, WAS THERE A TIME WHEN YOU WERE NOT ABLE TO PAY THE MORTGAGE OR RENT ON TIME?: NO

## 2023-11-21 SDOH — HEALTH STABILITY: PHYSICAL HEALTH: ON AVERAGE, HOW MANY MINUTES DO YOU ENGAGE IN EXERCISE AT THIS LEVEL?: 30 MIN

## 2023-11-21 SDOH — SOCIAL STABILITY: SOCIAL NETWORK: HOW OFTEN DO YOU ATTEND CHURCH OR RELIGIOUS SERVICES?: 1 TO 4 TIMES PER YEAR

## 2023-11-21 SDOH — SOCIAL STABILITY: SOCIAL NETWORK: ARE YOU MARRIED, WIDOWED, DIVORCED, SEPARATED, NEVER MARRIED, OR LIVING WITH A PARTNER?: DIVORCED

## 2023-11-21 SDOH — HEALTH STABILITY: PHYSICAL HEALTH: ON AVERAGE, HOW MANY DAYS PER WEEK DO YOU ENGAGE IN MODERATE TO STRENUOUS EXERCISE (LIKE A BRISK WALK)?: 2 DAYS

## 2023-11-21 SDOH — SOCIAL STABILITY: SOCIAL NETWORK: HOW OFTEN DO YOU ATTENT MEETINGS OF THE CLUB OR ORGANIZATION YOU BELONG TO?: MORE THAN 4 TIMES PER YEAR

## 2023-11-21 SDOH — ECONOMIC STABILITY: FOOD INSECURITY: WITHIN THE PAST 12 MONTHS, THE FOOD YOU BOUGHT JUST DIDN'T LAST AND YOU DIDN'T HAVE MONEY TO GET MORE.: NEVER TRUE

## 2023-11-21 SDOH — ECONOMIC STABILITY: INCOME INSECURITY: HOW HARD IS IT FOR YOU TO PAY FOR THE VERY BASICS LIKE FOOD, HOUSING, MEDICAL CARE, AND HEATING?: NOT VERY HARD

## 2023-11-21 SDOH — HEALTH STABILITY: MENTAL HEALTH: HOW MANY STANDARD DRINKS CONTAINING ALCOHOL DO YOU HAVE ON A TYPICAL DAY?: PATIENT DOES NOT DRINK

## 2023-11-21 NOTE — PROGRESS NOTES
Escobar Blackmon (:  1950) is a 68 y.o. male,Established patient, here for evaluation of the following chief complaint(s):  Diabetes and Hypertension         ASSESSMENT/PLAN:  1. Type 2 diabetes mellitus with stage 3 chronic kidney disease, without long-term current use of insulin, unspecified whether stage 3a or 3b CKD ([facility])  -     POCT glycosylated hemoglobin (Hb A1C)  -     Tirzepatide (MOUNJARO) 10 MG/0.5ML SOPN SC injection; Inject 0.5 mLs into the skin once a week, Disp-6 mL, R-0Normal  2. Chronic renal impairment, stage 3 (moderate), unspecified whether stage 3a or 3b CKD ([facility])  -     Tirzepatide (MOUNJARO) 10 MG/0.5ML SOPN SC injection; Inject 0.5 mLs into the skin once a week, Disp-6 mL, R-0Normal  3. Morbid obesity ([address])  -     Tirzepatide (MOUNJARO) 10 MG/0.5ML SOPN SC injection; Inject 0.5 mLs into the skin once a week, Disp-6 mL, R-0Normal  4. Controlled type 2 diabetes mellitus with diabetic neuropathy, with long-term current use of insulin ([facility])  -     atorvastatin (LIPITOR) 40 MG tablet; Take 1 tablet by mouth nightly, Disp-90 tablet, R-3Normal  5. Dyslipidemia associated with type 2 diabetes mellitus ([facility])  -     atorvastatin (LIPITOR) 40 MG tablet; Take 1 tablet by mouth nightly, Disp-90 tablet, R-3Normal      No follow-ups on file. Subjective   SUBJECTIVE/OBJECTIVE:  HPI      Treatment Adherence:   Medication compliance:  compliant most of the time  Diet compliance:  compliant most of the time  Weight trend: fluctuating  Current exercise: no regular exercise  Barriers: none    Diabetes Mellitus Type 2: Current symptoms/problems include none. Home blood sugar records: trend: stable  Any episodes of hypoglycemia? no  Eye exam current (within one year): no  Tobacco history: He  reports that he quit smoking about 22 years ago. His smoking use included cigars and cigarettes. He started smoking about 43 years ago. He has a 5.25 pack-year smoking history.  He has quit using smokeless

## 2023-11-22 RX ORDER — TIRZEPATIDE 10 MG/.5ML
INJECTION, SOLUTION SUBCUTANEOUS
Qty: 2 ML | Refills: 0 | OUTPATIENT
Start: 2023-11-22

## 2023-11-22 RX ORDER — TIRZEPATIDE 10 MG/.5ML
INJECTION, SOLUTION SUBCUTANEOUS
Qty: 2 ML | OUTPATIENT
Start: 2023-11-22

## 2023-11-27 RX ORDER — NIFEDIPINE 30 MG/1
TABLET, FILM COATED, EXTENDED RELEASE ORAL
Qty: 90 TABLET | Refills: 0 | Status: SHIPPED | OUTPATIENT
Start: 2023-11-27

## 2023-11-27 NOTE — TELEPHONE ENCOUNTER
Recent Visits  Date Type Provider Dept   11/21/23 Office Visit Rosi Monroy MD [facility]   07/10/23 Office Visit Rosi Monroy MD [facility]   05/10/23 Office Visit Rosi Monroy MD [facility]   03/13/23 Office Visit Rosi Monroy MD [facility]   12/08/22 Office Visit Rosi Monroy MD [facility]   11/11/22 Office Visit Rosi Monroy MD [facility]   09/02/22 Office Visit Rosi Monroy MD [facility]   08/10/22 Office Visit Gabi Jung APRN - CNP [facility]   Showing recent visits within past 540 days with a meds authorizing provider and meeting all other requirements  Future Appointments  Date Type Provider Dept   02/21/24 Appointment Rosi Monroy MD [facility]   Showing future appointments within next 150 days with a meds authorizing provider and meeting all other requirements     11/21/2023

## 2023-12-11 RX ORDER — METOPROLOL SUCCINATE 100 MG/1
100 TABLET, EXTENDED RELEASE ORAL DAILY
Qty: 30 TABLET | Refills: 0 | Status: SHIPPED | OUTPATIENT
Start: 2023-12-11 | End: 2024-01-10

## 2023-12-11 NOTE — TELEPHONE ENCOUNTER
----- Message from [place] sent at 12/11/2023  9:26 AM EST -----  Subject: Refill Request    QUESTIONS  Name of Medication? metoprolol succinate (TOPROL XL) 100 MG extended   release tablet  Patient-reported dosage and instructions? 100MG 1xday  How many days do you have left? 2  Preferred Pharmacy? [address] [id number]  Pharmacy phone number (if available)? [phone]  Additional Information for Provider? Per PT only needs 30 tablets/month   and not 90. Any questions please call PT back at [phone]  ---------------------------------------------------------------------------  --------------  [address]  What is the best way for the office to contact you? OK to leave message on   voicemail  Preferred Call Back Phone Number? [phone]  ---------------------------------------------------------------------------  --------------  SCRIPT ANSWERS  Relationship to Patient?  Self

## 2023-12-29 DIAGNOSIS — N18.30 TYPE 2 DIABETES MELLITUS WITH STAGE 3 CHRONIC KIDNEY DISEASE, WITHOUT LONG-TERM CURRENT USE OF INSULIN, UNSPECIFIED WHETHER STAGE 3A OR 3B CKD (HCC): ICD-10-CM

## 2023-12-29 DIAGNOSIS — E66.01 MORBID OBESITY (HCC): ICD-10-CM

## 2023-12-29 DIAGNOSIS — E11.22 TYPE 2 DIABETES MELLITUS WITH STAGE 3 CHRONIC KIDNEY DISEASE, WITHOUT LONG-TERM CURRENT USE OF INSULIN, UNSPECIFIED WHETHER STAGE 3A OR 3B CKD (HCC): ICD-10-CM

## 2023-12-29 DIAGNOSIS — N18.30 CHRONIC RENAL IMPAIRMENT, STAGE 3 (MODERATE), UNSPECIFIED WHETHER STAGE 3A OR 3B CKD (HCC): ICD-10-CM

## 2023-12-29 RX ORDER — TIRZEPATIDE 10 MG/.5ML
10 INJECTION, SOLUTION SUBCUTANEOUS WEEKLY
Qty: 6 ML | Refills: 1 | Status: SHIPPED | OUTPATIENT
Start: 2023-12-29 | End: 2024-06-24

## 2024-01-08 RX ORDER — METOPROLOL SUCCINATE 100 MG/1
100 TABLET, EXTENDED RELEASE ORAL DAILY
Qty: 30 TABLET | Refills: 0 | Status: SHIPPED | OUTPATIENT
Start: 2024-01-08 | End: 2024-02-07

## 2024-01-08 NOTE — TELEPHONE ENCOUNTER
Recent Visits  Date Type Provider Dept   11/21/23 Office Visit Edward Mcdonald MD [facility]   07/10/23 Office Visit Edward Mcdonald MD [facility]   05/10/23 Office Visit Edward Mcdonald MD [facility]   03/13/23 Office Visit Edward Mcdonald MD [facility]   12/08/22 Office Visit Edward Mcdonald MD [facility]   11/11/22 Office Visit Edward Mcdonald MD [facility]   09/02/22 Office Visit Edward Mcdonald MD [facility]   08/10/22 Office Visit Urmila Hager, OSMAN - CNP [facility]   Showing recent visits within past 540 days with a meds authorizing provider and meeting all other requirements  Future Appointments  Date Type Provider Dept   02/21/24 Appointment Edward Mcdonald MD [facility]   05/13/24 Appointment Edward Mcdonald MD [facility]   05/21/24 Appointment Edward Mcdonald MD [facility]   Showing future appointments within next 150 days with a meds authorizing provider and meeting all other requirements     11/21/2023

## 2024-01-23 ENCOUNTER — TELEPHONE (OUTPATIENT)
Dept: INTERNAL MEDICINE CLINIC | Age: 74
End: 2024-01-23

## 2024-01-23 RX ORDER — NIFEDIPINE 60 MG/1
60 TABLET, EXTENDED RELEASE ORAL DAILY
Qty: 90 TABLET | Refills: 1 | Status: SHIPPED | OUTPATIENT
Start: 2024-01-23

## 2024-02-05 RX ORDER — METOPROLOL SUCCINATE 100 MG/1
100 TABLET, EXTENDED RELEASE ORAL DAILY
Qty: 30 TABLET | Refills: 0 | Status: SHIPPED | OUTPATIENT
Start: 2024-02-05 | End: 2024-03-06

## 2024-02-05 NOTE — TELEPHONE ENCOUNTER
Recent Visits  Date Type Provider Dept   11/21/23 Office Visit Edward Mcdonald MD [facility]   07/10/23 Office Visit Edward Mcdonald MD [facility]   05/10/23 Office Visit Edward Mcdonald MD [facility]   03/13/23 Office Visit Edward Mcdonald MD [facility]   12/08/22 Office Visit Edward Mcdonald MD [facility]   11/11/22 Office Visit Edward Mcdonald MD [facility]   09/02/22 Office Visit Edward Mcdonald MD [facility]   Showing recent visits within past 540 days with a meds authorizing provider and meeting all other requirements  Future Appointments  Date Type Provider Dept   02/21/24 Appointment Edward Mcdonald MD [facility]   05/13/24 Appointment Edward Mcdonald MD [facility]   05/21/24 Appointment Edward Mcdonald MD [facility]   Showing future appointments within next 150 days with a meds authorizing provider and meeting all other requirements     11/21/2023

## 2024-02-21 ENCOUNTER — OFFICE VISIT (OUTPATIENT)
Dept: INTERNAL MEDICINE CLINIC | Age: 74
End: 2024-02-21
Payer: MEDICARE

## 2024-02-21 VITALS
HEART RATE: 77 BPM | WEIGHT: 271 LBS | BODY MASS INDEX: 45.1 KG/M2 | SYSTOLIC BLOOD PRESSURE: 118 MMHG | OXYGEN SATURATION: 97 % | DIASTOLIC BLOOD PRESSURE: 62 MMHG

## 2024-02-21 DIAGNOSIS — E66.01 MORBID OBESITY (HCC): ICD-10-CM

## 2024-02-21 DIAGNOSIS — N18.30 TYPE 2 DIABETES MELLITUS WITH STAGE 3 CHRONIC KIDNEY DISEASE, WITHOUT LONG-TERM CURRENT USE OF INSULIN, UNSPECIFIED WHETHER STAGE 3A OR 3B CKD (HCC): Primary | ICD-10-CM

## 2024-02-21 DIAGNOSIS — L85.3 DRY SKIN DERMATITIS: ICD-10-CM

## 2024-02-21 DIAGNOSIS — E11.69 CONTROLLED TYPE 2 DIABETES MELLITUS WITH OTHER SPECIFIED COMPLICATION, WITHOUT LONG-TERM CURRENT USE OF INSULIN (HCC): ICD-10-CM

## 2024-02-21 DIAGNOSIS — G62.9 NEUROPATHY: ICD-10-CM

## 2024-02-21 DIAGNOSIS — L21.9 SEBORRHEIC DERMATITIS: ICD-10-CM

## 2024-02-21 DIAGNOSIS — E78.5 DYSLIPIDEMIA ASSOCIATED WITH TYPE 2 DIABETES MELLITUS (HCC): ICD-10-CM

## 2024-02-21 DIAGNOSIS — E11.40 CONTROLLED TYPE 2 DIABETES MELLITUS WITH DIABETIC NEUROPATHY, WITH LONG-TERM CURRENT USE OF INSULIN (HCC): ICD-10-CM

## 2024-02-21 DIAGNOSIS — N18.30 CHRONIC RENAL IMPAIRMENT, STAGE 3 (MODERATE), UNSPECIFIED WHETHER STAGE 3A OR 3B CKD (HCC): ICD-10-CM

## 2024-02-21 DIAGNOSIS — E11.22 TYPE 2 DIABETES MELLITUS WITH STAGE 3 CHRONIC KIDNEY DISEASE, WITHOUT LONG-TERM CURRENT USE OF INSULIN, UNSPECIFIED WHETHER STAGE 3A OR 3B CKD (HCC): Primary | ICD-10-CM

## 2024-02-21 DIAGNOSIS — E11.69 DYSLIPIDEMIA ASSOCIATED WITH TYPE 2 DIABETES MELLITUS (HCC): ICD-10-CM

## 2024-02-21 DIAGNOSIS — Z79.4 CONTROLLED TYPE 2 DIABETES MELLITUS WITH DIABETIC NEUROPATHY, WITH LONG-TERM CURRENT USE OF INSULIN (HCC): ICD-10-CM

## 2024-02-21 PROCEDURE — 3074F SYST BP LT 130 MM HG: CPT | Performed by: INTERNAL MEDICINE

## 2024-02-21 PROCEDURE — 3046F HEMOGLOBIN A1C LEVEL >9.0%: CPT | Performed by: INTERNAL MEDICINE

## 2024-02-21 PROCEDURE — 1036F TOBACCO NON-USER: CPT | Performed by: INTERNAL MEDICINE

## 2024-02-21 PROCEDURE — G8484 FLU IMMUNIZE NO ADMIN: HCPCS | Performed by: INTERNAL MEDICINE

## 2024-02-21 PROCEDURE — 99214 OFFICE O/P EST MOD 30 MIN: CPT | Performed by: INTERNAL MEDICINE

## 2024-02-21 PROCEDURE — 3017F COLORECTAL CA SCREEN DOC REV: CPT | Performed by: INTERNAL MEDICINE

## 2024-02-21 PROCEDURE — 2022F DILAT RTA XM EVC RTNOPTHY: CPT | Performed by: INTERNAL MEDICINE

## 2024-02-21 PROCEDURE — G8427 DOCREV CUR MEDS BY ELIG CLIN: HCPCS | Performed by: INTERNAL MEDICINE

## 2024-02-21 PROCEDURE — 3078F DIAST BP <80 MM HG: CPT | Performed by: INTERNAL MEDICINE

## 2024-02-21 PROCEDURE — 1123F ACP DISCUSS/DSCN MKR DOCD: CPT | Performed by: INTERNAL MEDICINE

## 2024-02-21 PROCEDURE — G8417 CALC BMI ABV UP PARAM F/U: HCPCS | Performed by: INTERNAL MEDICINE

## 2024-02-21 RX ORDER — TORSEMIDE 20 MG/1
40 TABLET ORAL DAILY
Qty: 180 TABLET | Refills: 1 | Status: SHIPPED | OUTPATIENT
Start: 2024-02-21

## 2024-02-21 RX ORDER — ATORVASTATIN CALCIUM 40 MG/1
40 TABLET, FILM COATED ORAL NIGHTLY
Qty: 90 TABLET | Refills: 3 | Status: SHIPPED | OUTPATIENT
Start: 2024-02-21

## 2024-02-21 RX ORDER — ALENDRONATE SODIUM 70 MG/1
TABLET ORAL
Qty: 12 TABLET | Refills: 3 | Status: SHIPPED | OUTPATIENT
Start: 2024-02-21

## 2024-02-21 RX ORDER — MINERAL OIL/HYDROPHIL PETROLAT
OINTMENT (GRAM) TOPICAL PRN
Qty: 396 G | Refills: 1 | Status: SHIPPED | OUTPATIENT
Start: 2024-02-21

## 2024-02-21 RX ORDER — FLUOCINOLONE ACETONIDE 0.11 MG/ML
1 OIL TOPICAL 2 TIMES DAILY
Qty: 1 EACH | Refills: 3 | Status: SHIPPED | OUTPATIENT
Start: 2024-02-21

## 2024-02-21 RX ORDER — METOPROLOL SUCCINATE 100 MG/1
100 TABLET, EXTENDED RELEASE ORAL DAILY
Qty: 30 TABLET | Refills: 0 | Status: SHIPPED | OUTPATIENT
Start: 2024-02-21 | End: 2024-03-22

## 2024-02-21 RX ORDER — ALLOPURINOL 100 MG/1
100 TABLET ORAL DAILY
Qty: 90 TABLET | Refills: 1 | Status: SHIPPED | OUTPATIENT
Start: 2024-02-21

## 2024-02-21 RX ORDER — AMMONIUM LACTATE 12 G/100G
CREAM TOPICAL 2 TIMES DAILY
Qty: 385 G | Refills: 5 | Status: SHIPPED | OUTPATIENT
Start: 2024-02-21

## 2024-02-21 RX ORDER — PREGABALIN 50 MG/1
50 CAPSULE ORAL 2 TIMES DAILY
Qty: 270 CAPSULE | Refills: 0 | COMMUNITY
Start: 2024-02-21

## 2024-02-21 RX ORDER — CETIRIZINE HYDROCHLORIDE 5 MG/1
5 TABLET ORAL DAILY
Qty: 90 TABLET | Refills: 1 | Status: SHIPPED | OUTPATIENT
Start: 2024-02-21

## 2024-02-21 RX ORDER — PEN NEEDLE, DIABETIC 31 G X1/4"
1 NEEDLE, DISPOSABLE MISCELLANEOUS DAILY
Qty: 100 EACH | Refills: 3 | Status: SHIPPED | OUTPATIENT
Start: 2024-02-21

## 2024-02-21 ASSESSMENT — PATIENT HEALTH QUESTIONNAIRE - PHQ9
SUM OF ALL RESPONSES TO PHQ QUESTIONS 1-9: 0
6. FEELING BAD ABOUT YOURSELF - OR THAT YOU ARE A FAILURE OR HAVE LET YOURSELF OR YOUR FAMILY DOWN: 0
5. POOR APPETITE OR OVEREATING: 0
SUM OF ALL RESPONSES TO PHQ QUESTIONS 1-9: 0
9. THOUGHTS THAT YOU WOULD BE BETTER OFF DEAD, OR OF HURTING YOURSELF: 0
4. FEELING TIRED OR HAVING LITTLE ENERGY: 0
2. FEELING DOWN, DEPRESSED OR HOPELESS: 0
10. IF YOU CHECKED OFF ANY PROBLEMS, HOW DIFFICULT HAVE THESE PROBLEMS MADE IT FOR YOU TO DO YOUR WORK, TAKE CARE OF THINGS AT HOME, OR GET ALONG WITH OTHER PEOPLE: 0
SUM OF ALL RESPONSES TO PHQ9 QUESTIONS 1 & 2: 0
7. TROUBLE CONCENTRATING ON THINGS, SUCH AS READING THE NEWSPAPER OR WATCHING TELEVISION: 0
1. LITTLE INTEREST OR PLEASURE IN DOING THINGS: 0
8. MOVING OR SPEAKING SO SLOWLY THAT OTHER PEOPLE COULD HAVE NOTICED. OR THE OPPOSITE, BEING SO FIGETY OR RESTLESS THAT YOU HAVE BEEN MOVING AROUND A LOT MORE THAN USUAL: 0
3. TROUBLE FALLING OR STAYING ASLEEP: 0

## 2024-02-21 NOTE — PATIENT INSTRUCTIONS
Patient Education        Knee Arthritis: Care Instructions  Overview     Knee arthritis is a breakdown of the cartilage that cushions your knee joint. When the cartilage wears down, your bones rub against each other. This causes pain and stiffness. Knee arthritis tends to get worse with time.  Treatment for knee arthritis involves reducing pain, making the leg muscles stronger, and staying at a healthy body weight. The treatment usually does not improve the health of the cartilage, but it can reduce pain and improve how well your knee works.  You can take simple measures to protect your knee joints, ease your pain, and help you stay active.  Follow-up care is a key part of your treatment and safety. Be sure to make and go to all appointments, and call your doctor if you are having problems. It's also a good idea to know your test results and keep a list of the medicines you take.  How can you care for yourself at home?  Know that knee arthritis will cause more pain on some days than on others.  Stay at a healthy weight. Lose weight if you are overweight. When you stand up, the pressure on your knees from every pound of body weight is multiplied four times. So if you lose 10 pounds, you will reduce the pressure on your knees by 40 pounds.  Talk to your doctor or physical therapist about exercises that will help ease joint pain.  Stretch to help prevent stiffness and to prevent injury before you exercise. You may enjoy gentle forms of yoga to help keep your knee joints and muscles flexible.  Walk instead of jog.  Ride a bike. This makes your thigh muscles stronger and takes pressure off your knee.  Wear well-fitting and comfortable shoes.  Exercise in chest-deep water. This can help you exercise longer with less pain.  Avoid exercises that include squatting or kneeling. They can put a lot of strain on your knees.  Talk to your doctor to make sure that the exercise you do is not making the arthritis worse.  Do not sit

## 2024-02-21 NOTE — PROGRESS NOTES
Ramón Berg (:  1950) is a 73 y.o. male,Established patient, here for evaluation of the following chief complaint(s):  Diabetes         ASSESSMENT/PLAN:  1. Type 2 diabetes mellitus with stage 3 chronic kidney disease, without long-term current use of insulin, unspecified whether stage 3a or 3b CKD ([facility])  -     External Referral to Dietitian  2. Morbid obesity ([facility])  -     External Referral to Dietitian  3. Chronic renal impairment, stage 3 (moderate), unspecified whether stage 3a or 3b CKD ([facility])  -     Microalbumin / Creatinine Urine Ratio  -     Renal Function Panel; Future  -     Hemoglobin A1C; Future  4. Controlled type 2 diabetes mellitus with diabetic neuropathy, with long-term current use of insulin ([facility])  -     atorvastatin (LIPITOR) 40 MG tablet; Take 1 tablet by mouth nightly, Disp-90 tablet, R-3Normal  -     Microalbumin / Creatinine Urine Ratio  -     Renal Function Panel; Future  5. Dyslipidemia associated with type 2 diabetes mellitus ([facility])  -     atorvastatin (LIPITOR) 40 MG tablet; Take 1 tablet by mouth nightly, Disp-90 tablet, R-3Normal  -     Microalbumin / Creatinine Urine Ratio  -     Renal Function Panel; Future  6. Controlled type 2 diabetes mellitus with other specified complication, without long-term current use of insulin ([facility])  -     Insulin Pen Needle ([employer] PEN NEEDLES) 31G X 6 MM MISC; DAILY Starting Wed 2024, Disp-100 each, R-3, Normal  -     Hemoglobin A1C; Future  7. Dry skin dermatitis  -     mineral oil-hydrophilic petrolatum (AQUAPHOR) ointment; Apply topically as needed for Dry Skin (2-4 times a day) Apply topically as needed., Topical, PRN Starting Wed 2024, Disp-396 g, R-1, Normal  8. Neuropathy  - CONTINUE LYRICA    Return in about 3 months (around 2024) for Diabetes(OVE), Obesity, fasting blood work in 3 months.         Subjective   SUBJECTIVE/OBJECTIVE:  Diabetes          Treatment Adherence:   Medication compliance:  compliant most of the

## 2024-03-07 NOTE — TELEPHONE ENCOUNTER
Medication:   Requested Prescriptions     Pending Prescriptions Disp Refills    metoprolol succinate (TOPROL XL) 100 MG extended release tablet [Pharmacy Med Name: METOPROLOL ER SUCCINATE 100MG TABS] 30 tablet 0     Sig: TAKE 1 TABLET BY MOUTH DAILY       Last Filled:  2/21/24    Patient Phone Number: [phone] (home)     Last appt: 2/21/2024   Next appt: 5/13/2024

## 2024-03-12 RX ORDER — METOPROLOL SUCCINATE 100 MG/1
100 TABLET, EXTENDED RELEASE ORAL DAILY
Qty: 90 TABLET | Refills: 1 | Status: SHIPPED | OUTPATIENT
Start: 2024-03-12 | End: 2024-09-08

## 2024-03-14 ENCOUNTER — TELEPHONE (OUTPATIENT)
Dept: INTERNAL MEDICINE CLINIC | Age: 74
End: 2024-03-14

## 2024-03-14 NOTE — TELEPHONE ENCOUNTER
LVM re: nutrition referral/appt scheduling requesting call back. If/when pt calls back, PLEASE FORWARD TO TJ

## 2024-03-15 ENCOUNTER — HOSPITAL ENCOUNTER (OUTPATIENT)
Age: 74
Discharge: HOME | End: 2024-03-15
Payer: MEDICARE

## 2024-03-15 DIAGNOSIS — E78.5 DYSLIPIDEMIA ASSOCIATED WITH TYPE 2 DIABETES MELLITUS (HCC): ICD-10-CM

## 2024-03-15 DIAGNOSIS — E11.40 CONTROLLED TYPE 2 DIABETES MELLITUS WITH DIABETIC NEUROPATHY, WITH LONG-TERM CURRENT USE OF INSULIN (HCC): ICD-10-CM

## 2024-03-15 DIAGNOSIS — Z79.4 CONTROLLED TYPE 2 DIABETES MELLITUS WITH DIABETIC NEUROPATHY, WITH LONG-TERM CURRENT USE OF INSULIN (HCC): ICD-10-CM

## 2024-03-15 DIAGNOSIS — E11.69 DYSLIPIDEMIA ASSOCIATED WITH TYPE 2 DIABETES MELLITUS (HCC): ICD-10-CM

## 2024-03-15 DIAGNOSIS — N18.30 CHRONIC RENAL IMPAIRMENT, STAGE 3 (MODERATE), UNSPECIFIED WHETHER STAGE 3A OR 3B CKD (HCC): ICD-10-CM

## 2024-03-15 LAB
ALBUMIN SERPL-MCNC: 4.1 G/DL (ref 3.4–5)
ANION GAP SERPL CALCULATED.3IONS-SCNC: 12 MMOL/L (ref 3–16)
BUN SERPL-MCNC: 26 MG/DL (ref 7–20)
CALCIUM SERPL-MCNC: 9 MG/DL (ref 8.3–10.6)
CHLORIDE SERPL-SCNC: 109 MMOL/L (ref 99–110)
CO2 SERPL-SCNC: 24 MMOL/L (ref 21–32)
CREAT SERPL-MCNC: 2.6 MG/DL (ref 0.8–1.3)
CREAT UR-MCNC: 131.5 MG/DL (ref 39–259)
GFR SERPLBLD CREATININE-BSD FMLA CKD-EPI: 25 ML/MIN/{1.73_M2}
GLUCOSE SERPL-MCNC: 117 MG/DL (ref 70–99)
MICROALBUMIN UR DL<=1MG/L-MCNC: 287.7 MG/DL
MICROALBUMIN/CREAT UR: 2187.8 MG/G (ref 0–30)
PHOSPHATE SERPL-MCNC: 2.5 MG/DL (ref 2.5–4.9)
POTASSIUM SERPL-SCNC: 4.6 MMOL/L (ref 3.5–5.1)
SODIUM SERPL-SCNC: 145 MMOL/L (ref 136–145)

## 2024-03-15 PROCEDURE — 82570 ASSAY OF URINE CREATININE: CPT

## 2024-03-15 PROCEDURE — 83036 HEMOGLOBIN GLYCOSYLATED A1C: CPT

## 2024-03-15 PROCEDURE — 80069 RENAL FUNCTION PANEL: CPT

## 2024-03-15 PROCEDURE — 82043 UR ALBUMIN QUANTITATIVE: CPT

## 2024-03-15 PROCEDURE — 36415 COLL VENOUS BLD VENIPUNCTURE: CPT

## 2024-03-16 LAB
EST. AVERAGE GLUCOSE BLD GHB EST-MCNC: 125.5 MG/DL
HBA1C MFR BLD: 6 %

## 2024-03-18 ENCOUNTER — TELEPHONE (OUTPATIENT)
Dept: INTERNAL MEDICINE CLINIC | Age: 74
End: 2024-03-18

## 2024-03-18 NOTE — TELEPHONE ENCOUNTER
Pt insists that Dr. Kovacs's office has sent over to this office some paperwork for pt to receive new shoes for his arthritis.    No documentation in Epic of received paperwork.    Call to Dr. Kovacs's office does not provide clarity - unaware of pt's need for arthritic shoes, no record of needing anything from or communicating with this office on pt's behalf.    MA forwarded message to Dr. Kovacs to inquire. Office phone and fax numbers provided for return contact.

## 2024-03-25 DIAGNOSIS — E53.8 FOLATE DEFICIENCY: ICD-10-CM

## 2024-03-25 RX ORDER — FOLIC ACID 1 MG/1
1 TABLET ORAL DAILY
Qty: 90 TABLET | Refills: 1 | Status: SHIPPED | OUTPATIENT
Start: 2024-03-25

## 2024-03-25 NOTE — TELEPHONE ENCOUNTER
Recent Visits  Date Type Provider Dept   02/21/24 Office Visit Edward Mcdonald MD [facility]   11/21/23 Office Visit Edward Mcdonald MD [facility]   07/10/23 Office Visit Edward Mcdonald MD [facility]   05/10/23 Office Visit Edward Mcdonald MD [facility]   03/13/23 Office Visit Edward Mcdonald MD [facility]   12/08/22 Office Visit Edward Mcdonald MD [facility]   11/11/22 Office Visit Edward Mcdonald MD [facility]   Showing recent visits within past 540 days with a meds authorizing provider and meeting all other requirements  Future Appointments  Date Type Provider Dept   05/13/24 Appointment Edward Mcdonald MD [facility]   05/21/24 Appointment Edward Mcdonald MD [facility]   Showing future appointments within next 150 days with a meds authorizing provider and meeting all other requirements     2/21/2024

## 2024-03-26 ENCOUNTER — HOSPITAL ENCOUNTER (EMERGENCY)
Age: 74
Discharge: HOME | End: 2024-03-26
Attending: INTERNAL MEDICINE
Payer: MEDICARE

## 2024-03-26 VITALS
WEIGHT: 260 LBS | RESPIRATION RATE: 15 BRPM | OXYGEN SATURATION: 98 % | BODY MASS INDEX: 43.32 KG/M2 | HEART RATE: 72 BPM | HEIGHT: 65 IN | DIASTOLIC BLOOD PRESSURE: 91 MMHG | SYSTOLIC BLOOD PRESSURE: 165 MMHG | TEMPERATURE: 97.7 F

## 2024-03-26 DIAGNOSIS — T83.010A SUPRAPUBIC CATHETER DYSFUNCTION, INITIAL ENCOUNTER (HCC): Primary | ICD-10-CM

## 2024-03-26 LAB
ANION GAP SERPL CALCULATED.3IONS-SCNC: 12 MMOL/L (ref 3–16)
BASOPHILS # BLD: 0.1 K/UL (ref 0–0.2)
BASOPHILS NFR BLD: 1 %
BUN SERPL-MCNC: 20 MG/DL (ref 7–20)
CALCIUM SERPL-MCNC: 9.3 MG/DL (ref 8.3–10.6)
CHLORIDE SERPL-SCNC: 111 MMOL/L (ref 99–110)
CO2 SERPL-SCNC: 21 MMOL/L (ref 21–32)
CREAT SERPL-MCNC: 2.2 MG/DL (ref 0.8–1.3)
DEPRECATED RDW RBC AUTO: 14.4 % (ref 12.4–15.4)
EOSINOPHIL # BLD: 0.3 K/UL (ref 0–0.6)
EOSINOPHIL NFR BLD: 4.3 %
GFR SERPLBLD CREATININE-BSD FMLA CKD-EPI: 31 ML/MIN/{1.73_M2}
GLUCOSE SERPL-MCNC: 112 MG/DL (ref 70–99)
HCT VFR BLD AUTO: 41.6 % (ref 40.5–52.5)
HGB BLD-MCNC: 13.6 G/DL (ref 13.5–17.5)
LYMPHOCYTES # BLD: 2.1 K/UL (ref 1–5.1)
LYMPHOCYTES NFR BLD: 31.7 %
MCH RBC QN AUTO: 30.9 PG (ref 26–34)
MCHC RBC AUTO-ENTMCNC: 32.8 G/DL (ref 31–36)
MCV RBC AUTO: 94.3 FL (ref 80–100)
MONOCYTES # BLD: 0.8 K/UL (ref 0–1.3)
MONOCYTES NFR BLD: 12.8 %
NEUTROPHILS # BLD: 3.3 K/UL (ref 1.7–7.7)
NEUTROPHILS NFR BLD: 50.2 %
PLATELET # BLD AUTO: 336 K/UL (ref 135–450)
PMV BLD AUTO: 7.2 FL (ref 5–10.5)
POTASSIUM SERPL-SCNC: 4.6 MMOL/L (ref 3.5–5.1)
RBC # BLD AUTO: 4.41 M/UL (ref 4.2–5.9)
SODIUM SERPL-SCNC: 144 MMOL/L (ref 136–145)
WBC # BLD AUTO: 6.6 K/UL (ref 4–11)

## 2024-03-26 PROCEDURE — 51705 CHANGE OF BLADDER TUBE: CPT

## 2024-03-26 PROCEDURE — 80048 BASIC METABOLIC PNL TOTAL CA: CPT

## 2024-03-26 PROCEDURE — 99283 EMERGENCY DEPT VISIT LOW MDM: CPT

## 2024-03-26 PROCEDURE — 85025 COMPLETE CBC W/AUTO DIFF WBC: CPT

## 2024-03-26 PROCEDURE — 51702 INSERT TEMP BLADDER CATH: CPT

## 2024-03-26 ASSESSMENT — PAIN - FUNCTIONAL ASSESSMENT: PAIN_FUNCTIONAL_ASSESSMENT: NONE - DENIES PAIN

## 2024-03-26 ASSESSMENT — LIFESTYLE VARIABLES: HOW OFTEN DO YOU HAVE A DRINK CONTAINING ALCOHOL: NEVER

## 2024-03-26 NOTE — ED NOTES
Pt arrived per self for scheduled appt with urology. Urology unable to replace sp cath in office and pt was directed to come to er for possible surg placement. Last reported food or drink was last night except for drinking water with morning medications.

## 2024-03-26 NOTE — ED PROVIDER NOTES
Osteoarthritis, Type 2 diabetes mellitus without complication (HCC), Type 2 diabetes mellitus without complication, without long-term current use of insulin (HCC) (2/7/2018), Urinary incontinence, stress, male, and Wears dentures.    MDM and ED Course  A call was placed to urology and call and I spoke with Dr. Brewer with urology.  He informed me he would contact somebody who could come over and replace the suprapubic catheter.  Dr. Powell was nice enough to come in and successfully replace the suprapubic catheter.  Patient is stable for discharge under his direction.  Patient will follow-up under his recommendations.              Final Impression  1. Suprapubic catheter dysfunction, initial encounter ([facility])        Blood pressure (!) 165/91, pulse 72, temperature 97.7 °F (36.5 °C), temperature source Oral, resp. rate 15, height 1.651 m (5' 5\"), weight 117.9 kg (260 lb), SpO2 98 %.     Disposition:  DISPOSITION Decision To Discharge 03/26/2024 11:45:51 AM      Patient Referrals:  [facility] [place] [address]  [phone]    As instructed by Dr. Powell.      Discharge Medications:  Discharge Medication List as of 3/26/2024 12:04 PM          This chart was generated using the Dragon dictation system. I created this record but it may contain dictation errors given the limitations of this technology.        Jay Mckeon, DO  03/26/24 1903

## 2024-03-26 NOTE — CONSULTS
Urology      16 f talbot placed   5 cc balloon     irrigates freely     urine dark but no hematuria  Plan    discharge home with sp tube     no rx needed      Call dr carter office for follow up structions

## 2024-03-26 NOTE — PROCEDURES
[facility]                 [address] [place] [address]                             PROCEDURE NOTE      PATIENT NAME: DAMIEN GOULD            : 1950  MED REC NO: [id number]                      ROOM: [facility]  ACCOUNT NO: [id number]                       ADMIT DATE: 2024  PROVIDER: Yaquelin Hidalgo MD      DATE OF PROCEDURE:  2024    SURGEON:  Yaquelin Hidalgo MD    Procedure was done in the emergency room at [facility].    PREPROCEDURE DIAGNOSES:  Urinary retention with history of prostate cancer and inability to place suprapubic tube in the office.    POSTPROCEDURE DIAGNOSES:  Urinary retention with history of prostate cancer and inability to place suprapubic tube in the office.    PROCEDURE:  Placement of 16-Frisian suprapubic tube with suprapubic tube irrigation.    ANESTHESIA:  None.    BLOOD LOSS:  None.    The patient will call the office for followup instructions.    DESCRIPTION OF PROCEDURE:  The patient was seen in the emergency room at [facility].  He has had a suprapubic tube that was removed 3 to 4 hours ago, but unable to be placed back in the office.  He was in no obvious distress.  On exam, he had an opening, where the old suprapubic tube tract was seen with a little bit of clear yellow urine at that site.  He was prepped with Betadine.  A 16-Frisian Cook was placed within the tract.  There was some resistance, but eventually was able to be advanced into the bladder with obvious urine output.  5 mL of water was placed in the balloon just to be sure we irrigate the suprapubic tube with a Alison syringe, and irrigated freely.  There was no bleeding seen.  The patient tolerated the procedure without difficulty.  Tube will be placed back to a leg bag, and he will call the office for followup instructions.        YAQUELIN HIDALGO MD    D:  2024 11:52:44     T:  2024 12:55:18     KG/CHUCKY  Job #:  [id number]

## 2024-04-03 ENCOUNTER — TELEPHONE (OUTPATIENT)
Dept: PHARMACY | Facility: CLINIC | Age: 74
End: 2024-04-03

## 2024-04-03 NOTE — TELEPHONE ENCOUNTER
[facility] CLINICAL PHARMACY: ADHERENCE REVIEW  Identified care gap per [employer]: fills at [facility]: Diabetes adherence    Patient also appears to be prescribed: Statin    ASSESSMENT  DIABETES ADHERENCE    Insurance Records claims through 2024 (Prior Year PDC = 87% - PASSED ; YTD PDC = 82%; Potential Fail Date: 24):     Mounjaro 12.5 last filled on 3.2.24 for 28 day supply. Next refill due: 3.30.24    Prescribed sig:  inject weekly     Per Insurer Portal: same as above.    Per encounter today daughter [facility] as [facility] in [facility].    Lab Results   Component Value Date    LABA1C 6.0 03/15/2024    LABA1C 6.1 2023    LABA1C 6.2 2023     NOTE: A1c >9%    STATIN ADHERENCE    Insurance Records claims through 2024 (Prior Year PDC = 90% - PASSED ; YTD PDC = FIRST FILL; Potential Fail Date: 24):     Atorvastatin 40mg last filled on 24 for 90 day supply. Next refill due: 24    Prescribed si tablet/capsule daily    Per Insurer Portal: same as above.    Refills on file at pharmacy written 24 for 90 with 3 refills.    Lab Results   Component Value Date    CHOL 129 2023    TRIG 93 2023    HDL 53 2023    LDLCALC 57 2023     ALT   Date Value Ref Range Status   2023 23 10 - 40 U/L Final     AST   Date Value Ref Range Status   2023 26 15 - 37 U/L Final     The ASCVD Risk score (Fulda DK, et al., 2019) failed to calculate for the following reasons:    The valid total cholesterol range is 130 to 320 mg/dL     PLAN  Per insurer report, LIS-0 - co-pays are based on tiers and patient is subject to coverage gap.    The following are interventions that have been identified:   Patient OVERDUE refilling mounjaro and active on home medication list.   Pt daughter requested [facility] waiting for doctor to approve order.    No patient outreach planned at this time.    Last Visit:

## 2024-04-03 NOTE — TELEPHONE ENCOUNTER
Patient daughter asking that the script for [facility]O [facility]. They are the only pharmacy that has it in stock.

## 2024-04-08 NOTE — TELEPHONE ENCOUNTER
Recent Visits  Date Type Provider Dept   02/21/24 Office Visit Edward Mcdonald MD [facility]   11/21/23 Office Visit Edward Mcdonald MD [facility]   07/10/23 Office Visit Edward Mcdonald MD [facility]   05/10/23 Office Visit Edward Mcdonald MD [facility]   03/13/23 Office Visit Edward Mcdonald MD [facility]   12/08/22 Office Visit Edward Mcdonald MD [facility]   11/11/22 Office Visit Edward Mcdonald MD [facility]   Showing recent visits within past 540 days with a meds authorizing provider and meeting all other requirements  Future Appointments  Date Type Provider Dept   05/13/24 Appointment Edward Mcdonald MD [facility]   05/21/24 Appointment Edward Mcdonald MD [facility]   Showing future appointments within next 150 days with a meds authorizing provider and meeting all other requirements     2/21/2024

## 2024-04-09 ENCOUNTER — TELEPHONE (OUTPATIENT)
Dept: INTERNAL MEDICINE CLINIC | Age: 74
End: 2024-04-09

## 2024-04-09 RX ORDER — METOPROLOL SUCCINATE 100 MG/1
100 TABLET, EXTENDED RELEASE ORAL DAILY
Qty: 90 TABLET | Refills: 1 | Status: SHIPPED | OUTPATIENT
Start: 2024-04-09 | End: 2024-04-11 | Stop reason: SDUPTHER

## 2024-04-11 RX ORDER — METOPROLOL SUCCINATE 100 MG/1
100 TABLET, EXTENDED RELEASE ORAL DAILY
Qty: 90 TABLET | Refills: 1 | Status: SHIPPED | OUTPATIENT
Start: 2024-04-11 | End: 2024-10-08

## 2024-04-11 NOTE — TELEPHONE ENCOUNTER
Recent Visits  Date Type Provider Dept   02/21/24 Office Visit Edward Mcdonald MD [facility]   11/21/23 Office Visit Edward Mcdonald MD [facility]   07/10/23 Office Visit Edward Mcdonald MD [facility]   05/10/23 Office Visit Edward Mcdonald MD [facility]   03/13/23 Office Visit Edward Mcdonald MD [facility]   12/08/22 Office Visit Edward Mcdonald MD [facility]   11/11/22 Office Visit Edward Mcdonald MD [facility]   Showing recent visits within past 540 days with a meds authorizing provider and meeting all other requirements  Future Appointments  Date Type Provider Dept   05/13/24 Appointment Edward Mcdonald MD [facility]   05/21/24 Appointment Edward Mcdonald MD [facility]   Showing future appointments within next 150 days with a meds authorizing provider and meeting all other requirements     2/21/2024

## 2024-04-16 ENCOUNTER — OFFICE VISIT (OUTPATIENT)
Dept: INTERNAL MEDICINE CLINIC | Age: 74
End: 2024-04-16

## 2024-04-16 DIAGNOSIS — E11.22 TYPE 2 DIABETES MELLITUS WITH STAGE 3 CHRONIC KIDNEY DISEASE, WITHOUT LONG-TERM CURRENT USE OF INSULIN, UNSPECIFIED WHETHER STAGE 3A OR 3B CKD (HCC): Primary | ICD-10-CM

## 2024-04-16 DIAGNOSIS — N18.30 TYPE 2 DIABETES MELLITUS WITH STAGE 3 CHRONIC KIDNEY DISEASE, WITHOUT LONG-TERM CURRENT USE OF INSULIN, UNSPECIFIED WHETHER STAGE 3A OR 3B CKD (HCC): Primary | ICD-10-CM

## 2024-04-16 PROCEDURE — 99999 PR OFFICE/OUTPT VISIT,PROCEDURE ONLY: CPT

## 2024-04-16 NOTE — PROGRESS NOTES
NUTRITION THERAPY ASSESSMENT     Referring Physician: Edward Mcdonald MD.   PCP: Edward Mcdonald MD.   Referring diagnosis: Morbid Obesity, T2DM.    Start time: 1:30  End time: 2:20    Ramón Berg is a 73 y.o. male presents with questions about weight loss and sodium restriction.        OBJECTIVE DATA:  CHF, Prostate cancer, HLD, HTN, KAMRAN, osteoarthritis, DM.     Labs:  Lab Results   Component Value Date/Time    TRIG 93 06/20/2023 10:50 AM    HDL 53 06/20/2023 10:50 AM    HDL 55 11/14/2011 08:42 AM    LDLCALC 57 06/20/2023 10:50 AM     Lab Results   Component Value Date/Time    LABA1C 6.0 03/15/2024 01:52 PM     Medications: Fosamax, Atorvastatin, Lyrica, Torsemide, Metoprolol, Procardia, Hydrocodone, Ergocalciferol.       Anthropometric Measures:   Height: 5'5\"  Current Weight: 260#     Usual Body Weight: 260-271# x 1 year.      Ideal Body Weight: 136 #   % Ideal Body Weight: 191%       >or equal to 40 Obese Class III    Wt Readings from Last 8 Encounters:   03/26/24 117.9 kg (260 lb)   02/21/24 122.9 kg (271 lb)   11/21/23 122 kg (269 lb)   11/20/23 121.6 kg (268 lb)   07/10/23 119.7 kg (264 lb)   06/20/23 118.8 kg (262 lb)   05/10/23 121.6 kg (268 lb)   03/16/23 120.2 kg (265 lb)     Nutrition-focused Physical Findings:           Bowel Pattern: not discussed.   Skin: No issues noted  Chewing / Swallowing: No issues reported    Food/Nutrition-Related History:  Home Diet: Regular  Home Supplements / Herbals: None reported   Food Restrictions / Cultural Requests:   Food Allergies: NKFA    Physical Activity: none d/t knee pain.     Diet Recall: Patient reports he only eats one meal per day, lists items such as chicken, steak prime rib, fish, potatoes, all vegetables, salads, sweet and sour, Irish, italian dressing.     Reports adding salt while cooking.     Beverages:  Lemon Tea (sweetened)  water    NUTRITION DIAGNOSIS and GOAL  P: Food and nutrition related knowledge deficit  E related to Knowledge

## 2024-05-06 RX ORDER — TIRZEPATIDE 12.5 MG/.5ML
INJECTION, SOLUTION SUBCUTANEOUS
Qty: 6 ML | Refills: 0 | Status: SHIPPED | OUTPATIENT
Start: 2024-05-06

## 2024-05-06 NOTE — TELEPHONE ENCOUNTER
Recent Visits  Date Type Provider Dept   02/21/24 Office Visit Edward Mcdonald MD [facility]   11/21/23 Office Visit Edward Mcdonald MD [facility]   07/10/23 Office Visit Edward Mcdonald MD [facility]   05/10/23 Office Visit Edward Mcdonald MD [facility]   03/13/23 Office Visit Edward Mcdonald MD [facility]   12/08/22 Office Visit Edward Mcdonald MD [facility]   Showing recent visits within past 540 days with a meds authorizing provider and meeting all other requirements  Future Appointments  Date Type Provider Dept   05/13/24 Appointment Edward Mcdonald MD [facility]   05/21/24 Appointment Edward Mcdonald MD [facility]   Showing future appointments within next 150 days with a meds authorizing provider and meeting all other requirements     4/16/2024

## 2024-05-07 RX ORDER — TORSEMIDE 20 MG/1
40 TABLET ORAL DAILY
Qty: 180 TABLET | Refills: 1 | Status: SHIPPED | OUTPATIENT
Start: 2024-05-07

## 2024-05-14 ENCOUNTER — TELEPHONE (OUTPATIENT)
Dept: INTERNAL MEDICINE CLINIC | Age: 74
End: 2024-05-14

## 2024-05-14 NOTE — TELEPHONE ENCOUNTER
----- Message from Juanita Garcia RN sent at 5/14/2024  4:27 PM EDT -----  Would you call him and get him scheduled for a Nutrition appointment with Gagan? (You don't have to call today.) Thanks!

## 2024-05-20 ENCOUNTER — TELEPHONE (OUTPATIENT)
Dept: CARDIOLOGY CLINIC | Age: 74
End: 2024-05-20

## 2024-05-20 ENCOUNTER — OFFICE VISIT (OUTPATIENT)
Dept: CARDIOLOGY CLINIC | Age: 74
End: 2024-05-20

## 2024-05-20 VITALS
HEART RATE: 82 BPM | WEIGHT: 266 LBS | OXYGEN SATURATION: 94 % | DIASTOLIC BLOOD PRESSURE: 66 MMHG | BODY MASS INDEX: 44.32 KG/M2 | SYSTOLIC BLOOD PRESSURE: 108 MMHG | HEIGHT: 65 IN

## 2024-05-20 DIAGNOSIS — Z01.810 PRE-OPERATIVE CARDIOVASCULAR EXAMINATION: ICD-10-CM

## 2024-05-20 DIAGNOSIS — E66.01 MORBID OBESITY (HCC): ICD-10-CM

## 2024-05-20 DIAGNOSIS — R06.02 SOB (SHORTNESS OF BREATH): ICD-10-CM

## 2024-05-20 DIAGNOSIS — I50.32 CHRONIC DIASTOLIC HEART FAILURE (HCC): ICD-10-CM

## 2024-05-20 DIAGNOSIS — N18.30 STAGE 3 CHRONIC KIDNEY DISEASE, UNSPECIFIED WHETHER STAGE 3A OR 3B CKD (HCC): ICD-10-CM

## 2024-05-20 DIAGNOSIS — G47.33 OSA (OBSTRUCTIVE SLEEP APNEA): ICD-10-CM

## 2024-05-20 DIAGNOSIS — Z01.810 PRE-OPERATIVE CARDIOVASCULAR EXAMINATION: Primary | ICD-10-CM

## 2024-05-20 LAB
ANION GAP SERPL CALCULATED.3IONS-SCNC: 12 MMOL/L (ref 3–16)
BUN SERPL-MCNC: 32 MG/DL (ref 7–20)
CALCIUM SERPL-MCNC: 9.6 MG/DL (ref 8.3–10.6)
CHLORIDE SERPL-SCNC: 107 MMOL/L (ref 99–110)
CO2 SERPL-SCNC: 25 MMOL/L (ref 21–32)
CREAT SERPL-MCNC: 2.9 MG/DL (ref 0.8–1.3)
DEPRECATED RDW RBC AUTO: 14.4 % (ref 12.4–15.4)
GFR SERPLBLD CREATININE-BSD FMLA CKD-EPI: 22 ML/MIN/{1.73_M2}
GLUCOSE SERPL-MCNC: 104 MG/DL (ref 70–99)
HCT VFR BLD AUTO: 41.2 % (ref 40.5–52.5)
HGB BLD-MCNC: 13.2 G/DL (ref 13.5–17.5)
MCH RBC QN AUTO: 30.7 PG (ref 26–34)
MCHC RBC AUTO-ENTMCNC: 32 G/DL (ref 31–36)
MCV RBC AUTO: 95.7 FL (ref 80–100)
NT-PROBNP SERPL-MCNC: 114 PG/ML (ref 0–124)
PLATELET # BLD AUTO: 365 K/UL (ref 135–450)
PMV BLD AUTO: 7.6 FL (ref 5–10.5)
POTASSIUM SERPL-SCNC: 5 MMOL/L (ref 3.5–5.1)
RBC # BLD AUTO: 4.31 M/UL (ref 4.2–5.9)
SODIUM SERPL-SCNC: 144 MMOL/L (ref 136–145)
WBC # BLD AUTO: 6.6 K/UL (ref 4–11)

## 2024-05-20 NOTE — PROGRESS NOTES
[facility]  Advanced CHF/Pulmonary Hypertension   Cardiac Evaluation      Ramón Berg  YOB: 1950    Date of Visit:  5/20/24      Chief Complaint   Patient presents with    Cardiac Clearance    Congestive Heart Failure        History of Present Illness:  Ramón Berg is a 73 y.o. male who presents from referral from Marietta Fletcher NP for consultation and management of diastolic heart failure.       Ramón Berg is 73 y.o. and has a medical history of KAMRAN, Diabetes, obesity, Obstructive uropathy with suprapubic catheter, CKD (Dr. Medina) and diastolic heart failure.     He is here today for cardiac risk assessment for elective cytoscopy and Botox injection per Dr. Gagan Hale on 5/24/24. He states he is having painful bladder spasms.  He is anticipating this procedure every 6 months.  He has some SOB with longer walking distances but states he has has improvement since seeing Marietta.  He denies chest pain and chest pressure.  He is using CPAP and states he loves it.  He feels he is doing well on the Mounjaro for the last 2 months.  He is eating beet tablets for the last 6 months and feels they help him.       EKG read by me:  SR with first degree AV block. Rate 69.   NYHA Class 2    Allergies   Allergen Reactions    Indomethacin Other (See Comments)     Dr. Salazar told him not to take because affects the bladder.      Statins Other (See Comments)    Succinylcholine      ?? Succinylcholine \"allergy\"  Pt. Should be worked  Up for pseudocholinesterase deficiency. Pt. States he qwas told in the past that he had trouble with an anesthesia drug that begins with an \"s\" and that it took him awhile to breathe in PACU, but could provide no further details.     Unable To Assess       Anesthesia that begins with S- stops my breathing- daughter reports succinylcholine   Questionable pseudocholinesterase deficiency    Actos [Pioglitazone] Rash     Current Outpatient Medications   Medication

## 2024-05-20 NOTE — PATIENT INSTRUCTIONS
Plan:   Lab tests for Dr. uDncan CBC, BNP, BMP. See if Dr. Mcdonald has any Labs to add and get Labs tomorrow.   2.   Move appointment with Marietta to October.   3.   Continue same medications.

## 2024-05-20 NOTE — TELEPHONE ENCOUNTER
Faxed cardiac risk letter but it is not going through. Pt is low risk. It was faxed twice.      Will need another fax number besides [phone].

## 2024-05-21 ENCOUNTER — TELEPHONE (OUTPATIENT)
Dept: CARDIOLOGY CLINIC | Age: 74
End: 2024-05-21

## 2024-05-21 ENCOUNTER — OFFICE VISIT (OUTPATIENT)
Dept: INTERNAL MEDICINE CLINIC | Age: 74
End: 2024-05-21
Payer: MEDICARE

## 2024-05-21 VITALS
WEIGHT: 266 LBS | SYSTOLIC BLOOD PRESSURE: 106 MMHG | HEART RATE: 72 BPM | BODY MASS INDEX: 44.26 KG/M2 | OXYGEN SATURATION: 96 % | DIASTOLIC BLOOD PRESSURE: 62 MMHG

## 2024-05-21 DIAGNOSIS — N18.30 STAGE 3 CHRONIC KIDNEY DISEASE, UNSPECIFIED WHETHER STAGE 3A OR 3B CKD (HCC): ICD-10-CM

## 2024-05-21 DIAGNOSIS — E11.22 TYPE 2 DIABETES MELLITUS WITH STAGE 3 CHRONIC KIDNEY DISEASE, WITHOUT LONG-TERM CURRENT USE OF INSULIN, UNSPECIFIED WHETHER STAGE 3A OR 3B CKD (HCC): ICD-10-CM

## 2024-05-21 DIAGNOSIS — M20.42 ACQUIRED BILATERAL HAMMER TOES: ICD-10-CM

## 2024-05-21 DIAGNOSIS — N18.30 TYPE 2 DIABETES MELLITUS WITH STAGE 3 CHRONIC KIDNEY DISEASE, WITHOUT LONG-TERM CURRENT USE OF INSULIN, UNSPECIFIED WHETHER STAGE 3A OR 3B CKD (HCC): ICD-10-CM

## 2024-05-21 DIAGNOSIS — M19.011 LOCALIZED OSTEOARTHRITIS OF RIGHT SHOULDER: ICD-10-CM

## 2024-05-21 DIAGNOSIS — M20.41 ACQUIRED BILATERAL HAMMER TOES: ICD-10-CM

## 2024-05-21 DIAGNOSIS — G89.29 CHRONIC RIGHT SHOULDER PAIN: Primary | ICD-10-CM

## 2024-05-21 DIAGNOSIS — M25.511 CHRONIC RIGHT SHOULDER PAIN: Primary | ICD-10-CM

## 2024-05-21 DIAGNOSIS — E66.01 MORBID OBESITY (HCC): ICD-10-CM

## 2024-05-21 DIAGNOSIS — M79.672 FOOT PAIN, BILATERAL: ICD-10-CM

## 2024-05-21 DIAGNOSIS — I50.32 CHRONIC DIASTOLIC HEART FAILURE (HCC): Primary | ICD-10-CM

## 2024-05-21 DIAGNOSIS — M21.6X2 OTHER ACQUIRED DEFORMITIES OF LEFT FOOT: ICD-10-CM

## 2024-05-21 DIAGNOSIS — R06.02 SOB (SHORTNESS OF BREATH): ICD-10-CM

## 2024-05-21 DIAGNOSIS — M20.12 HALLUX VALGUS (ACQUIRED), LEFT FOOT: ICD-10-CM

## 2024-05-21 DIAGNOSIS — M79.671 FOOT PAIN, BILATERAL: ICD-10-CM

## 2024-05-21 DIAGNOSIS — M21.6X1 OTHER ACQUIRED DEFORMITIES OF RIGHT FOOT: ICD-10-CM

## 2024-05-21 PROCEDURE — 1123F ACP DISCUSS/DSCN MKR DOCD: CPT | Performed by: INTERNAL MEDICINE

## 2024-05-21 PROCEDURE — 3078F DIAST BP <80 MM HG: CPT | Performed by: INTERNAL MEDICINE

## 2024-05-21 PROCEDURE — 99214 OFFICE O/P EST MOD 30 MIN: CPT | Performed by: INTERNAL MEDICINE

## 2024-05-21 PROCEDURE — 3017F COLORECTAL CA SCREEN DOC REV: CPT | Performed by: INTERNAL MEDICINE

## 2024-05-21 PROCEDURE — 3044F HG A1C LEVEL LT 7.0%: CPT | Performed by: INTERNAL MEDICINE

## 2024-05-21 PROCEDURE — 3074F SYST BP LT 130 MM HG: CPT | Performed by: INTERNAL MEDICINE

## 2024-05-21 PROCEDURE — G8417 CALC BMI ABV UP PARAM F/U: HCPCS | Performed by: INTERNAL MEDICINE

## 2024-05-21 PROCEDURE — 1036F TOBACCO NON-USER: CPT | Performed by: INTERNAL MEDICINE

## 2024-05-21 PROCEDURE — G8427 DOCREV CUR MEDS BY ELIG CLIN: HCPCS | Performed by: INTERNAL MEDICINE

## 2024-05-21 PROCEDURE — 2022F DILAT RTA XM EVC RTNOPTHY: CPT | Performed by: INTERNAL MEDICINE

## 2024-05-21 ASSESSMENT — PATIENT HEALTH QUESTIONNAIRE - PHQ9
4. FEELING TIRED OR HAVING LITTLE ENERGY: MORE THAN HALF THE DAYS
SUM OF ALL RESPONSES TO PHQ9 QUESTIONS 1 & 2: 0
5. POOR APPETITE OR OVEREATING: NOT AT ALL
9. THOUGHTS THAT YOU WOULD BE BETTER OFF DEAD, OR OF HURTING YOURSELF: NOT AT ALL
SUM OF ALL RESPONSES TO PHQ QUESTIONS 1-9: 4
1. LITTLE INTEREST OR PLEASURE IN DOING THINGS: NOT AT ALL
SUM OF ALL RESPONSES TO PHQ QUESTIONS 1-9: 4
3. TROUBLE FALLING OR STAYING ASLEEP: NOT AT ALL
SUM OF ALL RESPONSES TO PHQ QUESTIONS 1-9: 4
7. TROUBLE CONCENTRATING ON THINGS, SUCH AS READING THE NEWSPAPER OR WATCHING TELEVISION: MORE THAN HALF THE DAYS
10. IF YOU CHECKED OFF ANY PROBLEMS, HOW DIFFICULT HAVE THESE PROBLEMS MADE IT FOR YOU TO DO YOUR WORK, TAKE CARE OF THINGS AT HOME, OR GET ALONG WITH OTHER PEOPLE: NOT DIFFICULT AT ALL
SUM OF ALL RESPONSES TO PHQ QUESTIONS 1-9: 4
2. FEELING DOWN, DEPRESSED OR HOPELESS: NOT AT ALL
6. FEELING BAD ABOUT YOURSELF - OR THAT YOU ARE A FAILURE OR HAVE LET YOURSELF OR YOUR FAMILY DOWN: NOT AT ALL
8. MOVING OR SPEAKING SO SLOWLY THAT OTHER PEOPLE COULD HAVE NOTICED. OR THE OPPOSITE, BEING SO FIGETY OR RESTLESS THAT YOU HAVE BEEN MOVING AROUND A LOT MORE THAN USUAL: NOT AT ALL

## 2024-05-21 ASSESSMENT — ANXIETY QUESTIONNAIRES
4. TROUBLE RELAXING: NOT AT ALL
6. BECOMING EASILY ANNOYED OR IRRITABLE: NEARLY EVERY DAY
2. NOT BEING ABLE TO STOP OR CONTROL WORRYING: NOT AT ALL
5. BEING SO RESTLESS THAT IT IS HARD TO SIT STILL: NOT AT ALL
7. FEELING AFRAID AS IF SOMETHING AWFUL MIGHT HAPPEN: NOT AT ALL
GAD7 TOTAL SCORE: 3
3. WORRYING TOO MUCH ABOUT DIFFERENT THINGS: NOT AT ALL
1. FEELING NERVOUS, ANXIOUS, OR ON EDGE: NOT AT ALL
IF YOU CHECKED OFF ANY PROBLEMS ON THIS QUESTIONNAIRE, HOW DIFFICULT HAVE THESE PROBLEMS MADE IT FOR YOU TO DO YOUR WORK, TAKE CARE OF THINGS AT HOME, OR GET ALONG WITH OTHER PEOPLE: NOT DIFFICULT AT ALL

## 2024-05-21 NOTE — TELEPHONE ENCOUNTER
----- Message from Alayna Duncan MD sent at 5/21/2024 10:33 AM EDT -----  Please call patient.  His labs show that his kidneys are more \"dry\".  Creatinine up to 2.9.  I am not sure what dosage of torsemide he is on, but we need to reduce the dose.  Find out dose and let me know.  dimitry

## 2024-05-21 NOTE — TELEPHONE ENCOUNTER
Spoke to pt.  He is pretty sure he is taking torsemide 10mg every day. Pt asked that we call his daughter Katiuska to confirm dose.     Left message for Katiuska.   Requested call back from her. Pt already took his morning meds.

## 2024-05-21 NOTE — PROGRESS NOTES
Ramón Berg (:  1950) is a 73 y.o. male,Established patient, here for evaluation of the following chief complaint(s):  Hypertension and Diabetes      Assessment & Plan   1. Chronic right shoulder pain: ACTIVE PAIN WORSENING  -     Drake Saenz MD, Orthopedics and Sports Medicine (Knee; Shoulder), [facility]  2. Localized osteoarthritis of right shoulder ACTIVE DISEASE. WORSENING  -     Drake Saenz MD, Orthopedics and Sports Medicine (Knee; Shoulder), [facility]  -     AFL - Bethanie, Tessy F, DPM, Podiatry, [place]  3. Foot pain, bilateral  -     AFL - Bethanie, Tessy F, DPM, Podiatry, [place]  4. Type 2 diabetes mellitus with stage 3 chronic kidney disease, without long-term current use of insulin, unspecified whether stage 3a or 3b CKD (HCC)  -     CONTINUE YOUR A1C  6. Hallux valgus (acquired), left foot  -     AFL - Bethanie, Tessy F, DPM, Podiatry, [place]  7. Acquired bilateral hammer toes  -     AFL - Bethanie, Tessy F, DPM, Podiatry, [place]  8. Other acquired deformities of right foot  -     AFL - Bethanie, Tessy F, DPM, Podiatry, [place]  9. Other acquired deformities of left foot  -     AFL - Bethanie, Tessy F, DPM, Podiatry, [place]  Forms completed    Return for AWV in August-A1c at this visit - .       Subjective   Hypertension  This is a chronic problem. The current episode started more than 1 year ago. The problem is unchanged. The problem is controlled. Pertinent negatives include no anxiety, blurred vision, chest pain, malaise/fatigue, neck pain, orthopnea, peripheral edema, PND or shortness of breath. There are no associated agents to hypertension. Risk factors for coronary artery disease include obesity, diabetes mellitus and dyslipidemia. Past treatments include nothing. The current treatment provides significant improvement. There are no compliance problems.  There is no history of angina,

## 2024-05-21 NOTE — TELEPHONE ENCOUNTER
Spoke with Brittni, pt's daughter  She has tried to reach her father but is is our running errands.    Once she is able to talk to him, she will call us.

## 2024-05-22 RX ORDER — TORSEMIDE 10 MG/1
10 TABLET ORAL EVERY OTHER DAY
Qty: 45 TABLET | Refills: 3 | Status: SHIPPED | OUTPATIENT
Start: 2024-05-22

## 2024-05-22 NOTE — TELEPHONE ENCOUNTER
Change torsemide to 10 mg every other day.  He can take an extra one here or there if he feels as if he is starting to swell again.  YENNY

## 2024-05-22 NOTE — TELEPHONE ENCOUNTER
Spoke to daughter Katiuska with updated Torsemide dose per YENNY.   New script sent to Pharmacy.      Labs added for 1 month. BNP. BMP.   Left message for pt with new Torsemide instructions on cell phone.   Will be sure to speak with pt later today.     Left office number for pt to call back.

## 2024-05-22 NOTE — TELEPHONE ENCOUNTER
Spoke to pt.    He was updated with new dose instructions.  He understands he will no longer need to cut his dose of Torsemide.  He will throw away his 20mg tablets.       He wrote down the instructions.    Pt restated the new instructions and they are correct.

## 2024-05-30 ENCOUNTER — TELEPHONE (OUTPATIENT)
Dept: INTERNAL MEDICINE CLINIC | Age: 74
End: 2024-05-30

## 2024-06-03 ENCOUNTER — OFFICE VISIT (OUTPATIENT)
Dept: ORTHOPEDIC SURGERY | Age: 74
End: 2024-06-03
Payer: MEDICARE

## 2024-06-03 VITALS — WEIGHT: 266 LBS | BODY MASS INDEX: 44.32 KG/M2 | HEIGHT: 65 IN

## 2024-06-03 DIAGNOSIS — M19.011 OSTEOARTHRITIS OF GLENOHUMERAL JOINT, RIGHT: Primary | ICD-10-CM

## 2024-06-03 DIAGNOSIS — M19.011 ARTHRITIS OF RIGHT ACROMIOCLAVICULAR JOINT: ICD-10-CM

## 2024-06-03 PROCEDURE — 1036F TOBACCO NON-USER: CPT | Performed by: ORTHOPAEDIC SURGERY

## 2024-06-03 PROCEDURE — G8427 DOCREV CUR MEDS BY ELIG CLIN: HCPCS | Performed by: ORTHOPAEDIC SURGERY

## 2024-06-03 PROCEDURE — 1123F ACP DISCUSS/DSCN MKR DOCD: CPT | Performed by: ORTHOPAEDIC SURGERY

## 2024-06-03 PROCEDURE — 99204 OFFICE O/P NEW MOD 45 MIN: CPT | Performed by: ORTHOPAEDIC SURGERY

## 2024-06-03 PROCEDURE — 3017F COLORECTAL CA SCREEN DOC REV: CPT | Performed by: ORTHOPAEDIC SURGERY

## 2024-06-03 PROCEDURE — G8417 CALC BMI ABV UP PARAM F/U: HCPCS | Performed by: ORTHOPAEDIC SURGERY

## 2024-06-03 NOTE — PROGRESS NOTES
gallo Berg is in agreement with this plan. All questions were answered to patient's satisfaction and was encouraged to call with any further questions.           No orders of the defined types were placed in this encounter.        I, Ayde Vicente ATC, am scribing for and in the presence of Dr. Drake Peacock.   06/03/24 1:24 PM Ayde Vicente ATC    I attest that I met personally with the patient, performed the described exam, reviewed the radiographic studies and medical records associated with this patient and supervised the services that are described above.     Drake Peacock MD

## 2024-07-01 ENCOUNTER — OFFICE VISIT (OUTPATIENT)
Dept: ORTHOPEDIC SURGERY | Age: 74
End: 2024-07-01
Payer: MEDICARE

## 2024-07-01 VITALS — HEIGHT: 65 IN | BODY MASS INDEX: 43.32 KG/M2 | WEIGHT: 260 LBS

## 2024-07-01 DIAGNOSIS — M19.011 OSTEOARTHRITIS OF GLENOHUMERAL JOINT, RIGHT: Primary | ICD-10-CM

## 2024-07-01 PROCEDURE — 20610 DRAIN/INJ JOINT/BURSA W/O US: CPT | Performed by: ORTHOPAEDIC SURGERY

## 2024-07-01 PROCEDURE — G8427 DOCREV CUR MEDS BY ELIG CLIN: HCPCS | Performed by: ORTHOPAEDIC SURGERY

## 2024-07-01 PROCEDURE — 3017F COLORECTAL CA SCREEN DOC REV: CPT | Performed by: ORTHOPAEDIC SURGERY

## 2024-07-01 PROCEDURE — 1123F ACP DISCUSS/DSCN MKR DOCD: CPT | Performed by: ORTHOPAEDIC SURGERY

## 2024-07-01 PROCEDURE — G8417 CALC BMI ABV UP PARAM F/U: HCPCS | Performed by: ORTHOPAEDIC SURGERY

## 2024-07-01 PROCEDURE — 99212 OFFICE O/P EST SF 10 MIN: CPT | Performed by: ORTHOPAEDIC SURGERY

## 2024-07-01 PROCEDURE — 1036F TOBACCO NON-USER: CPT | Performed by: ORTHOPAEDIC SURGERY

## 2024-07-01 RX ORDER — METHYLPREDNISOLONE ACETATE 40 MG/ML
80 INJECTION, SUSPENSION INTRA-ARTICULAR; INTRALESIONAL; INTRAMUSCULAR; SOFT TISSUE ONCE
Status: COMPLETED | OUTPATIENT
Start: 2024-07-01 | End: 2024-07-01

## 2024-07-01 RX ADMIN — METHYLPREDNISOLONE ACETATE 80 MG: 40 INJECTION, SUSPENSION INTRA-ARTICULAR; INTRALESIONAL; INTRAMUSCULAR; SOFT TISSUE at 14:47

## 2024-07-01 NOTE — PROGRESS NOTES
Chief Complaint    Follow-up (Right shoulder )      History of Present Illness:  Ramón Berg is a 73 y.o. male here today for follow up evaluation of the right shoulder. He has known osteoarthritis of the right shoulder. He presents today complaining of pain over the top of his right shoulder, exacerbated with raising arm. Endorses limited range of motion and locking. Denies numbness and tingling. He is ok sleeping at night. He is right hand dominant. He has undergone multiple corticosteroid injections, most recently 3 months ago, and states these help him for about 3-4 months. Denies any new injuries.    Medical History:  Patient's medications, allergies, past medical, surgical, social and family histories were reviewed and updated as appropriate.    Pertinent items are noted in HPI  Review of systems reviewed from Patient History Form completed today and available in the patient's chart under the Media tab.       Pain Assessment  Location of Pain: Shoulder  Location Modifiers: Right  Severity of Pain: 8  Quality of Pain: Sharp, Aching  Duration of Pain: Persistent  Frequency of Pain: Intermittent  Aggravating Factors:  (movement)  Limiting Behavior: Yes  Relieving Factors: Rest  Result of Injury: No  Work-Related Injury: No  Are there other pain locations you wish to document?: No    Past Medical History:   Diagnosis Date    Acute on chronic diastolic heart failure (HCC)     Acute respiratory failure with hypoxia (HCC) 4/5/2021    Anesthesia     anxiety regarding ETT    Cancer (HCC)     prostate    Hyperlipidemia     Hypertension     Obesity     KAMRAN treated with BiPAP     nasal    Osteoarthritis     Type 2 diabetes mellitus without complication (HCC)     Type 2 diabetes mellitus without complication, without long-term current use of insulin (HCC) 2/7/2018    Urinary incontinence, stress, male     Wears dentures         Past Surgical History:   Procedure Laterality Date    CT RETROPERITONEAL PERC DRAIN

## 2024-07-01 NOTE — PROGRESS NOTES
7/1/24 2:47 PM     Lidocaine Injection      NDC: [address]-36    Lot Number: [id number]    Body Part: right shoulder

## 2024-08-13 ENCOUNTER — TELEPHONE (OUTPATIENT)
Dept: INTERNAL MEDICINE CLINIC | Age: 74
End: 2024-08-13

## 2024-08-13 NOTE — TELEPHONE ENCOUNTER
Spoke with patient and he is interested in produce perks. He will call us back to schedule with german for 30 min nurse visit

## 2024-08-20 ENCOUNTER — TELEPHONE (OUTPATIENT)
Dept: INTERNAL MEDICINE CLINIC | Age: 74
End: 2024-08-20

## 2024-08-20 DIAGNOSIS — E66.01 MORBID OBESITY (HCC): Primary | ICD-10-CM

## 2024-08-20 DIAGNOSIS — M48.061 SPINAL STENOSIS OF LUMBAR REGION, UNSPECIFIED WHETHER NEUROGENIC CLAUDICATION PRESENT: ICD-10-CM

## 2024-08-20 DIAGNOSIS — M20.12 HALLUX VALGUS (ACQUIRED), LEFT FOOT: ICD-10-CM

## 2024-08-20 NOTE — TELEPHONE ENCOUNTER
Patient is having trouble getting pain medication. Previous Pain doctor is no longer available. Patient saw another doctor for pain, but is no longer able to be seen. Patient no longer has a pain doctor. Due for HYDROcodene  on 9/26/24.

## 2024-08-20 NOTE — TELEPHONE ENCOUNTER
Called patient, he was seeing Maren Dodd and she referred him to Dr. Shadi Gayle for pain management, he received a letter from him stating that he needed to find a new pain management physician, Can we refer him to someone so he can get his pain meds please.

## 2024-08-21 ENCOUNTER — TELEPHONE (OUTPATIENT)
Dept: CARDIOLOGY CLINIC | Age: 74
End: 2024-08-21

## 2024-08-21 NOTE — TELEPHONE ENCOUNTER
Pt LM MFF VM REQ refill     Medication Refill    Medication needing refilled:  torsemide (DEMADEX) 10 MG tablet   Dosage of the medication:   10 mg   How are you taking this medication (QD, BID, TID, QID, PRN):   Take 1 tablet by mouth every other day    30 or 90 day supply called in:   90 day   When will you run out of your medication:  Has 1 pill left   Which Pharmacy are we sending the medication to?:   [id number] [place] [address] - P [phone] - F [phone]  [facility] [address] [facility] [address]  Phone: [phone]  Fax: [phone]

## 2024-08-21 NOTE — TELEPHONE ENCOUNTER
Called the patient and gave him the referral information. Faxed referral to Dr. Perez office as well.

## 2024-08-21 NOTE — TELEPHONE ENCOUNTER
Spoke with pt  There is a prescription in force for torsemide.  He only needs to call for refills    He understands and will call the pharmacy

## 2024-08-27 ENCOUNTER — NURSE ONLY (OUTPATIENT)
Dept: INTERNAL MEDICINE CLINIC | Age: 74
End: 2024-08-27

## 2024-08-27 DIAGNOSIS — N18.30 TYPE 2 DIABETES MELLITUS WITH STAGE 3 CHRONIC KIDNEY DISEASE, WITHOUT LONG-TERM CURRENT USE OF INSULIN, UNSPECIFIED WHETHER STAGE 3A OR 3B CKD (HCC): Primary | ICD-10-CM

## 2024-08-27 DIAGNOSIS — E11.22 TYPE 2 DIABETES MELLITUS WITH STAGE 3 CHRONIC KIDNEY DISEASE, WITHOUT LONG-TERM CURRENT USE OF INSULIN, UNSPECIFIED WHETHER STAGE 3A OR 3B CKD (HCC): Primary | ICD-10-CM

## 2024-09-21 DIAGNOSIS — E53.8 FOLATE DEFICIENCY: ICD-10-CM

## 2024-09-23 RX ORDER — FOLIC ACID 1 MG/1
1 TABLET ORAL DAILY
Qty: 90 TABLET | Refills: 1 | Status: SHIPPED | OUTPATIENT
Start: 2024-09-23

## 2024-09-30 ENCOUNTER — OFFICE VISIT (OUTPATIENT)
Dept: ORTHOPEDIC SURGERY | Age: 74
End: 2024-09-30
Payer: MEDICARE

## 2024-09-30 VITALS — BODY MASS INDEX: 43.32 KG/M2 | WEIGHT: 260 LBS | HEIGHT: 65 IN

## 2024-09-30 DIAGNOSIS — M19.011 OSTEOARTHRITIS OF GLENOHUMERAL JOINT, RIGHT: ICD-10-CM

## 2024-09-30 DIAGNOSIS — M25.511 RIGHT SHOULDER PAIN, UNSPECIFIED CHRONICITY: Primary | ICD-10-CM

## 2024-09-30 DIAGNOSIS — M19.011 ARTHRITIS OF RIGHT ACROMIOCLAVICULAR JOINT: ICD-10-CM

## 2024-09-30 PROCEDURE — 3017F COLORECTAL CA SCREEN DOC REV: CPT

## 2024-09-30 PROCEDURE — G8427 DOCREV CUR MEDS BY ELIG CLIN: HCPCS

## 2024-09-30 PROCEDURE — 20610 DRAIN/INJ JOINT/BURSA W/O US: CPT

## 2024-09-30 PROCEDURE — 99214 OFFICE O/P EST MOD 30 MIN: CPT

## 2024-09-30 PROCEDURE — G8417 CALC BMI ABV UP PARAM F/U: HCPCS

## 2024-09-30 PROCEDURE — 1036F TOBACCO NON-USER: CPT

## 2024-09-30 PROCEDURE — 1123F ACP DISCUSS/DSCN MKR DOCD: CPT

## 2024-09-30 RX ORDER — METHYLPREDNISOLONE ACETATE 40 MG/ML
80 INJECTION, SUSPENSION INTRA-ARTICULAR; INTRALESIONAL; INTRAMUSCULAR; SOFT TISSUE ONCE
Status: COMPLETED | OUTPATIENT
Start: 2024-09-30 | End: 2024-09-30

## 2024-09-30 RX ORDER — LIDOCAINE HYDROCHLORIDE 10 MG/ML
4 INJECTION, SOLUTION INFILTRATION; PERINEURAL ONCE
Status: COMPLETED | OUTPATIENT
Start: 2024-09-30 | End: 2024-09-30

## 2024-09-30 RX ADMIN — METHYLPREDNISOLONE ACETATE 80 MG: 40 INJECTION, SUSPENSION INTRA-ARTICULAR; INTRALESIONAL; INTRAMUSCULAR; SOFT TISSUE at 15:08

## 2024-09-30 RX ADMIN — LIDOCAINE HYDROCHLORIDE 4 ML: 10 INJECTION, SOLUTION INFILTRATION; PERINEURAL at 15:08

## 2024-09-30 NOTE — PROGRESS NOTES
the patient.    X-rays of the RIGHT shoulder including Grashey, scapular Y and axillary views were obtained and reviewed in office:    Impression: Severe osteoarthritis of the acromioclavicular joint and severe osteoarthritis of the glenohumeral joint            Assessment :  74 year old male with osteoarthritis of right shoulder    Impression:  Encounter Diagnoses   Name Primary?    Right shoulder pain, unspecified chronicity Yes    Osteoarthritis of glenohumeral joint, right     Arthritis of right acromioclavicular joint        Office Procedures:  Orders Placed This Encounter   Procedures    XR SHOULDER RIGHT (MIN 2 VIEWS)     Standing Status:   Future     Number of Occurrences:   1     Standing Expiration Date:   9/30/2025     Order Specific Question:   Reason for exam:     Answer:   pain           Plan: The nature and natural history of osteoarthritis was discussed in detail the patient today.  Treatment options both surgical and nonsurgical were discussed in detail.  Patient was counseled with regard to the importance of activity modification.  The role for medications, intra-articular injections as well as surgery were discussed.  Patient's questions were answered.     Patient has known advanced osteoarthritis of the right shoulder, confirmed by x-ray today. He has seen good improvement in his symptoms with previous corticosteroid injection therefore believe he is a candidate for repeat injection today for his exacerbation of symptoms. He wishes to proceed.      The indications and risks of steroid injection as well as treatment alternatives were discussed with the patient who consented to the procedure. Under sterile conditions and after informed consent was obtained, using posterior approach the patient was given an injection into the RIGHT shoulder split equally between subacromial space and glenohumeral joint. 2mL 40 mg of Depo-Medrol and 4 mL of 1% lidocaine were placed in the shoulder after it was

## 2024-10-22 ENCOUNTER — HOSPITAL ENCOUNTER (OUTPATIENT)
Age: 74
Discharge: HOME | End: 2024-10-22
Payer: MEDICARE

## 2024-10-22 ENCOUNTER — OFFICE VISIT (OUTPATIENT)
Dept: CARDIOLOGY CLINIC | Age: 74
End: 2024-10-22
Payer: MEDICARE

## 2024-10-22 VITALS
WEIGHT: 256 LBS | HEIGHT: 65 IN | BODY MASS INDEX: 42.65 KG/M2 | OXYGEN SATURATION: 95 % | HEART RATE: 90 BPM | SYSTOLIC BLOOD PRESSURE: 138 MMHG | DIASTOLIC BLOOD PRESSURE: 60 MMHG

## 2024-10-22 DIAGNOSIS — I50.32 CHRONIC DIASTOLIC HEART FAILURE (HCC): ICD-10-CM

## 2024-10-22 DIAGNOSIS — E66.01 CLASS 3 SEVERE OBESITY DUE TO EXCESS CALORIES WITH SERIOUS COMORBIDITY AND BODY MASS INDEX (BMI) OF 45.0 TO 49.9 IN ADULT: ICD-10-CM

## 2024-10-22 DIAGNOSIS — D50.9 IRON DEFICIENCY ANEMIA, UNSPECIFIED IRON DEFICIENCY ANEMIA TYPE: ICD-10-CM

## 2024-10-22 DIAGNOSIS — N18.30 STAGE 3 CHRONIC KIDNEY DISEASE, UNSPECIFIED WHETHER STAGE 3A OR 3B CKD (HCC): ICD-10-CM

## 2024-10-22 DIAGNOSIS — G47.33 OSA (OBSTRUCTIVE SLEEP APNEA): ICD-10-CM

## 2024-10-22 DIAGNOSIS — I50.32 CHRONIC DIASTOLIC HEART FAILURE (HCC): Primary | ICD-10-CM

## 2024-10-22 DIAGNOSIS — E66.813 CLASS 3 SEVERE OBESITY DUE TO EXCESS CALORIES WITH SERIOUS COMORBIDITY AND BODY MASS INDEX (BMI) OF 45.0 TO 49.9 IN ADULT: ICD-10-CM

## 2024-10-22 LAB
ANION GAP SERPL CALCULATED.3IONS-SCNC: 13 MMOL/L (ref 3–16)
BUN SERPL-MCNC: 25 MG/DL (ref 7–20)
CALCIUM SERPL-MCNC: 9.5 MG/DL (ref 8.3–10.6)
CHLORIDE SERPL-SCNC: 108 MMOL/L (ref 99–110)
CO2 SERPL-SCNC: 22 MMOL/L (ref 21–32)
CREAT SERPL-MCNC: 2.4 MG/DL (ref 0.8–1.3)
DEPRECATED RDW RBC AUTO: 15 % (ref 12.4–15.4)
FERRITIN SERPL IA-MCNC: 147 NG/ML (ref 30–400)
GFR SERPLBLD CREATININE-BSD FMLA CKD-EPI: 28 ML/MIN/{1.73_M2}
GLUCOSE SERPL-MCNC: 121 MG/DL (ref 70–99)
HCT VFR BLD AUTO: 37.7 % (ref 40.5–52.5)
HGB BLD-MCNC: 12 G/DL (ref 13.5–17.5)
IRON SATN MFR SERPL: 21 % (ref 20–50)
IRON SERPL-MCNC: 48 UG/DL (ref 59–158)
MCH RBC QN AUTO: 30.4 PG (ref 26–34)
MCHC RBC AUTO-ENTMCNC: 31.9 G/DL (ref 31–36)
MCV RBC AUTO: 95.3 FL (ref 80–100)
NT-PROBNP SERPL-MCNC: 195 PG/ML (ref 0–124)
PLATELET # BLD AUTO: 581 K/UL (ref 135–450)
PMV BLD AUTO: 7.3 FL (ref 5–10.5)
POTASSIUM SERPL-SCNC: 4.4 MMOL/L (ref 3.5–5.1)
RBC # BLD AUTO: 3.96 M/UL (ref 4.2–5.9)
SODIUM SERPL-SCNC: 143 MMOL/L (ref 136–145)
TIBC SERPL-MCNC: 228 UG/DL (ref 260–445)
WBC # BLD AUTO: 6.3 K/UL (ref 4–11)

## 2024-10-22 PROCEDURE — 3078F DIAST BP <80 MM HG: CPT | Performed by: CLINICAL NURSE SPECIALIST

## 2024-10-22 PROCEDURE — 99214 OFFICE O/P EST MOD 30 MIN: CPT | Performed by: CLINICAL NURSE SPECIALIST

## 2024-10-22 PROCEDURE — 1123F ACP DISCUSS/DSCN MKR DOCD: CPT | Performed by: CLINICAL NURSE SPECIALIST

## 2024-10-22 PROCEDURE — G8427 DOCREV CUR MEDS BY ELIG CLIN: HCPCS | Performed by: CLINICAL NURSE SPECIALIST

## 2024-10-22 PROCEDURE — 1036F TOBACCO NON-USER: CPT | Performed by: CLINICAL NURSE SPECIALIST

## 2024-10-22 PROCEDURE — 3017F COLORECTAL CA SCREEN DOC REV: CPT | Performed by: CLINICAL NURSE SPECIALIST

## 2024-10-22 PROCEDURE — 3075F SYST BP GE 130 - 139MM HG: CPT | Performed by: CLINICAL NURSE SPECIALIST

## 2024-10-22 PROCEDURE — 83540 ASSAY OF IRON: CPT

## 2024-10-22 PROCEDURE — 85027 COMPLETE CBC AUTOMATED: CPT

## 2024-10-22 PROCEDURE — G8484 FLU IMMUNIZE NO ADMIN: HCPCS | Performed by: CLINICAL NURSE SPECIALIST

## 2024-10-22 PROCEDURE — 82728 ASSAY OF FERRITIN: CPT

## 2024-10-22 PROCEDURE — G8417 CALC BMI ABV UP PARAM F/U: HCPCS | Performed by: CLINICAL NURSE SPECIALIST

## 2024-10-22 PROCEDURE — 83550 IRON BINDING TEST: CPT

## 2024-10-22 PROCEDURE — 80048 BASIC METABOLIC PNL TOTAL CA: CPT

## 2024-10-22 PROCEDURE — 83880 ASSAY OF NATRIURETIC PEPTIDE: CPT

## 2024-10-22 PROCEDURE — 36415 COLL VENOUS BLD VENIPUNCTURE: CPT

## 2024-10-22 RX ORDER — TELMISARTAN 20 MG/1
20 TABLET ORAL DAILY
Qty: 30 TABLET | Refills: 0
Start: 2024-10-22

## 2024-10-22 NOTE — PATIENT INSTRUCTIONS
Continue all current medications  Blood work today  Wash cpap mask/tubing as below  RTO in 6 months      To clean CPAP tubing, you can:  1. Unplug and disconnect  Unplug the CPAP machine and disconnect the tubing from the mask and machine.  2. Wash  Submerge the tubing in warm, soapy water or a diluted vinegar solution. You can use a CPAP tube cleaning brush to scrub the inside.  3. Rinse  Rinse the tubing thoroughly with clean water.  4. Dry  Hang the tubing over a shower reuben, wall hook, or CPAP hose  to drain excess water. Allow it to air dry out of direct sunlight.

## 2024-10-22 NOTE — PROGRESS NOTES
[facility]  Progress Note    Primary Care Doctor:  Zenia French MD    Chief Complaint   Patient presents with    Follow-up    Congestive Heart Failure        History of Present Illness:  74 y.o. male with history of ckd (Dr Medina), kamran. Dm, morbid obesity,  4/5-14/2021 for chest discomfort and SOB for months, diuresed 314->293.    Obstructive uropathy with suprapubic catheter    I had the pleasure of seeing Ramón Berg in follow up for diastolic heart failure.  He is ambulatory and by his self.  He feels great, no shortness of breath, chest pain, palpitations, lightheadedness, edema or shortness of breath.  He feels that he has a sinus infection or cold.  He does and has not washed his cpap tubing or water reservoir.  His weight is down 10 pounds.  He started telmisartan 8/26/2024 per nephrology and has not had any blood work done yet.  He is taking his torsemide 10 mg every other day.  Nephrology note in [place]    Past Medical History:   has a past medical history of Acute on chronic diastolic heart failure (HCC), Acute respiratory failure with hypoxia, Anesthesia, Cancer (HCC), Hyperlipidemia, Hypertension, Obesity, KAMRAN treated with BiPAP, Osteoarthritis, Type 2 diabetes mellitus without complication (HCC), Type 2 diabetes mellitus without complication, without long-term current use of insulin (HCC), Urinary incontinence, stress, male, and Wears dentures.  Surgical History:   has a past surgical history that includes Total hip arthroplasty; joint replacement (Left); shoulder surgery (Bilateral); Knee arthroscopy (Right); Cystoscopy (N/A, 6/21/2021); and CT PERITONEAL/RETROPERITONEAL PERC DRAIN (12/23/2021).   Social History:   reports that he quit smoking about 23 years ago. His smoking use included cigars and cigarettes. He started smoking about 44 years ago. He has a 5.3 pack-year smoking history. He has quit using smokeless tobacco. He reports current alcohol use. He reports that he does

## 2024-10-23 ENCOUNTER — TELEPHONE (OUTPATIENT)
Dept: CARDIOLOGY CLINIC | Age: 74
End: 2024-10-23

## 2024-10-23 NOTE — TELEPHONE ENCOUNTER
----- Message from OSMAN De La Rosa - CNS sent at 10/23/2024  1:03 PM EDT -----  Blood work is stable fluid level is up some  Ask him to take extra 10 mg of torsemide for 2 days  Thanks    Spoke to patient's daughter Brittni she verbalized understanding.

## 2024-10-30 NOTE — TELEPHONE ENCOUNTER
Lvm for patient to call the office back to schedule an appt.    Recent Visits  Date Type Provider Dept   05/21/24 Office Visit Edward Mcdonald MD [facility]   02/21/24 Office Visit Edward Mcdonald MD [facility]   11/21/23 Office Visit Edward Mcdonald MD [facility]   07/10/23 Office Visit Edward Mcdonald MD [facility]   05/10/23 Office Visit Edward Mcdonald MD [facility]   Showing recent visits within past 540 days with a meds authorizing provider and meeting all other requirements  Future Appointments  No visits were found meeting these conditions.  Showing future appointments within next 150 days with a meds authorizing provider and meeting all other requirements     5/21/2024

## 2024-11-03 RX ORDER — TORSEMIDE 20 MG/1
40 TABLET ORAL DAILY
Qty: 180 TABLET | Refills: 0 | Status: SHIPPED | OUTPATIENT
Start: 2024-11-03

## 2024-11-18 ENCOUNTER — HOSPITAL ENCOUNTER (EMERGENCY)
Age: 74
Discharge: HOME | End: 2024-11-18
Attending: EMERGENCY MEDICINE
Payer: MEDICARE

## 2024-11-18 VITALS
TEMPERATURE: 98.7 F | SYSTOLIC BLOOD PRESSURE: 163 MMHG | RESPIRATION RATE: 16 BRPM | BODY MASS INDEX: 42 KG/M2 | HEART RATE: 94 BPM | OXYGEN SATURATION: 94 % | DIASTOLIC BLOOD PRESSURE: 87 MMHG | HEIGHT: 65 IN | WEIGHT: 252.1 LBS

## 2024-11-18 DIAGNOSIS — T83.021A DISPLACEMENT OF FOLEY CATHETER, INITIAL ENCOUNTER (HCC): Primary | ICD-10-CM

## 2024-11-18 PROCEDURE — 99282 EMERGENCY DEPT VISIT SF MDM: CPT

## 2024-11-18 PROCEDURE — 51702 INSERT TEMP BLADDER CATH: CPT

## 2024-11-18 ASSESSMENT — ENCOUNTER SYMPTOMS
ABDOMINAL PAIN: 0
DIARRHEA: 0
NAUSEA: 0
CHEST TIGHTNESS: 0
SHORTNESS OF BREATH: 0
VOMITING: 0

## 2024-11-18 ASSESSMENT — PAIN - FUNCTIONAL ASSESSMENT: PAIN_FUNCTIONAL_ASSESSMENT: NONE - DENIES PAIN

## 2024-11-19 NOTE — CONSULTS
has a 5.3 pack-year smoking history. He has quit using smokeless tobacco. He reports current alcohol use. He reports that he does not use drugs.    Family History:  family history includes Diabetes in his mother; Hypertension in his father and mother.    Medications:   Scheduled Meds:  Continuous Infusions:  PRN Meds:    Review of Systems:  Constitutional: Negative for fever    Genitourinary: see HPI  Eyes: negative for sudden change in vision  EENT: no complaints  Cardiovascular: Negative for chest pain  Respiratory: Negative for shortness of breath  Gastrointestinal: Negative for nausea  Musculoskeletal: Negative for back pain   Neurological: Negative for weakness  Psychiatric: Negative for anxiety  Integumentary: Negative for rashes or adenopathy     Physical Exam:  Vitals:    11/18/24 1902   BP: (!) 163/87   Pulse: 94   Resp: 16   Temp: 98.7 °F (37.1 °C)   SpO2: 94%     Constitutional: NAD, well-developed, well-nourished.  HEENT: MMM.  Hearing intact. PERRL  Neck: no thyroid masses appreciated. Trachea is midline. Neck appears unremarkable   Lymph: no palpable adenopathy in supraclavicular, or axillary lymph nodes  Cardiovascular: Regular rate. No peripheral edema  Respiratory: Respirations are even and non-labored. No audible breath sounds.   Genitourinary: SPT site draining cloudy urine   Abdomen: Soft. No distension, tenderness, hernias, masses or guarding. No CVA tenderness. No hernias appreciated. Liver and spleen appear normal  Psychiatric: A + O x 3, normal affect. Insight appears intact.   Muskuloskeletal: TOMASZ x 4   Skin: Pink, warm and dry.  No rashes on face and arms.    Labs:  Lab Results   Component Value Date    WBC 6.3 10/22/2024    HGB 12.0 (L) 10/22/2024    HCT 37.7 (L) 10/22/2024    MCV 95.3 10/22/2024     (H) 10/22/2024     Lab Results   Component Value Date    CREATININE 2.4 (H) 10/22/2024    BUN 25 (H) 10/22/2024     10/22/2024    K 4.4 10/22/2024     10/22/2024    CO2 22

## 2024-11-20 DIAGNOSIS — E11.69 DYSLIPIDEMIA ASSOCIATED WITH TYPE 2 DIABETES MELLITUS (HCC): ICD-10-CM

## 2024-11-20 DIAGNOSIS — E11.40 CONTROLLED TYPE 2 DIABETES MELLITUS WITH DIABETIC NEUROPATHY, WITH LONG-TERM CURRENT USE OF INSULIN (HCC): ICD-10-CM

## 2024-11-20 DIAGNOSIS — E78.5 DYSLIPIDEMIA ASSOCIATED WITH TYPE 2 DIABETES MELLITUS (HCC): ICD-10-CM

## 2024-11-20 DIAGNOSIS — Z79.4 CONTROLLED TYPE 2 DIABETES MELLITUS WITH DIABETIC NEUROPATHY, WITH LONG-TERM CURRENT USE OF INSULIN (HCC): ICD-10-CM

## 2024-11-20 RX ORDER — ATORVASTATIN CALCIUM 40 MG/1
40 TABLET, FILM COATED ORAL NIGHTLY
Qty: 90 TABLET | Refills: 3 | OUTPATIENT
Start: 2024-11-20

## 2024-11-20 NOTE — ED PROVIDER NOTES
EMERGENCY DEPARTMENT SUPERVISING PHYSICIAN NOTE    I have seen this patient & have reviewed history and findings with the PA, NP, or resident physician and provided direct supervision. I saw the patient and performed a substantive portion of the visit. I was present for key portions of any procedures performed. I've participated in medical management, monitoring, and treatment of this patient with the provider. I have reviewed currently available documentation, test results, and laboratory results in the interim. Care plan has been developed collaboratively. I take responsibility for the patient's management from when I was asked to get involved in this patient's care. Bessie and MARIANA are the primary clinicians of record.    Brief HPI:  74yrs M reports suprapubic catheter became dislodged earlier today  Reports that he is feeling \"fine\" currently and has no other current complaints    Pertinent Exam Findings:  Awake, alert, not acutely ill-appearing, not distressed  CTAB, RR and WOB normal  Abdomen soft, NT, ND  Ambulatory independently    Plan:  Bessie and MARIANA were unsuccessful on multiple attempts at suprapubic catheter replacement  Consulted on call urologist  Dr. Flores graciously came to bedside and was able to successfully place a suprapubic catheter  On reevaluation urine is draining easily from this catheter and And Mr. Berg is asymptomatic  Repeat abdominal exam remains benign  Mr. Berg would like to be discharged home now  Return precautions reviewed in detail and outpatient follow up advised    Final Impression(s)  1. Displacement of Cook catheter, initial encounter (HCC)           Jorge Luis Prasad MD  11/20/24 6204    
per the Radiologist below, if available at the time of this note:    No orders to display     No results found.    No results found.    PROCEDURES   Unless otherwise noted below, none     Procedures    CRITICAL CARE TIME (.cctime)   none    PAST MEDICAL HISTORY      has a past medical history of Acute on chronic diastolic heart failure (HCC), Acute respiratory failure with hypoxia (4/5/2021), Anesthesia, Cancer (HCC), Hyperlipidemia, Hypertension, Obesity, KAMRAN treated with BiPAP, Osteoarthritis, Type 2 diabetes mellitus without complication (HCC), Type 2 diabetes mellitus without complication, without long-term current use of insulin (HCC) (2/7/2018), Urinary incontinence, stress, male, and Wears dentures.     Chronic Conditions affecting Care: chronic urinary catheter, stress incontinence.    EMERGENCY DEPARTMENT COURSE and DIFFERENTIAL DIAGNOSIS/MDM:   Vitals:    Vitals:    11/18/24 1902 11/18/24 1904   BP: (!) 163/87    Pulse: 94    Resp: 16    Temp: 98.7 °F (37.1 °C)    TempSrc: Oral    SpO2: 94%    Weight:  114.4 kg (252 lb 1.6 oz)   Height:  1.651 m (5' 5\")       Patient was given the following medications:  Medications - No data to display          Is this patient to be included in the SEP-1 Core Measure due to severe sepsis or septic shock?   No   Exclusion criteria - the patient is NOT to be included for SEP-1 Core Measure due to:  Infection is not suspected    CONSULTS: (Who and What was discussed)  IP CONSULT TO UROLOGY  Discussion with Other Profesionals : None    Social Determinants : None    Records Reviewed : Outpatient Notes chronic diastolic heart failure    CC/HPI Summary, DDx, ED Course, and Reassessment:     Briefly, this is a 74 year old male who presents to the ER with suprapubic catheter that has come out, requesting to have catheter replaced- 18 [place].    I was unsuccessful after multiple attempts to pass catheter.  I did ask attending physician to see the patient.  He was also unsuccessful,

## 2024-12-28 LAB
ESTIMATED AVERAGE GLUCOSE: NORMAL
HBA1C MFR BLD: 6.4 %

## 2025-01-05 NOTE — PROGRESS NOTES
[facility] PROGRESS NOTE    8/11/2021 2:52 PM        Name: Humble Rojas . Admitted: 8/8/2021  Primary Care Provider: Jg Hobson MD (Tel: [phone])      Subjective:  . Admitted with inability to void  Had not voided since Friday  Underwent complicated talbot Monday yesterday. Patient feeling well, denies pain. Very pleasant and eager to go home.      Current Medications  NIFEdipine (PROCARDIA XL) extended release tablet 30 mg, Daily  Fixodent Complete CREA 1 each, PRN  finasteride (PROSCAR) tablet 5 mg, Daily  atorvastatin (LIPITOR) tablet 40 mg, Daily  aspirin EC tablet 81 mg, Daily  HYDROcodone-acetaminophen (NORCO)  MG per tablet 1 tablet, TID PRN  metoprolol succinate (TOPROL XL) extended release tablet 100 mg, Daily  glucose (GLUTOSE) 40 % oral gel 15 g, PRN  dextrose 50 % IV solution, PRN  glucagon (rDNA) injection 1 mg, PRN  dextrose 5 % solution, PRN  sodium chloride flush 0.9 % injection 5-40 mL, 2 times per day  sodium chloride flush 0.9 % injection 5-40 mL, PRN  0.9 % sodium chloride infusion, PRN  enoxaparin (LOVENOX) injection 30 mg, Daily  ondansetron (ZOFRAN-ODT) disintegrating tablet 4 mg, Q8H PRN   Or  ondansetron (ZOFRAN) injection 4 mg, Q6H PRN  polyethylene glycol (GLYCOLAX) packet 17 g, Daily PRN  acetaminophen (TYLENOL) tablet 650 mg, Q6H PRN   Or  acetaminophen (TYLENOL) suppository 650 mg, Q6H PRN  insulin lispro (1 Unit Dial) 0-12 Units, TID WC  insulin lispro (1 Unit Dial) 0-6 Units, Nightly  tamsulosin (FLOMAX) capsule 0.4 mg, Daily        Objective:  /72   Pulse 75   Temp 97.6 °F (36.4 °C) (Oral)   Resp 16   Ht 5' 5\" (1.651 m)   Wt 294 lb 15.6 oz (133.8 kg)   SpO2 95%   BMI 49.09 kg/m²     Intake/Output Summary (Last 24 hours) at 8/11/2021 1452  Last data filed at 8/11/2021 1121  Gross per 24 hour   Intake 10 ml   Output 2300 ml   Net -2290 ml      Wt Readings from Last 3 Encounters:   08/11/21 294 lb 15.6 oz (133.8 kg)   07/16/21 282 lb 8 oz (128.1 kg)   07/14/21 283 lb (128.4 kg)       General appearance:  Appears comfortable, he is alert and very pleasant , obese   Eyes: Sclera clear. Pupils equal.  ENT: Moist oral mucosa. Trachea midline, no adenopathy. Cardiovascular: Regular rhythm, normal S1, S2. No murmur. No edema in lower extremities  Respiratory: Not using accessory muscles. Good inspiratory effort. Clear to auscultation bilaterally, no wheeze or crackles. GI: Abdomen soft, no tenderness, not distended, normal bowel sounds, tenderness noted over the bladder  Musculoskeletal: No cyanosis in digits, neck supple  Neurology: CN 2-12 grossly intact. No speech or motor deficits  Psych: Normal affect. Alert and oriented in time, place and person  Skin: Warm, dry, normal turgor    Labs and Tests:  CBC:   Recent Labs     08/09/21  1000 08/10/21  1125   WBC 5.4 6.0   HGB 14.4 13.7    331     BMP:    Recent Labs     08/09/21  1000 08/10/21  1125 08/11/21  0520    139 137   K 4.4 4.0 3.8    104 104   CO2 21 19* 22   BUN 34* 33* 28*   CREATININE 4.5* 3.3* 2.8*   GLUCOSE 110* 96 100*     Hepatic:   No results for input(s): AST, ALT, ALB, BILITOT, ALKPHOS in the last 72 hours. Last AIC 5.7  Impression:     1. Moderate bilateral hydronephrosis and hydroureter.  This demonstrates   long-term stability with no obstructing calculi.  Findings may be related to   bladder outlet obstruction or neurogenic bladder.  Distal ureteral strictures   would be difficult to exclude.  There is progressive bilateral renal atrophy,   right greater than left. 2. Colonic diverticulosis with no acute features. Problem List  Active Problems:    Urinary retention  Resolved Problems:    * No resolved hospital problems. *       Assessment & Plan:   1.  Acute on chronic kidney disease:  Baseline appears to be 2.2 , acute injury likely due to obstructive uropathy. Creat improved to 2.8. IV fluids changed per Nephrology. Repeat labs in am. Hopefully can dc home tomorrow. 2. Urinary retention: he has complex history that includes prostatectomy 2005 on ADT , he is also s/p bladder neck contracture incision 2011 and 2017. He has a transurethral bladder neck resection scheduled in a few weeks with his urologist, Dr Chrissie Portillo. Will dc home with a talbot  3. T2DM: will continue with humalog correction as needed. Historically his AIC have been less than 6       Diet: ADULT DIET;  Regular; 5 carb choices (75 gm/meal)  Code:Full Code  DVT PPX      Jean Pierre Yeboah PA-C   8/11/2021 2:52 PM Rivaroxaban/Xarelto increases your risk for bleeding. Notify your doctor if you experience any of the following side effects: unusual bleeding or bruising, vomiting blood or coffee ground-like material, red or black stool, itching or hives, chest tightness, trouble breathing, swelling in your face or hands, swelling in your mouth or throat, change in how much or how often you urinate, red or brown urine, heavy menstrual or vaginal bleeding, or blistering or peeling skin. When Rivaroxaban/Xarelto is taken with other medicines, they can affect how it works. Taking other medications such as aspirin, antibiotics, antifungals, blood thinners, nonsteroidal anti-inflammatories, and medications that treat depression can increase your risk of bleeding. It is very important to tell your health care provider about all of the other medicines, including over-the-counter medications, herbs, and vitamins you are taking.  DO NOT start, stop, or change the dosage of any medicine, including over-the-counter medicines, vitamins, and herbal products without your doctor’s approval.  Any products containing aspirin or are nonsteroidal anti-inflammatories lessen the blood’s ability to form clots and adds to the effect of Rivaroxaban/Xarelto. Never take aspirin or medicines that contain aspirin without speaking to your doctor.

## 2025-01-09 RX ORDER — NIFEDIPINE 60 MG/1
60 TABLET, EXTENDED RELEASE ORAL DAILY
Qty: 90 TABLET | Refills: 1 | Status: SHIPPED | OUTPATIENT
Start: 2025-01-09

## 2025-01-21 ENCOUNTER — TELEPHONE (OUTPATIENT)
Dept: CARDIOLOGY CLINIC | Age: 75
End: 2025-01-21

## 2025-01-21 NOTE — TELEPHONE ENCOUNTER
Pt would like to know if they can come in [facility] 10:30 am same day is ok, However pt has to work in the PM,    Pls advise pt stated it is ok to leave massage on VM

## 2025-01-21 NOTE — TELEPHONE ENCOUNTER
I saw him in May for the same thing.  If RG could see him when I am around and get EKG, that should be good.  YENNY

## 2025-01-21 NOTE — TELEPHONE ENCOUNTER
Cardiac clearance requested by [facility], Dr. Flores.  Pt. Scheduled 1/24/25 for Cysto Botox Injection at [facility].  I confirmed with Urology group by phone.    Patient has been seeing [facility]  Last echo 4/14/23    Do you want to see him, or would you like [facility] to see him when you're \"around\"?      There are echos open at [facility] 1/22/25

## 2025-01-22 NOTE — TELEPHONE ENCOUNTER
Was on the phone w/ Pt called to speak w/Heidi once I returned back to the Pt we were disconnected.  Tried to call the Pt on both of his phone numbers,  no answer. Lvm that the Pt has an appt wN[facility]G on 01/23 at 1:00pm.  STEPHANIE

## 2025-01-22 NOTE — TELEPHONE ENCOUNTER
He has to come in at 1p as I do not have any morning openings as I am seeing the in hospital patients

## 2025-01-22 NOTE — TELEPHONE ENCOUNTER
Pt daughter Brittni called back stating they received call, Brittni will relay the message to the patient about the date and time of appt.

## 2025-01-24 NOTE — TELEPHONE ENCOUNTER
Pt called back and stated that he could not make the 1:00 appt for an EKG as he was working. He did go to the [facility] in [place] to get one. Everything looked good.

## 2025-01-24 NOTE — TELEPHONE ENCOUNTER
Called the [facility]  they will fax over the EKG tracing from 01/22/2025. EKG tracing received today. HAMLET said if the patient can come in today she will see him at 11:00 am if the patient can get here.  spoke to WESLEY about EKG, LMOM for patient to return our call about his procedure scheduled for today asking if it was cancelled and rescheduled, Wesley can not give clearance unless patient is seen in the office by HAMLET on Monday next week or if Wesley has a appointment available on Monday morning.

## 2025-01-27 RX ORDER — ALENDRONATE SODIUM 70 MG/1
TABLET ORAL
Qty: 12 TABLET | Refills: 3 | OUTPATIENT
Start: 2025-01-27

## 2025-01-27 NOTE — TELEPHONE ENCOUNTER
Recent Visits  Date Type Provider Dept   05/21/24 Office Visit Edward Mcdonadl MD [facility]   02/21/24 Office Visit Edward Mcdonald MD [facility]   11/21/23 Office Visit Edward Mcdonald MD [facility]   Showing recent visits within past 540 days with a meds authorizing provider and meeting all other requirements  Future Appointments  No visits were found meeting these conditions.  Showing future appointments within next 150 days with a meds authorizing provider and meeting all other requirements     5/21/2024

## 2025-03-21 DIAGNOSIS — E53.8 FOLATE DEFICIENCY: ICD-10-CM

## 2025-03-21 NOTE — TELEPHONE ENCOUNTER
Called and lvm for patient to be scheduled for a follow up appt.    Recent Visits  Date Type Provider Dept   05/21/24 Office Visit Edward Mcdonald MD [facility]   02/21/24 Office Visit Edward Mcdonald MD [facility]   11/21/23 Office Visit [facility]   Showing recent visits within past 540 days with a meds authorizing provider and meeting all other requirements  Future Appointments  No visits were found meeting these conditions.  Showing future appointments within next 150 days with a meds authorizing provider and meeting all other requirements     5/21/2024

## 2025-03-24 RX ORDER — FOLIC ACID 1 MG/1
1 TABLET ORAL DAILY
Qty: 90 TABLET | Refills: 1 | Status: SHIPPED | OUTPATIENT
Start: 2025-03-24

## 2025-03-25 ENCOUNTER — PATIENT MESSAGE (OUTPATIENT)
Dept: INTERNAL MEDICINE CLINIC | Age: 75
End: 2025-03-25

## 2025-04-28 ENCOUNTER — TELEPHONE (OUTPATIENT)
Dept: CARDIOLOGY CLINIC | Age: 75
End: 2025-04-28

## 2025-04-29 ENCOUNTER — TELEPHONE (OUTPATIENT)
Dept: CARDIOLOGY CLINIC | Age: 75
End: 2025-04-29

## 2025-04-29 DIAGNOSIS — I50.32 CHRONIC DIASTOLIC CONGESTIVE HEART FAILURE (HCC): Primary | ICD-10-CM

## 2025-04-29 NOTE — TELEPHONE ENCOUNTER
Cardiac clearance:      MCPP Revision, penile prosthetic     When: TBD  Surgeon: Dr. Powell  Blood Thinners: aspirin 81 MG EC tablet  Where[facility]  LOV: 2024      Past Medical History:   Diagnosis Date    Acute on chronic diastolic heart failure (HCC)     Acute respiratory failure with hypoxia (HCC) 2021    Anesthesia     anxiety regarding ETT    Cancer (HCC)     prostate    Hyperlipidemia     Hypertension     Obesity     KAMRAN treated with BiPAP     nasal    Osteoarthritis     Type 2 diabetes mellitus without complication (HCC)     Type 2 diabetes mellitus without complication, without long-term current use of insulin (HCC) 2018    Urinary incontinence, stress, male     Wears dentures         Last Echo:  23: Overall left ventricular systolic function appears normal with EF of 60%.     EK/2/22: NSR 87 BPM    Last BNP:  10/22/24: 195    Last CBC:  Lab Results   Component Value Date    WBC 6.6 2025    HGB 12.3 (L) 2025    HCT 38.4 (L) 2025    MCV 94.9 2025     2025       Last CMP:  Lab Results   Component Value Date     2025    K 4.7 2025     2025    CO2 23 2025    BUN 25 (H) 2025    CREATININE 2.6 (H) 2025    GLUCOSE 111 (H) 2025    CALCIUM 9.6 2025    BILITOT 0.4 2023    ALKPHOS 102 2023    AST 26 2023    ALT 23 2023    LABGLOM 25 (A) 2025    GFRAA 42 (A) 2022    AGRATIO 1.1 2023    GLOB 3.7 2021       Last BMP:  Lab Results   Component Value Date/Time     2025 02:12 PM    K 4.7 2025 02:12 PM    K 4.2 2021 06:51 AM     2025 02:12 PM    CO2 23 2025 02:12 PM    BUN 25 2025 02:12 PM    CREATININE 2.6 2025 02:12 PM    GLUCOSE 111 2025 02:12 PM    CALCIUM 9.6 2025 02:12 PM    LABGLOM 25 2025 02:12 PM    LABGLOM 31 2024 10:50 AM

## 2025-05-05 ENCOUNTER — TELEPHONE (OUTPATIENT)
Dept: CARDIOLOGY CLINIC | Age: 75
End: 2025-05-05

## 2025-05-05 NOTE — TELEPHONE ENCOUNTER
Ordered echo. He states he already has an appointment June 6. He could not do this Wednesday because of work.

## 2025-05-05 NOTE — TELEPHONE ENCOUNTER
He needs an echo and an OV before clearance can be given.  He could come in 845 this Wednesday May 7 if that works for him for OV.  YENNY

## 2025-05-12 DIAGNOSIS — I50.32 CHRONIC DIASTOLIC HEART FAILURE (HCC): ICD-10-CM

## 2025-05-13 RX ORDER — TORSEMIDE 10 MG/1
10 TABLET ORAL EVERY OTHER DAY
Qty: 45 TABLET | Refills: 1 | Status: SHIPPED | OUTPATIENT
Start: 2025-05-13

## 2025-06-02 NOTE — PROGRESS NOTES
[facility]  Advanced CHF/Pulmonary Hypertension   Cardiac Evaluation      Ramón Berg  YOB: 1950    Date of Visit:  6/6/25      Chief Complaint   Patient presents with    Cardiac Clearance        History of Present Illness:  Ramón Berg is a 74 y.o. male who presents from referral from Marietta Fletcher NP for consultation and management of diastolic heart failure.     Hx of Acute on chronic diastolic heart failure (HCC), Acute respiratory failure with hypoxia, Anesthesia, Cancer (HCC), Hyperlipidemia, Hypertension, Obesity, KAMRAN treated with BiPAP, Osteoarthritis, Type 2 diabetes mellitus without complication (HCC), Type 2 diabetes mellitus without complication, without long-term current use of insulin (HCC), Urinary incontinence, stress, male, and Wears dentures.     Standing Orders: no  Cardiac Stent: no  Cardiac Device: no    Pt was last seen in office 5/20/2024 and presents today for a follow up of cardiac risk assessment for MCPP Revision  with Dr. Powell at [facility] on -date pending cardiac clearance-. He is actually not sure he is even going to have this procedure or not, he has not decided. Last EF 60% on 4/14/23. Echo 6/4/25, EF 55-60%, Mildly elevated RVSP (40 mmhg) consistent with mild pulmonary hypertension. Pt is on CPAP and uses regularly. Follows with Dr. Medina, Nephrology. Follows with Joana Platt MD, nephrology. He has gone from 314 to 250 pounds.  Complains of chronic SOB, unchanged. Denies chest pain, palpitations, dizziness, and edema today.       NYHA Class 2    Heart Failure Quality of Life Questionnaire   What brings you the most gopal in life: weight loss is doing good    What is an activity your heart issues prevent you from doing now, a physical goal you would like to achieve in the future: weight loss is doing good    3.  Today, I feel my heart health is rated; 0=very poor, 10=excellent: 5    Allergies   Allergen Reactions    Indomethacin

## 2025-06-06 ENCOUNTER — OFFICE VISIT (OUTPATIENT)
Dept: CARDIOLOGY CLINIC | Age: 75
End: 2025-06-06
Payer: MEDICARE

## 2025-06-06 ENCOUNTER — RESULTS FOLLOW-UP (OUTPATIENT)
Dept: CARDIOLOGY CLINIC | Age: 75
End: 2025-06-06

## 2025-06-06 VITALS
WEIGHT: 258 LBS | OXYGEN SATURATION: 96 % | BODY MASS INDEX: 42.99 KG/M2 | SYSTOLIC BLOOD PRESSURE: 116 MMHG | HEIGHT: 65 IN | DIASTOLIC BLOOD PRESSURE: 62 MMHG | HEART RATE: 74 BPM

## 2025-06-06 DIAGNOSIS — G47.33 OSA (OBSTRUCTIVE SLEEP APNEA): ICD-10-CM

## 2025-06-06 DIAGNOSIS — I50.32 CHRONIC DIASTOLIC HEART FAILURE (HCC): ICD-10-CM

## 2025-06-06 DIAGNOSIS — Z01.810 PRE-OPERATIVE CARDIOVASCULAR EXAMINATION: Primary | ICD-10-CM

## 2025-06-06 DIAGNOSIS — N18.30 STAGE 3 CHRONIC KIDNEY DISEASE, UNSPECIFIED WHETHER STAGE 3A OR 3B CKD (HCC): ICD-10-CM

## 2025-06-06 DIAGNOSIS — E66.813 CLASS 3 SEVERE OBESITY DUE TO EXCESS CALORIES WITH SERIOUS COMORBIDITY AND BODY MASS INDEX (BMI) OF 45.0 TO 49.9 IN ADULT (HCC): ICD-10-CM

## 2025-06-06 PROCEDURE — 93000 ELECTROCARDIOGRAM COMPLETE: CPT | Performed by: INTERNAL MEDICINE

## 2025-06-06 PROCEDURE — G2211 COMPLEX E/M VISIT ADD ON: HCPCS | Performed by: INTERNAL MEDICINE

## 2025-06-06 PROCEDURE — 1159F MED LIST DOCD IN RCRD: CPT | Performed by: INTERNAL MEDICINE

## 2025-06-06 PROCEDURE — 3078F DIAST BP <80 MM HG: CPT | Performed by: INTERNAL MEDICINE

## 2025-06-06 PROCEDURE — 1036F TOBACCO NON-USER: CPT | Performed by: INTERNAL MEDICINE

## 2025-06-06 PROCEDURE — 99215 OFFICE O/P EST HI 40 MIN: CPT | Performed by: INTERNAL MEDICINE

## 2025-06-06 PROCEDURE — G8427 DOCREV CUR MEDS BY ELIG CLIN: HCPCS | Performed by: INTERNAL MEDICINE

## 2025-06-06 PROCEDURE — 1123F ACP DISCUSS/DSCN MKR DOCD: CPT | Performed by: INTERNAL MEDICINE

## 2025-06-06 PROCEDURE — 3017F COLORECTAL CA SCREEN DOC REV: CPT | Performed by: INTERNAL MEDICINE

## 2025-06-06 PROCEDURE — G8417 CALC BMI ABV UP PARAM F/U: HCPCS | Performed by: INTERNAL MEDICINE

## 2025-06-06 PROCEDURE — 3074F SYST BP LT 130 MM HG: CPT | Performed by: INTERNAL MEDICINE

## 2025-06-06 NOTE — PATIENT INSTRUCTIONS
Continue current medications, no changes  Cardiac Clearance for upcoming procedure is approved with Dr. Powell. Clearance form faxed successfully. Stop aspirin for 7 days prior to procedure. The day of surgery, hold all medications EXCEPT FOR metoprolol.   Follow up with DEWAYNE Ga in January  Call my office for any worsening symptoms such as shortness of breath, chest pain, sudden weight gain or loss, or any increased swelling: Phone: [phone] or Fax: [phone]

## 2025-06-17 ENCOUNTER — TELEPHONE (OUTPATIENT)
Dept: INTERNAL MEDICINE CLINIC | Age: 75
End: 2025-06-17

## 2025-07-08 RX ORDER — NIFEDIPINE 60 MG/1
60 TABLET, EXTENDED RELEASE ORAL DAILY
Qty: 90 TABLET | Refills: 3 | Status: SHIPPED | OUTPATIENT
Start: 2025-07-08

## 2025-07-08 NOTE — TELEPHONE ENCOUNTER
Prescription refill    Last OV:06/06/2025     Last Refill:01/09/2025    Labs:06/04/2025    Future Appt:01/12/2026

## (undated) DEVICE — 35 ML SYRINGE LUER-LOCK TIP: Brand: MONOJECT

## (undated) DEVICE — GUIDEWIRE URO L150CM DIA0.035IN TAPR 3CM NIT HYDRPHLC ANG

## (undated) DEVICE — BAG DRNGE 2000ML ROUNDED TEARDROP W/ ANTIREFLX CHMBR DISP

## (undated) DEVICE — GUIDEWIRE URO L145CM DIA0.035IN TIP L3.5CM PTFE FLX AMPLATZ

## (undated) DEVICE — OPEN-END FLEXI-TIP URETERAL CATHETER: Brand: FLEXI-TIP

## (undated) DEVICE — GLOVE ORANGE PI 7 1/2   MSG9075

## (undated) DEVICE — BAG DRNGE COMB PK

## (undated) DEVICE — CYSTO: Brand: MEDLINE INDUSTRIES, INC.

## (undated) DEVICE — SOLUTION IRRIG 2000ML STRL H2O UROMATIC PLAS CONT USP

## (undated) DEVICE — CATHETER URETH 24FR 5CC BLLN SIL ELASTMR F 2 W